# Patient Record
Sex: MALE | Race: WHITE | NOT HISPANIC OR LATINO | Employment: FULL TIME | URBAN - METROPOLITAN AREA
[De-identification: names, ages, dates, MRNs, and addresses within clinical notes are randomized per-mention and may not be internally consistent; named-entity substitution may affect disease eponyms.]

---

## 2017-06-02 ENCOUNTER — LAB VISIT (OUTPATIENT)
Dept: LAB | Facility: HOSPITAL | Age: 42
End: 2017-06-02
Attending: INTERNAL MEDICINE
Payer: COMMERCIAL

## 2017-06-02 DIAGNOSIS — N18.30 CHRONIC KIDNEY DISEASE, STAGE III (MODERATE): ICD-10-CM

## 2017-06-02 LAB
ANION GAP SERPL CALC-SCNC: 9 MMOL/L
BASOPHILS # BLD AUTO: 0.03 K/UL
BASOPHILS NFR BLD: 0.3 %
BUN SERPL-MCNC: 20 MG/DL
CALCIUM SERPL-MCNC: 9.2 MG/DL
CHLORIDE SERPL-SCNC: 105 MMOL/L
CO2 SERPL-SCNC: 24 MMOL/L
CREAT SERPL-MCNC: 1.5 MG/DL
DIFFERENTIAL METHOD: ABNORMAL
EOSINOPHIL # BLD AUTO: 0.4 K/UL
EOSINOPHIL NFR BLD: 3.4 %
ERYTHROCYTE [DISTWIDTH] IN BLOOD BY AUTOMATED COUNT: 13.5 %
EST. GFR  (AFRICAN AMERICAN): >60 ML/MIN/1.73 M^2
EST. GFR  (NON AFRICAN AMERICAN): 56.2 ML/MIN/1.73 M^2
GLUCOSE SERPL-MCNC: 108 MG/DL
HCT VFR BLD AUTO: 45.3 %
HGB BLD-MCNC: 15.1 G/DL
LYMPHOCYTES # BLD AUTO: 4.5 K/UL
LYMPHOCYTES NFR BLD: 41.2 %
MCH RBC QN AUTO: 32.1 PG
MCHC RBC AUTO-ENTMCNC: 33.3 %
MCV RBC AUTO: 96 FL
MONOCYTES # BLD AUTO: 0.7 K/UL
MONOCYTES NFR BLD: 6.1 %
NEUTROPHILS # BLD AUTO: 5.3 K/UL
NEUTROPHILS NFR BLD: 48.9 %
PLATELET # BLD AUTO: 321 K/UL
PMV BLD AUTO: 9.4 FL
POTASSIUM SERPL-SCNC: 4.1 MMOL/L
RBC # BLD AUTO: 4.7 M/UL
SODIUM SERPL-SCNC: 138 MMOL/L
WBC # BLD AUTO: 10.91 K/UL

## 2017-06-02 PROCEDURE — 36415 COLL VENOUS BLD VENIPUNCTURE: CPT | Mod: PO

## 2017-06-02 PROCEDURE — 85025 COMPLETE CBC W/AUTO DIFF WBC: CPT

## 2017-06-02 PROCEDURE — 80048 BASIC METABOLIC PNL TOTAL CA: CPT

## 2017-06-09 ENCOUNTER — OFFICE VISIT (OUTPATIENT)
Dept: NEPHROLOGY | Facility: CLINIC | Age: 42
End: 2017-06-09
Payer: COMMERCIAL

## 2017-06-09 VITALS
BODY MASS INDEX: 34.41 KG/M2 | HEIGHT: 71 IN | SYSTOLIC BLOOD PRESSURE: 140 MMHG | DIASTOLIC BLOOD PRESSURE: 70 MMHG | WEIGHT: 245.81 LBS | OXYGEN SATURATION: 98 % | HEART RATE: 88 BPM

## 2017-06-09 DIAGNOSIS — N02.B9 IGA NEPHROPATHY: ICD-10-CM

## 2017-06-09 DIAGNOSIS — N18.30 CKD (CHRONIC KIDNEY DISEASE) STAGE 3, GFR 30-59 ML/MIN: Primary | ICD-10-CM

## 2017-06-09 PROCEDURE — 99214 OFFICE O/P EST MOD 30 MIN: CPT | Mod: S$GLB,,, | Performed by: INTERNAL MEDICINE

## 2017-06-09 PROCEDURE — 99999 PR PBB SHADOW E&M-EST. PATIENT-LVL III: CPT | Mod: PBBFAC,,, | Performed by: INTERNAL MEDICINE

## 2017-06-09 RX ORDER — DUREZOL 0.5 MG/ML
EMULSION OPHTHALMIC
COMMUNITY
Start: 2017-06-05 | End: 2019-09-11 | Stop reason: ALTCHOICE

## 2017-06-09 NOTE — PROGRESS NOTES
New Consultation Report  Nephrology      Consult Requested By: Self-referred  Reason for Consult: CKD    History of Present Illness:  Patient is a 41 y.o. male presents with prior history of CKD stage 2/3, formerly followed by Dr. SONG Mancuso but has not been seen by a nephrologist in 4 years. Mr. Pena reports history of glomerulonephritis and gross hematuria at the age of 12. After that, he states that he has always had hematuria. He also reports history of iritis. He has family history of father with ESRD.     The patient denies taking NSAIDs or new antibiotics, recreational drugs, recent episode of dehydration, diarrhea, nausea or vomiting, acute illness, hospitalization or exposure to IV radiocontrast.     Past Medical History:   Diagnosis Date    Chronic kidney disease     Chronic kidney disease stage II    Colon polyps     Hyperlipidemia 12/13/2012    Hypertension        Current Outpatient Prescriptions:     allopurinol (ZYLOPRIM) 100 MG tablet, Take 1 tablet (100 mg total) by mouth 3 (three) times daily., Disp: 90 tablet, Rfl: 3    atorvastatin (LIPITOR) 10 MG tablet, Take 1 tablet (10 mg total) by mouth once daily., Disp: 90 tablet, Rfl: 3    DAILY GARLIC ONCE-A-DAY ORAL, Take by mouth., Disp: , Rfl:     DUREZOL 0.05 % Drop ophthalmic solution, , Disp: , Rfl:     lactobacillus acidophilus & bulgar (LACTINEX) 100 million cell packet, Take 1 tablet by mouth once daily., Disp: , Rfl:     multivitamin (ONE DAILY MULTIVITAMIN) per tablet, Take 1 tablet by mouth., Disp: , Rfl:     valsartan (DIOVAN) 160 MG tablet, Take 1 tablet (160 mg total) by mouth once daily., Disp: 90 tablet, Rfl: 3  No Known Allergies     Past Surgical History:   Procedure Laterality Date    COSMETIC SURGERY      pyloric stenosis       Family History   Problem Relation Age of Onset    COPD Mother     Heart disease Mother     Peripheral vascular disease Mother     Diabetes Father     Kidney disease Father     Stroke Father      Hypertension Father     Heart disease Father 70     CABG x 3    Heart disease Maternal Grandfather      Social History   Substance Use Topics    Smoking status: Current Every Day Smoker     Packs/day: 1.00     Years: 24.00    Smokeless tobacco: Not on file    Alcohol use Yes      Comment: Occasional       Review of Systems   Constitutional: Negative.    HENT: Negative.    Eyes: Positive for pain and redness. Negative for blurred vision, double vision, photophobia and discharge.   Respiratory: Positive for cough. Negative for sputum production, shortness of breath and wheezing.    Genitourinary: Negative for dysuria, hematuria and urgency.   Musculoskeletal: Negative for back pain, falls, joint pain, myalgias and neck pain.   Skin: Negative.    Neurological: Negative.    All other systems reviewed and are negative.      Vitals:    06/09/17 1517   BP: (!) 140/70   Pulse: 88       PHYSICAL EXAMINATION:  General: no distress, well nourished  Skin: color, texture, turgor normal. No rash or lesions  HEENT:  Eyes: reactive pupils, normal conjunctiva, mild redness. Oral mucosa moist, no ulcers. Throat: no erythema.  Neck: supple, symmetrical, trachea midline, no JVD, no carotid bruit  Lungs: clear to auscultation bilaterally and normal respiratory effort  Cardiovascular: Heart: regular rate and rhythm, S1, S2 normal, no murmur, rub or gallop. Pulses: 2+ and symmetric.  Abdomen: bowel sounds present, no abdominal bruit, soft, non-tender non-distented; no masses, organomegaly or ascites.   Musculoskeletal: no pitting edema in lower extremities, no clubbing or cyanosis  Lymph Nodes: No cervical or supraclavicular adenopathy  Neurologic: AAOx3, normal strength and tone. No focal deficit. No asterixis.       LABORATORY DATA:  Lab Results   Component Value Date    CREATININE 1.5 (H) 06/02/2017       Prot/Creat Ratio, Ur   Date Value Ref Range Status   11/08/2016 0.19 0.00 - 0.20 Final   08/12/2009 0.29 0.04 - 0.70 Final    06/18/2008 0.08 0.04 - 0.70 Final       Lab Results   Component Value Date     06/02/2017    K 4.1 06/02/2017    CO2 24 06/02/2017       last PTH   Lab Results   Component Value Date    PTH 85.0 (H) 11/08/2016    CALCIUM 9.2 06/02/2017    PHOS 3.9 11/08/2016       Lab Results   Component Value Date    HGB 15.1 06/02/2017        Lab Results   Component Value Date    HGBA1C 6.2 11/08/2016       Lab Results   Component Value Date    LDLCALC Invalid, Trig>400.0 11/08/2016         IMPRESSION / RECOMMENDATIONS:     1. CKD (chronic kidney disease) stage 3A, eGFR 56 ml/min  Unknown cause. HTN might be secondary to CKD instead of the cause. 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 History of iritis suggests systemic autoimmune link, uveitis can be an extrarenal manifestation in patients with IgA nephropathy. Not progressive, very low grade proteinuria and hematuria now. No compelling reason to pursue kidney biopsy. On an ARB. Asked to avoid NSAIDs. On atorvastatin. No compelling indication for immunosuppression at this time.     allopurinol (ZYLOPRIM) 100 MG tablet to slow CKD progression   2. Essential hypertension  As above. On ARB. Asked to reduce salt intake   3. Hyperuricemia  allopurinol (ZYLOPRIM) 100 MG tablet daily to keep serum Ur Ac < 7 mg/dL     SUMMARY OF PLAN:  1. Continue valsartan 160 mg daily; add low Na diet  2. Avoid NSAIDs  3. Continue atorvastatin 10 mg daily  4. Continue allopurinol 100 mg daily  5. RTC in 6 months

## 2017-06-28 DIAGNOSIS — I10 ESSENTIAL HYPERTENSION: ICD-10-CM

## 2017-06-28 DIAGNOSIS — N18.30 CKD (CHRONIC KIDNEY DISEASE) STAGE 3, GFR 30-59 ML/MIN: ICD-10-CM

## 2017-06-28 DIAGNOSIS — E79.0 HYPERURICEMIA: ICD-10-CM

## 2017-06-28 NOTE — TELEPHONE ENCOUNTER
----- Message from Soni Schmidt sent at 6/28/2017 12:50 PM CDT -----  Contact: self   [phone]  Pt is requesting a refill for all his medication:    valsartan (DIOVAN) 160 MG tablet   lactobacillus acidophilus & bulgar (LACTINEX) 100 million cell packet   DUREZOL 0.05 % Drop ophthalmic solution   atorvastatin (LIPITOR) 10 MG tablet   allopurinol (ZYLOPRIM) 100 MG tablet      Pt is requesting 90 supply on meds  Pt is requesting to speak with the nurse concerning changing his pressure meds please    [facility] phone [phone]    Thank you!          Called pt l/m

## 2017-06-30 DIAGNOSIS — E79.0 HYPERURICEMIA: ICD-10-CM

## 2017-06-30 DIAGNOSIS — N18.30 CKD (CHRONIC KIDNEY DISEASE) STAGE 3, GFR 30-59 ML/MIN: ICD-10-CM

## 2017-06-30 DIAGNOSIS — I10 ESSENTIAL HYPERTENSION: ICD-10-CM

## 2017-07-12 RX ORDER — L. ACIDOPHILUS/L.BULGARICUS 100MM CELL
1 GRANULES IN PACKET (EA) ORAL DAILY
Qty: 90 PACKET | Refills: 0 | Status: SHIPPED | OUTPATIENT
Start: 2017-07-12 | End: 2023-02-16

## 2017-07-12 RX ORDER — ALLOPURINOL 100 MG/1
100 TABLET ORAL 3 TIMES DAILY
Qty: 90 TABLET | Refills: 3 | Status: SHIPPED | OUTPATIENT
Start: 2017-07-12 | End: 2017-07-12 | Stop reason: SDUPTHER

## 2017-07-12 RX ORDER — ATORVASTATIN CALCIUM 10 MG/1
10 TABLET, FILM COATED ORAL DAILY
Qty: 90 TABLET | Refills: 3 | Status: SHIPPED | OUTPATIENT
Start: 2017-07-12 | End: 2019-09-11 | Stop reason: ALTCHOICE

## 2017-07-12 RX ORDER — VALSARTAN 160 MG/1
160 TABLET ORAL DAILY
Qty: 90 TABLET | Refills: 0 | Status: SHIPPED | OUTPATIENT
Start: 2017-07-12 | End: 2018-07-12

## 2017-07-12 RX ORDER — ALLOPURINOL 100 MG/1
100 TABLET ORAL 3 TIMES DAILY
Qty: 90 TABLET | Refills: 0 | Status: SHIPPED | OUTPATIENT
Start: 2017-07-12 | End: 2019-09-11 | Stop reason: SDUPTHER

## 2017-07-24 ENCOUNTER — OFFICE VISIT (OUTPATIENT)
Dept: RHEUMATOLOGY | Facility: CLINIC | Age: 42
End: 2017-07-24
Payer: COMMERCIAL

## 2017-07-24 ENCOUNTER — HOSPITAL ENCOUNTER (OUTPATIENT)
Dept: RADIOLOGY | Facility: HOSPITAL | Age: 42
Discharge: HOME | End: 2017-07-24
Attending: INTERNAL MEDICINE
Payer: COMMERCIAL

## 2017-07-24 VITALS
HEIGHT: 71 IN | WEIGHT: 242.69 LBS | BODY MASS INDEX: 33.98 KG/M2 | SYSTOLIC BLOOD PRESSURE: 130 MMHG | DIASTOLIC BLOOD PRESSURE: 86 MMHG | HEART RATE: 85 BPM

## 2017-07-24 DIAGNOSIS — H20.10 IRITIS, CHRONIC: Primary | ICD-10-CM

## 2017-07-24 DIAGNOSIS — H20.10 IRITIS, CHRONIC: ICD-10-CM

## 2017-07-24 PROCEDURE — 71020 XR CHEST PA AND LATERAL: CPT | Mod: TC

## 2017-07-24 PROCEDURE — 99244 OFF/OP CNSLTJ NEW/EST MOD 40: CPT | Mod: S$GLB,,, | Performed by: INTERNAL MEDICINE

## 2017-07-24 PROCEDURE — 99999 PR PBB SHADOW E&M-EST. PATIENT-LVL III: CPT | Mod: PBBFAC,,, | Performed by: INTERNAL MEDICINE

## 2017-07-24 PROCEDURE — 71020 XR CHEST PA AND LATERAL: CPT | Mod: 26,,, | Performed by: RADIOLOGY

## 2017-07-24 ASSESSMENT — ROUTINE ASSESSMENT OF PATIENT INDEX DATA (RAPID3)
TOTAL RAPID3 SCORE: .33
MDHAQ FUNCTION SCORE: 0
WHEN YOU AWAKENED IN THE MORNING OVER THE LAST WEEK, PLEASE INDICATE THE AMOUNT OF TIME IT TAKES UNTIL YOU ARE AS LIMBER AS YOU WILL BE FOR THE DAY: 1HR
AM STIFFNESS SCORE: 1, YES
PAIN SCORE: 0
PSYCHOLOGICAL DISTRESS SCORE: 0
PATIENT GLOBAL ASSESSMENT SCORE: 1
FATIGUE SCORE: 0

## 2017-07-24 NOTE — LETTER
July 24, 2017      CLARK Lopez, OD  [address] [place] [id number]           [facility] - Rheumatology  [address]  [facility] [address]  Phone: [phone]  Fax: [phone]          Patient: Edouard Pena   MR Number: [id number]   YOB: 1975   Date of Visit: 7/24/2017       Dear Dr. CLARK Lopez:    Thank you for referring Edouard Pena to me for evaluation. Attached you will find relevant portions of my assessment and plan of care.    If you have questions, please do not hesitate to call me. I look forward to following Edouard Pena along with you.    Sincerely,    Vijay Walker MD    Enclosure  CC:  No Recipients    If you would like to receive this communication electronically, please contact [email or web address] or [phone] to request more information on [employer] Link access.    For providers and/or their staff who would like to refer a patient to [facility], please contact us through our one-stop-shop provider referral line, [facility], at [phone].    If you feel you have received this communication in error or would no longer like to receive these types of communications, please e-mail [email or web address]
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
## 2017-07-31 ENCOUNTER — TELEPHONE (OUTPATIENT)
Dept: RHEUMATOLOGY | Facility: CLINIC | Age: 42
End: 2017-07-31

## 2017-08-02 ENCOUNTER — TELEPHONE (OUTPATIENT)
Dept: RHEUMATOLOGY | Facility: CLINIC | Age: 42
End: 2017-08-02

## 2017-11-06 DIAGNOSIS — I10 ESSENTIAL HYPERTENSION: ICD-10-CM

## 2017-11-06 DIAGNOSIS — E79.0 HYPERURICEMIA: ICD-10-CM

## 2017-11-06 DIAGNOSIS — N18.30 CKD (CHRONIC KIDNEY DISEASE) STAGE 3, GFR 30-59 ML/MIN: ICD-10-CM

## 2017-11-06 RX ORDER — VALSARTAN 160 MG/1
TABLET ORAL
Qty: 90 TABLET | Refills: 0 | OUTPATIENT
Start: 2017-11-06

## 2017-12-04 DIAGNOSIS — E79.0 HYPERURICEMIA: ICD-10-CM

## 2017-12-04 DIAGNOSIS — N18.30 CKD (CHRONIC KIDNEY DISEASE) STAGE 3, GFR 30-59 ML/MIN: ICD-10-CM

## 2017-12-04 DIAGNOSIS — I10 ESSENTIAL HYPERTENSION: ICD-10-CM

## 2017-12-04 RX ORDER — VALSARTAN 160 MG/1
TABLET ORAL
Qty: 90 TABLET | Refills: 3 | Status: SHIPPED | OUTPATIENT
Start: 2017-12-04 | End: 2018-07-19

## 2018-02-14 RX ORDER — ALLOPURINOL 100 MG/1
TABLET ORAL
Qty: 90 TABLET | Refills: 3 | Status: SHIPPED | OUTPATIENT
Start: 2018-02-14 | End: 2022-08-25

## 2018-07-16 ENCOUNTER — TELEPHONE (OUTPATIENT)
Dept: NEPHROLOGY | Facility: CLINIC | Age: 43
End: 2018-07-16

## 2018-07-19 DIAGNOSIS — I10 HYPERTENSION, UNSPECIFIED TYPE: ICD-10-CM

## 2018-07-19 DIAGNOSIS — N18.30 CKD (CHRONIC KIDNEY DISEASE) STAGE 3, GFR 30-59 ML/MIN: Primary | ICD-10-CM

## 2018-07-19 RX ORDER — IRBESARTAN 300 MG/1
300 TABLET ORAL DAILY
Qty: 90 TABLET | Refills: 3 | Status: SHIPPED | OUTPATIENT
Start: 2018-07-19 | End: 2019-09-11 | Stop reason: ALTCHOICE

## 2018-09-10 ENCOUNTER — TELEPHONE (OUTPATIENT)
Dept: NEPHROLOGY | Facility: CLINIC | Age: 43
End: 2018-09-10

## 2018-09-10 NOTE — TELEPHONE ENCOUNTER
New medication was prescribed on 7/19 pt changed pharmacies and never received prescription. Script was called in to the right pharmacy on 9/10----- Message from Radha Lowery sent at 9/10/2018  1:24 PM CDT -----  Contact: [phone]  .Needs Advice    Reason for call:      Communication Preference:  Additional Information:pt states he stop taking valsartan because of the recall stats he never got any other medication for replacement

## 2018-12-10 DIAGNOSIS — E79.0 HYPERURICEMIA: ICD-10-CM

## 2018-12-10 DIAGNOSIS — N18.30 CKD (CHRONIC KIDNEY DISEASE) STAGE 3, GFR 30-59 ML/MIN: ICD-10-CM

## 2018-12-10 DIAGNOSIS — I10 ESSENTIAL HYPERTENSION: ICD-10-CM

## 2018-12-10 RX ORDER — VALSARTAN 160 MG/1
TABLET ORAL
Qty: 90 TABLET | Refills: 3 | Status: SHIPPED | OUTPATIENT
Start: 2018-12-10 | End: 2019-12-13 | Stop reason: SDUPTHER

## 2019-09-11 ENCOUNTER — OFFICE VISIT (OUTPATIENT)
Dept: INTERNAL MEDICINE | Facility: CLINIC | Age: 44
End: 2019-09-11
Payer: COMMERCIAL

## 2019-09-11 VITALS
SYSTOLIC BLOOD PRESSURE: 120 MMHG | WEIGHT: 249.81 LBS | TEMPERATURE: 99 F | DIASTOLIC BLOOD PRESSURE: 70 MMHG | HEIGHT: 71 IN | BODY MASS INDEX: 34.97 KG/M2 | RESPIRATION RATE: 20 BRPM | HEART RATE: 80 BPM

## 2019-09-11 DIAGNOSIS — R36.1 BLOOD IN SEMEN: ICD-10-CM

## 2019-09-11 DIAGNOSIS — F17.200 TOBACCO DEPENDENCE: ICD-10-CM

## 2019-09-11 DIAGNOSIS — B96.89 ACUTE BACTERIAL BRONCHITIS: ICD-10-CM

## 2019-09-11 DIAGNOSIS — I10 ESSENTIAL HYPERTENSION: ICD-10-CM

## 2019-09-11 DIAGNOSIS — K92.1 BLOOD IN STOOL: ICD-10-CM

## 2019-09-11 DIAGNOSIS — J20.8 ACUTE BACTERIAL BRONCHITIS: ICD-10-CM

## 2019-09-11 DIAGNOSIS — Z00.00 ANNUAL PHYSICAL EXAM: Primary | ICD-10-CM

## 2019-09-11 DIAGNOSIS — R55 SYNCOPE, UNSPECIFIED SYNCOPE TYPE: ICD-10-CM

## 2019-09-11 DIAGNOSIS — N18.30 CKD (CHRONIC KIDNEY DISEASE) STAGE 3, GFR 30-59 ML/MIN: ICD-10-CM

## 2019-09-11 PROCEDURE — 93005 EKG 12-LEAD: ICD-10-PCS | Mod: S$GLB,,, | Performed by: HOSPITALIST

## 2019-09-11 PROCEDURE — 99386 PREV VISIT NEW AGE 40-64: CPT | Mod: S$GLB,,, | Performed by: HOSPITALIST

## 2019-09-11 PROCEDURE — 3078F PR MOST RECENT DIASTOLIC BLOOD PRESSURE < 80 MM HG: ICD-10-PCS | Mod: CPTII,S$GLB,, | Performed by: HOSPITALIST

## 2019-09-11 PROCEDURE — 99999 PR PBB SHADOW E&M-EST. PATIENT-LVL V: ICD-10-PCS | Mod: PBBFAC,,, | Performed by: HOSPITALIST

## 2019-09-11 PROCEDURE — 93010 EKG 12-LEAD: ICD-10-PCS | Mod: S$GLB,,, | Performed by: INTERNAL MEDICINE

## 2019-09-11 PROCEDURE — 3074F PR MOST RECENT SYSTOLIC BLOOD PRESSURE < 130 MM HG: ICD-10-PCS | Mod: CPTII,S$GLB,, | Performed by: HOSPITALIST

## 2019-09-11 PROCEDURE — 3078F DIAST BP <80 MM HG: CPT | Mod: CPTII,S$GLB,, | Performed by: HOSPITALIST

## 2019-09-11 PROCEDURE — 93005 ELECTROCARDIOGRAM TRACING: CPT | Mod: S$GLB,,, | Performed by: HOSPITALIST

## 2019-09-11 PROCEDURE — 99999 PR PBB SHADOW E&M-EST. PATIENT-LVL V: CPT | Mod: PBBFAC,,, | Performed by: HOSPITALIST

## 2019-09-11 PROCEDURE — 3074F SYST BP LT 130 MM HG: CPT | Mod: CPTII,S$GLB,, | Performed by: HOSPITALIST

## 2019-09-11 PROCEDURE — 99386 PR PREVENTIVE VISIT,NEW,40-64: ICD-10-PCS | Mod: S$GLB,,, | Performed by: HOSPITALIST

## 2019-09-11 PROCEDURE — 93010 ELECTROCARDIOGRAM REPORT: CPT | Mod: S$GLB,,, | Performed by: INTERNAL MEDICINE

## 2019-09-11 RX ORDER — AMOXICILLIN 500 MG
1 CAPSULE ORAL DAILY
COMMUNITY
End: 2023-02-16

## 2019-09-11 RX ORDER — AZITHROMYCIN 250 MG/1
TABLET, FILM COATED ORAL
Qty: 6 TABLET | Refills: 0 | Status: SHIPPED | OUTPATIENT
Start: 2019-09-11 | End: 2019-09-16

## 2019-09-11 NOTE — PROGRESS NOTES
"Subjective:     @Patient ID: Edouard Pena is a 44 y.o. male.    Chief Complaint: Establish Care and Annual Exam    HPI    44 y.o. male here for annual exam. Pt is new to me. 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 Is    1. Blood in semen 1-2 months. Intermittently. States brown appearance but not sure if blood  2. Blood in stool - started 2013. Last colonoscopy done at [facility]. Found polyps. Also has external hemorrhoids. Reports bright red blood. Last episode few months ago. Reports possible straining.   3. Syncope- started 2 years ago. heat exhaustion? Works outside. Has occurred 3x over the years. Reports possibly dehydration. One episode occurred when getting out of shower, hit head as he was going out. States only out a few seconds. Had also drank the night before this occurred Feb 2019. Last episode May 2019 and was outdoors, thought he had to have bowel movement and passed out.  Denies chest pain. "feels like black out"  4. Cough with green sputum x 4 days. Mild wheeze. No fever/chills. Not improving    Lipid disorders/ASCVD risk (ages >/= 45 or >/= 20 if increased risk ): ordered  DM (>45y yearly or if obese, HTN): A1c ordered  Eye exam:   Colorectal Cancer (normal risk 50-75yr): Colonoscopy  due   Prostate (per discussion with patient starting at 50y or 45y if high risk):     Vaccines:   Influenza (yearly) declines   Tetanus (every 10 yrs - 1st tdap) : reports done within 10 years    PPSV23(>66yo or <65 w/ lung dz, smoking, DM) :due. Defer at this time due to respiratory infection      Exercise:  none  Diet:  regular        Review of Systems   Constitutional: Negative for chills and fever.   HENT: Negative for congestion and sore throat.    Eyes: Negative for pain and visual disturbance.   Respiratory: Negative for cough and shortness of breath.    Cardiovascular: Negative for chest pain and leg swelling.   Gastrointestinal: Negative for abdominal pain, nausea and vomiting.   Endocrine: Negative for " "polydipsia and polyuria.   Genitourinary: Negative for difficulty urinating and dysuria.   Musculoskeletal: Negative for arthralgias and back pain.   Skin: Negative for rash and wound.   Neurological: Negative for weakness and headaches.   Psychiatric/Behavioral: Negative for agitation and confusion.     Past medical history, surgical history, and family medical history reviewed and updated as appropriate.    Medications and allergies reviewed.     Objective:     Vitals:    09/11/19 1624   BP: 120/70   Pulse: 80   Resp: 20   Temp: 99.3 °F (37.4 °C)   Weight: 113.3 kg (249 lb 12.5 oz)   Height: 5' 11" (1.803 m)     Body mass index is 34.84 kg/m².  Physical Exam   Constitutional: He is oriented to person, place, and time. He appears well-developed and well-nourished. No distress.   HENT:   Head: Normocephalic and atraumatic.   Right Ear: External ear normal.   Left Ear: External ear normal.   Mouth/Throat: Oropharynx is clear and moist. No oropharyngeal exudate.   Eyes: Pupils are equal, round, and reactive to light. Conjunctivae are normal. Right eye exhibits no discharge. Left eye exhibits no discharge.   Neck: Normal range of motion. Neck supple.   Cardiovascular: Normal rate, regular rhythm, normal heart sounds and intact distal pulses. Exam reveals no friction rub.   No murmur heard.  Pulmonary/Chest: Effort normal and breath sounds normal.   Abdominal: Soft. Bowel sounds are normal. He exhibits no distension. There is no tenderness.   Musculoskeletal: Normal range of motion. He exhibits no edema.   Lymphadenopathy:     He has no cervical adenopathy.   Neurological: He is alert and oriented to person, place, and time. No cranial nerve deficit or sensory deficit. He exhibits normal muscle tone. Coordination normal.   Skin: Skin is warm and dry.   Psychiatric: He has a normal mood and affect. His behavior is normal.   Vitals reviewed.      Lab Results   Component Value Date    WBC 10.91 06/02/2017    HGB 15.1 " 06/02/2017    HCT 45.3 06/02/2017     06/02/2017    CHOL 243 (H) 11/08/2016    TRIG 425 (H) 11/08/2016    HDL 31 (L) 11/08/2016    ALT 42 11/08/2016    AST 33 11/08/2016     06/02/2017    K 4.1 06/02/2017     06/02/2017    CREATININE 1.5 (H) 06/02/2017    BUN 20 06/02/2017    CO2 24 06/02/2017    TSH 1.200 12/12/2012    HGBA1C 6.2 11/08/2016       Assessment:     1. Annual physical exam    2. Syncope, unspecified syncope type    3. Acute bacterial bronchitis    4. Essential hypertension    5. CKD (chronic kidney disease) stage 3, GFR 30-59 ml/min    6. Blood in semen    7. Tobacco dependence      Plan:   Edouard was seen today for establish care and annual exam.    Diagnoses and all orders for this visit:    Annual physical exam  -     Comprehensive metabolic panel; Future  -     CBC auto differential; Future  -     TSH; Future  -     Vitamin D; Future  -     Lipid panel; Future  -     HIV 1/2 Ag/Ab (4th Gen); Future  -     Urinalysis; Future  -     HEMOGLOBIN A1C; Future  -     IN OFFICE EKG 12-LEAD (to Muse)  -     PSA, Screening; Future    Syncope, unspecified syncope type  - Possibly heat related/dehydration given clinical presentation. However will check ekg, carotid u/s and CT head.   -     IN OFFICE EKG 12-LEAD (to Muse)  -     US Carotid Bilateral; Future  -     CT Head Without Contrast; Future    Acute bacterial bronchitis  -     azithromycin (Z-MISHA) 250 MG tablet; Take 2 tablets by mouth on day 1; Take 1 tablet by mouth on days 2-5    Essential hypertension       - BP controlled. Not currently on any medication    CKD (chronic kidney disease) stage 3, GFR 30-59 ml/min  -     Ambulatory Referral to Nephrology    Blood in semen  -     Ambulatory Referral to Urology    Tobacco dependence         - Pt counseled on smoking cessation. At this time he is not ready to quit    Blood in stool          - Possibly due to hemorrhoids however likely due for repeat cscope. Reports last cscope in 2013 and  found to have polyps. Will refer to GI      Follow up in about 1 year (around 9/11/2020), or if symptoms worsen or fail to improve.    Jacki Mai MD   Internal Medicine    9/11/2019

## 2019-09-16 ENCOUNTER — HOSPITAL ENCOUNTER (OUTPATIENT)
Dept: RADIOLOGY | Facility: HOSPITAL | Age: 44
Discharge: HOME | End: 2019-09-16
Attending: HOSPITALIST
Payer: COMMERCIAL

## 2019-09-16 DIAGNOSIS — R55 SYNCOPE, UNSPECIFIED SYNCOPE TYPE: ICD-10-CM

## 2019-09-16 PROCEDURE — 93880 EXTRACRANIAL BILAT STUDY: CPT | Mod: TC

## 2019-09-16 PROCEDURE — 93880 EXTRACRANIAL BILAT STUDY: CPT | Mod: 26,,, | Performed by: RADIOLOGY

## 2019-09-16 PROCEDURE — 93880 US CAROTID BILATERAL: ICD-10-PCS | Mod: 26,,, | Performed by: RADIOLOGY

## 2019-09-20 ENCOUNTER — TELEPHONE (OUTPATIENT)
Dept: INTERNAL MEDICINE | Facility: CLINIC | Age: 44
End: 2019-09-20

## 2019-09-23 NOTE — TELEPHONE ENCOUNTER
Was able to speak to pt's wife about carotid ultrasound result showing no significant atherosclerosis. Also that CT head is now approved for scheduling as completed peer to peer last week w/ insurance company. Wife reports she will notify her  as he is at work and does not get off until evening

## 2019-09-30 ENCOUNTER — TELEPHONE (OUTPATIENT)
Dept: INTERNAL MEDICINE | Facility: CLINIC | Age: 44
End: 2019-09-30

## 2019-09-30 ENCOUNTER — LAB VISIT (OUTPATIENT)
Dept: LAB | Facility: HOSPITAL | Age: 44
End: 2019-09-30
Attending: HOSPITALIST
Payer: COMMERCIAL

## 2019-09-30 DIAGNOSIS — Z00.00 ANNUAL PHYSICAL EXAM: ICD-10-CM

## 2019-09-30 LAB
25(OH)D3+25(OH)D2 SERPL-MCNC: 37 NG/ML (ref 30–96)
ALBUMIN SERPL BCP-MCNC: 3.9 G/DL (ref 3.5–5.2)
ALP SERPL-CCNC: 87 U/L (ref 55–135)
ALT SERPL W/O P-5'-P-CCNC: 60 U/L (ref 10–44)
ANION GAP SERPL CALC-SCNC: 11 MMOL/L (ref 8–16)
AST SERPL-CCNC: 38 U/L (ref 10–40)
BASOPHILS # BLD AUTO: 0.07 K/UL (ref 0–0.2)
BASOPHILS NFR BLD: 0.6 % (ref 0–1.9)
BILIRUB SERPL-MCNC: 0.3 MG/DL (ref 0.1–1)
BUN SERPL-MCNC: 20 MG/DL (ref 6–20)
CALCIUM SERPL-MCNC: 9.8 MG/DL (ref 8.7–10.5)
CHLORIDE SERPL-SCNC: 105 MMOL/L (ref 95–110)
CHOLEST SERPL-MCNC: 292 MG/DL (ref 120–199)
CHOLEST/HDLC SERPL: 8.6 {RATIO} (ref 2–5)
CO2 SERPL-SCNC: 24 MMOL/L (ref 23–29)
COMPLEXED PSA SERPL-MCNC: 1.8 NG/ML (ref 0–4)
CREAT SERPL-MCNC: 1.5 MG/DL (ref 0.5–1.4)
DIFFERENTIAL METHOD: ABNORMAL
EOSINOPHIL # BLD AUTO: 0.3 K/UL (ref 0–0.5)
EOSINOPHIL NFR BLD: 2.5 % (ref 0–8)
ERYTHROCYTE [DISTWIDTH] IN BLOOD BY AUTOMATED COUNT: 13.3 % (ref 11.5–14.5)
EST. GFR  (AFRICAN AMERICAN): >60 ML/MIN/1.73 M^2
EST. GFR  (NON AFRICAN AMERICAN): 55 ML/MIN/1.73 M^2
ESTIMATED AVG GLUCOSE: 146 MG/DL (ref 68–131)
GLUCOSE SERPL-MCNC: 162 MG/DL (ref 70–110)
HBA1C MFR BLD HPLC: 6.7 % (ref 4–5.6)
HCT VFR BLD AUTO: 47.4 % (ref 40–54)
HDLC SERPL-MCNC: 34 MG/DL (ref 40–75)
HDLC SERPL: 11.6 % (ref 20–50)
HGB BLD-MCNC: 15.5 G/DL (ref 14–18)
HIV 1+2 AB+HIV1 P24 AG SERPL QL IA: NEGATIVE
IMM GRANULOCYTES # BLD AUTO: 0.05 K/UL (ref 0–0.04)
IMM GRANULOCYTES NFR BLD AUTO: 0.4 % (ref 0–0.5)
LDLC SERPL CALC-MCNC: ABNORMAL MG/DL (ref 63–159)
LYMPHOCYTES # BLD AUTO: 5.7 K/UL (ref 1–4.8)
LYMPHOCYTES NFR BLD: 46.3 % (ref 18–48)
MCH RBC QN AUTO: 32.6 PG (ref 27–31)
MCHC RBC AUTO-ENTMCNC: 32.7 G/DL (ref 32–36)
MCV RBC AUTO: 100 FL (ref 82–98)
MONOCYTES # BLD AUTO: 0.8 K/UL (ref 0.3–1)
MONOCYTES NFR BLD: 6.1 % (ref 4–15)
NEUTROPHILS # BLD AUTO: 5.5 K/UL (ref 1.8–7.7)
NEUTROPHILS NFR BLD: 44.1 % (ref 38–73)
NONHDLC SERPL-MCNC: 258 MG/DL
NRBC BLD-RTO: 0 /100 WBC
PLATELET # BLD AUTO: 412 K/UL (ref 150–350)
PMV BLD AUTO: 9.6 FL (ref 9.2–12.9)
POTASSIUM SERPL-SCNC: 4.5 MMOL/L (ref 3.5–5.1)
PROT SERPL-MCNC: 7.7 G/DL (ref 6–8.4)
RBC # BLD AUTO: 4.76 M/UL (ref 4.6–6.2)
SODIUM SERPL-SCNC: 140 MMOL/L (ref 136–145)
TRIGL SERPL-MCNC: 509 MG/DL (ref 30–150)
TSH SERPL DL<=0.005 MIU/L-ACNC: 0.91 UIU/ML (ref 0.4–4)
WBC # BLD AUTO: 12.41 K/UL (ref 3.9–12.7)

## 2019-09-30 PROCEDURE — 82306 VITAMIN D 25 HYDROXY: CPT

## 2019-09-30 PROCEDURE — 36415 COLL VENOUS BLD VENIPUNCTURE: CPT | Mod: PO

## 2019-09-30 PROCEDURE — 80061 LIPID PANEL: CPT

## 2019-09-30 PROCEDURE — 86703 HIV-1/HIV-2 1 RESULT ANTBDY: CPT

## 2019-09-30 PROCEDURE — 83036 HEMOGLOBIN GLYCOSYLATED A1C: CPT

## 2019-09-30 PROCEDURE — 80053 COMPREHEN METABOLIC PANEL: CPT

## 2019-09-30 PROCEDURE — 84443 ASSAY THYROID STIM HORMONE: CPT

## 2019-09-30 PROCEDURE — 85025 COMPLETE CBC W/AUTO DIFF WBC: CPT

## 2019-09-30 PROCEDURE — 84153 ASSAY OF PSA TOTAL: CPT

## 2019-09-30 NOTE — TELEPHONE ENCOUNTER
----- Message from Day Soliman sent at 9/30/2019 11:21 AM CDT -----  Contact: Pts wife Mrs. Gomez Cell# [phone]  Patient is returning a phone call.  Who left a message for the patient:   Does patient know what this is regarding:    Comments: Patient's wife Patricia said that she received a call on today about her 's test results and she would like a call back please.

## 2019-10-03 ENCOUNTER — TELEPHONE (OUTPATIENT)
Dept: UROLOGY | Facility: CLINIC | Age: 44
End: 2019-10-03

## 2019-10-03 ENCOUNTER — TELEPHONE (OUTPATIENT)
Dept: INTERNAL MEDICINE | Facility: CLINIC | Age: 44
End: 2019-10-03

## 2019-10-03 DIAGNOSIS — E11.22 TYPE 2 DIABETES MELLITUS WITH STAGE 3 CHRONIC KIDNEY DISEASE, WITHOUT LONG-TERM CURRENT USE OF INSULIN: Primary | ICD-10-CM

## 2019-10-03 DIAGNOSIS — N18.30 TYPE 2 DIABETES MELLITUS WITH STAGE 3 CHRONIC KIDNEY DISEASE, WITHOUT LONG-TERM CURRENT USE OF INSULIN: Primary | ICD-10-CM

## 2019-10-03 DIAGNOSIS — E78.5 HYPERLIPIDEMIA, UNSPECIFIED HYPERLIPIDEMIA TYPE: ICD-10-CM

## 2019-10-03 DIAGNOSIS — E11.9 NEWLY DIAGNOSED DIABETES: ICD-10-CM

## 2019-10-03 RX ORDER — ATORVASTATIN CALCIUM 20 MG/1
20 TABLET, FILM COATED ORAL DAILY
Qty: 90 TABLET | Refills: 3 | Status: SHIPPED | OUTPATIENT
Start: 2019-10-03 | End: 2023-02-16

## 2019-10-03 RX ORDER — INSULIN PUMP SYRINGE, 3 ML
EACH MISCELLANEOUS
Qty: 1 EACH | Refills: 0 | Status: SHIPPED | OUTPATIENT
Start: 2019-10-03

## 2019-10-03 RX ORDER — LANCETS
1 EACH MISCELLANEOUS 2 TIMES DAILY
Qty: 100 EACH | Refills: 6 | Status: SHIPPED | OUTPATIENT
Start: 2019-10-03 | End: 2023-03-08 | Stop reason: SDUPTHER

## 2019-10-03 NOTE — TELEPHONE ENCOUNTER
Spoke w/ pt's wife about 's lab results (pt difficult to get ahold of due to work schedule)    Mostly wnl. ckd3 stable.     New dm2 with a1c 6.7. Counseled will refer to diabetic education. Supplies sent to pharmacy. Pt to notify MD if would agree to start medication vs diet control    HLD- will start statin. Counseled on side effects of possible myalgia.     Needs lipid, a1c and f/u appt with pcp in 3 months.

## 2019-10-03 NOTE — TELEPHONE ENCOUNTER
A 3 month month recall placed,  Pt Needs lipid, a1c and f/u appt with PCP Dr Gilbert in 3 months around 1/4/2020.

## 2019-10-03 NOTE — TELEPHONE ENCOUNTER
I spoke with patient's wife who agreed to new appt date time,location, provider. Due to scheduled with wrong provider.

## 2019-11-01 ENCOUNTER — TELEPHONE (OUTPATIENT)
Dept: NEPHROLOGY | Facility: CLINIC | Age: 44
End: 2019-11-01

## 2019-11-01 DIAGNOSIS — N18.30 STAGE 3 CHRONIC KIDNEY DISEASE: Primary | ICD-10-CM

## 2019-12-13 DIAGNOSIS — N18.30 CKD (CHRONIC KIDNEY DISEASE) STAGE 3, GFR 30-59 ML/MIN: ICD-10-CM

## 2019-12-13 DIAGNOSIS — E79.0 HYPERURICEMIA: ICD-10-CM

## 2019-12-13 DIAGNOSIS — I10 ESSENTIAL HYPERTENSION: ICD-10-CM

## 2019-12-13 RX ORDER — VALSARTAN 160 MG/1
TABLET ORAL
Qty: 90 TABLET | Refills: 3 | Status: SHIPPED | OUTPATIENT
Start: 2019-12-13 | End: 2020-12-21

## 2020-12-21 DIAGNOSIS — E79.0 HYPERURICEMIA: ICD-10-CM

## 2020-12-21 DIAGNOSIS — N18.30 CKD (CHRONIC KIDNEY DISEASE) STAGE 3, GFR 30-59 ML/MIN: ICD-10-CM

## 2020-12-21 DIAGNOSIS — I10 ESSENTIAL HYPERTENSION: ICD-10-CM

## 2020-12-21 RX ORDER — VALSARTAN 160 MG/1
160 TABLET ORAL DAILY
Qty: 90 TABLET | Refills: 3 | Status: SHIPPED | OUTPATIENT
Start: 2020-12-21 | End: 2022-04-07

## 2020-12-21 NOTE — TELEPHONE ENCOUNTER
Spoke to pt wife, informed her I have sent refill request to . Verbally understood.       ----- Message from Amanda Blas sent at 12/21/2020  2:57 PM CST -----  Regarding: Out of Med  Contact: pt's wife  OUt of med     Pt's wife called previously    No Response       valsartan (DIOVAN) 160 MG tablet      Pt calling in regards to medication and needs refill on:      Preferred Pharmacy:   [facility] - [address]      Please call pt's wife   Mrs Patricia Pena  once script sent  ph   [phone]

## 2021-03-03 ENCOUNTER — TELEPHONE (OUTPATIENT)
Dept: UROLOGY | Facility: CLINIC | Age: 46
End: 2021-03-03

## 2022-04-01 ENCOUNTER — LAB VISIT (OUTPATIENT)
Dept: LAB | Facility: HOSPITAL | Age: 47
End: 2022-04-01
Attending: PEDIATRICS
Payer: COMMERCIAL

## 2022-04-01 ENCOUNTER — TELEPHONE (OUTPATIENT)
Dept: NEPHROLOGY | Facility: CLINIC | Age: 47
End: 2022-04-01
Payer: COMMERCIAL

## 2022-04-01 DIAGNOSIS — N18.30 STAGE 3 CHRONIC KIDNEY DISEASE, UNSPECIFIED WHETHER STAGE 3A OR 3B CKD: ICD-10-CM

## 2022-04-01 DIAGNOSIS — N18.30 STAGE 3 CHRONIC KIDNEY DISEASE, UNSPECIFIED WHETHER STAGE 3A OR 3B CKD: Primary | ICD-10-CM

## 2022-04-01 LAB
ANION GAP SERPL CALC-SCNC: 12 MMOL/L (ref 8–16)
BASOPHILS # BLD AUTO: 0.07 K/UL (ref 0–0.2)
BASOPHILS NFR BLD: 0.5 % (ref 0–1.9)
BUN SERPL-MCNC: 23 MG/DL (ref 6–20)
CALCIUM SERPL-MCNC: 9.3 MG/DL (ref 8.7–10.5)
CHLORIDE SERPL-SCNC: 111 MMOL/L (ref 95–110)
CO2 SERPL-SCNC: 16 MMOL/L (ref 23–29)
CREAT SERPL-MCNC: 1.9 MG/DL (ref 0.5–1.4)
DIFFERENTIAL METHOD: ABNORMAL
EOSINOPHIL # BLD AUTO: 0.4 K/UL (ref 0–0.5)
EOSINOPHIL NFR BLD: 2.7 % (ref 0–8)
ERYTHROCYTE [DISTWIDTH] IN BLOOD BY AUTOMATED COUNT: 13.5 % (ref 11.5–14.5)
EST. GFR  (AFRICAN AMERICAN): 47.1 ML/MIN/1.73 M^2
EST. GFR  (NON AFRICAN AMERICAN): 40.8 ML/MIN/1.73 M^2
GLUCOSE SERPL-MCNC: 116 MG/DL (ref 70–110)
HCT VFR BLD AUTO: 43.4 % (ref 40–54)
HGB BLD-MCNC: 14.3 G/DL (ref 14–18)
IMM GRANULOCYTES # BLD AUTO: 0.04 K/UL (ref 0–0.04)
IMM GRANULOCYTES NFR BLD AUTO: 0.3 % (ref 0–0.5)
LYMPHOCYTES # BLD AUTO: 4.7 K/UL (ref 1–4.8)
LYMPHOCYTES NFR BLD: 36.1 % (ref 18–48)
MCH RBC QN AUTO: 31.9 PG (ref 27–31)
MCHC RBC AUTO-ENTMCNC: 32.9 G/DL (ref 32–36)
MCV RBC AUTO: 97 FL (ref 82–98)
MONOCYTES # BLD AUTO: 0.8 K/UL (ref 0.3–1)
MONOCYTES NFR BLD: 6.5 % (ref 4–15)
NEUTROPHILS # BLD AUTO: 7 K/UL (ref 1.8–7.7)
NEUTROPHILS NFR BLD: 53.9 % (ref 38–73)
NRBC BLD-RTO: 0 /100 WBC
PLATELET # BLD AUTO: 441 K/UL (ref 150–450)
PLATELET BLD QL SMEAR: ABNORMAL
PMV BLD AUTO: 9.4 FL (ref 9.2–12.9)
POTASSIUM SERPL-SCNC: 4.8 MMOL/L (ref 3.5–5.1)
RBC # BLD AUTO: 4.48 M/UL (ref 4.6–6.2)
SODIUM SERPL-SCNC: 139 MMOL/L (ref 136–145)
WBC # BLD AUTO: 12.95 K/UL (ref 3.9–12.7)

## 2022-04-01 PROCEDURE — 36415 COLL VENOUS BLD VENIPUNCTURE: CPT | Mod: PO | Performed by: INTERNAL MEDICINE

## 2022-04-01 PROCEDURE — 85025 COMPLETE CBC W/AUTO DIFF WBC: CPT | Performed by: INTERNAL MEDICINE

## 2022-04-01 PROCEDURE — 80048 BASIC METABOLIC PNL TOTAL CA: CPT | Performed by: INTERNAL MEDICINE

## 2022-04-07 ENCOUNTER — OFFICE VISIT (OUTPATIENT)
Dept: NEPHROLOGY | Facility: CLINIC | Age: 47
End: 2022-04-07
Payer: COMMERCIAL

## 2022-04-07 VITALS
WEIGHT: 242.5 LBS | OXYGEN SATURATION: 98 % | HEART RATE: 87 BPM | SYSTOLIC BLOOD PRESSURE: 170 MMHG | DIASTOLIC BLOOD PRESSURE: 90 MMHG | BODY MASS INDEX: 33.82 KG/M2

## 2022-04-07 DIAGNOSIS — I10 HYPERTENSION, UNSPECIFIED TYPE: ICD-10-CM

## 2022-04-07 DIAGNOSIS — E79.0 HYPERURICEMIA: ICD-10-CM

## 2022-04-07 DIAGNOSIS — I10 ESSENTIAL HYPERTENSION: ICD-10-CM

## 2022-04-07 DIAGNOSIS — N18.32 STAGE 3B CHRONIC KIDNEY DISEASE: Primary | ICD-10-CM

## 2022-04-07 PROCEDURE — 99999 PR PBB SHADOW E&M-EST. PATIENT-LVL III: CPT | Mod: PBBFAC,,, | Performed by: INTERNAL MEDICINE

## 2022-04-07 PROCEDURE — 99999 PR PBB SHADOW E&M-EST. PATIENT-LVL III: ICD-10-PCS | Mod: PBBFAC,,, | Performed by: INTERNAL MEDICINE

## 2022-04-07 RX ORDER — ALLOPURINOL 100 MG/1
100 TABLET ORAL DAILY
Status: SHIPPED | OUTPATIENT
Start: 2022-04-08 | End: 2022-05-08

## 2022-04-07 RX ORDER — VALSARTAN 320 MG/1
320 TABLET ORAL DAILY
Qty: 90 TABLET | Refills: 3 | Status: SHIPPED | OUTPATIENT
Start: 2022-04-07 | End: 2022-11-10 | Stop reason: SDUPTHER

## 2022-04-07 RX ORDER — ALLOPURINOL 100 MG/1
100 TABLET ORAL
Status: DISCONTINUED | OUTPATIENT
Start: 2022-04-07 | End: 2022-04-07

## 2022-04-07 RX ORDER — COLCHICINE 0.6 MG/1
0.6 TABLET ORAL DAILY PRN
COMMUNITY
Start: 2022-03-15 | End: 2024-03-11 | Stop reason: SDUPTHER

## 2022-04-07 NOTE — PROGRESS NOTES
Nephrology Clinic Note   4/7/2022    Chief Complaint   Patient presents with    Chronic Kidney Disease      History of present illness:  Patient is a 46 y.o. male.   Presents to the clinic today for medical conditions listed below.  Problem Noted   Htn (Hypertension) 11/14/2012   Ckd (Chronic Kidney Disease) Stage 3, Gfr 30-59 Ml/Min 11/14/2012    45 y/o M with hx of HTN, Gout and prior history of glomerulonephritis who comes in for follow up evaluation. Pt was last evaluated by a kidney doctor 5y ago in 2017. At that time it was suspected he had stable disease, possibly IgA nephropathy. Biopsy was deferred and expectant observation was recommended. Pt now comes back to the Nephrology clinic with worsening renal dysfunction and proteinuria. Blood pressure also significantly elevated. He refers his father suffered from kidney disease and had a complication of bleeding after a biopsy and required dialysis however he cannot remember the diagnosis. He believes it was related to hypertension. Pt denies any recent nausea, vomits, diarrhea, radiocontrast administration, dehydration or NSAID use.        Review of Systems   Constitutional: Negative for chills, fever and weight loss.   Respiratory: Negative for cough, shortness of breath and wheezing.    Cardiovascular: Negative for chest pain, claudication and leg swelling.   Gastrointestinal: Negative for abdominal pain, diarrhea, nausea and vomiting.   Genitourinary: Negative for dysuria, flank pain, frequency, hematuria and urgency.   Skin: Negative for rash.   Neurological: Negative for weakness.       History:  Past Medical History:   Diagnosis Date    Chronic kidney disease     Chronic kidney disease stage II    Colon polyps     Hyperlipidemia 12/13/2012    Hypertension       Past Surgical History:   Procedure Laterality Date    COSMETIC SURGERY      pyloric stenosis          Current Outpatient Medications:     allopurinol (ZYLOPRIM) 100 MG tablet, TAKE ONE  TABLET BY MOUTH THREE TIMES A DAY (Patient not taking: Reported on 4/7/2022), Disp: 90 tablet, Rfl: 3    atorvastatin (LIPITOR) 20 MG tablet, Take 1 tablet (20 mg total) by mouth once daily., Disp: 90 tablet, Rfl: 3    blood sugar diagnostic Strp, 1 strip by Misc.(Non-Drug; Combo Route) route 2 (two) times daily. ICD 10: E11.22 (Patient not taking: Reported on 4/7/2022), Disp: 100 each, Rfl: 6    blood-glucose meter kit, Use as instructed to check blood sugar two times a day. ICD 10: E11.22 (Patient not taking: Reported on 4/7/2022), Disp: 1 each, Rfl: 0    colchicine (COLCRYS) 0.6 mg tablet, Take by mouth., Disp: , Rfl:     DAILY GARLIC ONCE-A-DAY ORAL, Take by mouth., Disp: , Rfl:     fish oil-omega-3 fatty acids 300-1,000 mg capsule, Take 1 capsule by mouth once daily., Disp: , Rfl:     lactobacillus acidophilus & bulgar (LACTINEX) 100 million cell packet, Take 1 packet (1 each total) by mouth once daily. (Patient not taking: Reported on 4/7/2022), Disp: 90 packet, Rfl: 0    lancets Misc, 1 lancet by Misc.(Non-Drug; Combo Route) route 2 (two) times daily. ICD 10: E11.22 (Patient not taking: Reported on 4/7/2022), Disp: 100 each, Rfl: 6    multivitamin (THERAGRAN) per tablet, Take 1 tablet by mouth., Disp: , Rfl:     valsartan (DIOVAN) 320 MG tablet, Take 1 tablet (320 mg total) by mouth once daily., Disp: 90 tablet, Rfl: 3    Current Facility-Administered Medications:     allopurinoL tablet 100 mg, 100 mg, Oral, 1 time in Clinic/HOD, All Benavides MD    [START ON 4/8/2022] allopurinoL tablet 100 mg, 100 mg, Oral, Daily, All Benavides MD  Review of patient's allergies indicates:  No Known Allergies   Social History     Tobacco Use    Smoking status: Current Every Day Smoker     Packs/day: 1.00     Years: 24.00     Pack years: 24.00    Smokeless tobacco: Not on file   Substance Use Topics    Alcohol use: Yes     Comment: Occasional      Family History   Problem Relation Age of Onset     COPD Mother     Heart disease Mother     Peripheral vascular disease Mother     Diabetes Father     Kidney disease Father     Stroke Father     Hypertension Father     Heart disease Father 70        CABG x 3    Kidney failure Father     Heart disease Maternal Grandfather         Physical Exam :  Vitals:    04/07/22 1430   BP: (!) 170/90   Pulse: 87     Physical Exam  Constitutional:       General: He is not in acute distress.     Appearance: He is not ill-appearing or toxic-appearing.   HENT:      Head: Atraumatic.      Nose: Nose normal.      Mouth/Throat:      Mouth: Mucous membranes are moist.   Cardiovascular:      Rate and Rhythm: Normal rate.   Pulmonary:      Effort: Pulmonary effort is normal.   Abdominal:      General: There is no distension.      Palpations: Abdomen is soft.      Tenderness: There is no abdominal tenderness.   Musculoskeletal:         General: No swelling. Normal range of motion.      Right lower leg: No edema.      Left lower leg: No edema.   Skin:     General: Skin is warm and dry.   Neurological:      Mental Status: He is alert and oriented to person, place, and time.         Labs reviewed   Images Reviewed    Assessment:    1. Stage 3b chronic kidney disease    2. CKD (chronic kidney disease) stage 3, GFR 30-59 ml/min    3. Essential hypertension    4. Hyperuricemia    5. Hypertension, unspecified type        Plan:    CKD (chronic kidney disease) stage 3, GFR 30-59 ml/min  Cr up to 1.9 from baseline around 1.5  Significant proteinuria which is new from before; now at nephrotic range with UPCR up to 4  Blood 2+ with 2 RBCs in UA   No edema on examination  Will send preteinuria workup along with repeat labs   Retroperitoneal US noted  Pt may benefit from kidney biopsy however he prefers to hold off from now and think about it.   Valsartan increased to 320mg for better management of HTN and proteinuria.   RTC in 6 weeks with repeat labs.       HTN (hypertension)  Valsartan  increased to 320mg PO daily     Follow up in about 6 weeks (around 5/19/2022).     Orders Placed This Encounter   Procedures    US Retroperitoneal Complete     Screen w/Reflex    ANTI-DNA ANTIBODY, DOUBLE-STRANDED    Protein electrophoresis, serum    Immunofixation electrophoresis    IGA    Immunoglobulin free LT chains blood    Glomerular Basement Membrane Antibodies    Phospholipase A2 Receptor AB, Serum    Uric Acid    Renal Function Panel    Urinalysis    Protein / creatinine ratio, urine     Medications Discontinued During This Encounter   Medication Reason    valsartan (DIOVAN) 160 MG tablet Change in Dosage Form    valsartan (DIOVAN) 160 MG tablet Change in Dosage Form      Future Appointments   Date Time Provider Department Center   5/5/2022 11:00 AM [facility] OI-US1 MASTER [facility] ULTR [facility]   5/5/2022  1:00 PM LAB, APPOINTMENT [facility] IM Dave Rodgers W   5/5/2022  1:10 PM LAB, SPECIMEN [facility] SPLABIM Dave Rodgers W   5/19/2022  2:00 PM All Benavides MD [facility] NEPHYA Benavides

## 2022-04-07 NOTE — ASSESSMENT & PLAN NOTE
Cr up to 1.9 from baseline around 1.5  Significant proteinuria which is new from before; now at nephrotic range with UPCR up to 4  Blood 2+ with 2 RBCs in UA   No edema on examination  Will send preteinuria workup along with repeat labs   Retroperitoneal US noted  Pt may benefit from kidney biopsy however he prefers to hold off from now and think about it.   Valsartan increased to 320mg for better management of HTN and proteinuria.   RTC in 6 weeks with repeat labs.

## 2022-05-05 ENCOUNTER — HOSPITAL ENCOUNTER (OUTPATIENT)
Dept: RADIOLOGY | Facility: HOSPITAL | Age: 47
Discharge: HOME | End: 2022-05-05
Attending: INTERNAL MEDICINE
Payer: COMMERCIAL

## 2022-05-05 DIAGNOSIS — N18.32 STAGE 3B CHRONIC KIDNEY DISEASE: ICD-10-CM

## 2022-05-05 PROCEDURE — 76770 US EXAM ABDO BACK WALL COMP: CPT | Mod: 26,,, | Performed by: RADIOLOGY

## 2022-05-05 PROCEDURE — 76770 US EXAM ABDO BACK WALL COMP: CPT | Mod: TC

## 2022-05-05 PROCEDURE — 76770 US RETROPERITONEAL COMPLETE: ICD-10-PCS | Mod: 26,,, | Performed by: RADIOLOGY

## 2022-05-19 ENCOUNTER — OFFICE VISIT (OUTPATIENT)
Dept: NEPHROLOGY | Facility: CLINIC | Age: 47
End: 2022-05-19
Payer: COMMERCIAL

## 2022-05-19 VITALS
HEART RATE: 89 BPM | BODY MASS INDEX: 34.13 KG/M2 | SYSTOLIC BLOOD PRESSURE: 172 MMHG | DIASTOLIC BLOOD PRESSURE: 90 MMHG | OXYGEN SATURATION: 98 % | WEIGHT: 244.69 LBS

## 2022-05-19 DIAGNOSIS — I10 HYPERTENSION, UNSPECIFIED TYPE: ICD-10-CM

## 2022-05-19 DIAGNOSIS — N18.32 STAGE 3B CHRONIC KIDNEY DISEASE: Primary | ICD-10-CM

## 2022-05-19 PROCEDURE — 3080F PR MOST RECENT DIASTOLIC BLOOD PRESSURE >= 90 MM HG: ICD-10-PCS | Mod: CPTII,S$GLB,, | Performed by: INTERNAL MEDICINE

## 2022-05-19 PROCEDURE — 3080F DIAST BP >= 90 MM HG: CPT | Mod: CPTII,S$GLB,, | Performed by: INTERNAL MEDICINE

## 2022-05-19 PROCEDURE — 3008F PR BODY MASS INDEX (BMI) DOCUMENTED: ICD-10-PCS | Mod: CPTII,S$GLB,, | Performed by: INTERNAL MEDICINE

## 2022-05-19 PROCEDURE — 99213 PR OFFICE/OUTPT VISIT, EST, LEVL III, 20-29 MIN: ICD-10-PCS | Mod: S$GLB,,, | Performed by: INTERNAL MEDICINE

## 2022-05-19 PROCEDURE — 99999 PR PBB SHADOW E&M-EST. PATIENT-LVL III: ICD-10-PCS | Mod: PBBFAC,,, | Performed by: INTERNAL MEDICINE

## 2022-05-19 PROCEDURE — 1159F PR MEDICATION LIST DOCUMENTED IN MEDICAL RECORD: ICD-10-PCS | Mod: CPTII,S$GLB,, | Performed by: INTERNAL MEDICINE

## 2022-05-19 PROCEDURE — 3077F SYST BP >= 140 MM HG: CPT | Mod: CPTII,S$GLB,, | Performed by: INTERNAL MEDICINE

## 2022-05-19 PROCEDURE — 4010F PR ACE/ARB THEARPY RXD/TAKEN: ICD-10-PCS | Mod: CPTII,S$GLB,, | Performed by: INTERNAL MEDICINE

## 2022-05-19 PROCEDURE — 99213 OFFICE O/P EST LOW 20 MIN: CPT | Mod: S$GLB,,, | Performed by: INTERNAL MEDICINE

## 2022-05-19 PROCEDURE — 3066F PR DOCUMENTATION OF TREATMENT FOR NEPHROPATHY: ICD-10-PCS | Mod: CPTII,S$GLB,, | Performed by: INTERNAL MEDICINE

## 2022-05-19 PROCEDURE — 4010F ACE/ARB THERAPY RXD/TAKEN: CPT | Mod: CPTII,S$GLB,, | Performed by: INTERNAL MEDICINE

## 2022-05-19 PROCEDURE — 3077F PR MOST RECENT SYSTOLIC BLOOD PRESSURE >= 140 MM HG: ICD-10-PCS | Mod: CPTII,S$GLB,, | Performed by: INTERNAL MEDICINE

## 2022-05-19 PROCEDURE — 3066F NEPHROPATHY DOC TX: CPT | Mod: CPTII,S$GLB,, | Performed by: INTERNAL MEDICINE

## 2022-05-19 PROCEDURE — 99999 PR PBB SHADOW E&M-EST. PATIENT-LVL III: CPT | Mod: PBBFAC,,, | Performed by: INTERNAL MEDICINE

## 2022-05-19 PROCEDURE — 3008F BODY MASS INDEX DOCD: CPT | Mod: CPTII,S$GLB,, | Performed by: INTERNAL MEDICINE

## 2022-05-19 PROCEDURE — 1159F MED LIST DOCD IN RCRD: CPT | Mod: CPTII,S$GLB,, | Performed by: INTERNAL MEDICINE

## 2022-05-19 RX ORDER — PREDNISOLONE ACETATE 10 MG/ML
1 SUSPENSION/ DROPS OPHTHALMIC DAILY PRN
COMMUNITY
Start: 2022-05-03

## 2022-05-19 RX ORDER — AMLODIPINE BESYLATE 10 MG/1
10 TABLET ORAL DAILY
Qty: 30 TABLET | Refills: 11 | Status: SHIPPED | OUTPATIENT
Start: 2022-05-19 | End: 2022-07-25 | Stop reason: SDUPTHER

## 2022-05-19 NOTE — PROGRESS NOTES
Nephrology Clinic Note   5/19/2022    Chief Complaint   Patient presents with    Chronic Kidney Disease      History of present illness:  Patient is a 46 y.o. male.   Presents to the clinic today for medical conditions listed below.  Problem Noted   Htn (Hypertension) 11/14/2012   Ckd (Chronic Kidney Disease) Stage 3, Gfr 30-59 Ml/Min 11/14/2012    45 y/o M with hx of HTN, Gout and prior history of glomerulonephritis who comes in for follow up evaluation. Pt was last evaluated by a kidney doctor 5y ago in 2017. At that time it was suspected he had stable disease, possibly IgA nephropathy. Biopsy was deferred and expectant observation was recommended. Pt now comes back to the Nephrology clinic with worsening renal dysfunction and proteinuria. Blood pressure also significantly elevated. He refers his father suffered from kidney disease and had a complication of bleeding after a biopsy and required dialysis however he cannot remember the diagnosis. He believes it was related to hypertension. Pt denies any recent nausea, vomits, diarrhea, radiocontrast administration, dehydration or NSAID use.        Review of Systems   Constitutional: Negative for chills, fever and weight loss.   Respiratory: Negative for cough, shortness of breath and wheezing.    Cardiovascular: Negative for chest pain, claudication and leg swelling.   Gastrointestinal: Negative for abdominal pain, diarrhea, nausea and vomiting.   Genitourinary: Negative for dysuria, flank pain, frequency, hematuria and urgency.   Skin: Negative for rash.   Neurological: Negative for weakness.       History:  Past Medical History:   Diagnosis Date    Chronic kidney disease     Chronic kidney disease stage II    Colon polyps     Hyperlipidemia 12/13/2012    Hypertension       Past Surgical History:   Procedure Laterality Date    COSMETIC SURGERY      pyloric stenosis          Current Outpatient Medications:     blood sugar diagnostic Strp, 1 strip by  Misc.(Non-Drug; Combo Route) route 2 (two) times daily. ICD 10: E11.22, Disp: 100 each, Rfl: 6    blood-glucose meter kit, Use as instructed to check blood sugar two times a day. ICD 10: E11.22, Disp: 1 each, Rfl: 0    lancets Misc, 1 lancet by Misc.(Non-Drug; Combo Route) route 2 (two) times daily. ICD 10: E11.22, Disp: 100 each, Rfl: 6    prednisoLONE acetate (PRED FORTE) 1 % DrpS, Instill 1 drop into right eye four times a day, Disp: , Rfl:     valsartan (DIOVAN) 320 MG tablet, Take 1 tablet (320 mg total) by mouth once daily., Disp: 90 tablet, Rfl: 3    allopurinol (ZYLOPRIM) 100 MG tablet, TAKE ONE TABLET BY MOUTH THREE TIMES A DAY (Patient not taking: No sig reported), Disp: 90 tablet, Rfl: 3    amLODIPine (NORVASC) 10 MG tablet, Take 1 tablet (10 mg total) by mouth once daily., Disp: 30 tablet, Rfl: 11    atorvastatin (LIPITOR) 20 MG tablet, Take 1 tablet (20 mg total) by mouth once daily., Disp: 90 tablet, Rfl: 3    colchicine (COLCRYS) 0.6 mg tablet, Take by mouth., Disp: , Rfl:     DAILY GARLIC ONCE-A-DAY ORAL, Take by mouth., Disp: , Rfl:     fish oil-omega-3 fatty acids 300-1,000 mg capsule, Take 1 capsule by mouth once daily., Disp: , Rfl:     lactobacillus acidophilus & bulgar (LACTINEX) 100 million cell packet, Take 1 packet (1 each total) by mouth once daily. (Patient not taking: No sig reported), Disp: 90 packet, Rfl: 0    multivitamin (THERAGRAN) per tablet, Take 1 tablet by mouth., Disp: , Rfl:   Review of patient's allergies indicates:  No Known Allergies   Social History     Tobacco Use    Smoking status: Current Every Day Smoker     Packs/day: 1.00     Years: 24.00     Pack years: 24.00    Smokeless tobacco: Not on file   Substance Use Topics    Alcohol use: Yes     Comment: Occasional      Family History   Problem Relation Age of Onset    COPD Mother     Heart disease Mother     Peripheral vascular disease Mother     Diabetes Father     Kidney disease Father     Stroke  Father     Hypertension Father     Heart disease Father 70        CABG x 3    Kidney failure Father     Heart disease Maternal Grandfather         Physical Exam :  Vitals:    05/19/22 1426   BP: (!) 172/90   Pulse: 89     Physical Exam    Labs reviewed   Images Reviewed    Assessment:    1. Stage 3b chronic kidney disease    2. Hypertension, unspecified type        Plan:    CKD (chronic kidney disease) stage 3, GFR 30-59 ml/min  Cr continues trending up, now up to 2.2  from baseline around 1.5  Proteinuria improved with Valsartan 320mg however remains at nephrotic range with UPCR at 3.8   Blood 1+ with 2 RBCs in UA   No edema on examination  Workup so far negative  Pt would  benefit from kidney biopsy; discussed with the pt who agreed; referred to Interventional Nephrologist Dr. Abad.   Will obtain repeat labs in around 2 weeks  RTC in 2-3 months; will schedule earlier if needed for worsening renal dysfunction or due to biopsy findings.     HTN (hypertension)  Valsartan 320mg PO daily   Remains uncontrolled; will start Amlodipine 10mg PO daily     Follow up in about 3 months (around 8/19/2022).     Orders Placed This Encounter   Procedures    RENAL FUNCTION PANEL    Protein / creatinine ratio, urine    Sedimentation rate    C-reactive protein    ANTI-SCLERODERMA ANTIBODY    C3 Complement    C4 Complement     There are no discontinued medications.   Future Appointments   Date Time Provider Department Center   6/2/2022  2:00 PM LAB, [facility] [place]   6/2/2022  2:15 PM SPECIMEN, [facility] Pasquale Benavides

## 2022-05-19 NOTE — ASSESSMENT & PLAN NOTE
Cr continues trending up, now up to 2.2  from baseline around 1.5  Proteinuria improved with Valsartan 320mg however remains at nephrotic range with UPCR at 3.8   Blood 1+ with 2 RBCs in UA   No edema on examination  Workup so far negative  Pt would  benefit from kidney biopsy; discussed with the pt who agreed; referred to Interventional Nephrologist Dr. Abad.   Will obtain repeat labs in around 2 weeks  RTC in 2-3 months; will schedule earlier if needed for worsening renal dysfunction or due to biopsy findings.

## 2022-06-07 ENCOUNTER — TELEPHONE (OUTPATIENT)
Dept: NEPHROLOGY | Facility: CLINIC | Age: 47
End: 2022-06-07
Payer: COMMERCIAL

## 2022-07-25 DIAGNOSIS — I10 HYPERTENSION, UNSPECIFIED TYPE: Primary | ICD-10-CM

## 2022-07-26 RX ORDER — AMLODIPINE BESYLATE 10 MG/1
10 TABLET ORAL DAILY
Qty: 30 TABLET | Refills: 11 | Status: SHIPPED | OUTPATIENT
Start: 2022-07-26 | End: 2022-09-22 | Stop reason: SDUPTHER

## 2022-08-18 DIAGNOSIS — N18.32 STAGE 3B CHRONIC KIDNEY DISEASE: Primary | ICD-10-CM

## 2022-08-24 ENCOUNTER — LAB VISIT (OUTPATIENT)
Dept: LAB | Facility: HOSPITAL | Age: 47
End: 2022-08-24
Payer: COMMERCIAL

## 2022-08-24 DIAGNOSIS — N18.32 STAGE 3B CHRONIC KIDNEY DISEASE: ICD-10-CM

## 2022-08-24 LAB
ALBUMIN SERPL BCP-MCNC: 3.5 G/DL (ref 3.5–5.2)
ANION GAP SERPL CALC-SCNC: 9 MMOL/L (ref 8–16)
ANISOCYTOSIS BLD QL SMEAR: SLIGHT
BASO STIPL BLD QL SMEAR: ABNORMAL
BASOPHILS # BLD AUTO: 0.09 K/UL (ref 0–0.2)
BASOPHILS NFR BLD: 0.8 % (ref 0–1.9)
BUN SERPL-MCNC: 30 MG/DL (ref 6–20)
CALCIUM SERPL-MCNC: 9.3 MG/DL (ref 8.7–10.5)
CHLORIDE SERPL-SCNC: 110 MMOL/L (ref 95–110)
CO2 SERPL-SCNC: 18 MMOL/L (ref 23–29)
CREAT SERPL-MCNC: 2.5 MG/DL (ref 0.5–1.4)
DIFFERENTIAL METHOD: ABNORMAL
EOSINOPHIL # BLD AUTO: 0.7 K/UL (ref 0–0.5)
EOSINOPHIL NFR BLD: 6.7 % (ref 0–8)
ERYTHROCYTE [DISTWIDTH] IN BLOOD BY AUTOMATED COUNT: 14.1 % (ref 11.5–14.5)
EST. GFR  (NO RACE VARIABLE): 30.7 ML/MIN/1.73 M^2
GLUCOSE SERPL-MCNC: 205 MG/DL (ref 70–110)
HCT VFR BLD AUTO: 38.8 % (ref 40–54)
HGB BLD-MCNC: 12.4 G/DL (ref 14–18)
IMM GRANULOCYTES # BLD AUTO: 0.04 K/UL (ref 0–0.04)
IMM GRANULOCYTES NFR BLD AUTO: 0.4 % (ref 0–0.5)
LYMPHOCYTES # BLD AUTO: 5.4 K/UL (ref 1–4.8)
LYMPHOCYTES NFR BLD: 50 % (ref 18–48)
MCH RBC QN AUTO: 32.1 PG (ref 27–31)
MCHC RBC AUTO-ENTMCNC: 32 G/DL (ref 32–36)
MCV RBC AUTO: 101 FL (ref 82–98)
MONOCYTES # BLD AUTO: 0.7 K/UL (ref 0.3–1)
MONOCYTES NFR BLD: 6.4 % (ref 4–15)
NEUTROPHILS # BLD AUTO: 3.9 K/UL (ref 1.8–7.7)
NEUTROPHILS NFR BLD: 35.7 % (ref 38–73)
NRBC BLD-RTO: 0 /100 WBC
PHOSPHATE SERPL-MCNC: 3.2 MG/DL (ref 2.7–4.5)
PLATELET # BLD AUTO: 478 K/UL (ref 150–450)
PLATELET BLD QL SMEAR: ABNORMAL
PMV BLD AUTO: 9.2 FL (ref 9.2–12.9)
POTASSIUM SERPL-SCNC: 5.1 MMOL/L (ref 3.5–5.1)
PTH-INTACT SERPL-MCNC: 157.9 PG/ML (ref 9–77)
RBC # BLD AUTO: 3.86 M/UL (ref 4.6–6.2)
SODIUM SERPL-SCNC: 137 MMOL/L (ref 136–145)
WBC # BLD AUTO: 10.81 K/UL (ref 3.9–12.7)

## 2022-08-24 PROCEDURE — 83970 ASSAY OF PARATHORMONE: CPT | Performed by: INTERNAL MEDICINE

## 2022-08-24 PROCEDURE — 85025 COMPLETE CBC W/AUTO DIFF WBC: CPT | Performed by: INTERNAL MEDICINE

## 2022-08-24 PROCEDURE — 36415 COLL VENOUS BLD VENIPUNCTURE: CPT | Mod: PO | Performed by: INTERNAL MEDICINE

## 2022-08-24 PROCEDURE — 80069 RENAL FUNCTION PANEL: CPT | Performed by: INTERNAL MEDICINE

## 2022-08-25 ENCOUNTER — OFFICE VISIT (OUTPATIENT)
Dept: NEPHROLOGY | Facility: CLINIC | Age: 47
End: 2022-08-25
Payer: COMMERCIAL

## 2022-08-25 VITALS
BODY MASS INDEX: 34.13 KG/M2 | SYSTOLIC BLOOD PRESSURE: 150 MMHG | WEIGHT: 244.69 LBS | HEART RATE: 96 BPM | DIASTOLIC BLOOD PRESSURE: 94 MMHG

## 2022-08-25 DIAGNOSIS — E79.0 HYPERURICEMIA: ICD-10-CM

## 2022-08-25 DIAGNOSIS — N18.32 STAGE 3B CHRONIC KIDNEY DISEASE: Primary | ICD-10-CM

## 2022-08-25 DIAGNOSIS — I10 ESSENTIAL HYPERTENSION: ICD-10-CM

## 2022-08-25 PROCEDURE — 4010F PR ACE/ARB THEARPY RXD/TAKEN: ICD-10-PCS | Mod: CPTII,S$GLB,, | Performed by: INTERNAL MEDICINE

## 2022-08-25 PROCEDURE — 4010F ACE/ARB THERAPY RXD/TAKEN: CPT | Mod: CPTII,S$GLB,, | Performed by: INTERNAL MEDICINE

## 2022-08-25 PROCEDURE — 99999 PR PBB SHADOW E&M-EST. PATIENT-LVL III: CPT | Mod: PBBFAC,,, | Performed by: INTERNAL MEDICINE

## 2022-08-25 PROCEDURE — 99999 PR PBB SHADOW E&M-EST. PATIENT-LVL III: ICD-10-PCS | Mod: PBBFAC,,, | Performed by: INTERNAL MEDICINE

## 2022-08-25 PROCEDURE — 99213 PR OFFICE/OUTPT VISIT, EST, LEVL III, 20-29 MIN: ICD-10-PCS | Mod: S$GLB,,, | Performed by: INTERNAL MEDICINE

## 2022-08-25 PROCEDURE — 3066F PR DOCUMENTATION OF TREATMENT FOR NEPHROPATHY: ICD-10-PCS | Mod: CPTII,S$GLB,, | Performed by: INTERNAL MEDICINE

## 2022-08-25 PROCEDURE — 3077F SYST BP >= 140 MM HG: CPT | Mod: CPTII,S$GLB,, | Performed by: INTERNAL MEDICINE

## 2022-08-25 PROCEDURE — 3080F PR MOST RECENT DIASTOLIC BLOOD PRESSURE >= 90 MM HG: ICD-10-PCS | Mod: CPTII,S$GLB,, | Performed by: INTERNAL MEDICINE

## 2022-08-25 PROCEDURE — 3008F BODY MASS INDEX DOCD: CPT | Mod: CPTII,S$GLB,, | Performed by: INTERNAL MEDICINE

## 2022-08-25 PROCEDURE — 3066F NEPHROPATHY DOC TX: CPT | Mod: CPTII,S$GLB,, | Performed by: INTERNAL MEDICINE

## 2022-08-25 PROCEDURE — 3077F PR MOST RECENT SYSTOLIC BLOOD PRESSURE >= 140 MM HG: ICD-10-PCS | Mod: CPTII,S$GLB,, | Performed by: INTERNAL MEDICINE

## 2022-08-25 PROCEDURE — 3008F PR BODY MASS INDEX (BMI) DOCUMENTED: ICD-10-PCS | Mod: CPTII,S$GLB,, | Performed by: INTERNAL MEDICINE

## 2022-08-25 PROCEDURE — 3080F DIAST BP >= 90 MM HG: CPT | Mod: CPTII,S$GLB,, | Performed by: INTERNAL MEDICINE

## 2022-08-25 PROCEDURE — 99213 OFFICE O/P EST LOW 20 MIN: CPT | Mod: S$GLB,,, | Performed by: INTERNAL MEDICINE

## 2022-08-25 NOTE — ASSESSMENT & PLAN NOTE
Cr continues trending up, now up to 2.5  from baseline around 1.5  Proteinuria improved with Valsartan 320mg however remains elevated  Blood 1+ with 2 RBCs in UA   No edema on examination  Workup so far negative  Urine microscopy (8/25) with many acanthocytes  Pt would  benefit from kidney biopsy; pt refers he was not able to get it done before due to not feeling well however is now willing to undergo the procedure; will need to schedule with Interventional Nephrology    RTC in 2-3 months; will schedule earlier if needed for worsening renal dysfunction or due to biopsy findings.

## 2022-08-25 NOTE — PROGRESS NOTES
Nephrology Clinic Note   8/25/2022    Chief Complaint   Patient presents with    Chronic Kidney Disease      History of present illness:  Patient is a 47 y.o. male.   Presents to the clinic today for medical conditions listed below.  Problem Noted   Htn (Hypertension) 11/14/2012   Ckd (Chronic Kidney Disease) Stage 3, Gfr 30-59 Ml/Min 11/14/2012    45 y/o M with hx of HTN, Gout and prior history of glomerulonephritis who comes in for follow up evaluation. Pt was last evaluated by a kidney doctor 5y ago in 2017. At that time it was suspected he had stable disease, possibly IgA nephropathy. Biopsy was deferred and expectant observation was recommended. Pt now comes back to the Nephrology clinic with worsening renal dysfunction and proteinuria. Blood pressure also significantly elevated. He refers his father suffered from kidney disease and had a complication of bleeding after a biopsy and required dialysis however he cannot remember the diagnosis. He believes it was related to hypertension. Pt denies any recent nausea, vomits, diarrhea, radiocontrast administration, dehydration or NSAID use.        Review of Systems   Constitutional: Negative for chills, fever and weight loss.   Respiratory: Negative for cough, shortness of breath and wheezing.    Cardiovascular: Negative for chest pain, claudication and leg swelling.   Gastrointestinal: Negative for abdominal pain, diarrhea, nausea and vomiting.   Genitourinary: Negative for dysuria, flank pain, frequency, hematuria and urgency.   Skin: Negative for rash.   Neurological: Negative for weakness.       History:  Past Medical History:   Diagnosis Date    Chronic kidney disease     Chronic kidney disease stage II    Colon polyps     Hyperlipidemia 12/13/2012    Hypertension       Past Surgical History:   Procedure Laterality Date    COSMETIC SURGERY      pyloric stenosis          Current Outpatient Medications:     amLODIPine (NORVASC) 10 MG tablet, Take 1  tablet (10 mg total) by mouth once daily., Disp: 30 tablet, Rfl: 11    blood-glucose meter kit, Use as instructed to check blood sugar two times a day. ICD 10: E11.22, Disp: 1 each, Rfl: 0    colchicine (COLCRYS) 0.6 mg tablet, Take by mouth., Disp: , Rfl:     prednisoLONE acetate (PRED FORTE) 1 % DrpS, Instill 1 drop into right eye four times a day, Disp: , Rfl:     atorvastatin (LIPITOR) 20 MG tablet, Take 1 tablet (20 mg total) by mouth once daily. (Patient not taking: Reported on 8/25/2022), Disp: 90 tablet, Rfl: 3    blood sugar diagnostic Strp, 1 strip by Misc.(Non-Drug; Combo Route) route 2 (two) times daily. ICD 10: E11.22 (Patient not taking: Reported on 8/25/2022), Disp: 100 each, Rfl: 6    DAILY GARLIC ONCE-A-DAY ORAL, Take by mouth., Disp: , Rfl:     fish oil-omega-3 fatty acids 300-1,000 mg capsule, Take 1 capsule by mouth once daily., Disp: , Rfl:     lactobacillus acidophilus & bulgar (LACTINEX) 100 million cell packet, Take 1 packet (1 each total) by mouth once daily. (Patient not taking: No sig reported), Disp: 90 packet, Rfl: 0    lancets Misc, 1 lancet by Misc.(Non-Drug; Combo Route) route 2 (two) times daily. ICD 10: E11.22 (Patient not taking: Reported on 8/25/2022), Disp: 100 each, Rfl: 6    multivitamin (THERAGRAN) per tablet, Take 1 tablet by mouth., Disp: , Rfl:     valsartan (DIOVAN) 320 MG tablet, Take 1 tablet (320 mg total) by mouth once daily. (Patient not taking: Reported on 8/25/2022), Disp: 90 tablet, Rfl: 3  Review of patient's allergies indicates:  No Known Allergies   Social History     Tobacco Use    Smoking status: Current Every Day Smoker     Packs/day: 1.00     Years: 24.00     Pack years: 24.00    Smokeless tobacco: Not on file   Substance Use Topics    Alcohol use: Yes     Comment: Occasional      Family History   Problem Relation Age of Onset    COPD Mother     Heart disease Mother     Peripheral vascular disease Mother     Diabetes Father     Kidney  disease Father     Stroke Father     Hypertension Father     Heart disease Father 70        CABG x 3    Kidney failure Father     Heart disease Maternal Grandfather         Physical Exam :  Vitals:    08/25/22 1510   BP: (!) 150/94   Pulse: 96     Physical Exam  Constitutional:       General: He is not in acute distress.     Appearance: He is not ill-appearing or toxic-appearing.   HENT:      Head: Atraumatic.      Nose: Nose normal.      Mouth/Throat:      Mouth: Mucous membranes are moist.   Cardiovascular:      Rate and Rhythm: Normal rate.   Pulmonary:      Effort: Pulmonary effort is normal.   Abdominal:      General: There is no distension.      Palpations: Abdomen is soft.      Tenderness: There is no abdominal tenderness.   Musculoskeletal:         General: No swelling. Normal range of motion.      Right lower leg: No edema.      Left lower leg: No edema.   Skin:     General: Skin is warm and dry.   Neurological:      Mental Status: He is alert and oriented to person, place, and time.         Labs reviewed   Images Reviewed    Assessment:    1. Stage 3b chronic kidney disease    2. CKD (chronic kidney disease) stage 3, GFR 30-59 ml/min    3. Essential hypertension    4. Hyperuricemia        Plan:    CKD (chronic kidney disease) stage 3, GFR 30-59 ml/min  Cr continues trending up, now up to 2.5  from baseline around 1.5  Proteinuria improved with Valsartan 320mg however remains elevated  Blood 1+ with 2 RBCs in UA   No edema on examination  Workup so far negative  Urine microscopy (8/25) with many acanthocytes  Pt would  benefit from kidney biopsy; pt refers he was not able to get it done before due to not feeling well however is now willing to undergo the procedure; will need to schedule with Interventional Nephrology    RTC in 2-3 months; will schedule earlier if needed for worsening renal dysfunction or due to biopsy findings.     Follow up in about 2 months (around 10/25/2022).     Orders Placed This  Encounter   Procedures    RENAL FUNCTION PANEL    Urinalysis    Protein / creatinine ratio, urine    Ambulatory referral/consult to Cardiology     Medications Discontinued During This Encounter   Medication Reason    allopurinol (ZYLOPRIM) 100 MG tablet       No future appointments.    All Benavides    Nephrology Clinic Note   8/25/2022    Chief Complaint   Patient presents with    Chronic Kidney Disease      History of present illness:  Patient is a 47 y.o. male.   Presents to the clinic today for medical conditions listed below.  Problem Noted   Htn (Hypertension) 11/14/2012   Ckd (Chronic Kidney Disease) Stage 3, Gfr 30-59 Ml/Min 11/14/2012    45 y/o M with hx of HTN, Gout and prior history of glomerulonephritis who comes in for follow up evaluation. Pt was last evaluated by a kidney doctor 5y ago in 2017. At that time it was suspected he had stable disease, possibly IgA nephropathy. Biopsy was deferred and expectant observation was recommended. Pt now comes back to the Nephrology clinic with worsening renal dysfunction and proteinuria. Blood pressure also significantly elevated. He refers his father suffered from kidney disease and had a complication of bleeding after a biopsy and required dialysis however he cannot remember the diagnosis. He believes it was related to hypertension. Pt denies any recent nausea, vomits, diarrhea, radiocontrast administration, dehydration or NSAID use.        Review of Systems   Constitutional: Negative for chills, fever and weight loss.   Respiratory: Negative for cough, shortness of breath and wheezing.    Cardiovascular: Negative for chest pain, claudication and leg swelling.   Gastrointestinal: Negative for abdominal pain, diarrhea, nausea and vomiting.   Genitourinary: Negative for dysuria, flank pain, frequency, hematuria and urgency.   Skin: Negative for rash.   Neurological: Negative for weakness.       History:  Past Medical History:   Diagnosis Date     Chronic kidney disease     Chronic kidney disease stage II    Colon polyps     Hyperlipidemia 12/13/2012    Hypertension       Past Surgical History:   Procedure Laterality Date    COSMETIC SURGERY      pyloric stenosis          Current Outpatient Medications:     amLODIPine (NORVASC) 10 MG tablet, Take 1 tablet (10 mg total) by mouth once daily., Disp: 30 tablet, Rfl: 11    blood-glucose meter kit, Use as instructed to check blood sugar two times a day. ICD 10: E11.22, Disp: 1 each, Rfl: 0    colchicine (COLCRYS) 0.6 mg tablet, Take by mouth., Disp: , Rfl:     prednisoLONE acetate (PRED FORTE) 1 % DrpS, Instill 1 drop into right eye four times a day, Disp: , Rfl:     atorvastatin (LIPITOR) 20 MG tablet, Take 1 tablet (20 mg total) by mouth once daily. (Patient not taking: Reported on 8/25/2022), Disp: 90 tablet, Rfl: 3    blood sugar diagnostic Strp, 1 strip by Misc.(Non-Drug; Combo Route) route 2 (two) times daily. ICD 10: E11.22 (Patient not taking: Reported on 8/25/2022), Disp: 100 each, Rfl: 6    DAILY GARLIC ONCE-A-DAY ORAL, Take by mouth., Disp: , Rfl:     fish oil-omega-3 fatty acids 300-1,000 mg capsule, Take 1 capsule by mouth once daily., Disp: , Rfl:     lactobacillus acidophilus & bulgar (LACTINEX) 100 million cell packet, Take 1 packet (1 each total) by mouth once daily. (Patient not taking: No sig reported), Disp: 90 packet, Rfl: 0    lancets Misc, 1 lancet by Misc.(Non-Drug; Combo Route) route 2 (two) times daily. ICD 10: E11.22 (Patient not taking: Reported on 8/25/2022), Disp: 100 each, Rfl: 6    multivitamin (THERAGRAN) per tablet, Take 1 tablet by mouth., Disp: , Rfl:     valsartan (DIOVAN) 320 MG tablet, Take 1 tablet (320 mg total) by mouth once daily. (Patient not taking: Reported on 8/25/2022), Disp: 90 tablet, Rfl: 3  Review of patient's allergies indicates:  No Known Allergies   Social History     Tobacco Use    Smoking status: Current Every Day Smoker     Packs/day: 1.00      Years: 24.00     Pack years: 24.00    Smokeless tobacco: Not on file   Substance Use Topics    Alcohol use: Yes     Comment: Occasional      Family History   Problem Relation Age of Onset    COPD Mother     Heart disease Mother     Peripheral vascular disease Mother     Diabetes Father     Kidney disease Father     Stroke Father     Hypertension Father     Heart disease Father 70        CABG x 3    Kidney failure Father     Heart disease Maternal Grandfather         Physical Exam :  Vitals:    08/25/22 1510   BP: (!) 150/94   Pulse: 96     Physical Exam    Labs reviewed   Images Reviewed    Assessment:    1. Stage 3b chronic kidney disease    2. CKD (chronic kidney disease) stage 3, GFR 30-59 ml/min    3. Essential hypertension    4. Hyperuricemia        Plan:    CKD (chronic kidney disease) stage 3, GFR 30-59 ml/min  Cr continues trending up, now up to 2.5  from baseline around 1.5  Proteinuria improved with Valsartan 320mg however remains elevated  Blood 1+ with 2 RBCs in UA   No edema on examination  Workup so far negative  Urine microscopy (8/25) with many acanthocytes  Pt would  benefit from kidney biopsy; pt refers he was not able to get it done before due to not feeling well however is now willing to undergo the procedure; will need to schedule with Interventional Nephrology    RTC in 2-3 months; will schedule earlier if needed for worsening renal dysfunction or due to biopsy findings.     Follow up in about 2 months (around 10/25/2022).     Orders Placed This Encounter   Procedures    RENAL FUNCTION PANEL    Urinalysis    Protein / creatinine ratio, urine    Ambulatory referral/consult to Cardiology     Medications Discontinued During This Encounter   Medication Reason    allopurinol (ZYLOPRIM) 100 MG tablet       No future appointments.    All Benavides

## 2022-08-26 ENCOUNTER — TELEPHONE (OUTPATIENT)
Dept: NEPHROLOGY | Facility: CLINIC | Age: 47
End: 2022-08-26
Payer: COMMERCIAL

## 2022-08-26 RX ORDER — ALLOPURINOL 100 MG/1
100 TABLET ORAL DAILY
Qty: 30 TABLET | Refills: 3 | Status: SHIPPED | OUTPATIENT
Start: 2022-08-26 | End: 2024-03-08 | Stop reason: SDUPTHER

## 2022-08-29 ENCOUNTER — OFFICE VISIT (OUTPATIENT)
Dept: NEPHROLOGY | Facility: CLINIC | Age: 47
End: 2022-08-29
Payer: COMMERCIAL

## 2022-08-29 ENCOUNTER — TELEPHONE (OUTPATIENT)
Dept: NEPHROLOGY | Facility: CLINIC | Age: 47
End: 2022-08-29
Payer: COMMERCIAL

## 2022-08-29 ENCOUNTER — LAB VISIT (OUTPATIENT)
Dept: LAB | Facility: HOSPITAL | Age: 47
End: 2022-08-29
Payer: COMMERCIAL

## 2022-08-29 VITALS
BODY MASS INDEX: 33.82 KG/M2 | HEART RATE: 96 BPM | WEIGHT: 242.5 LBS | SYSTOLIC BLOOD PRESSURE: 128 MMHG | DIASTOLIC BLOOD PRESSURE: 84 MMHG | OXYGEN SATURATION: 98 %

## 2022-08-29 DIAGNOSIS — Z01.812 PRE-PROCEDURE LAB EXAM: ICD-10-CM

## 2022-08-29 DIAGNOSIS — N17.9 AKI (ACUTE KIDNEY INJURY): ICD-10-CM

## 2022-08-29 DIAGNOSIS — Z01.812 PRE-PROCEDURE LAB EXAM: Primary | ICD-10-CM

## 2022-08-29 DIAGNOSIS — N17.9 ACUTE RENAL FAILURE, UNSPECIFIED ACUTE RENAL FAILURE TYPE: Primary | ICD-10-CM

## 2022-08-29 LAB
APTT BLDCRRT: 27.6 SEC (ref 21–32)
BASOPHILS # BLD AUTO: 0.11 K/UL (ref 0–0.2)
BASOPHILS NFR BLD: 0.8 % (ref 0–1.9)
DIFFERENTIAL METHOD: ABNORMAL
EOSINOPHIL # BLD AUTO: 0.7 K/UL (ref 0–0.5)
EOSINOPHIL NFR BLD: 4.7 % (ref 0–8)
ERYTHROCYTE [DISTWIDTH] IN BLOOD BY AUTOMATED COUNT: 14.3 % (ref 11.5–14.5)
HCT VFR BLD AUTO: 39.9 % (ref 40–54)
HGB BLD-MCNC: 12.9 G/DL (ref 14–18)
IMM GRANULOCYTES # BLD AUTO: 0.12 K/UL (ref 0–0.04)
IMM GRANULOCYTES NFR BLD AUTO: 0.9 % (ref 0–0.5)
INR PPP: 1 (ref 0.8–1.2)
LYMPHOCYTES # BLD AUTO: 6.5 K/UL (ref 1–4.8)
LYMPHOCYTES NFR BLD: 46.6 % (ref 18–48)
MCH RBC QN AUTO: 32.3 PG (ref 27–31)
MCHC RBC AUTO-ENTMCNC: 32.3 G/DL (ref 32–36)
MCV RBC AUTO: 100 FL (ref 82–98)
MONOCYTES # BLD AUTO: 1.2 K/UL (ref 0.3–1)
MONOCYTES NFR BLD: 8.5 % (ref 4–15)
NEUTROPHILS # BLD AUTO: 5.4 K/UL (ref 1.8–7.7)
NEUTROPHILS NFR BLD: 38.5 % (ref 38–73)
NRBC BLD-RTO: 0 /100 WBC
PLATELET # BLD AUTO: 472 K/UL (ref 150–450)
PMV BLD AUTO: 8.9 FL (ref 9.2–12.9)
PROTHROMBIN TIME: 10 SEC (ref 9–12.5)
RBC # BLD AUTO: 4 M/UL (ref 4.6–6.2)
WBC # BLD AUTO: 13.87 K/UL (ref 3.9–12.7)

## 2022-08-29 PROCEDURE — 3079F PR MOST RECENT DIASTOLIC BLOOD PRESSURE 80-89 MM HG: ICD-10-PCS | Mod: CPTII,S$GLB,, | Performed by: INTERNAL MEDICINE

## 2022-08-29 PROCEDURE — 3079F DIAST BP 80-89 MM HG: CPT | Mod: CPTII,S$GLB,, | Performed by: INTERNAL MEDICINE

## 2022-08-29 PROCEDURE — 3074F PR MOST RECENT SYSTOLIC BLOOD PRESSURE < 130 MM HG: ICD-10-PCS | Mod: CPTII,S$GLB,, | Performed by: INTERNAL MEDICINE

## 2022-08-29 PROCEDURE — 3074F SYST BP LT 130 MM HG: CPT | Mod: CPTII,S$GLB,, | Performed by: INTERNAL MEDICINE

## 2022-08-29 PROCEDURE — 3008F PR BODY MASS INDEX (BMI) DOCUMENTED: ICD-10-PCS | Mod: CPTII,S$GLB,, | Performed by: INTERNAL MEDICINE

## 2022-08-29 PROCEDURE — 4010F ACE/ARB THERAPY RXD/TAKEN: CPT | Mod: CPTII,S$GLB,, | Performed by: INTERNAL MEDICINE

## 2022-08-29 PROCEDURE — 99999 PR PBB SHADOW E&M-EST. PATIENT-LVL III: ICD-10-PCS | Mod: PBBFAC,,, | Performed by: INTERNAL MEDICINE

## 2022-08-29 PROCEDURE — 85730 THROMBOPLASTIN TIME PARTIAL: CPT | Performed by: INTERNAL MEDICINE

## 2022-08-29 PROCEDURE — 85610 PROTHROMBIN TIME: CPT | Performed by: INTERNAL MEDICINE

## 2022-08-29 PROCEDURE — 3008F BODY MASS INDEX DOCD: CPT | Mod: CPTII,S$GLB,, | Performed by: INTERNAL MEDICINE

## 2022-08-29 PROCEDURE — 99214 PR OFFICE/OUTPT VISIT, EST, LEVL IV, 30-39 MIN: ICD-10-PCS | Mod: S$GLB,,, | Performed by: INTERNAL MEDICINE

## 2022-08-29 PROCEDURE — 36415 COLL VENOUS BLD VENIPUNCTURE: CPT | Performed by: INTERNAL MEDICINE

## 2022-08-29 PROCEDURE — 99999 PR PBB SHADOW E&M-EST. PATIENT-LVL III: CPT | Mod: PBBFAC,,, | Performed by: INTERNAL MEDICINE

## 2022-08-29 PROCEDURE — 4010F PR ACE/ARB THEARPY RXD/TAKEN: ICD-10-PCS | Mod: CPTII,S$GLB,, | Performed by: INTERNAL MEDICINE

## 2022-08-29 PROCEDURE — 85025 COMPLETE CBC W/AUTO DIFF WBC: CPT | Performed by: INTERNAL MEDICINE

## 2022-08-29 PROCEDURE — 99214 OFFICE O/P EST MOD 30 MIN: CPT | Mod: S$GLB,,, | Performed by: INTERNAL MEDICINE

## 2022-08-29 PROCEDURE — 3066F PR DOCUMENTATION OF TREATMENT FOR NEPHROPATHY: ICD-10-PCS | Mod: CPTII,S$GLB,, | Performed by: INTERNAL MEDICINE

## 2022-08-29 PROCEDURE — 3066F NEPHROPATHY DOC TX: CPT | Mod: CPTII,S$GLB,, | Performed by: INTERNAL MEDICINE

## 2022-08-29 RX ORDER — SODIUM CHLORIDE 9 MG/ML
INJECTION, SOLUTION INTRAVENOUS CONTINUOUS
Status: CANCELLED | OUTPATIENT
Start: 2022-08-29 | End: 2022-08-29

## 2022-08-29 NOTE — PROGRESS NOTES
CHIEF COMPLAINT/HPI: Edouard is a 47 y.o. male for evaluation of Consult (For kidney bx)    HPI:    I have seen Edouard Turcios for assessment prekidncye biopsy. He had history of HTN. Diabetes, Heart failure, hyperlipidemia and Hyperuricemia. His kidney function had been declining  4 months. He was followed in the fellows' Nephrology clinic and was referred for a kidney biopsy. With his multiple comorbid medical history with DM and HTN, we will need to confirm that his Bp is well controlled before we proceed. I have asked him to measure his BP three times daily and send the paper in 2 days. I have explained all the details of the procedure, Kidney biopsy, with all the complications including Hematuria, requirement of transfusion, and the possible need for arterial embolization if the bleeding did not stop. I have answered all the questions asked. We will confirm the date after checking home BP. The patient agree to go ahead with the biopsy and signed the consent.     Past Medical History:   Diagnosis Date    Chronic kidney disease     Chronic kidney disease stage II    Colon polyps     Hyperlipidemia 12/13/2012    Hypertension        Edouard reports that he has been smoking. He has a 24.00 pack-year smoking history. He does not have any smokeless tobacco history on file. He reports current alcohol use. He reports that he does not use drugs.    Family History   Problem Relation Age of Onset    COPD Mother     Heart disease Mother     Peripheral vascular disease Mother     Diabetes Father     Kidney disease Father     Stroke Father     Hypertension Father     Heart disease Father 70        CABG x 3    Kidney failure Father     Heart disease Maternal Grandfather        ROS:    General: No fever, chills, change in appetite or weight loss  Skin: No rashes or pruritus  Head: No headaches or recent head trauma  Eyes: No changes in vision or eye pain  Nodes: No swollen glands  Pulmonary: No dyspnea, wheezes, cough or sputum  production  Cardiovascular: No chest pain, edema, PND, orthopnea or reduced exercise tolerance  Abdomen: No abdominal pain, nausea, vomiting, constipation or diarrhea  Urinary: No flank pain, dysuria or hematuria  Peripheral Vascular: No claudication or cyanosis  Musculoskeletal: No joint stiffness, swelling or back pain  Neurologic: No history of seizures, tremors, forcal weakness or numbness    PE:    Vitals:    08/29/22 1407   BP: 128/84   Pulse: 96   SpO2: 98%   Weight: 110 kg (242 lb 8.1 oz)       HEENT: Normocephalic, atraumatic, EOMI, PERRLA, normal conjunctive and lids, supple neck with no thyromeglay or masses, normal oropharynx, hearing intact  Cardiovascular: Regular without murmur, gallop or rub, no edema  Respiratory: Clear bilaterally without rales, rhonchi, wheezes with normal effort  Abdomen: Soft, nontender/nondistended, positive bowel sounds, no hepatospenomegaly  Extremities: No cyanosis, clubbing, normal gait  Skin: No rashes, ulcers, induration  Psych: Oriented x 3 with normal mood and effect      Labs:  CBC  PT  PTT      Impression and plan:  CKD III with rapid decline in his renal function for the last  4 months. He is using NSAID as pain control. Will schedule the biopsy of his BP was well controlled at [facility] and on the condition that all his labs and coagulation parameters are normal.     Primary HTN will check at home.    Ischemic Cardiomyopathy with CHF.    Bifascicular block.     Aortic valve disease.     JEWEL IRWIN.Marco Antonio. MD. ZENA. FACP.  , [employer] / [employer] ([place]  Nephrology Consultant. [employer] [facility].   [facility]. 5th [place] [address].    email: [email or web address]  Tel: Office: [phone]

## 2022-08-29 NOTE — H&P (VIEW-ONLY)
CHIEF COMPLAINT/HPI: Edouard is a 47 y.o. male for evaluation of Consult (For kidney bx)    HPI:    I have seen Edouard Turcios for assessment prekidncye biopsy. He had history of HTN. Diabetes, Heart failure, hyperlipidemia and Hyperuricemia. His kidney function had been declining  4 months. He was followed in the fellows' Nephrology clinic and was referred for a kidney biopsy. With his multiple comorbid medical history with DM and HTN, we will need to confirm that his Bp is well controlled before we proceed. I have asked him to measure his BP three times daily and send the paper in 2 days. I have explained all the details of the procedure, Kidney biopsy, with all the complications including Hematuria, requirement of transfusion, and the possible need for arterial embolization if the bleeding did not stop. I have answered all the questions asked. We will confirm the date after checking home BP. The patient agree to go ahead with the biopsy and signed the consent.     Past Medical History:   Diagnosis Date    Chronic kidney disease     Chronic kidney disease stage II    Colon polyps     Hyperlipidemia 12/13/2012    Hypertension        Edouard reports that he has been smoking. He has a 24.00 pack-year smoking history. He does not have any smokeless tobacco history on file. He reports current alcohol use. He reports that he does not use drugs.    Family History   Problem Relation Age of Onset    COPD Mother     Heart disease Mother     Peripheral vascular disease Mother     Diabetes Father     Kidney disease Father     Stroke Father     Hypertension Father     Heart disease Father 70        CABG x 3    Kidney failure Father     Heart disease Maternal Grandfather        ROS:    General: No fever, chills, change in appetite or weight loss  Skin: No rashes or pruritus  Head: No headaches or recent head trauma  Eyes: No changes in vision or eye pain  Nodes: No swollen glands  Pulmonary: No dyspnea, wheezes, cough or sputum  production  Cardiovascular: No chest pain, edema, PND, orthopnea or reduced exercise tolerance  Abdomen: No abdominal pain, nausea, vomiting, constipation or diarrhea  Urinary: No flank pain, dysuria or hematuria  Peripheral Vascular: No claudication or cyanosis  Musculoskeletal: No joint stiffness, swelling or back pain  Neurologic: No history of seizures, tremors, forcal weakness or numbness    PE:    Vitals:    08/29/22 1407   BP: 128/84   Pulse: 96   SpO2: 98%   Weight: 110 kg (242 lb 8.1 oz)       HEENT: Normocephalic, atraumatic, EOMI, PERRLA, normal conjunctive and lids, supple neck with no thyromeglay or masses, normal oropharynx, hearing intact  Cardiovascular: Regular without murmur, gallop or rub, no edema  Respiratory: Clear bilaterally without rales, rhonchi, wheezes with normal effort  Abdomen: Soft, nontender/nondistended, positive bowel sounds, no hepatospenomegaly  Extremities: No cyanosis, clubbing, normal gait  Skin: No rashes, ulcers, induration  Psych: Oriented x 3 with normal mood and effect      Labs:  CBC  PT  PTT      Impression and plan:  CKD III with rapid decline in his renal function for the last  4 months. He is using NSAID as pain control. Will schedule the biopsy of his BP was well controlled at [facility] and on the condition that all his labs and coagulation parameters are normal.     Primary HTN will check at home.    Ischemic Cardiomyopathy with CHF.    Bifascicular block.     Aortic valve disease.     JEWEL IRWIN.Marco Antonio. MD. ZENA. FACP.  , [employer] / [employer] ([place]  Nephrology Consultant. [facility].   [facility]. 5th [place] [address].    email: [email or web address]  Tel: Office: [phone]

## 2022-08-29 NOTE — TELEPHONE ENCOUNTER
Informed NPO after MN the night before his kidney biopsy. Informed with a sip of water to take the amlodipine and valsartan the morning of the biopsy at home. Informed cannot drive himself home after the procedure. Informed to keep the dressing on for 24 hrs. and can shower afterwards but do not sit in a tub or pool of water for 3 weeks. Informed no heavy lifting for 3 weeks after the biopsy.Informed to report to Short stay cardiac unit on the 3rd floor 2 hrs. before the procedure begins. Informed no aspirin or blood thinners between now and the procedure.

## 2022-08-30 ENCOUNTER — TELEPHONE (OUTPATIENT)
Dept: NEPHROLOGY | Facility: CLINIC | Age: 47
End: 2022-08-30
Payer: COMMERCIAL

## 2022-08-30 RX ORDER — SODIUM CHLORIDE 9 MG/ML
INJECTION, SOLUTION INTRAVENOUS CONTINUOUS
Status: CANCELLED | OUTPATIENT
Start: 2022-08-30 | End: 2022-08-30

## 2022-08-31 ENCOUNTER — TELEPHONE (OUTPATIENT)
Dept: NEPHROLOGY | Facility: CLINIC | Age: 47
End: 2022-08-31
Payer: COMMERCIAL

## 2022-09-01 ENCOUNTER — OFFICE VISIT (OUTPATIENT)
Dept: MEDSURG UNIT | Facility: HOSPITAL | Age: 47
End: 2022-09-01
Attending: INTERNAL MEDICINE
Payer: COMMERCIAL

## 2022-09-01 ENCOUNTER — HOSPITAL ENCOUNTER (OUTPATIENT)
Facility: HOSPITAL | Age: 47
Discharge: HOME | End: 2022-09-01
Attending: INTERNAL MEDICINE | Admitting: INTERNAL MEDICINE
Payer: COMMERCIAL

## 2022-09-01 VITALS
OXYGEN SATURATION: 95 % | DIASTOLIC BLOOD PRESSURE: 87 MMHG | BODY MASS INDEX: 34.37 KG/M2 | RESPIRATION RATE: 16 BRPM | SYSTOLIC BLOOD PRESSURE: 140 MMHG | TEMPERATURE: 98 F | HEART RATE: 74 BPM | HEIGHT: 71 IN | WEIGHT: 245.5 LBS

## 2022-09-01 DIAGNOSIS — N17.9 AKI (ACUTE KIDNEY INJURY): ICD-10-CM

## 2022-09-01 DIAGNOSIS — N17.9 ACUTE RENAL FAILURE, UNSPECIFIED ACUTE RENAL FAILURE TYPE: ICD-10-CM

## 2022-09-01 DIAGNOSIS — N18.30 STAGE 3 CHRONIC KIDNEY DISEASE, UNSPECIFIED WHETHER STAGE 3A OR 3B CKD: Primary | ICD-10-CM

## 2022-09-01 LAB
BASOPHILS # BLD AUTO: 0.05 K/UL (ref 0–0.2)
BASOPHILS NFR BLD: 0.5 % (ref 0–1.9)
CTP QC/QA: YES
DIFFERENTIAL METHOD: ABNORMAL
EOSINOPHIL # BLD AUTO: 0.4 K/UL (ref 0–0.5)
EOSINOPHIL NFR BLD: 3.5 % (ref 0–8)
ERYTHROCYTE [DISTWIDTH] IN BLOOD BY AUTOMATED COUNT: 14.4 % (ref 11.5–14.5)
HCT VFR BLD AUTO: 34 % (ref 40–54)
HGB BLD-MCNC: 11.1 G/DL (ref 14–18)
IMM GRANULOCYTES # BLD AUTO: 0.05 K/UL (ref 0–0.04)
IMM GRANULOCYTES NFR BLD AUTO: 0.5 % (ref 0–0.5)
LYMPHOCYTES # BLD AUTO: 4.8 K/UL (ref 1–4.8)
LYMPHOCYTES NFR BLD: 46.1 % (ref 18–48)
MCH RBC QN AUTO: 32.1 PG (ref 27–31)
MCHC RBC AUTO-ENTMCNC: 32.6 G/DL (ref 32–36)
MCV RBC AUTO: 98 FL (ref 82–98)
MONOCYTES # BLD AUTO: 0.7 K/UL (ref 0.3–1)
MONOCYTES NFR BLD: 6.3 % (ref 4–15)
NEUTROPHILS # BLD AUTO: 4.5 K/UL (ref 1.8–7.7)
NEUTROPHILS NFR BLD: 43.1 % (ref 38–73)
NRBC BLD-RTO: 0 /100 WBC
PLATELET # BLD AUTO: 391 K/UL (ref 150–450)
PMV BLD AUTO: 8.8 FL (ref 9.2–12.9)
POCT GLUCOSE: 154 MG/DL (ref 70–110)
RBC # BLD AUTO: 3.46 M/UL (ref 4.6–6.2)
SARS-COV-2 AG RESP QL IA.RAPID: NEGATIVE
WBC # BLD AUTO: 10.47 K/UL (ref 3.9–12.7)

## 2022-09-01 PROCEDURE — 25000003 PHARM REV CODE 250: Performed by: INTERNAL MEDICINE

## 2022-09-01 PROCEDURE — 50200 RENAL BIOPSY PERQ: CPT

## 2022-09-01 PROCEDURE — 85025 COMPLETE CBC W/AUTO DIFF WBC: CPT | Performed by: INTERNAL MEDICINE

## 2022-09-01 PROCEDURE — 63600175 PHARM REV CODE 636 W HCPCS: Performed by: INTERNAL MEDICINE

## 2022-09-01 PROCEDURE — 36415 COLL VENOUS BLD VENIPUNCTURE: CPT | Performed by: INTERNAL MEDICINE

## 2022-09-01 RX ORDER — ACETAMINOPHEN 10 MG/ML
INJECTION, SOLUTION INTRAVENOUS
Status: DISCONTINUED | OUTPATIENT
Start: 2022-09-01 | End: 2022-09-01 | Stop reason: HOSPADM

## 2022-09-01 RX ORDER — LIDOCAINE HYDROCHLORIDE AND EPINEPHRINE 20; 10 MG/ML; UG/ML
INJECTION, SOLUTION INFILTRATION; PERINEURAL
Status: DISCONTINUED | OUTPATIENT
Start: 2022-09-01 | End: 2022-09-01 | Stop reason: HOSPADM

## 2022-09-01 RX ORDER — SODIUM CHLORIDE 9 MG/ML
INJECTION, SOLUTION INTRAVENOUS CONTINUOUS
Status: DISCONTINUED | OUTPATIENT
Start: 2022-09-01 | End: 2022-09-01 | Stop reason: HOSPADM

## 2022-09-01 RX ORDER — SODIUM CHLORIDE 9 MG/ML
INJECTION, SOLUTION INTRAVENOUS CONTINUOUS
Status: ACTIVE | OUTPATIENT
Start: 2022-09-01 | End: 2022-09-01

## 2022-09-01 RX ADMIN — SODIUM CHLORIDE: 0.9 INJECTION, SOLUTION INTRAVENOUS at 07:09

## 2022-09-01 NOTE — NURSING
Pt transferred from procedure area via stretcher.  Vss.  Post op orders and assessment initiated.  Pt aao, tolerating po.  Family called to bs.  Pt in no acute distress and verbalizes no complaints. Call bell within reach.  Will continue to monitor.

## 2022-09-01 NOTE — CARE UPDATE
Interventional Nephrology.    Post Biopsy care:    Post Kidney Biopsy Orders:    The patient was shifted to the observation one day surgery area for 8 hours of monitoring.  Complete bed rest in supine for 6 hours with a pad below the Lt flank.  Head can be elevated at 40 degrees if she wants to eat.   Bed pan for urine is recommended. So that she won't get out of the bed for the first 6 hours.   CBC will be done after 4 hours. At 18:00.  The urine color and output will be monitored.   For the first three times she passes urine, a sample will be held in a cup to observe the color change   The BP will be monitored closely. Every 15 min for one hour, then hourly.   No bleeding was noticed during the procedure.        JEWEL IRWIN.Marco Antonio. MD. ZENA. KIKAP.  , [employer] / [employer] ([place]  Nephrology Consultant. [facility].   [address] [place] [address]. 5th [place] [address].    email: [email or web address]  Tel: Office: [phone]

## 2022-09-01 NOTE — OP NOTE
[facility] - Cath Lab  Operative Note    Date of Procedure: 9/1/2022     Procedure: Procedure(s) (LRB):  BIOPSY, KIDNEY (Left)     Kidney biopsy note: 9/1/2022     Edouard Cali Jean  1975  [id number]    Pre-op Diagnosis: Acute renal failure, unspecified acute renal failure type [N17.9]   Post-op Diagnosis: Acute renal failure, unspecified acute renal failure type [N17.9]    Procedure note: Lt. Kidney Biopsy.   The patient consent was obtained earlier after explaining all the possible complications.  The patient was placed prone with support below the abdomen.   Vitals were stable.   The Lt. lower renal pole was localized with ultrasound.   The patient was given 2 % Xylocaine to anesthetize the skin and subcutaneous tissues.  A spinal needle was introduced with intermittent xylocaine injections form the skin down to the kidneys at 8 depth.  We used he biopsy gun and 2 pieces was obtained via 2 passes.   The pieces were inspected under the Microscope.    The patient was stable.   Acetaminophen 1000mg IV was given.   There was no complication.  The procedure was smooth.   The patient was shifted to the observation one day surgery area for 8 hours of monitoring.  CBC will be done after 4 hours.  The urine color and output will be monitored.   The BP will be monitored closely.   He will be in complete bed rest for 6 hours post biopsy.   Complications: No  Condition: Good  FOLLOWUP: In clinic      Post Kidney Biopsy Orders:    The patient was shifted to the observation one day surgery area for 8 hours of monitoring.  Complete bed rest in supine for 6 hours with a pad below the Lt flank.  Head can be elevated at 40 degrees if she wants to eat.   Bed pan for urine is recommended. So that she won't get out of the bed for the first 6 hours.   CBC will be done after 4 hours. At 18:00.  The urine color and output will be monitored.   For the first three times she passes urine, a sample will be held in a cup to observe the  color change   The BP will be monitored closely. Every 15 min for one hour, then hourly.   No bleeding was noticed during the procedure.        Angelia IRWIN. MD. ALISSA HERRERA.  , [employer] [facility] ([place]).  Nephrology Consultant. [facility].   [address] [place] [address]. 5th [place] [address].    email: [email or web address].  Tel: Office: [phone]           15:04: The patient is comfortable. He had no pain or discomfort. A repeated ultrasound was done for the Lt kidney. There was no hematoma noticed. The patient is comfortable. Will monitor for 2 more hours.           Angelia IRWIN. MD. ALISSA HERRERA.  , [employer] [facility] ([place]).  Nephrology Consultant. [facility].   [address] [place] [address]. 5th [place] [address].    email: [email or web address].  Tel: Office: [phone]

## 2022-09-01 NOTE — INTERVAL H&P NOTE
The patient has been examined and the H&P has been reviewed:    I concur with the findings and no changes have occurred since H&P was written.    Procedure risks, benefits and alternative options discussed and understood by patient/family.    Kidney Biopsy.       There are no hospital problems to display for this patient.

## 2022-09-01 NOTE — DISCHARGE INSTRUCTIONS
You may take tylenol 650 m.g. as needed for pain every six hours. You may remove the bandage and shower after 48 hours. Monitor for s/s of infection (bleeding, oozing, redness, swelling, fever). Call the doctor's office with any questions.

## 2022-09-01 NOTE — DISCHARGE SUMMARY
Clinical Course:     I have seen Edouard Turcios for assessment prekidncye biopsy. He had history of HTN. Diabetes, Heart failure, hyperlipidemia and Hyperuricemia. His kidney function had been declining  4 months. He was followed in the fellows' Nephrology clinic and was referred for a kidney biopsy. With his multiple comorbid medical history with DM and HTN, we will need to confirm that his Bp is well controlled before we proceed. I have asked him to measure his BP three times daily and send the paper in 2 days. I have explained all the details of the procedure, Kidney biopsy, with all the complications including Hematuria, requirement of transfusion, and the possible need for arterial embolization if the bleeding did not stop. I have answered all the questions asked. We will confirm the date after checking home BP. The patient agree to go ahead with the biopsy and signed the consent.           Past Medical History:   Diagnosis Date    Chronic kidney disease       Chronic kidney disease stage II    Colon polyps      Hyperlipidemia 12/13/2012    Hypertension           Edouard reports that he has been smoking. He has a 24.00 pack-year smoking history. He does not have any smokeless tobacco history on file. He reports current alcohol use. He reports that he does not use drugs.           Family History   Problem Relation Age of Onset    COPD Mother      Heart disease Mother      Peripheral vascular disease Mother      Diabetes Father      Kidney disease Father      Stroke Father      Hypertension Father      Heart disease Father 70         CABG x 3    Kidney failure Father      Heart disease Maternal Grandfather           ROS:     General: No fever, chills, change in appetite or weight loss  Skin: No rashes or pruritus  Head: No headaches or recent head trauma  Eyes: No changes in vision or eye pain  Nodes: No swollen glands  Pulmonary: No dyspnea, wheezes, cough or sputum production  Cardiovascular: No chest pain,  edema, PND, orthopnea or reduced exercise tolerance  Abdomen: No abdominal pain, nausea, vomiting, constipation or diarrhea  Urinary: No flank pain, dysuria or hematuria  Peripheral Vascular: No claudication or cyanosis  Musculoskeletal: No joint stiffness, swelling or back pain  Neurologic: No history of seizures, tremors, forcal weakness or numbness     PE:     Vitals       Vitals:     08/29/22 1407   BP: 128/84   Pulse: 96   SpO2: 98%   Weight: 110 kg (242 lb 8.1 oz)            HEENT: Normocephalic, atraumatic, EOMI, PERRLA, normal conjunctive and lids, supple neck with no thyromeglay or masses, normal oropharynx, hearing intact  Cardiovascular: Regular without murmur, gallop or rub, no edema  Respiratory: Clear bilaterally without rales, rhonchi, wheezes with normal effort  Abdomen: Soft, nontender/nondistended, positive bowel sounds, no hepatosplenomegaly  Extremities: No cyanosis, clubbing, normal gait  Skin: No rashes, ulcers, induration  Psych: Oriented x 3 with normal mood and effect        Labs:  CBC  PT  PTT        Impression and plan:  CKD III with rapid decline in his renal function for the last  4 months. He is using NSAID as pain control. Will schedule the biopsy of his BP was well controlled at [facility] and on the condition that all his labs and coagulation parameters are normal.      Primary HTN will check at home.     Ischemic Cardiomyopathy with CHF.     Bifascicular block.     Aortic valve disease.     Date of Procedure: 9/1/2022      Procedure: Procedure(s) (LRB):  BIOPSY, KIDNEY (Left)      Kidney biopsy note: 9/1/2022      Edouard Cali [facility]  1975  [id number]     Pre-op Diagnosis: Acute renal failure, unspecified acute renal failure type [N17.9]   Post-op Diagnosis: Acute renal failure, unspecified acute renal failure type [N17.9]     Procedure note: Lt. Kidney Biopsy.   The patient consent was obtained earlier after explaining all the possible complications.  The patient was placed prone with  support below the abdomen.   Vitals were stable.   The Lt. lower renal pole was localized with ultrasound.   The patient was given 2 % Xylocaine to anesthetize the skin and subcutaneous tissues.  A spinal needle was introduced with intermittent xylocaine injections form the skin down to the kidneys at 8 depth.  We used he biopsy gun and 2 pieces was obtained via 2 passes.   The pieces were inspected under the Microscope.    The patient was stable.   Acetaminophen 1000mg IV was given.   There was no complication.  The procedure was smooth.   The patient was shifted to the observation one day surgery area for 8 hours of monitoring.  CBC will be done after 4 hours.  The urine color and output will be monitored.   The BP will be monitored closely.   He will be in complete bed rest for 6 hours post biopsy.   Complications: No  Condition: Good  FOLLOWUP: In clinic        Post Kidney Biopsy Orders:     The patient was shifted to the observation one day surgery area for 8 hours of monitoring.  Complete bed rest in supine for 6 hours with a pad below the Lt flank.  Head can be elevated at 40 degrees if she wants to eat.   Bed pan for urine is recommended. So that she won't get out of the bed for the first 6 hours.   CBC will be done after 4 hours. At 18:00.  The urine color and output will be monitored.   For the first three times she passes urine, a sample will be held in a cup to observe the color change   The BP will be monitored closely. Every 15 min for one hour, then hourly.   No bleeding was noticed during the procedure.                 15:04: The patient is comfortable. He had no pain or discomfort. A repeated ultrasound was done for the Lt kidney. There was no hematoma noticed. The patient is comfortable. Will monitor for 2 more hours.       He will be discharged home and will need 48 hours of leave.          JEWEL IRWIN.Marco Antonio. MD. ZENA. KIKAP.  , [employer] [facility]  [place] ([place]).  Nephrology Consultant. [facility].   [address],  [facility]. 5th [place] [address].

## 2022-09-01 NOTE — Clinical Note
The left back and lower back was prepped. The site was prepped with ChloraPrep. The patient was draped. The patient was positioned prone.

## 2022-09-01 NOTE — PROGRESS NOTES
Pt walking off unit for d/c at this time with spouse. Pt has personal belongings and d/c documentation.

## 2022-09-01 NOTE — PROGRESS NOTES
Dr. Madisonaid at bedside with pt, clearing him for d/c at this time. Pt VS are stable and he has no complaints of pain. D/C instructions given per MD orders. Pt verbalizes understanding.

## 2022-09-01 NOTE — Clinical Note
NDL MONOPTY BIOPSY 18g 16cm- The biopsy device collected a biopsy of the Lt. Kidney. The sample count is 2.

## 2022-09-01 NOTE — PROGRESS NOTES
Pt out of bed and ambulating at this time. No s/s of distress or discomfort present, VS stable. Call placed to Dr. Abad to inform him pt is up and walking and that he has voided his last urine sample. Awaiting returned call.

## 2022-09-02 ENCOUNTER — TELEPHONE (OUTPATIENT)
Dept: NEPHROLOGY | Facility: CLINIC | Age: 47
End: 2022-09-02
Payer: COMMERCIAL

## 2022-09-08 ENCOUNTER — TELEPHONE (OUTPATIENT)
Dept: NEPHROLOGY | Facility: CLINIC | Age: 47
End: 2022-09-08
Payer: COMMERCIAL

## 2022-09-09 ENCOUNTER — TELEPHONE (OUTPATIENT)
Dept: NEPHROLOGY | Facility: CLINIC | Age: 47
End: 2022-09-09
Payer: COMMERCIAL

## 2022-09-09 ENCOUNTER — PATIENT MESSAGE (OUTPATIENT)
Dept: NEPHROLOGY | Facility: CLINIC | Age: 47
End: 2022-09-09
Payer: COMMERCIAL

## 2022-09-13 ENCOUNTER — TELEPHONE (OUTPATIENT)
Dept: NEPHROLOGY | Facility: CLINIC | Age: 47
End: 2022-09-13
Payer: COMMERCIAL

## 2022-09-13 ENCOUNTER — LAB VISIT (OUTPATIENT)
Dept: LAB | Facility: HOSPITAL | Age: 47
End: 2022-09-13
Attending: INTERNAL MEDICINE
Payer: COMMERCIAL

## 2022-09-13 DIAGNOSIS — N18.32 STAGE 3B CHRONIC KIDNEY DISEASE: Primary | ICD-10-CM

## 2022-09-13 DIAGNOSIS — N18.32 STAGE 3B CHRONIC KIDNEY DISEASE: ICD-10-CM

## 2022-09-13 LAB
ALBUMIN SERPL BCP-MCNC: 3.9 G/DL (ref 3.5–5.2)
ANION GAP SERPL CALC-SCNC: 6 MMOL/L (ref 8–16)
ANISOCYTOSIS BLD QL SMEAR: SLIGHT
BASOPHILS # BLD AUTO: 0.09 K/UL (ref 0–0.2)
BASOPHILS NFR BLD: 0.7 % (ref 0–1.9)
BUN SERPL-MCNC: 38 MG/DL (ref 6–20)
CALCIUM SERPL-MCNC: 9.3 MG/DL (ref 8.7–10.5)
CHLORIDE SERPL-SCNC: 109 MMOL/L (ref 95–110)
CO2 SERPL-SCNC: 20 MMOL/L (ref 23–29)
CREAT SERPL-MCNC: 2.9 MG/DL (ref 0.5–1.4)
DIFFERENTIAL METHOD: ABNORMAL
EOSINOPHIL # BLD AUTO: 0.3 K/UL (ref 0–0.5)
EOSINOPHIL NFR BLD: 2.7 % (ref 0–8)
ERYTHROCYTE [DISTWIDTH] IN BLOOD BY AUTOMATED COUNT: 14.8 % (ref 11.5–14.5)
EST. GFR  (NO RACE VARIABLE): 25.7 ML/MIN/1.73 M^2
GIANT PLATELETS BLD QL SMEAR: PRESENT
GLUCOSE SERPL-MCNC: 211 MG/DL (ref 70–110)
HCT VFR BLD AUTO: 39.8 % (ref 40–54)
HGB BLD-MCNC: 13.1 G/DL (ref 14–18)
HYPOCHROMIA BLD QL SMEAR: ABNORMAL
IMM GRANULOCYTES # BLD AUTO: 0.05 K/UL (ref 0–0.04)
IMM GRANULOCYTES NFR BLD AUTO: 0.4 % (ref 0–0.5)
LYMPHOCYTES # BLD AUTO: 5 K/UL (ref 1–4.8)
LYMPHOCYTES NFR BLD: 40.1 % (ref 18–48)
MCH RBC QN AUTO: 32.9 PG (ref 27–31)
MCHC RBC AUTO-ENTMCNC: 32.9 G/DL (ref 32–36)
MCV RBC AUTO: 100 FL (ref 82–98)
MONOCYTES # BLD AUTO: 0.7 K/UL (ref 0.3–1)
MONOCYTES NFR BLD: 5.4 % (ref 4–15)
NEUTROPHILS # BLD AUTO: 6.3 K/UL (ref 1.8–7.7)
NEUTROPHILS NFR BLD: 50.7 % (ref 38–73)
NRBC BLD-RTO: 0 /100 WBC
PHOSPHATE SERPL-MCNC: 3.4 MG/DL (ref 2.7–4.5)
PLATELET # BLD AUTO: 530 K/UL (ref 150–450)
PLATELET BLD QL SMEAR: ABNORMAL
PMV BLD AUTO: 9 FL (ref 9.2–12.9)
POIKILOCYTOSIS BLD QL SMEAR: SLIGHT
POLYCHROMASIA BLD QL SMEAR: ABNORMAL
POTASSIUM SERPL-SCNC: 5.7 MMOL/L (ref 3.5–5.1)
PTH-INTACT SERPL-MCNC: 198.4 PG/ML (ref 9–77)
RBC # BLD AUTO: 3.98 M/UL (ref 4.6–6.2)
SODIUM SERPL-SCNC: 135 MMOL/L (ref 136–145)
SPHEROCYTES BLD QL SMEAR: ABNORMAL
WBC # BLD AUTO: 12.44 K/UL (ref 3.9–12.7)

## 2022-09-13 PROCEDURE — 83970 ASSAY OF PARATHORMONE: CPT | Performed by: INTERNAL MEDICINE

## 2022-09-13 PROCEDURE — 36415 COLL VENOUS BLD VENIPUNCTURE: CPT | Mod: PO | Performed by: INTERNAL MEDICINE

## 2022-09-13 PROCEDURE — 85025 COMPLETE CBC W/AUTO DIFF WBC: CPT | Performed by: INTERNAL MEDICINE

## 2022-09-13 PROCEDURE — 80069 RENAL FUNCTION PANEL: CPT | Performed by: INTERNAL MEDICINE

## 2022-09-13 RX ORDER — DAPAGLIFLOZIN 5 MG/1
10 TABLET, FILM COATED ORAL DAILY
Qty: 90 TABLET | Refills: 1 | Status: SHIPPED | OUTPATIENT
Start: 2022-09-13 | End: 2022-09-15 | Stop reason: ALTCHOICE

## 2022-09-13 NOTE — PROGRESS NOTES
Biopsy results reviewed. Consistent with IgA Nephropathy. Results discussed with the patient and will start therapy with SGLT2 inhibitors at this time for additional proteinuria control. Pt oriented on the importance of blood pressure control, maintaining a low salt diet, quitting tobacco, and losing weight. Pt understands and agreed to plan. Will see on clinic on Thursday 15, 2022.     Discussed with attending Dr. Ozuna.

## 2022-09-15 ENCOUNTER — SPECIALTY PHARMACY (OUTPATIENT)
Dept: PHARMACY | Facility: CLINIC | Age: 47
End: 2022-09-15
Payer: COMMERCIAL

## 2022-09-15 ENCOUNTER — OFFICE VISIT (OUTPATIENT)
Dept: NEPHROLOGY | Facility: CLINIC | Age: 47
End: 2022-09-15
Payer: COMMERCIAL

## 2022-09-15 VITALS
WEIGHT: 244.69 LBS | SYSTOLIC BLOOD PRESSURE: 122 MMHG | OXYGEN SATURATION: 98 % | BODY MASS INDEX: 34.13 KG/M2 | HEART RATE: 98 BPM | DIASTOLIC BLOOD PRESSURE: 80 MMHG

## 2022-09-15 DIAGNOSIS — N18.4 CHRONIC KIDNEY DISEASE, STAGE 4 (SEVERE): ICD-10-CM

## 2022-09-15 LAB
FINAL PATHOLOGIC DIAGNOSIS: NORMAL
GROSS: NORMAL
Lab: NORMAL

## 2022-09-15 PROCEDURE — 3079F PR MOST RECENT DIASTOLIC BLOOD PRESSURE 80-89 MM HG: ICD-10-PCS | Mod: CPTII,S$GLB,, | Performed by: INTERNAL MEDICINE

## 2022-09-15 PROCEDURE — 3066F NEPHROPATHY DOC TX: CPT | Mod: CPTII,S$GLB,, | Performed by: INTERNAL MEDICINE

## 2022-09-15 PROCEDURE — 3066F PR DOCUMENTATION OF TREATMENT FOR NEPHROPATHY: ICD-10-PCS | Mod: CPTII,S$GLB,, | Performed by: INTERNAL MEDICINE

## 2022-09-15 PROCEDURE — 3074F PR MOST RECENT SYSTOLIC BLOOD PRESSURE < 130 MM HG: ICD-10-PCS | Mod: CPTII,S$GLB,, | Performed by: INTERNAL MEDICINE

## 2022-09-15 PROCEDURE — 99999 PR PBB SHADOW E&M-EST. PATIENT-LVL IV: CPT | Mod: PBBFAC,,, | Performed by: INTERNAL MEDICINE

## 2022-09-15 PROCEDURE — 99214 OFFICE O/P EST MOD 30 MIN: CPT | Mod: S$GLB,,, | Performed by: INTERNAL MEDICINE

## 2022-09-15 PROCEDURE — 99999 PR PBB SHADOW E&M-EST. PATIENT-LVL IV: ICD-10-PCS | Mod: PBBFAC,,, | Performed by: INTERNAL MEDICINE

## 2022-09-15 PROCEDURE — 3079F DIAST BP 80-89 MM HG: CPT | Mod: CPTII,S$GLB,, | Performed by: INTERNAL MEDICINE

## 2022-09-15 PROCEDURE — 99214 PR OFFICE/OUTPT VISIT, EST, LEVL IV, 30-39 MIN: ICD-10-PCS | Mod: S$GLB,,, | Performed by: INTERNAL MEDICINE

## 2022-09-15 PROCEDURE — 4010F ACE/ARB THERAPY RXD/TAKEN: CPT | Mod: CPTII,S$GLB,, | Performed by: INTERNAL MEDICINE

## 2022-09-15 PROCEDURE — 3074F SYST BP LT 130 MM HG: CPT | Mod: CPTII,S$GLB,, | Performed by: INTERNAL MEDICINE

## 2022-09-15 PROCEDURE — 4010F PR ACE/ARB THEARPY RXD/TAKEN: ICD-10-PCS | Mod: CPTII,S$GLB,, | Performed by: INTERNAL MEDICINE

## 2022-09-15 RX ORDER — SODIUM BICARBONATE 650 MG/1
650 TABLET ORAL 3 TIMES DAILY
Qty: 270 TABLET | Refills: 3 | Status: SHIPPED | OUTPATIENT
Start: 2022-09-15 | End: 2023-01-19 | Stop reason: SDUPTHER

## 2022-09-15 RX ORDER — BUDESONIDE 4 MG/1
16 CAPSULE, DELAYED RELEASE ORAL DAILY
Qty: 120 CAPSULE | Refills: 3 | Status: SHIPPED | OUTPATIENT
Start: 2022-09-15 | End: 2023-08-28

## 2022-09-15 RX ORDER — CHLORTHALIDONE 25 MG/1
12.5 TABLET ORAL DAILY
Qty: 45 TABLET | Refills: 3 | Status: SHIPPED | OUTPATIENT
Start: 2022-09-15 | End: 2022-11-10

## 2022-09-15 NOTE — PROGRESS NOTES
Nephrology Clinic Note   9/15/2022    Chief Complaint   Patient presents with    Chronic Kidney Disease      History of present illness:  Patient is a 47 y.o. male.   Presents to the clinic today for medical conditions listed below.  Problem Noted   Htn (Hypertension) 11/14/2012   Chronic Kidney Disease, Stage 4 (Severe) 11/14/2012    47 y/o M with hx of HTN, Gout and prior history of glomerulonephritis who comes in for follow up evaluation. Pt was last evaluated by a kidney doctor 5y ago in 2017. At that time it was suspected he had stable disease, possibly IgA nephropathy. Biopsy was deferred and expectant observation was recommended. Pt now comes back to the Nephrology clinic with worsening renal dysfunction and proteinuria. Blood pressure also significantly elevated. He refers his father suffered from kidney disease and had a complication of bleeding after a biopsy and required dialysis however he cannot remember the diagnosis. He believes it was related to hypertension. Pt denies any recent nausea, vomits, diarrhea, radiocontrast administration, dehydration or NSAID use.     Interval Hx:  9/15/22: Kidney biopsy results noted and consistent with IgA nephropathy. Worsening kidney function with creatinine up to 2.9.        Review of Systems   Constitutional:  Negative for chills, fever and weight loss.   Respiratory:  Negative for cough, shortness of breath and wheezing.    Cardiovascular:  Negative for chest pain, claudication and leg swelling.   Gastrointestinal:  Negative for abdominal pain, diarrhea, nausea and vomiting.   Genitourinary:  Negative for dysuria, flank pain, frequency, hematuria and urgency.   Skin:  Negative for rash.   Neurological:  Negative for weakness.     History:  Past Medical History:   Diagnosis Date    Chronic kidney disease     Chronic kidney disease stage II    Colon polyps     Hyperlipidemia 12/13/2012    Hypertension       Past Surgical History:   Procedure Laterality Date     COSMETIC SURGERY      pyloric stenosis      RENAL BIOPSY Left 9/1/2022    Procedure: BIOPSY, KIDNEY;  Surgeon: Sinan Abad MD;  Location: [facility] CATH LAB;  Service: Interventional Nephrology;  Laterality: Left;        Current Outpatient Medications:     allopurinoL (ZYLOPRIM) 100 MG tablet, Take 1 tablet (100 mg total) by mouth once daily., Disp: 30 tablet, Rfl: 3    amLODIPine (NORVASC) 10 MG tablet, Take 1 tablet (10 mg total) by mouth once daily., Disp: 30 tablet, Rfl: 11    blood sugar diagnostic Strp, 1 strip by Misc.(Non-Drug; Combo Route) route 2 (two) times daily. ICD 10: E11.22, Disp: 100 each, Rfl: 6    blood-glucose meter kit, Use as instructed to check blood sugar two times a day. ICD 10: E11.22, Disp: 1 each, Rfl: 0    colchicine (COLCRYS) 0.6 mg tablet, Take by mouth., Disp: , Rfl:     DAILY GARLIC ONCE-A-DAY ORAL, Take by mouth., Disp: , Rfl:     lancets Misc, 1 lancet by Misc.(Non-Drug; Combo Route) route 2 (two) times daily. ICD 10: E11.22, Disp: 100 each, Rfl: 6    prednisoLONE acetate (PRED FORTE) 1 % DrpS, Instill 1 drop into right eye four times a day, Disp: , Rfl:     valsartan (DIOVAN) 320 MG tablet, Take 1 tablet (320 mg total) by mouth once daily., Disp: 90 tablet, Rfl: 3    atorvastatin (LIPITOR) 20 MG tablet, Take 1 tablet (20 mg total) by mouth once daily. (Patient not taking: Reported on 9/15/2022), Disp: 90 tablet, Rfl: 3    budesonide (TARPEYO) 4 mg CpDR, Take 16 mg by mouth once daily., Disp: 120 capsule, Rfl: 3    chlorthalidone (HYGROTEN) 25 MG Tab, Take 0.5 tablets (12.5 mg total) by mouth once daily., Disp: 45 tablet, Rfl: 3    dapagliflozin (FARXIGA) 5 mg Tab tablet, Take 2 tablets (10 mg total) by mouth once daily. (Patient not taking: Reported on 9/15/2022), Disp: 90 tablet, Rfl: 1    fish oil-omega-3 fatty acids 300-1,000 mg capsule, Take 1 capsule by mouth once daily., Disp: , Rfl:     lactobacillus acidophilus & bulgar (LACTINEX) 100 million cell packet, Take 1 packet (1  each total) by mouth once daily. (Patient not taking: No sig reported), Disp: 90 packet, Rfl: 0    multivitamin (THERAGRAN) per tablet, Take 1 tablet by mouth., Disp: , Rfl:     sodium bicarbonate 650 MG tablet, Take 1 tablet (650 mg total) by mouth 3 (three) times daily., Disp: 270 tablet, Rfl: 3    sodium zirconium cyclosilicate (LOKELMA) 5 gram packet, Take 1 packet (5 g total) by mouth once daily. Mix entire contents of packet(s) into drinking glass containing 3 tablespoons of water; stir well and drink immediately. Add water and repeat until no powder remains to receive entire dose., Disp: 30 packet, Rfl: 2  Review of patient's allergies indicates:  No Known Allergies   Social History     Tobacco Use    Smoking status: Every Day     Packs/day: 1.00     Years: 24.00     Pack years: 24.00     Types: Cigarettes    Smokeless tobacco: Current   Substance Use Topics    Alcohol use: Yes     Comment: Occasional      Family History   Problem Relation Age of Onset    COPD Mother     Heart disease Mother     Peripheral vascular disease Mother     Diabetes Father     Kidney disease Father     Stroke Father     Hypertension Father     Heart disease Father 70        CABG x 3    Kidney failure Father     Heart disease Maternal Grandfather         Physical Exam :  Vitals:    09/15/22 1319   BP: 122/80   Pulse: 98     Physical Exam  Constitutional:       General: He is not in acute distress.     Appearance: He is not ill-appearing or toxic-appearing.   HENT:      Head: Atraumatic.      Nose: Nose normal.      Mouth/Throat:      Mouth: Mucous membranes are moist.   Cardiovascular:      Rate and Rhythm: Normal rate.   Pulmonary:      Effort: Pulmonary effort is normal.   Abdominal:      General: There is no distension.      Palpations: Abdomen is soft.      Tenderness: There is no abdominal tenderness.   Musculoskeletal:         General: No swelling. Normal range of motion.      Right lower leg: No edema.      Left lower leg: No  edema.   Skin:     General: Skin is warm and dry.   Neurological:      Mental Status: He is alert and oriented to person, place, and time.       Labs reviewed   Images Reviewed    Assessment:    1. Chronic kidney disease, stage 4 (severe)        Plan:    Chronic kidney disease, stage 4 (severe)  Cr continues trending up, now up to 2.9  from baseline around 1.5  Proteinuria improved with Valsartan 320mg; down to 1.6g/g  Blood 1+ with 2 RBCs in UA   No edema on examination  Workup so far negative  Urine microscopy (8/25) with many acanthocytes  Kidney biopsy results noted; consistent with chronic IgA nephropathy; no significant disease activity noted however pts renal function continues to deteriorate   Serum creatinine now up to 2.9 with metabolic acidosis (bicarb 20) and hyperkalemia of 5.7  Kidney US performed in clinic (9/15) with no hydronephrosis  Pt refers SGLT2 inhibitor prescription was not filled at his pharmacy due to insurance issues  Will start oral budesonide 16mg PO daily  Will also start Chlorthalidone 12.5mg PO daily, Sodium bicarb 650mg PO TID and Lokelma 10mg PO every other day   Repeat labs on Monday   RTC in 1 month with repeat labs as well   Follow up in about 1 month (around 10/15/2022).     No orders of the defined types were placed in this encounter.    There are no discontinued medications.   No future appointments.    All Benavides

## 2022-09-15 NOTE — ASSESSMENT & PLAN NOTE
Cr continues trending up, now up to 2.9  from baseline around 1.5  Proteinuria improved with Valsartan 320mg; down to 1.6g/g  Blood 1+ with 2 RBCs in UA   No edema on examination  Workup so far negative  Urine microscopy (8/25) with many acanthocytes  Kidney biopsy results noted; consistent with chronic IgA nephropathy; no significant disease activity noted however pts renal function continues to deteriorate   Serum creatinine now up to 2.9 with metabolic acidosis (bicarb 20) and hyperkalemia of 5.7  Kidney US performed in clinic (9/15) with no hydronephrosis  Pt refers SGLT2 inhibitor prescription was not filled at his pharmacy due to insurance issues  Will start oral budesonide 16mg PO daily  Will also start Chlorthalidone 12.5mg PO daily, Sodium bicarb 650mg PO TID and Lokelma 10mg PO every other day   Repeat labs on Monday   RTC in 1 month with repeat labs as well

## 2022-09-16 ENCOUNTER — SPECIALTY PHARMACY (OUTPATIENT)
Dept: PHARMACY | Facility: CLINIC | Age: 47
End: 2022-09-16

## 2022-09-16 NOTE — TELEPHONE ENCOUNTER
New Rx received for Tarpeyo for treatment of IgA nephropathy. PA required. Tarpeyo PA submitted via [facility]. Key: [id number] - PA Case ID: [id number].

## 2022-09-16 NOTE — TELEPHONE ENCOUNTER
New Rx received for [facility]. No PA required. Test claim: $95 copay. Pt has commercial insurance. Forwarding to BI/FA.

## 2022-09-19 ENCOUNTER — LAB VISIT (OUTPATIENT)
Dept: LAB | Facility: HOSPITAL | Age: 47
End: 2022-09-19
Attending: INTERNAL MEDICINE
Payer: COMMERCIAL

## 2022-09-19 DIAGNOSIS — N18.4 CHRONIC KIDNEY DISEASE, STAGE 4 (SEVERE): ICD-10-CM

## 2022-09-19 LAB
CREAT UR-MCNC: 250 MG/DL (ref 23–375)
PROT UR-MCNC: 266 MG/DL (ref 0–15)
PROT/CREAT UR: 1.06 MG/G{CREAT} (ref 0–0.2)

## 2022-09-19 PROCEDURE — 84156 ASSAY OF PROTEIN URINE: CPT | Performed by: INTERNAL MEDICINE

## 2022-09-19 PROCEDURE — 81001 URINALYSIS AUTO W/SCOPE: CPT | Performed by: INTERNAL MEDICINE

## 2022-09-19 NOTE — TELEPHONE ENCOUNTER
Patient's caregiver called for updates on Tarpeyo and Tjma. Informed her of OSP services and the PA process. It may take up to 3 business days for a PA determination to be made. She expressed understanding.

## 2022-09-20 ENCOUNTER — PATIENT MESSAGE (OUTPATIENT)
Dept: NEPHROLOGY | Facility: CLINIC | Age: 47
End: 2022-09-20
Payer: COMMERCIAL

## 2022-09-20 LAB
BACTERIA #/AREA URNS AUTO: ABNORMAL /HPF
BILIRUB UR QL STRIP: NEGATIVE
CLARITY UR REFRACT.AUTO: CLEAR
COLOR UR AUTO: YELLOW
GLUCOSE UR QL STRIP: NEGATIVE
HGB UR QL STRIP: ABNORMAL
HYALINE CASTS UR QL AUTO: 0 /LPF
KETONES UR QL STRIP: NEGATIVE
LEUKOCYTE ESTERASE UR QL STRIP: NEGATIVE
MICROSCOPIC COMMENT: ABNORMAL
NITRITE UR QL STRIP: NEGATIVE
PH UR STRIP: 6 [PH] (ref 5–8)
PROT UR QL STRIP: ABNORMAL
RBC #/AREA URNS AUTO: 14 /HPF (ref 0–4)
SP GR UR STRIP: 1.01 (ref 1–1.03)
SQUAMOUS #/AREA URNS AUTO: 0 /HPF
URN SPEC COLLECT METH UR: ABNORMAL
WBC #/AREA URNS AUTO: 4 /HPF (ref 0–5)

## 2022-09-21 ENCOUNTER — SPECIALTY PHARMACY (OUTPATIENT)
Dept: PHARMACY | Facility: CLINIC | Age: 47
End: 2022-09-21
Payer: COMMERCIAL

## 2022-09-21 DIAGNOSIS — E87.5 HYPERKALEMIA: Primary | ICD-10-CM

## 2022-09-21 NOTE — TELEPHONE ENCOUNTER
Specialty Pharmacy - Initial Clinical Assessment    Specialty Medication Orders Linked to Encounter      Flowsheet Row Most Recent Value   Medication #1 sodium zirconium cyclosilicate (LOKELMA) 5 gram packet (Order#[id number], Rx#[id number])          Patient Diagnosis   E87.5 - Hyperkalemia    Subjective    Edouard Pena is a 47 y.o. male, who is followed by the specialty pharmacy service for management and education.    Recent Encounters       Date Type Provider Description    09/21/2022 Specialty Pharmacy Betsy Adkins PharmD Initial Clinical Assessment    09/16/2022 Specialty Pharmacy Betsy Adkins, Liza Referral Authorization    09/15/2022 Specialty Pharmacy Betsy Adkins, Liza Referral Authorization          Clinical call attempts since last clinical assessment   No call attempts found.     Current Outpatient Medications   Medication Sig    allopurinoL (ZYLOPRIM) 100 MG tablet Take 1 tablet (100 mg total) by mouth once daily.    amLODIPine (NORVASC) 10 MG tablet Take 1 tablet (10 mg total) by mouth once daily.    atorvastatin (LIPITOR) 20 MG tablet Take 1 tablet (20 mg total) by mouth once daily. (Patient not taking: Reported on 9/15/2022)    blood sugar diagnostic Strp 1 strip by Misc.(Non-Drug; Combo Route) route 2 (two) times daily. ICD 10: E11.22    blood-glucose meter kit Use as instructed to check blood sugar two times a day. ICD 10: E11.22    budesonide (TARPEYO) 4 mg CpDR Take 16 mg by mouth once daily.    chlorthalidone (HYGROTEN) 25 MG Tab Take 0.5 tablets (12.5 mg total) by mouth once daily.    colchicine (COLCRYS) 0.6 mg tablet Take by mouth.    DAILY GARLIC ONCE-A-DAY ORAL Take by mouth.    fish oil-omega-3 fatty acids 300-1,000 mg capsule Take 1 capsule by mouth once daily.    lactobacillus acidophilus & bulgar (LACTINEX) 100 million cell packet Take 1 packet (1 each total) by mouth once daily. (Patient not taking: No sig reported)    lancets Misc 1 lancet by Misc.(Non-Drug; Combo  Route) route 2 (two) times daily. ICD 10: E11.22    multivitamin (THERAGRAN) per tablet Take 1 tablet by mouth.    prednisoLONE acetate (PRED FORTE) 1 % DrpS Instill 1 drop into right eye four times a day    sodium bicarbonate 650 MG tablet Take 1 tablet (650 mg total) by mouth 3 (three) times daily.    sodium zirconium cyclosilicate (LOKELMA) 5 gram packet Take 1 packet (5 g total) by mouth once daily. Mix entire contents of packet(s) into drinking glass containing 3 tablespoons of water; stir well and drink immediately. Add water and repeat until no powder remains to receive entire dose.    valsartan (DIOVAN) 320 MG tablet Take 1 tablet (320 mg total) by mouth once daily.   Last reviewed on 9/15/2022  1:49 PM by All Benavides MD    Review of patient's allergies indicates:  No Known AllergiesLast reviewed on  9/15/2022 1:49 PM by All Benavides    Drug Interactions    Drug interactions evaluated: yes  Clinically relevant drug interactions identified: yes   Interactions list: Separate other oral medications by 2 hours.                Assessment Questions - Documented Responses      Flowsheet Row Most Recent Value   Assessment    Medication Reconciliation completed for patient Yes   During the past 4 weeks, has patient missed any activities due to condition or medication? No   During the past 4 weeks, did patient have any of the following urgent care visits? None   Goals of Therapy Status Discussed (new start)   Status of the patients ability to self-administer: Is Able   All education points have been covered with patient? Yes, supplemental printed education provided   Welcome packet contents reviewed and discussed with patient? Yes   Assesment completed? Yes   Plan Therapy being initiated   Do you need to open a clinical intervention (i-vent)? No   Do you want to schedule first shipment? Yes   Medication #1 Assessment Info    Patient status New medication, New to [facility]   Is this medication appropriate  "for the patient? Yes   Is this medication effective? Not yet started          Refill Questions - Documented Responses      Flowsheet Row Most Recent Value   Patient Availability and HIPAA Verification    Does patient want to proceed with activity? Yes   HIPAA/medical authority confirmed? Yes   Relationship to patient of person spoken to? Self   Refill Screening Questions    When does the patient need to receive the medication? 09/22/22   Refill Delivery Questions    How will the patient receive the medication? Delivery [facility]   When does the patient need to receive the medication? 09/22/22   Shipping Address Home   Address in [facility] confirmed and updated if neccessary? Yes   Expected Copay ($) 0   Is the patient able to afford the medication copay? Yes   Payment Method zero copay   Days supply of Refill 30   Supplies needed? No supplies needed   Refill activity completed? Yes   Refill activity plan Refill scheduled   Shipment/Pickup Date: 09/21/22            Objective    He has a past medical history of Chronic kidney disease, Colon polyps, Hyperlipidemia (12/13/2012), and Hypertension.    Tried/failed medications: None    BP Readings from Last 4 Encounters:   09/15/22 122/80   09/01/22 (!) 140/87   08/29/22 128/84   08/25/22 (!) 150/94     Ht Readings from Last 4 Encounters:   09/01/22 5' 11" (1.803 m)   09/11/19 5' 11" (1.803 m)   07/24/17 5' 11" (1.803 m)   06/09/17 5' 11" (1.803 m)     Wt Readings from Last 4 Encounters:   09/15/22 111 kg (244 lb 11.4 oz)   09/01/22 111.4 kg (245 lb 8.1 oz)   08/29/22 110 kg (242 lb 8.1 oz)   08/25/22 111 kg (244 lb 11.4 oz)       The goals of prescribed drug therapy management include:  Supporting patient to meet the prescriber's medical treatment objectives  Improving or maintaining quality of life  Maintaining optimal therapy adherence  Minimizing and managing side effects      Goals of Therapy Status: Discussed (new start)    Assessment/Plan  Patient plans to start " therapy on 09/22/22      Indication, dosage, appropriateness, effectiveness, safety and convenience of his specialty medication(s) were reviewed today.     Patient Education   Patient received education on the following:   Expectations and possible outcomes of therapy  Proper use, timely administration, and missed dose management  Duration of therapy  Side effects, including prevention, minimization, and management  Contraindications and safety precautions  New or changed medications, including prescribe and over the counter medications and supplements  Reviews recommended vaccinations, as appropriate  Storage, safe handling, and disposal      Tasks added this encounter   10/15/2022 - Refill Call (Auto Added)   Tasks due within next 3 months   No tasks due.     Betsy Adkins, PharmD  [facility]  [address] [facility] [address]  Phone: [phone]  Fax: [phone]

## 2022-09-21 NOTE — TELEPHONE ENCOUNTER
BENEFITS INVESTIGATION:   [employer]; [facility] website  [facility] in network.   Deductible: None   OOP Max: $97688 ($2908.12 met)  Estimated copay: $95 copay.

## 2022-09-21 NOTE — TELEPHONE ENCOUNTER
Called [employer] dept at [phone] for status check on Tarpe[place] PA. Spoke with representative Dionisio who states PA still in process at this time. However, he will expedite request and a determination should be received within 1-2 days.

## 2022-09-27 ENCOUNTER — TELEPHONE (OUTPATIENT)
Dept: NEPHROLOGY | Facility: CLINIC | Age: 47
End: 2022-09-27
Payer: COMMERCIAL

## 2022-09-27 ENCOUNTER — DOCUMENTATION ONLY (OUTPATIENT)
Dept: TRANSPLANT | Facility: HOSPITAL | Age: 47
End: 2022-09-27
Payer: COMMERCIAL

## 2022-09-27 NOTE — PROGRESS NOTES
Called patient to discuss most recent labs. Pts wife referred he has not been feeling well due to a tooth infection for which he is taking antibiotics however they report no improvement and is now having severe headaches and subjective fevers. Strongly recommended he come in to the ED for further evaluation as his infection maybe more severe or not responding to therapy. Pts wife understood and agreed to bring him in today.

## 2022-09-27 NOTE — TELEPHONE ENCOUNTER
Ferdinand [employer] denied on the basis that patient's plan requires his estimated eGFR to be >30 mL/min. Will proceed with appeal. Attempted to reach patient to inform him of this information. No answer, unable to LVM.

## 2022-09-27 NOTE — TELEPHONE ENCOUNTER
Incoming call from provider to check status of Tarpe[place]. Informed him that the PA was denied. Betsy MAGAÑA PharmD is currently working on an appeal. MD requested to speak with Betsy, but she was unavailable at the time of the call. MD is okay with [facility] proceeding with the Appeal process.

## 2022-09-28 ENCOUNTER — HOSPITAL ENCOUNTER (EMERGENCY)
Facility: HOSPITAL | Age: 47
Discharge: HOME | End: 2022-09-28
Attending: EMERGENCY MEDICINE
Payer: COMMERCIAL

## 2022-09-28 VITALS
SYSTOLIC BLOOD PRESSURE: 163 MMHG | OXYGEN SATURATION: 97 % | RESPIRATION RATE: 18 BRPM | DIASTOLIC BLOOD PRESSURE: 87 MMHG | HEIGHT: 71 IN | TEMPERATURE: 98 F | BODY MASS INDEX: 34.44 KG/M2 | HEART RATE: 80 BPM | WEIGHT: 246 LBS

## 2022-09-28 DIAGNOSIS — R51.9 HEADACHE, TEMPORAL: ICD-10-CM

## 2022-09-28 DIAGNOSIS — R03.0 ELEVATED BLOOD PRESSURE READING: ICD-10-CM

## 2022-09-28 DIAGNOSIS — K08.89 PAIN, DENTAL: Primary | ICD-10-CM

## 2022-09-28 PROCEDURE — 99284 EMERGENCY DEPT VISIT MOD MDM: CPT | Mod: ,,, | Performed by: EMERGENCY MEDICINE

## 2022-09-28 PROCEDURE — 99284 PR EMERGENCY DEPT VISIT,LEVEL IV: ICD-10-PCS | Mod: ,,, | Performed by: EMERGENCY MEDICINE

## 2022-09-28 PROCEDURE — 99284 EMERGENCY DEPT VISIT MOD MDM: CPT

## 2022-09-28 PROCEDURE — 25000003 PHARM REV CODE 250: Performed by: PHYSICIAN ASSISTANT

## 2022-09-28 RX ORDER — CLINDAMYCIN HYDROCHLORIDE 150 MG/1
300 CAPSULE ORAL
Status: COMPLETED | OUTPATIENT
Start: 2022-09-28 | End: 2022-09-28

## 2022-09-28 RX ORDER — HYDROCODONE BITARTRATE AND ACETAMINOPHEN 5; 325 MG/1; MG/1
1 TABLET ORAL EVERY 4 HOURS PRN
Qty: 9 TABLET | Refills: 0 | OUTPATIENT
Start: 2022-09-28 | End: 2023-02-16

## 2022-09-28 RX ORDER — HYDROCODONE BITARTRATE AND ACETAMINOPHEN 5; 325 MG/1; MG/1
1 TABLET ORAL
Status: COMPLETED | OUTPATIENT
Start: 2022-09-28 | End: 2022-09-28

## 2022-09-28 RX ORDER — CLINDAMYCIN HYDROCHLORIDE 150 MG/1
450 CAPSULE ORAL EVERY 8 HOURS
Qty: 63 CAPSULE | Refills: 0 | Status: SHIPPED | OUTPATIENT
Start: 2022-09-28 | End: 2022-10-05

## 2022-09-28 RX ADMIN — HYDROCODONE BITARTRATE AND ACETAMINOPHEN 1 TABLET: 5; 325 TABLET ORAL at 08:09

## 2022-09-28 RX ADMIN — CLINDAMYCIN HYDROCHLORIDE 300 MG: 150 CAPSULE ORAL at 08:09

## 2022-09-28 NOTE — Clinical Note
"Edouard Olivares" Jean was seen and treated in our emergency department on 9/28/2022.  He may return to work on 09/30/2022.       If you have any questions or concerns, please don't hesitate to call.      TRISTAN Aiken"

## 2022-09-28 NOTE — ED NOTES
Patient at sink washing warm fluid over face, applied warm pack, has dental appointment tomorrow, taking wifes antibiotics

## 2022-09-28 NOTE — ED PROVIDER NOTES
Encounter Date: 9/28/2022       History     Chief Complaint   Patient presents with    Headache     48 y/o male with history of CKD stage II, HTN, presents to the ER with complaint left upper dental pain worsening over the last week.  He is taking wife's antibiotics - he has taken 6 tablets of Penicillin over the last 2 days .  He is taking tylenol and ibuprofen without relief of pain.  He reports throbbing bitemporal headache and left upper dental pain with severe pain radiating into left upper jaw, which radiates up to left temple.  He has taken blood pressure medications today.  He denies chest pain or SOB, nausea, vomiting or visual changes, neck stiffness or fever. Patient drove himself to the ER.  He has no other complaints at this time.        Review of patient's allergies indicates:  No Known Allergies  Past Medical History:   Diagnosis Date    Chronic kidney disease     Chronic kidney disease stage II    Colon polyps     Hyperlipidemia 12/13/2012    Hypertension      Past Surgical History:   Procedure Laterality Date    COSMETIC SURGERY      pyloric stenosis      RENAL BIOPSY Left 9/1/2022    Procedure: BIOPSY, KIDNEY;  Surgeon: Sinan Abad MD;  [facility] C[facility] LAB;  Service: Interventional Nephrology;  Laterality: Left;     Family History   Problem Relation Age of Onset    COPD Mother     Heart disease Mother     Peripheral vascular disease Mother     Diabetes Father     Kidney disease Father     Stroke Father     Hypertension Father     Heart disease Father 70        CABG x 3    Kidney failure Father     Heart disease Maternal Grandfather      Social History     Tobacco Use    Smoking status: Every Day     Packs/day: 1.00     Years: 24.00     Pack years: 24.00     Types: Cigarettes    Smokeless tobacco: Current   Substance Use Topics    Alcohol use: Yes     Comment: Occasional    Drug use: No     Review of Systems   Constitutional:  Negative for chills and fever.   HENT:   Positive for dental problem. Negative for sore throat, trouble swallowing and voice change.    Respiratory:  Negative for shortness of breath.    Cardiovascular:  Negative for chest pain.   Gastrointestinal:  Negative for nausea and vomiting.   Genitourinary:  Negative for dysuria.   Musculoskeletal:  Negative for back pain.   Skin:  Negative for color change and rash.   Neurological:  Positive for headaches. Negative for dizziness, seizures, syncope, weakness, light-headedness and numbness.   Hematological:  Does not bruise/bleed easily.   Psychiatric/Behavioral:  Negative for confusion.      Physical Exam     Initial Vitals [09/28/22 0752]   BP Pulse Resp Temp SpO2   (!) 209/98 94 16 98.4 °F (36.9 °C) 100 %      MAP       --         Physical Exam    Nursing note and vitals reviewed.  Constitutional: He appears well-developed and well-nourished. No distress.   HENT:   Head: Atraumatic.   Mouth/Throat: Dental caries (significant dental decay left upper tooth #16. no gumline tenderness, swelling or fluctuance) present.   Very mild swelling left maxillary area, no overlying tenderness, no cellulitis.     Eyes: Conjunctivae and EOM are normal. Pupils are equal, round, and reactive to light.   Neck: Neck supple.   Normal range of motion.  Cardiovascular:  Normal rate and regular rhythm.           Pulmonary/Chest: Breath sounds normal. No respiratory distress. He has no wheezes. He has no rales.   Musculoskeletal:      Cervical back: Normal range of motion and neck supple.     Neurological: He is alert and oriented to person, place, and time. He has normal strength. No cranial nerve deficit or sensory deficit. GCS score is 15. GCS eye subscore is 4. GCS verbal subscore is 5. GCS motor subscore is 6.   Skin: No rash noted.   Psychiatric: He has a normal mood and affect.       ED Course   Procedures  Labs Reviewed - No data to display         Imaging Results    None          Medications   HYDROcodone-acetaminophen 5-325 mg  per tablet 1 tablet (1 tablet Oral Given 9/28/22 0847)   clindamycin capsule 300 mg (300 mg Oral Given 9/28/22 0847)           APC / Resident Notes:   The patient appears to have left upper dental infection.    Based upon the history and physical I see no signs of Darrick's angina, sublingual swelling, significant facial swelling, airway compromise, facial cellulitis, sepsis, dehydration, or a fluctuant abscess to drain.  Patient has associated headache in bitemporal area.  NO visual changes or dizziness. Headache is ongoing for 1 week, he his neuro intact on exam.  I discussed the care of this patient with my supervising MD and the patient was also seen by her.  We did not see an indication for emergent labs or imaging at this time.  I believe the patient can be discharged home with antibiotics and pain medications and plan to follow up with dentist tomorrow as planned.  Patient given norco and first dose of clindamycin in the ED, BP improving to 163/87 with pain control..  The patient has been given specific return precautions and instructed to follow up with primary care physician within 1 week for ER follow up exam.         Attending Attestation:     Physician Attestation Statement for NP/PA:   I have conducted a face to face encounter with this patient in addition to the NP/PA, due to Medical Complexity    Other NP/PA Attestation Additions:    History of Present Illness: This is a 47-year-old male who presents to the emergency department today with a known history of chronic renal insufficiency.  His presentation today is related to some pain and he has been having in his left most posterior molar in his maxillary region.  He states that is been hurting for quite some time but the pain is now escalating.  It is to the point that it is radiating up primarily to the left side of the head but across the forehead as well.  He has an appointment with a dentist tomorrow afternoon.  He is not able to take ibuprofen  given his renal insufficiency and this is been trying to use Tylenol for the pain but to minimal avail.  He has also started to take his wife's old antibiotic prescription and has had 4-5 doses of this.  He believes it is a penicillin class medication.  He has not had any systemic symptoms.  No fevers or chills.  No chest pain or shortness of breath.  No abdominal pain, vomiting, or diarrhea.  He has had no visual changes either.  He has no difficulty swallowing and no airway difficulty either.  He states that he has had problems in the past with that tooth to the point that he has had abscesses around the tooth that is actively draining to his mouth.  None of that drainage has occurred recently.  He has noticed some mild swelling to his left maxillary area on external examination of his face.   Physical Exam: VS upon arrival:  163/87; 80; 18; 97% room air; afebrile.  Consititutional: Pt is awake, alert, and oriented x 4. Does not appear to be in any em distress.  HEENT: PERRL; EOMI; the patient has some very subtle overlying swelling to the left side of the face around the left zygoma area.  Once again it is very subtle and there is no overlying redness or induration;  nares patent; op clear posteriorly and widely patent; mmm without lesions; the tooth of concern is the left posterior maxillary molar which does appear carried as does the tooth adjacent to it.  There does not appear to be any gingival change around the tooth however and no gingival abscess.  Both of these teeth are tender to tap.  There is normal tongue protrusion and saw floor the mouth.  There are no brawny changes to the submandibular area.  Neck: Supple with good ROM.  CV: Normal rate; regular rhythm; no mrg. Heart sounds normal. No peripheral edema. 2+ radials bilateral and symmetric.  Respiratory: CTA bilaterally with no focal rales, ronchi, or wheezes.  GI: Abdomen soft, NTND. No rebound. No guarding. BS normal.  MSK: No long bone  deformities; no focal joint swellings.  Neuro: DESTINY.   Skin:  Face findings as above.  Otherwise no skin eruptions noted.      Medical Decision Making: The patient appears to have a odontogenic infection involving the left posterior maxillary molar.  Likely this is a periapical abscess.  However, he has no systemic signs of infection.  He has no visible abscess that could be easily drained here in the emergency department.  He has very mild swelling in the left zygoma area that is hardly discernible on exam and thus no overt significant facial swelling.  He also has no difficulty tolerating p.o. or difficulty with his airway.  He also has excellent dental follow-up tomorrow afternoon.  Thus, we have discussed placing the patient on antibiotics now.  We are going to use clindamycin.  He had already had multiple doses of a penicillin that did not seem to be helping much.  I honestly do not know if he had a steady state a penicillin or if this was a true failure of the penicillin given how many tablets he has had, but given this was difficult to tell, we opted for switching to clindamycin.  I did discuss with him the side effects of clindamycin including possible allergic reaction or Clostridium difficile.  He has voiced understanding of this.  I do think the benefits of treatment of this periapical abscess outweigh the risks.  We will place him on a course of this as well as a short course of oral narcotics to bridge him to his visit tomorrow with the dentist.  I do not feel that any radiographic imaging is needed at this time.  He is in stable condition for discharge.  There are no concerns for Vincent's angina, Darrick's angina, abscess to the neck, or airway difficulty.  ED diagnosis:  1. Acute periapical abscess, left posterior maxillary molar.                        Clinical Impression:   Final diagnoses:  [K08.89] Pain, dental (Primary)  [R51.9] Headache, temporal  [R03.0] Elevated blood pressure reading      ED  Disposition Condition    Discharge Stable          ED Prescriptions       Medication Sig Dispense Start Date End Date Auth. Provider    HYDROcodone-acetaminophen (NORCO) 5-325 mg per tablet Take 1 tablet by mouth every 4 (four) hours as needed for Pain. 9 tablet 9/28/2022 -- TRISTAN Aiken    clindamycin (CLEOCIN) 150 MG capsule Take 3 capsules (450 mg total) by mouth every 8 (eight) hours. for 7 days 63 capsule 9/28/2022 10/5/2022 TRISTAN Aiken          Follow-up Information       Follow up With Specialties Details Why Contact Info    Castro Gilbert MD Family Medicine Schedule an appointment as soon as possible for a visit in 1 week To discuss ER visit and schedule follow up appointment within 1 week [address] [facility] [address]  [phone]               TRISTAN Aiken  09/28/22 2883       Patricia Paredes MD  09/29/22 0491

## 2022-09-28 NOTE — ED NOTES
Patient states left jaw pain that radiates up to eye, kylah temple pain Tylenol 0500, reports left tooth pain x 1 year

## 2022-09-28 NOTE — ED NOTES
Patient identifiers verified and correct for Mr Senac  C/C: Dental PAIn SEE NN  APPEARANCE: awake and alert in NAD.  SKIN: warm, dry and intact. No breakdown or bruising.  MUSCULOSKELETAL: Patient moving all extremities spontaneously, no obvious swelling or deformities noted. Ambulates independently.  RESPIRATORY: Denies shortness of breath.Respirations unlabored.   CARDIAC: Denies CP, 2+ distal pulses; no peripheral edema  ABDOMEN: S/ND/NT, Denies nausea  : voids spontaneously, denies difficulty  Neurologic: AAO x 4; follows commands equal strength in all extremities; denies numbness/tingling. Denies dizziness Denies weakness, reports left dental pain

## 2022-10-07 DIAGNOSIS — N18.4 CHRONIC KIDNEY DISEASE, STAGE 4 (SEVERE): Primary | ICD-10-CM

## 2022-10-07 NOTE — TELEPHONE ENCOUNTER
Called appeals dept at [phone] for status check on [facility] [employer] appeal. Spoke with representative, Beatris, who confirms receipt of appeal on 10/4. However, she states that the appeal did not meet criteria to be expedited and is being processed as a standard appeal. Expected turnaround time is within 30 days. Appeal request ID: [id number].

## 2022-10-13 NOTE — TELEPHONE ENCOUNTER
Called appeals dept again at [phone] for status check on [place]pe[place] appeal. Spoke with representative Dakotah who states appeal determination still in process at this time and may take up to 30 calendar days.

## 2022-10-17 ENCOUNTER — SPECIALTY PHARMACY (OUTPATIENT)
Dept: PHARMACY | Facility: CLINIC | Age: 47
End: 2022-10-17
Payer: COMMERCIAL

## 2022-10-17 ENCOUNTER — LAB VISIT (OUTPATIENT)
Dept: LAB | Facility: HOSPITAL | Age: 47
End: 2022-10-17
Payer: COMMERCIAL

## 2022-10-17 DIAGNOSIS — N18.4 CHRONIC KIDNEY DISEASE, STAGE 4 (SEVERE): ICD-10-CM

## 2022-10-17 LAB
ALBUMIN SERPL BCP-MCNC: 3.7 G/DL (ref 3.5–5.2)
ANION GAP SERPL CALC-SCNC: 8 MMOL/L (ref 8–16)
BASOPHILS # BLD AUTO: 0.07 K/UL (ref 0–0.2)
BASOPHILS NFR BLD: 0.7 % (ref 0–1.9)
BUN SERPL-MCNC: 32 MG/DL (ref 6–20)
CALCIUM SERPL-MCNC: 9.5 MG/DL (ref 8.7–10.5)
CHLORIDE SERPL-SCNC: 106 MMOL/L (ref 95–110)
CO2 SERPL-SCNC: 24 MMOL/L (ref 23–29)
CREAT SERPL-MCNC: 3.2 MG/DL (ref 0.5–1.4)
DIFFERENTIAL METHOD: ABNORMAL
EOSINOPHIL # BLD AUTO: 0.3 K/UL (ref 0–0.5)
EOSINOPHIL NFR BLD: 3.4 % (ref 0–8)
ERYTHROCYTE [DISTWIDTH] IN BLOOD BY AUTOMATED COUNT: 14.2 % (ref 11.5–14.5)
EST. GFR  (NO RACE VARIABLE): 22.8 ML/MIN/1.73 M^2
GLUCOSE SERPL-MCNC: 164 MG/DL (ref 70–110)
HCT VFR BLD AUTO: 37.8 % (ref 40–54)
HGB BLD-MCNC: 12.2 G/DL (ref 14–18)
IMM GRANULOCYTES # BLD AUTO: 0.03 K/UL (ref 0–0.04)
IMM GRANULOCYTES NFR BLD AUTO: 0.3 % (ref 0–0.5)
LYMPHOCYTES # BLD AUTO: 5.1 K/UL (ref 1–4.8)
LYMPHOCYTES NFR BLD: 52.4 % (ref 18–48)
MCH RBC QN AUTO: 32.1 PG (ref 27–31)
MCHC RBC AUTO-ENTMCNC: 32.3 G/DL (ref 32–36)
MCV RBC AUTO: 100 FL (ref 82–98)
MONOCYTES # BLD AUTO: 0.7 K/UL (ref 0.3–1)
MONOCYTES NFR BLD: 7.4 % (ref 4–15)
NEUTROPHILS # BLD AUTO: 3.5 K/UL (ref 1.8–7.7)
NEUTROPHILS NFR BLD: 35.8 % (ref 38–73)
NRBC BLD-RTO: 0 /100 WBC
PHOSPHATE SERPL-MCNC: 3.8 MG/DL (ref 2.7–4.5)
PLATELET # BLD AUTO: 561 K/UL (ref 150–450)
PMV BLD AUTO: 8.9 FL (ref 9.2–12.9)
POTASSIUM SERPL-SCNC: 4.5 MMOL/L (ref 3.5–5.1)
PTH-INTACT SERPL-MCNC: 212.4 PG/ML (ref 9–77)
RBC # BLD AUTO: 3.8 M/UL (ref 4.6–6.2)
SODIUM SERPL-SCNC: 138 MMOL/L (ref 136–145)
WBC # BLD AUTO: 9.81 K/UL (ref 3.9–12.7)

## 2022-10-17 PROCEDURE — 36415 COLL VENOUS BLD VENIPUNCTURE: CPT | Mod: PO | Performed by: INTERNAL MEDICINE

## 2022-10-17 PROCEDURE — 85025 COMPLETE CBC W/AUTO DIFF WBC: CPT | Performed by: INTERNAL MEDICINE

## 2022-10-17 PROCEDURE — 83970 ASSAY OF PARATHORMONE: CPT | Performed by: INTERNAL MEDICINE

## 2022-10-17 PROCEDURE — 80069 RENAL FUNCTION PANEL: CPT | Performed by: INTERNAL MEDICINE

## 2022-10-17 NOTE — TELEPHONE ENCOUNTER
Specialty Pharmacy - Refill Coordination    Specialty Medication Orders Linked to Encounter      Flowsheet Row Most Recent Value   Medication #1 sodium zirconium cyclosilicate (LOKELMA) 5 gram packet (Order#[id number], Rx#[id number])            Refill Questions - Documented Responses      Flowsheet Row Most Recent Value   Patient Availability and HIPAA Verification    Does patient want to proceed with activity? Yes   HIPAA/medical authority confirmed? Yes   Relationship to patient of person spoken to? Self   Refill Screening Questions    Changes to allergies? No   Changes to medications? No   New conditions since last clinic visit? No   Unplanned office visit, urgent care, ED, or hospital admission in the last 4 weeks? Yes  [dental pain]   How does patient/caregiver feel medication is working? Good   Financial problems or insurance changes? No   How many doses of your specialty medications were missed in the last 4 weeks? 0   Would patient like to speak to a pharmacist? No   When does the patient need to receive the medication? 10/24/22   Refill Delivery Questions    How will the patient receive the medication? [facility]   When does the patient need to receive the medication? 10/24/22   Shipping Address Home   Address in [facility] confirmed and updated if neccessary? Yes   Expected Copay ($) 0   Is the patient able to afford the medication copay? Yes   Payment Method zero copay   Days supply of Refill 30   Supplies needed? No supplies needed   Refill activity completed? Yes   Refill activity plan Refill scheduled   Shipment/Pickup Date: 10/19/22            Current Outpatient Medications   Medication Sig    allopurinoL (ZYLOPRIM) 100 MG tablet Take 1 tablet (100 mg total) by mouth once daily.    amLODIPine (NORVASC) 10 MG tablet Take 1 tablet (10 mg total) by mouth once daily.    atorvastatin (LIPITOR) 20 MG tablet Take 1 tablet (20 mg total) by mouth once daily. (Patient not taking: Reported on 9/15/2022)     blood sugar diagnostic Strp 1 strip by Misc.(Non-Drug; Combo Route) route 2 (two) times daily. ICD 10: E11.22    blood-glucose meter kit Use as instructed to check blood sugar two times a day. ICD 10: E11.22    chlorthalidone (HYGROTEN) 25 MG Tab Take 0.5 tablets (12.5 mg total) by mouth once daily.    colchicine (COLCRYS) 0.6 mg tablet Take by mouth.    DAILY GARLIC ONCE-A-DAY ORAL Take by mouth.    fish oil-omega-3 fatty acids 300-1,000 mg capsule Take 1 capsule by mouth once daily.    HYDROcodone-acetaminophen (NORCO) 5-325 mg per tablet Take 1 tablet by mouth every 4 (four) hours as needed for Pain.    lactobacillus acidophilus & bulgar (LACTINEX) 100 million cell packet Take 1 packet (1 each total) by mouth once daily. (Patient not taking: No sig reported)    lancets Misc 1 lancet by Misc.(Non-Drug; Combo Route) route 2 (two) times daily. ICD 10: E11.22    multivitamin (THERAGRAN) per tablet Take 1 tablet by mouth.    prednisoLONE acetate (PRED FORTE) 1 % DrpS Instill 1 drop into right eye four times a day    sodium bicarbonate 650 MG tablet Take 1 tablet (650 mg total) by mouth 3 (three) times daily.    sodium zirconium cyclosilicate (LOKELMA) 5 gram packet Take 1 packet (5 g total) by mouth once daily. Mix entire contents of packet(s) into drinking glass containing 3 tablespoons of water; stir well and drink immediately. Add water and repeat until no powder remains to receive entire dose.    valsartan (DIOVAN) 320 MG tablet Take 1 tablet (320 mg total) by mouth once daily.   Last reviewed on 9/28/2022  8:43 AM by Rebeca Leal, RN    Review of patient's allergies indicates:  No Known Allergies Last reviewed on  9/28/2022 8:42 AM by Rebeca Leal      Tasks added this encounter   No tasks added.   Tasks due within next 3 months   11/16/2022 - Refill Call (Auto Added)     Pierce Huber, PharmD  [facility]  [address] [place] [id number]  Phone: [phone]  Fax:  [phone]

## 2022-10-20 ENCOUNTER — OFFICE VISIT (OUTPATIENT)
Dept: NEPHROLOGY | Facility: CLINIC | Age: 47
End: 2022-10-20
Payer: COMMERCIAL

## 2022-10-20 VITALS
OXYGEN SATURATION: 99 % | WEIGHT: 246.94 LBS | HEART RATE: 103 BPM | SYSTOLIC BLOOD PRESSURE: 162 MMHG | DIASTOLIC BLOOD PRESSURE: 80 MMHG | BODY MASS INDEX: 34.44 KG/M2

## 2022-10-20 DIAGNOSIS — N02.B9 IGA NEPHROPATHY: Primary | ICD-10-CM

## 2022-10-20 DIAGNOSIS — N18.4 CHRONIC KIDNEY DISEASE, STAGE 4 (SEVERE): ICD-10-CM

## 2022-10-20 PROCEDURE — 3077F PR MOST RECENT SYSTOLIC BLOOD PRESSURE >= 140 MM HG: ICD-10-PCS | Mod: CPTII,S$GLB,, | Performed by: INTERNAL MEDICINE

## 2022-10-20 PROCEDURE — 99999 PR PBB SHADOW E&M-EST. PATIENT-LVL IV: ICD-10-PCS | Mod: PBBFAC,,, | Performed by: INTERNAL MEDICINE

## 2022-10-20 PROCEDURE — 4010F ACE/ARB THERAPY RXD/TAKEN: CPT | Mod: CPTII,S$GLB,, | Performed by: INTERNAL MEDICINE

## 2022-10-20 PROCEDURE — 1159F PR MEDICATION LIST DOCUMENTED IN MEDICAL RECORD: ICD-10-PCS | Mod: CPTII,S$GLB,, | Performed by: INTERNAL MEDICINE

## 2022-10-20 PROCEDURE — 4010F PR ACE/ARB THEARPY RXD/TAKEN: ICD-10-PCS | Mod: CPTII,S$GLB,, | Performed by: INTERNAL MEDICINE

## 2022-10-20 PROCEDURE — 1159F MED LIST DOCD IN RCRD: CPT | Mod: CPTII,S$GLB,, | Performed by: INTERNAL MEDICINE

## 2022-10-20 PROCEDURE — 99215 PR OFFICE/OUTPT VISIT, EST, LEVL V, 40-54 MIN: ICD-10-PCS | Mod: S$GLB,,, | Performed by: INTERNAL MEDICINE

## 2022-10-20 PROCEDURE — 99215 OFFICE O/P EST HI 40 MIN: CPT | Mod: S$GLB,,, | Performed by: INTERNAL MEDICINE

## 2022-10-20 PROCEDURE — 3077F SYST BP >= 140 MM HG: CPT | Mod: CPTII,S$GLB,, | Performed by: INTERNAL MEDICINE

## 2022-10-20 PROCEDURE — 99999 PR PBB SHADOW E&M-EST. PATIENT-LVL IV: CPT | Mod: PBBFAC,,, | Performed by: INTERNAL MEDICINE

## 2022-10-20 PROCEDURE — 3066F PR DOCUMENTATION OF TREATMENT FOR NEPHROPATHY: ICD-10-PCS | Mod: CPTII,S$GLB,, | Performed by: INTERNAL MEDICINE

## 2022-10-20 PROCEDURE — 3079F PR MOST RECENT DIASTOLIC BLOOD PRESSURE 80-89 MM HG: ICD-10-PCS | Mod: CPTII,S$GLB,, | Performed by: INTERNAL MEDICINE

## 2022-10-20 PROCEDURE — 3066F NEPHROPATHY DOC TX: CPT | Mod: CPTII,S$GLB,, | Performed by: INTERNAL MEDICINE

## 2022-10-20 PROCEDURE — 3079F DIAST BP 80-89 MM HG: CPT | Mod: CPTII,S$GLB,, | Performed by: INTERNAL MEDICINE

## 2022-10-20 RX ORDER — PREDNISONE 20 MG/1
TABLET ORAL
Qty: 42 TABLET | Refills: 0 | Status: SHIPPED | OUTPATIENT
Start: 2022-10-20 | End: 2022-10-21 | Stop reason: ALTCHOICE

## 2022-10-20 RX ORDER — SULFAMETHOXAZOLE AND TRIMETHOPRIM 400; 80 MG/1; MG/1
1 TABLET ORAL
Qty: 15 TABLET | Refills: 0 | Status: SHIPPED | OUTPATIENT
Start: 2022-10-21 | End: 2022-11-10 | Stop reason: ALTCHOICE

## 2022-10-20 RX ORDER — PANTOPRAZOLE SODIUM 40 MG/1
40 TABLET, DELAYED RELEASE ORAL DAILY
Qty: 90 TABLET | Refills: 0 | Status: SHIPPED | OUTPATIENT
Start: 2022-10-20 | End: 2022-11-10 | Stop reason: ALTCHOICE

## 2022-10-20 NOTE — ASSESSMENT & PLAN NOTE
Cr appears to have peaked at 3.6 and is currently down to 3.2  Proteinuria improved with Valsartan 320mg; down to 1.4g/g  Blood 1+ with 2 RBCs in UA   No edema on examination   Urine microscopy (8/25) with many acanthocytes  Kidney biopsy results noted; consistent with chronic IgA nephropathy; no significant disease activity noted however pts renal function continues to deteriorate   Serum creatinine stabilizing at 3.2  Hyperkalemia well controlled on lokelma   Kidney US performed in clinic (9/15) with no hydronephrosis  Budesonide 16mg PO daily currently awaiting appeal to insurance company   Will start prednisone course at this time; Bactrim prescribed for OI ppx and Pantoprazole for ulcer ppx  Continue Chlorthalidone 12.5mg PO daily, Sodium bicarb 650mg PO TID and Lokelma 10mg PO every other day   Oriented on the importance of weight loss, BP control and smoking cessation  Also recommend to follow up or establish care with his primary care physician   RTC in 4-6 weekswith repeat labs

## 2022-10-20 NOTE — PROGRESS NOTES
Nephrology Clinic Note   10/20/2022    Chief Complaint   Patient presents with    Chronic Kidney Disease      History of present illness:  Patient is a 47 y.o. male.   Presents to the clinic today for medical conditions listed below.  Problem Noted   Iga Nephropathy 10/20/2022   Htn (Hypertension) 11/14/2012   Chronic Kidney Disease, Stage 4 (Severe) 11/14/2012    45 y/o M with hx of HTN, Gout and prior history of glomerulonephritis who comes in for follow up evaluation. Pt was last evaluated by a kidney doctor 5y ago in 2017. At that time it was suspected he had stable disease, possibly IgA nephropathy. Biopsy was deferred and expectant observation was recommended. Pt now comes back to the Nephrology clinic with worsening renal dysfunction and proteinuria. Blood pressure also significantly elevated. He refers his father suffered from kidney disease and had a complication of bleeding after a biopsy and required dialysis however he cannot remember the diagnosis. He believes it was related to hypertension. Pt denies any recent nausea, vomits, diarrhea, radiocontrast administration, dehydration or NSAID use.     Interval Hx:  9/15/22: Kidney biopsy results noted and consistent with IgA nephropathy. Worsening kidney function with creatinine up to 2.9.   10/20/22: Serum creatinine appears to have peaked at 3.6 and currently down to 3.2 as pt refers he has been working on lifestyle modifications. Tooth infection/abscess resolved after extraction and pt has completed his antibiotics.        Review of Systems   Constitutional:  Negative for chills, fever and weight loss.   Respiratory:  Negative for cough, shortness of breath and wheezing.    Cardiovascular:  Negative for chest pain, claudication and leg swelling.   Gastrointestinal:  Negative for abdominal pain, diarrhea, nausea and vomiting.   Genitourinary:  Negative for dysuria, flank pain, frequency, hematuria and urgency.   Skin:  Negative for rash.   Neurological:   Negative for weakness.     History:  Past Medical History:   Diagnosis Date    Chronic kidney disease     Chronic kidney disease stage II    Colon polyps     Hyperlipidemia 12/13/2012    Hypertension       Past Surgical History:   Procedure Laterality Date    COSMETIC SURGERY      pyloric stenosis      RENAL BIOPSY Left 9/1/2022    Procedure: BIOPSY, KIDNEY;  Surgeon: Sinan Abad MD;  Location: [facility] CATH LAB;  Service: Interventional Nephrology;  Laterality: Left;        Current Outpatient Medications:     allopurinoL (ZYLOPRIM) 100 MG tablet, Take 1 tablet (100 mg total) by mouth once daily., Disp: 30 tablet, Rfl: 3    amLODIPine (NORVASC) 10 MG tablet, Take 1 tablet (10 mg total) by mouth once daily., Disp: 90 tablet, Rfl: 3    blood sugar diagnostic Strp, 1 strip by Misc.(Non-Drug; Combo Route) route 2 (two) times daily. ICD 10: E11.22, Disp: 100 each, Rfl: 6    blood-glucose meter kit, Use as instructed to check blood sugar two times a day. ICD 10: E11.22, Disp: 1 each, Rfl: 0    chlorthalidone (HYGROTEN) 25 MG Tab, Take 0.5 tablets (12.5 mg total) by mouth once daily., Disp: 45 tablet, Rfl: 3    colchicine (COLCRYS) 0.6 mg tablet, Take by mouth., Disp: , Rfl:     HYDROcodone-acetaminophen (NORCO) 5-325 mg per tablet, Take 1 tablet by mouth every 4 (four) hours as needed for Pain., Disp: 9 tablet, Rfl: 0    lancets Misc, 1 lancet by Misc.(Non-Drug; Combo Route) route 2 (two) times daily. ICD 10: E11.22, Disp: 100 each, Rfl: 6    prednisoLONE acetate (PRED FORTE) 1 % DrpS, Instill 1 drop into right eye four times a day, Disp: , Rfl:     sodium bicarbonate 650 MG tablet, Take 1 tablet (650 mg total) by mouth 3 (three) times daily., Disp: 270 tablet, Rfl: 3    sodium zirconium cyclosilicate (LOKELMA) 5 gram packet, Take 1 packet (5 g total) by mouth once daily. Mix entire contents of packet(s) into drinking glass containing 3 tablespoons of water; stir well and drink immediately. Add water and repeat until no  powder remains to receive entire dose., Disp: 30 packet, Rfl: 2    valsartan (DIOVAN) 320 MG tablet, Take 1 tablet (320 mg total) by mouth once daily., Disp: 90 tablet, Rfl: 3    atorvastatin (LIPITOR) 20 MG tablet, Take 1 tablet (20 mg total) by mouth once daily. (Patient not taking: No sig reported), Disp: 90 tablet, Rfl: 3    DAILY GARLIC ONCE-A-DAY ORAL, Take by mouth., Disp: , Rfl:     fish oil-omega-3 fatty acids 300-1,000 mg capsule, Take 1 capsule by mouth once daily., Disp: , Rfl:     lactobacillus acidophilus & bulgar (LACTINEX) 100 million cell packet, Take 1 packet (1 each total) by mouth once daily. (Patient not taking: No sig reported), Disp: 90 packet, Rfl: 0    multivitamin (THERAGRAN) per tablet, Take 1 tablet by mouth., Disp: , Rfl:     pantoprazole (PROTONIX) 40 MG tablet, Take 1 tablet (40 mg total) by mouth once daily., Disp: 90 tablet, Rfl: 0    predniSONE (DELTASONE) 20 MG tablet, Take 2 tablets (40 mg total) by mouth once daily AND 1 tablet (20 mg total) once daily. Do all this for 14 days., Disp: 42 tablet, Rfl: 0    [START ON 10/21/2022] sulfamethoxazole-trimethoprim 400-80mg (BACTRIM,SEPTRA) 400-80 mg per tablet, Take 1 tablet by mouth every Mon, Wed, Fri., Disp: 15 tablet, Rfl: 0  Review of patient's allergies indicates:  No Known Allergies   Social History     Tobacco Use    Smoking status: Every Day     Packs/day: 1.00     Years: 24.00     Pack years: 24.00     Types: Cigarettes    Smokeless tobacco: Current   Substance Use Topics    Alcohol use: Yes     Comment: Occasional      Family History   Problem Relation Age of Onset    COPD Mother     Heart disease Mother     Peripheral vascular disease Mother     Diabetes Father     Kidney disease Father     Stroke Father     Hypertension Father     Heart disease Father 70        CABG x 3    Kidney failure Father     Heart disease Maternal Grandfather         Physical Exam :  Vitals:    10/20/22 1340   BP: (!) 162/80   Pulse: 103     Physical  Exam  Constitutional:       General: He is not in acute distress.     Appearance: He is not ill-appearing or toxic-appearing.   HENT:      Head: Atraumatic.      Nose: Nose normal.      Mouth/Throat:      Mouth: Mucous membranes are moist.   Cardiovascular:      Rate and Rhythm: Normal rate.   Pulmonary:      Effort: Pulmonary effort is normal.   Abdominal:      General: There is no distension.      Palpations: Abdomen is soft.      Tenderness: There is no abdominal tenderness.   Musculoskeletal:         General: No swelling. Normal range of motion.      Right lower leg: No edema.      Left lower leg: No edema.   Skin:     General: Skin is warm and dry.   Neurological:      Mental Status: He is alert and oriented to person, place, and time.   Psychiatric:         Mood and Affect: Mood normal.         Behavior: Behavior normal.       Labs reviewed   Images Reviewed    Assessment:    1. IgA nephropathy    2. Chronic kidney disease, stage 4 (severe)        Plan:    Chronic kidney disease, stage 4 (severe)  Cr appears to have peaked at 3.6 and is currently down to 3.2  Proteinuria improved with Valsartan 320mg; down to 1.4g/g  Blood 1+ with 2 RBCs in UA   No edema on examination   Urine microscopy (8/25) with many acanthocytes  Kidney biopsy results noted; consistent with chronic IgA nephropathy; no significant disease activity noted however pts renal function continues to deteriorate   Serum creatinine stabilizing at 3.2  Hyperkalemia well controlled on lokelma   Kidney US performed in clinic (9/15) with no hydronephrosis  Budesonide 16mg PO daily currently awaiting appeal to insurance company   Will start prednisone course at this time; Bactrim prescribed for OI ppx and Pantoprazole for ulcer ppx  Continue Chlorthalidone 12.5mg PO daily, Sodium bicarb 650mg PO TID and Lokelma 10mg PO every other day   Oriented on the importance of weight loss, BP control and smoking cessation  Also recommend to follow up or establish  care with his primary care physician   RTC in 4-6 weekswith repeat labs     IgA nephropathy  See CKD stage 4   Follow up in about 4 weeks (around 11/17/2022).     Orders Placed This Encounter   Procedures    RENAL FUNCTION PANEL    Urinalysis    Protein / creatinine ratio, urine     There are no discontinued medications.   Future Appointments   Date Time Provider Department Center   11/10/2022  3:00 PM All Benavides MD [facility] NEPHRO [facility]       All Benavides

## 2022-10-21 ENCOUNTER — TELEPHONE (OUTPATIENT)
Dept: NEPHROLOGY | Facility: CLINIC | Age: 47
End: 2022-10-21
Payer: COMMERCIAL

## 2022-10-21 RX ORDER — PREDNISONE 20 MG/1
TABLET ORAL
Qty: 67 TABLET | Refills: 0 | Status: SHIPPED | OUTPATIENT
Start: 2022-10-21 | End: 2022-11-10 | Stop reason: ALTCHOICE

## 2022-10-21 NOTE — TELEPHONE ENCOUNTER
Adin Carrizales Staff  Caller: Herminia (Today,  9:04 AM)  Herminia from [facility] requesting call back RE: has questions regarding RX below. Would like to know if pt should take two in the morning and 1 at night     predniSONE (DELTASONE) 20 MG tablet       [facility] - [place]LENE Giordano - [address]   [address]   [place] [address]   Phone: [phone] Fax: [phone]

## 2022-10-24 NOTE — PROGRESS NOTES
I have reviewed the notes, assessments, and/or procedures performed by Dr Blue and , I concur with her/his documentation of Edouard Pena.     Biopsy proven IgA nephropathy with no crescents/ endocapillary proliferation  Cr appears to have peaked at 3.6 and is currently down to 3.2  Proteinuria improved with Valsartan 320mg; down to 1.4g/g  Blood 1+ with 2 RBCs in UA   Urine microscopy (10/20) with acanthocytes  Patient  is awaiting to be started on Tarpeyo, however couldn't get given insurance issues.  Mean while would start patient on Prednisone 40 mg po daily for one month, followed by 20 mg   Will start Bactrim prophylaxis while on steroids along with calcium / PPI    Guadalupe Colón MD  Nephrology  [facility].

## 2022-10-25 NOTE — TELEPHONE ENCOUNTER
Called appeals dept again at [phone] for status check on Tarpeyo appeal. Was advised system is currently down and they are unable to provide any updates at this time.

## 2022-10-27 NOTE — TELEPHONE ENCOUNTER
Called [place] dept for status check on Tarpeyo appeal. Was advised that appeal has been approved. REQ ID: [id number]. Approval letter will be faxed to [employer]. Tarpeyo is only available through [facility] ([phone]). Rx forwarded.     Attempted to reach patient to inform him of this information. No answer, LVM requesting a call back.

## 2022-11-07 ENCOUNTER — LAB VISIT (OUTPATIENT)
Dept: LAB | Facility: HOSPITAL | Age: 47
End: 2022-11-07
Payer: COMMERCIAL

## 2022-11-07 DIAGNOSIS — N02.B9 IGA NEPHROPATHY: ICD-10-CM

## 2022-11-07 PROCEDURE — 36415 COLL VENOUS BLD VENIPUNCTURE: CPT | Mod: PO | Performed by: INTERNAL MEDICINE

## 2022-11-07 PROCEDURE — 80069 RENAL FUNCTION PANEL: CPT | Performed by: INTERNAL MEDICINE

## 2022-11-08 LAB
ALBUMIN SERPL BCP-MCNC: 3.6 G/DL (ref 3.5–5.2)
ANION GAP SERPL CALC-SCNC: 10 MMOL/L (ref 8–16)
BUN SERPL-MCNC: 22 MG/DL (ref 6–20)
CALCIUM SERPL-MCNC: 9.1 MG/DL (ref 8.7–10.5)
CHLORIDE SERPL-SCNC: 107 MMOL/L (ref 95–110)
CO2 SERPL-SCNC: 23 MMOL/L (ref 23–29)
CREAT SERPL-MCNC: 2.6 MG/DL (ref 0.5–1.4)
EST. GFR  (NO RACE VARIABLE): 29.3 ML/MIN/1.73 M^2
GLUCOSE SERPL-MCNC: 151 MG/DL (ref 70–110)
PHOSPHATE SERPL-MCNC: 3.1 MG/DL (ref 2.7–4.5)
POTASSIUM SERPL-SCNC: 4.6 MMOL/L (ref 3.5–5.1)
SODIUM SERPL-SCNC: 140 MMOL/L (ref 136–145)

## 2022-11-10 ENCOUNTER — OFFICE VISIT (OUTPATIENT)
Dept: NEPHROLOGY | Facility: CLINIC | Age: 47
End: 2022-11-10
Payer: COMMERCIAL

## 2022-11-10 VITALS
HEART RATE: 92 BPM | WEIGHT: 249.13 LBS | BODY MASS INDEX: 34.75 KG/M2 | DIASTOLIC BLOOD PRESSURE: 84 MMHG | SYSTOLIC BLOOD PRESSURE: 168 MMHG | OXYGEN SATURATION: 98 %

## 2022-11-10 DIAGNOSIS — N02.B9 IGA NEPHROPATHY: ICD-10-CM

## 2022-11-10 DIAGNOSIS — I10 HYPERTENSION, UNSPECIFIED TYPE: ICD-10-CM

## 2022-11-10 DIAGNOSIS — N18.4 CHRONIC KIDNEY DISEASE, STAGE 4 (SEVERE): ICD-10-CM

## 2022-11-10 PROCEDURE — 3077F SYST BP >= 140 MM HG: CPT | Mod: CPTII,S$GLB,, | Performed by: INTERNAL MEDICINE

## 2022-11-10 PROCEDURE — 3066F NEPHROPATHY DOC TX: CPT | Mod: CPTII,S$GLB,, | Performed by: INTERNAL MEDICINE

## 2022-11-10 PROCEDURE — 99999 PR PBB SHADOW E&M-EST. PATIENT-LVL IV: CPT | Mod: PBBFAC,,, | Performed by: INTERNAL MEDICINE

## 2022-11-10 PROCEDURE — 4010F PR ACE/ARB THEARPY RXD/TAKEN: ICD-10-PCS | Mod: CPTII,S$GLB,, | Performed by: INTERNAL MEDICINE

## 2022-11-10 PROCEDURE — 4010F ACE/ARB THERAPY RXD/TAKEN: CPT | Mod: CPTII,S$GLB,, | Performed by: INTERNAL MEDICINE

## 2022-11-10 PROCEDURE — 99999 PR PBB SHADOW E&M-EST. PATIENT-LVL IV: ICD-10-PCS | Mod: PBBFAC,,, | Performed by: INTERNAL MEDICINE

## 2022-11-10 PROCEDURE — 3066F PR DOCUMENTATION OF TREATMENT FOR NEPHROPATHY: ICD-10-PCS | Mod: CPTII,S$GLB,, | Performed by: INTERNAL MEDICINE

## 2022-11-10 PROCEDURE — 1159F PR MEDICATION LIST DOCUMENTED IN MEDICAL RECORD: ICD-10-PCS | Mod: CPTII,S$GLB,, | Performed by: INTERNAL MEDICINE

## 2022-11-10 PROCEDURE — 3077F PR MOST RECENT SYSTOLIC BLOOD PRESSURE >= 140 MM HG: ICD-10-PCS | Mod: CPTII,S$GLB,, | Performed by: INTERNAL MEDICINE

## 2022-11-10 PROCEDURE — 3079F DIAST BP 80-89 MM HG: CPT | Mod: CPTII,S$GLB,, | Performed by: INTERNAL MEDICINE

## 2022-11-10 PROCEDURE — 3008F BODY MASS INDEX DOCD: CPT | Mod: CPTII,S$GLB,, | Performed by: INTERNAL MEDICINE

## 2022-11-10 PROCEDURE — 99213 OFFICE O/P EST LOW 20 MIN: CPT | Mod: S$GLB,,, | Performed by: INTERNAL MEDICINE

## 2022-11-10 PROCEDURE — 3008F PR BODY MASS INDEX (BMI) DOCUMENTED: ICD-10-PCS | Mod: CPTII,S$GLB,, | Performed by: INTERNAL MEDICINE

## 2022-11-10 PROCEDURE — 3079F PR MOST RECENT DIASTOLIC BLOOD PRESSURE 80-89 MM HG: ICD-10-PCS | Mod: CPTII,S$GLB,, | Performed by: INTERNAL MEDICINE

## 2022-11-10 PROCEDURE — 1159F MED LIST DOCD IN RCRD: CPT | Mod: CPTII,S$GLB,, | Performed by: INTERNAL MEDICINE

## 2022-11-10 PROCEDURE — 99213 PR OFFICE/OUTPT VISIT, EST, LEVL III, 20-29 MIN: ICD-10-PCS | Mod: S$GLB,,, | Performed by: INTERNAL MEDICINE

## 2022-11-10 RX ORDER — AMLODIPINE BESYLATE 10 MG/1
10 TABLET ORAL DAILY
Qty: 90 TABLET | Refills: 3 | Status: SHIPPED | OUTPATIENT
Start: 2022-11-10 | End: 2023-01-19 | Stop reason: SDUPTHER

## 2022-11-10 RX ORDER — CHLORTHALIDONE 25 MG/1
25 TABLET ORAL DAILY
Qty: 90 TABLET | Refills: 3 | Status: SHIPPED | OUTPATIENT
Start: 2022-11-10 | End: 2023-01-19 | Stop reason: SDUPTHER

## 2022-11-10 RX ORDER — VALSARTAN 320 MG/1
320 TABLET ORAL DAILY
Qty: 90 TABLET | Refills: 3 | Status: SHIPPED | OUTPATIENT
Start: 2022-11-10 | End: 2023-01-19 | Stop reason: SDUPTHER

## 2022-11-10 RX ORDER — CALCITRIOL 0.25 UG/1
0.25 CAPSULE ORAL DAILY
Qty: 90 CAPSULE | Refills: 3 | Status: SHIPPED | OUTPATIENT
Start: 2022-11-10 | End: 2023-01-19 | Stop reason: SDUPTHER

## 2022-11-10 NOTE — ASSESSMENT & PLAN NOTE
Cr appears to have peaked at 3.6 and is currently down to 2.6 with a GFR of 29   Proteinuria up to 4g/g; continue Valsartan 320mg  Blood 1+ with 2 RBCs in UA   No edema on examination   Urine microscopy (8/25) with many acanthocytes  Kidney biopsy results noted; consistent with chronic IgA nephropathy; no significant disease activity noted however pts renal function continues to deteriorate   Serum creatinine stabilizing at 3.2  Hyperkalemia well controlled on lokelma will decrease lokelma to TIW  Kidney US performed in clinic (9/15) with no hydronephrosis  Budesonide 16mg PO daily currently approved after appeal; discussed with rep from company who referred it should be mailed to him in the next 2-3 days  Pt refers he did not start prednisone course; will hold off at this time.   Increase chlorthalidone to 25mg PO daily  Sodium bicarb 650mg PO TID and Lokelma 10mg PO TIW  Oriented on the importance of weight loss, BP control and smoking cessation  Referred to dietitian  Also recommend to follow up or establish care with his primary care physician   RTC in 4-6 weekswith repeat labs

## 2022-11-10 NOTE — PATIENT INSTRUCTIONS
Blood pressure medications: Amlodipine 10mg pill every day, Valsartan 320mg pill every day and Chlorthalidone 25mg pill every day   Start Tarpeyo as prescribed soons as it arrives from the company  Start calcitriol 0.25mcg every day   Remain hydrated  Avoid salt, goal < 2g of salt per day  Follow up with nutritionist   Strongly recommend to consider tobacco cessation

## 2022-11-10 NOTE — ASSESSMENT & PLAN NOTE
Kidney biopsy (9/8/22): IgA dominant diffuse global granular mesangial with segmental capillary loop staining by IF, diffuse mild increase in mesangial cellularity and 30-40% interstitial fibrosis. There is no activity in that isaiah is no endocapillary proliferation, crescents, or fibrinoid necrosis. Three worse prognostic indicators present; significant mesangial proliferation, segmental sclerosis and interstitial fibrosis.     Tarpeyo approved

## 2022-11-16 ENCOUNTER — SPECIALTY PHARMACY (OUTPATIENT)
Dept: PHARMACY | Facility: CLINIC | Age: 47
End: 2022-11-16
Payer: COMMERCIAL

## 2022-11-16 NOTE — TELEPHONE ENCOUNTER
Outgoing call regarding lokelma refill; per pt, he's unsure of how many he has on hand; informed him that [employer] will follow up on 11/17 to schedule delivery

## 2022-11-17 NOTE — TELEPHONE ENCOUNTER
Outgoing call regarding lokelma refill; per pt, he takes it every other day, and has 9 packets on hand; informed him that [facility] will follow up on 11/28 to schedule delivery

## 2022-11-25 NOTE — TELEPHONE ENCOUNTER
Incoming call from pt's wife to see why [employer] had called.  Informed wife [employer] was calling regarding Michellekelma.  Per last note, pt dose has been changed.  However, wife was unaware.  Per OV on 11/10, dosing was changed from daily to three times a week.  Informed wife of dosage change and will have MD send new order.  Wife verbalized understanding.  Will route assigned [facility] to have new order sent.

## 2022-12-06 NOTE — PROGRESS NOTES
Nephrology Clinic Note   11/10/2022    Chief Complaint   Patient presents with    Chronic Kidney Disease      History of present illness:  Patient is a 47 y.o. male.   Presents to the clinic today for medical conditions listed below.  Problem Noted   Iga Nephropathy 10/20/2022   Htn (Hypertension) 11/14/2012   Chronic Kidney Disease, Stage 4 (Severe) 11/14/2012    47 y/o M with hx of HTN, Gout and prior history of glomerulonephritis who comes in for follow up evaluation. Pt was last evaluated by a kidney doctor 5y ago in 2017. At that time it was suspected he had stable disease, possibly IgA nephropathy. Biopsy was deferred and expectant observation was recommended. Pt now comes back to the Nephrology clinic with worsening renal dysfunction and proteinuria. Blood pressure also significantly elevated. He refers his father suffered from kidney disease and had a complication of bleeding after a biopsy and required dialysis however he cannot remember the diagnosis. He believes it was related to hypertension. Pt denies any recent nausea, vomits, diarrhea, radiocontrast administration, dehydration or NSAID use.     Interval Hx:  9/15/22: Kidney biopsy results noted and consistent with IgA nephropathy. Worsening kidney function with creatinine up to 2.9.   10/20/22: Serum creatinine appears to have peaked at 3.6 and currently down to 3.2 as pt refers he has been working on lifestyle modifications. Tooth infection/abscess resolved after extraction and pt has completed his antibiotics.   11/10/22: sCr down to 2.6, UPCR up to 4. Pt refers he just realized he had not been taking the prednisone. [place] approved on appeal.        Review of Systems   Constitutional:  Negative for chills, fever and weight loss.   Respiratory:  Negative for cough, shortness of breath and wheezing.    Cardiovascular:  Negative for chest pain, claudication and leg swelling.   Gastrointestinal:  Negative for abdominal pain, diarrhea, nausea and  vomiting.   Genitourinary:  Negative for dysuria, flank pain, frequency, hematuria and urgency.   Skin:  Negative for rash.   Neurological:  Negative for weakness.     History:  Past Medical History:   Diagnosis Date    Chronic kidney disease     Chronic kidney disease stage II    Colon polyps     Hyperlipidemia 12/13/2012    Hypertension       Past Surgical History:   Procedure Laterality Date    COSMETIC SURGERY      pyloric stenosis      RENAL BIOPSY Left 9/1/2022    Procedure: BIOPSY, KIDNEY;  Surgeon: Sinan Abad MD;  Location: [facility] CATH LAB;  Service: Interventional Nephrology;  Laterality: Left;        Current Outpatient Medications:     allopurinoL (ZYLOPRIM) 100 MG tablet, Take 1 tablet (100 mg total) by mouth once daily., Disp: 30 tablet, Rfl: 3    blood sugar diagnostic Strp, 1 strip by Misc.(Non-Drug; Combo Route) route 2 (two) times daily. ICD 10: E11.22, Disp: 100 each, Rfl: 6    blood-glucose meter kit, Use as instructed to check blood sugar two times a day. ICD 10: E11.22, Disp: 1 each, Rfl: 0    colchicine (COLCRYS) 0.6 mg tablet, Take by mouth., Disp: , Rfl:     DAILY GARLIC ONCE-A-DAY ORAL, Take by mouth., Disp: , Rfl:     fish oil-omega-3 fatty acids 300-1,000 mg capsule, Take 1 capsule by mouth once daily., Disp: , Rfl:     lactobacillus acidophilus & bulgar (LACTINEX) 100 million cell packet, Take 1 packet (1 each total) by mouth once daily., Disp: 90 packet, Rfl: 0    lancets Misc, 1 lancet by Misc.(Non-Drug; Combo Route) route 2 (two) times daily. ICD 10: E11.22, Disp: 100 each, Rfl: 6    multivitamin (THERAGRAN) per tablet, Take 1 tablet by mouth., Disp: , Rfl:     prednisoLONE acetate (PRED FORTE) 1 % DrpS, Instill 1 drop into right eye four times a day, Disp: , Rfl:     sodium bicarbonate 650 MG tablet, Take 1 tablet (650 mg total) by mouth 3 (three) times daily., Disp: 270 tablet, Rfl: 3    sodium zirconium cyclosilicate (LOKELMA) 5 gram packet, Take 1 packet (5 g total) by mouth once  daily. Mix entire contents of packet(s) into drinking glass containing 3 tablespoons of water; stir well and drink immediately. Add water and repeat until no powder remains to receive entire dose., Disp: 30 packet, Rfl: 2    amLODIPine (NORVASC) 10 MG tablet, Take 1 tablet (10 mg total) by mouth once daily., Disp: 90 tablet, Rfl: 3    atorvastatin (LIPITOR) 20 MG tablet, Take 1 tablet (20 mg total) by mouth once daily. (Patient not taking: Reported on 9/15/2022), Disp: 90 tablet, Rfl: 3    budesonide (TARPEYO) 4 mg CpDR, Take 16 mg by mouth once daily. (Patient not taking: Reported on 11/10/2022), Disp: 120 capsule, Rfl: 3    calcitRIOL (ROCALTROL) 0.25 MCG Cap, Take 1 capsule (0.25 mcg total) by mouth once daily., Disp: 90 capsule, Rfl: 3    chlorthalidone (HYGROTEN) 25 MG Tab, Take 1 tablet (25 mg total) by mouth once daily., Disp: 90 tablet, Rfl: 3    HYDROcodone-acetaminophen (NORCO) 5-325 mg per tablet, Take 1 tablet by mouth every 4 (four) hours as needed for Pain. (Patient not taking: Reported on 11/10/2022), Disp: 9 tablet, Rfl: 0    valsartan (DIOVAN) 320 MG tablet, Take 1 tablet (320 mg total) by mouth once daily., Disp: 90 tablet, Rfl: 3  Review of patient's allergies indicates:  No Known Allergies   Social History     Tobacco Use    Smoking status: Every Day     Packs/day: 1.00     Years: 24.00     Pack years: 24.00     Types: Cigarettes    Smokeless tobacco: Current   Substance Use Topics    Alcohol use: Yes     Comment: Occasional      Family History   Problem Relation Age of Onset    COPD Mother     Heart disease Mother     Peripheral vascular disease Mother     Diabetes Father     Kidney disease Father     Stroke Father     Hypertension Father     Heart disease Father 70        CABG x 3    Kidney failure Father     Heart disease Maternal Grandfather         Physical Exam :  Vitals:    11/10/22 1500   BP: (!) 168/84   Pulse: 92     Physical Exam  Constitutional:       General: He is not in acute  distress.     Appearance: He is not ill-appearing or toxic-appearing.   HENT:      Head: Atraumatic.      Nose: Nose normal.      Mouth/Throat:      Mouth: Mucous membranes are moist.   Cardiovascular:      Rate and Rhythm: Normal rate.   Pulmonary:      Effort: Pulmonary effort is normal.   Abdominal:      General: There is no distension.      Palpations: Abdomen is soft.      Tenderness: There is no abdominal tenderness.   Musculoskeletal:         General: No swelling. Normal range of motion.      Right lower leg: No edema.      Left lower leg: No edema.   Skin:     General: Skin is warm and dry.   Neurological:      Mental Status: He is alert and oriented to person, place, and time.   Psychiatric:         Mood and Affect: Mood normal.         Behavior: Behavior normal.       Labs reviewed   Images Reviewed    Assessment:    1. Chronic kidney disease, stage 4 (severe)    2. Hypertension, unspecified type    3. IgA nephropathy        Plan:    Chronic kidney disease, stage 4 (severe)  Cr appears to have peaked at 3.6 and is currently down to 2.6 with a GFR of 29   Proteinuria up to 4g/g; continue Valsartan 320mg  Blood 1+ with 2 RBCs in UA   No edema on examination   Urine microscopy (8/25) with many acanthocytes  Kidney biopsy results noted; consistent with chronic IgA nephropathy; no significant disease activity noted however pts renal function continues to deteriorate   Serum creatinine stabilizing at 3.2  Hyperkalemia well controlled on lokelma will decrease lokelma to TIW  Kidney US performed in clinic (9/15) with no hydronephrosis  Budesonide 16mg PO daily currently approved after appeal; discussed with rep from company who referred it should be mailed to him in the next 2-3 days  Pt refers he did not start prednisone course; will hold off at this time.   Increase chlorthalidone to 25mg PO daily  Sodium bicarb 650mg PO TID and Lokelma 10mg PO TIW  Oriented on the importance of weight loss, BP control and  smoking cessation  Referred to dietitian  Also recommend to follow up or establish care with his primary care physician   RTC in 4-6 weekswith repeat labs     IgA nephropathy  Kidney biopsy (9/8/22): IgA dominant diffuse global granular mesangial with segmental capillary loop staining by IF, diffuse mild increase in mesangial cellularity and 30-40% interstitial fibrosis. There is no activity in that isaiah is no endocapillary proliferation, crescents, or fibrinoid necrosis. Three worse prognostic indicators present; significant mesangial proliferation, segmental sclerosis and interstitial fibrosis.     Tarpeyo approved     HTN (hypertension)  Valsartan 320mg PO daily  Amlodipine 10mg PO daily   Chlorthalidone 25mg PO daily   Follow up in about 4 weeks (around 12/8/2022).     Orders Placed This Encounter   Procedures    Ambulatory referral/consult to Nutrition Services     Medications Discontinued During This Encounter   Medication Reason    predniSONE (DELTASONE) 20 MG tablet Alternate therapy    valsartan (DIOVAN) 320 MG tablet Reorder    chlorthalidone (HYGROTEN) 25 MG Tab     amLODIPine (NORVASC) 10 MG tablet Reorder    sulfamethoxazole-trimethoprim 400-80mg (BACTRIM,SEPTRA) 400-80 mg per tablet Alternate therapy    pantoprazole (PROTONIX) 40 MG tablet Alternate therapy      No future appointments.    All Benavides        Orthopedic

## 2022-12-08 NOTE — TELEPHONE ENCOUNTER
Specialty Pharmacy - Refill Coordination    Specialty Medication Orders Linked to Encounter      Flowsheet Row Most Recent Value   Medication #1 sodium zirconium cyclosilicate (LOKELMA) 5 gram packet (Order#[id number], Rx#[id number])            Refill Questions - Documented Responses      Flowsheet Row Most Recent Value   Patient Availability and HIPAA Verification    Does patient want to proceed with activity? Yes   HIPAA/medical authority confirmed? Yes   Relationship to patient of person spoken to? Self   Refill Screening Questions    Changes to allergies? No   Changes to medications? No   New conditions since last clinic visit? No   Unplanned office visit, urgent care, ED, or hospital admission in the last 4 weeks? No   How does patient/caregiver feel medication is working? Good   Financial problems or insurance changes? No   How many doses of your specialty medications were missed in the last 4 weeks? 0   Would patient like to speak to a pharmacist? No   When does the patient need to receive the medication? 12/09/22   Refill Delivery Questions    How will the patient receive the medication? [facility]   When does the patient need to receive the medication? 12/09/22   Shipping Address Home   Address in [facility] confirmed and updated if neccessary? Yes   Expected Copay ($) 0   Is the patient able to afford the medication copay? Yes   Payment Method zero copay   Days supply of Refill 70   Supplies needed? No supplies needed   Refill activity completed? Yes   Refill activity plan Refill scheduled   Shipment/Pickup Date: 12/08/22            Current Outpatient Medications   Medication Sig    allopurinoL (ZYLOPRIM) 100 MG tablet Take 1 tablet (100 mg total) by mouth once daily.    amLODIPine (NORVASC) 10 MG tablet Take 1 tablet (10 mg total) by mouth once daily.    atorvastatin (LIPITOR) 20 MG tablet Take 1 tablet (20 mg total) by mouth once daily. (Patient not taking: Reported on 9/15/2022)    blood sugar  diagnostic Strp 1 strip by Misc.(Non-Drug; Combo Route) route 2 (two) times daily. ICD 10: E11.22    blood-glucose meter kit Use as instructed to check blood sugar two times a day. ICD 10: E11.22    budesonide (TARPEYO) 4 mg CpDR Take 16 mg by mouth once daily. (Patient not taking: Reported on 11/10/2022)    calcitRIOL (ROCALTROL) 0.25 MCG Cap Take 1 capsule (0.25 mcg total) by mouth once daily.    chlorthalidone (HYGROTEN) 25 MG Tab Take 1 tablet (25 mg total) by mouth once daily.    colchicine (COLCRYS) 0.6 mg tablet Take by mouth.    DAILY GARLIC ONCE-A-DAY ORAL Take by mouth.    fish oil-omega-3 fatty acids 300-1,000 mg capsule Take 1 capsule by mouth once daily.    HYDROcodone-acetaminophen (NORCO) 5-325 mg per tablet Take 1 tablet by mouth every 4 (four) hours as needed for Pain. (Patient not taking: Reported on 11/10/2022)    lactobacillus acidophilus & bulgar (LACTINEX) 100 million cell packet Take 1 packet (1 each total) by mouth once daily.    lancets Misc 1 lancet by Misc.(Non-Drug; Combo Route) route 2 (two) times daily. ICD 10: E11.22    multivitamin (THERAGRAN) per tablet Take 1 tablet by mouth.    prednisoLONE acetate (PRED FORTE) 1 % DrpS Instill 1 drop into right eye four times a day    sodium bicarbonate 650 MG tablet Take 1 tablet (650 mg total) by mouth 3 (three) times daily.    sodium zirconium cyclosilicate (LOKELMA) 5 gram packet Take 1 packet (5 g total) by mouth once daily. Mix entire contents of packet(s) into drinking glass containing 3 tablespoons of water; stir well and drink immediately. Add water and repeat until no powder remains to receive entire dose.    valsartan (DIOVAN) 320 MG tablet Take 1 tablet (320 mg total) by mouth once daily.   Last reviewed on 11/10/2022  3:10 PM by Marbella Finch RN    Review of patient's allergies indicates:  No Known Allergies Last reviewed on  11/10/2022 3:09 PM by Marbella Finch      Tasks added this encounter   2/10/2023 - Refill Call (Auto Added)    Tasks due within next 3 months   No tasks due.     Betsy Adkins, PharmD  [facility]  [address]  [facility] [address]  Phone: [phone]  Fax: [phone]

## 2022-12-16 DIAGNOSIS — N18.4 CHRONIC KIDNEY DISEASE, STAGE 4 (SEVERE): Primary | ICD-10-CM

## 2023-01-03 ENCOUNTER — LAB VISIT (OUTPATIENT)
Dept: LAB | Facility: HOSPITAL | Age: 48
End: 2023-01-03
Payer: COMMERCIAL

## 2023-01-03 DIAGNOSIS — N18.4 CHRONIC KIDNEY DISEASE, STAGE 4 (SEVERE): ICD-10-CM

## 2023-01-03 PROCEDURE — 36415 COLL VENOUS BLD VENIPUNCTURE: CPT | Mod: PO | Performed by: INTERNAL MEDICINE

## 2023-01-03 PROCEDURE — 83970 ASSAY OF PARATHORMONE: CPT | Performed by: INTERNAL MEDICINE

## 2023-01-03 PROCEDURE — 85025 COMPLETE CBC W/AUTO DIFF WBC: CPT | Performed by: INTERNAL MEDICINE

## 2023-01-03 PROCEDURE — 80069 RENAL FUNCTION PANEL: CPT | Performed by: INTERNAL MEDICINE

## 2023-01-04 LAB
ALBUMIN SERPL BCP-MCNC: 3.6 G/DL (ref 3.5–5.2)
ANION GAP SERPL CALC-SCNC: 15 MMOL/L (ref 8–16)
BASOPHILS # BLD AUTO: 0.04 K/UL (ref 0–0.2)
BASOPHILS NFR BLD: 0.3 % (ref 0–1.9)
BUN SERPL-MCNC: 62 MG/DL (ref 6–20)
CALCIUM SERPL-MCNC: 9.4 MG/DL (ref 8.7–10.5)
CHLORIDE SERPL-SCNC: 99 MMOL/L (ref 95–110)
CO2 SERPL-SCNC: 20 MMOL/L (ref 23–29)
CREAT SERPL-MCNC: 3 MG/DL (ref 0.5–1.4)
DIFFERENTIAL METHOD: ABNORMAL
EOSINOPHIL # BLD AUTO: 0 K/UL (ref 0–0.5)
EOSINOPHIL NFR BLD: 0.1 % (ref 0–8)
ERYTHROCYTE [DISTWIDTH] IN BLOOD BY AUTOMATED COUNT: 13.5 % (ref 11.5–14.5)
EST. GFR  (NO RACE VARIABLE): 24.7 ML/MIN/1.73 M^2
GLUCOSE SERPL-MCNC: 402 MG/DL (ref 70–110)
HCT VFR BLD AUTO: 43.3 % (ref 40–54)
HGB BLD-MCNC: 13.8 G/DL (ref 14–18)
IMM GRANULOCYTES # BLD AUTO: 0.1 K/UL (ref 0–0.04)
IMM GRANULOCYTES NFR BLD AUTO: 0.6 % (ref 0–0.5)
LYMPHOCYTES # BLD AUTO: 3.2 K/UL (ref 1–4.8)
LYMPHOCYTES NFR BLD: 20 % (ref 18–48)
MCH RBC QN AUTO: 31.9 PG (ref 27–31)
MCHC RBC AUTO-ENTMCNC: 31.9 G/DL (ref 32–36)
MCV RBC AUTO: 100 FL (ref 82–98)
MONOCYTES # BLD AUTO: 0.8 K/UL (ref 0.3–1)
MONOCYTES NFR BLD: 4.7 % (ref 4–15)
NEUTROPHILS # BLD AUTO: 11.8 K/UL (ref 1.8–7.7)
NEUTROPHILS NFR BLD: 74.3 % (ref 38–73)
NRBC BLD-RTO: 0 /100 WBC
PHOSPHATE SERPL-MCNC: 4.4 MG/DL (ref 2.7–4.5)
PLATELET # BLD AUTO: 488 K/UL (ref 150–450)
PMV BLD AUTO: 9.7 FL (ref 9.2–12.9)
POTASSIUM SERPL-SCNC: 4.4 MMOL/L (ref 3.5–5.1)
PTH-INTACT SERPL-MCNC: 167.5 PG/ML (ref 9–77)
RBC # BLD AUTO: 4.33 M/UL (ref 4.6–6.2)
SODIUM SERPL-SCNC: 134 MMOL/L (ref 136–145)
WBC # BLD AUTO: 15.9 K/UL (ref 3.9–12.7)

## 2023-01-12 ENCOUNTER — TELEPHONE (OUTPATIENT)
Dept: NEPHROLOGY | Facility: CLINIC | Age: 48
End: 2023-01-12
Payer: COMMERCIAL

## 2023-01-19 ENCOUNTER — OFFICE VISIT (OUTPATIENT)
Dept: NEPHROLOGY | Facility: CLINIC | Age: 48
End: 2023-01-19
Payer: COMMERCIAL

## 2023-01-19 ENCOUNTER — LAB VISIT (OUTPATIENT)
Dept: LAB | Facility: HOSPITAL | Age: 48
End: 2023-01-19
Payer: COMMERCIAL

## 2023-01-19 VITALS
WEIGHT: 244.69 LBS | BODY MASS INDEX: 34.13 KG/M2 | DIASTOLIC BLOOD PRESSURE: 86 MMHG | OXYGEN SATURATION: 96 % | HEART RATE: 105 BPM | SYSTOLIC BLOOD PRESSURE: 134 MMHG

## 2023-01-19 DIAGNOSIS — I10 HYPERTENSION, UNSPECIFIED TYPE: ICD-10-CM

## 2023-01-19 DIAGNOSIS — N02.B9 IGA NEPHROPATHY: ICD-10-CM

## 2023-01-19 DIAGNOSIS — N18.4 CHRONIC KIDNEY DISEASE, STAGE 4 (SEVERE): ICD-10-CM

## 2023-01-19 LAB
ALBUMIN SERPL BCP-MCNC: 3.7 G/DL (ref 3.5–5.2)
ALBUMIN SERPL BCP-MCNC: 3.7 G/DL (ref 3.5–5.2)
ANION GAP SERPL CALC-SCNC: 11 MMOL/L (ref 8–16)
ANION GAP SERPL CALC-SCNC: 11 MMOL/L (ref 8–16)
BUN SERPL-MCNC: 46 MG/DL (ref 6–20)
BUN SERPL-MCNC: 46 MG/DL (ref 6–20)
CALCIUM SERPL-MCNC: 10.3 MG/DL (ref 8.7–10.5)
CALCIUM SERPL-MCNC: 10.3 MG/DL (ref 8.7–10.5)
CHLORIDE SERPL-SCNC: 100 MMOL/L (ref 95–110)
CHLORIDE SERPL-SCNC: 100 MMOL/L (ref 95–110)
CO2 SERPL-SCNC: 25 MMOL/L (ref 23–29)
CO2 SERPL-SCNC: 25 MMOL/L (ref 23–29)
CREAT SERPL-MCNC: 3.2 MG/DL (ref 0.5–1.4)
CREAT SERPL-MCNC: 3.2 MG/DL (ref 0.5–1.4)
EST. GFR  (NO RACE VARIABLE): 22.8 ML/MIN/1.73 M^2
EST. GFR  (NO RACE VARIABLE): 22.8 ML/MIN/1.73 M^2
ESTIMATED AVG GLUCOSE: 232 MG/DL (ref 68–131)
GLUCOSE SERPL-MCNC: 286 MG/DL (ref 70–110)
GLUCOSE SERPL-MCNC: 286 MG/DL (ref 70–110)
HBA1C MFR BLD: 9.7 % (ref 4–5.6)
PHOSPHATE SERPL-MCNC: 4.2 MG/DL (ref 2.7–4.5)
PHOSPHATE SERPL-MCNC: 4.2 MG/DL (ref 2.7–4.5)
POTASSIUM SERPL-SCNC: 4.4 MMOL/L (ref 3.5–5.1)
POTASSIUM SERPL-SCNC: 4.4 MMOL/L (ref 3.5–5.1)
SODIUM SERPL-SCNC: 136 MMOL/L (ref 136–145)
SODIUM SERPL-SCNC: 136 MMOL/L (ref 136–145)

## 2023-01-19 PROCEDURE — 80069 RENAL FUNCTION PANEL: CPT | Performed by: INTERNAL MEDICINE

## 2023-01-19 PROCEDURE — 99999 PR PBB SHADOW E&M-EST. PATIENT-LVL IV: ICD-10-PCS | Mod: PBBFAC,,, | Performed by: INTERNAL MEDICINE

## 2023-01-19 PROCEDURE — 83036 HEMOGLOBIN GLYCOSYLATED A1C: CPT | Performed by: INTERNAL MEDICINE

## 2023-01-19 PROCEDURE — 99999 PR PBB SHADOW E&M-EST. PATIENT-LVL IV: CPT | Mod: PBBFAC,,, | Performed by: INTERNAL MEDICINE

## 2023-01-19 PROCEDURE — 36415 COLL VENOUS BLD VENIPUNCTURE: CPT | Performed by: INTERNAL MEDICINE

## 2023-01-19 RX ORDER — CHLORTHALIDONE 25 MG/1
25 TABLET ORAL DAILY
Qty: 90 TABLET | Refills: 3 | Status: ON HOLD | OUTPATIENT
Start: 2023-01-19 | End: 2023-03-03 | Stop reason: HOSPADM

## 2023-01-19 RX ORDER — VALSARTAN 320 MG/1
320 TABLET ORAL DAILY
Qty: 90 TABLET | Refills: 3 | Status: ON HOLD | OUTPATIENT
Start: 2023-01-19 | End: 2023-03-03 | Stop reason: HOSPADM

## 2023-01-19 RX ORDER — SODIUM BICARBONATE 650 MG/1
650 TABLET ORAL 3 TIMES DAILY
Qty: 270 TABLET | Refills: 3 | Status: SHIPPED | OUTPATIENT
Start: 2023-01-19 | End: 2023-03-23 | Stop reason: SDUPTHER

## 2023-01-19 RX ORDER — AMLODIPINE BESYLATE 10 MG/1
10 TABLET ORAL DAILY
Qty: 90 TABLET | Refills: 3 | Status: ON HOLD | OUTPATIENT
Start: 2023-01-19 | End: 2023-03-03 | Stop reason: SDUPTHER

## 2023-01-19 RX ORDER — CALCITRIOL 0.25 UG/1
0.25 CAPSULE ORAL DAILY
Qty: 90 CAPSULE | Refills: 3 | Status: SHIPPED | OUTPATIENT
Start: 2023-01-19 | End: 2024-03-01 | Stop reason: SDUPTHER

## 2023-01-19 NOTE — PROGRESS NOTES
Nephrology Clinic Note   1/19/2023    Chief Complaint   Patient presents with    Chronic Kidney Disease      History of present illness:  Patient is a 47 y.o. male.   Presents to the clinic today for medical conditions listed below.  Problem Noted   Iga Nephropathy 10/20/2022   Htn (Hypertension) 11/14/2012   Chronic Kidney Disease, Stage 4 (Severe) 11/14/2012    47 y/o M with hx of HTN, Gout and prior history of glomerulonephritis who comes in for follow up evaluation. Pt was last evaluated by a kidney doctor 5y ago in 2017. At that time it was suspected he had stable disease, possibly IgA nephropathy. Biopsy was deferred and expectant observation was recommended. Pt now comes back to the Nephrology clinic with worsening renal dysfunction and proteinuria. Blood pressure also significantly elevated. He refers his father suffered from kidney disease and had a complication of bleeding after a biopsy and required dialysis however he cannot remember the diagnosis. He believes it was related to hypertension. Pt denies any recent nausea, vomits, diarrhea, radiocontrast administration, dehydration or NSAID use.     Interval Hx:  9/15/22: Kidney biopsy results noted and consistent with IgA nephropathy. Worsening kidney function with creatinine up to 2.9.   10/20/22: Serum creatinine appears to have peaked at 3.6 and currently down to 3.2 as pt refers he has been working on lifestyle modifications. Tooth infection/abscess resolved after extraction and pt has completed his antibiotics.   11/10/22: sCr down to 2.6, UPCR up to 4. Pt refers he just realized he had not been taking the prednisone. [place] approved on appeal.        Review of Systems   Constitutional:  Negative for chills, fever and weight loss.   Respiratory:  Negative for cough, shortness of breath and wheezing.    Cardiovascular:  Negative for chest pain, claudication and leg swelling.   Gastrointestinal:  Negative for abdominal pain, diarrhea, nausea and  vomiting.   Genitourinary:  Negative for dysuria, flank pain, frequency, hematuria and urgency.   Skin:  Negative for rash.   Neurological:  Negative for weakness.     History:  Past Medical History:   Diagnosis Date    Chronic kidney disease     Chronic kidney disease stage II    Colon polyps     Hyperlipidemia 12/13/2012    Hypertension       Past Surgical History:   Procedure Laterality Date    COSMETIC SURGERY      pyloric stenosis      RENAL BIOPSY Left 9/1/2022    Procedure: BIOPSY, KIDNEY;  Surgeon: Sinan Abad MD;  Location: [facility] CATH LAB;  Service: Interventional Nephrology;  Laterality: Left;        Current Outpatient Medications:     allopurinoL (ZYLOPRIM) 100 MG tablet, Take 1 tablet (100 mg total) by mouth once daily., Disp: 30 tablet, Rfl: 3    atorvastatin (LIPITOR) 20 MG tablet, Take 1 tablet (20 mg total) by mouth once daily., Disp: 90 tablet, Rfl: 3    blood sugar diagnostic Strp, 1 strip by Misc.(Non-Drug; Combo Route) route 2 (two) times daily. ICD 10: E11.22, Disp: 100 each, Rfl: 6    blood-glucose meter kit, Use as instructed to check blood sugar two times a day. ICD 10: E11.22, Disp: 1 each, Rfl: 0    colchicine (COLCRYS) 0.6 mg tablet, Take by mouth., Disp: , Rfl:     lancets Misc, 1 lancet by Misc.(Non-Drug; Combo Route) route 2 (two) times daily. ICD 10: E11.22, Disp: 100 each, Rfl: 6    prednisoLONE acetate (PRED FORTE) 1 % DrpS, Instill 1 drop into right eye four times a day, Disp: , Rfl:     amLODIPine (NORVASC) 10 MG tablet, Take 1 tablet (10 mg total) by mouth once daily., Disp: 90 tablet, Rfl: 3    budesonide (TARPEYO) 4 mg CpDR, Take 16 mg by mouth once daily. (Patient not taking: Reported on 11/10/2022), Disp: 120 capsule, Rfl: 3    calcitRIOL (ROCALTROL) 0.25 MCG Cap, Take 1 capsule (0.25 mcg total) by mouth once daily., Disp: 90 capsule, Rfl: 3    chlorthalidone (HYGROTEN) 25 MG Tab, Take 1 tablet (25 mg total) by mouth once daily., Disp: 90 tablet, Rfl: 3    DAILY GARLIC  ONCE-A-DAY ORAL, Take by mouth., Disp: , Rfl:     fish oil-omega-3 fatty acids 300-1,000 mg capsule, Take 1 capsule by mouth once daily., Disp: , Rfl:     HYDROcodone-acetaminophen (NORCO) 5-325 mg per tablet, Take 1 tablet by mouth every 4 (four) hours as needed for Pain. (Patient not taking: Reported on 11/10/2022), Disp: 9 tablet, Rfl: 0    lactobacillus acidophilus & bulgar (LACTINEX) 100 million cell packet, Take 1 packet (1 each total) by mouth once daily. (Patient not taking: Reported on 1/19/2023), Disp: 90 packet, Rfl: 0    multivitamin (THERAGRAN) per tablet, Take 1 tablet by mouth., Disp: , Rfl:     sodium bicarbonate 650 MG tablet, Take 1 tablet (650 mg total) by mouth 3 (three) times daily., Disp: 270 tablet, Rfl: 3    sodium zirconium cyclosilicate (LOKELMA) 5 gram packet, Take 1 packet (5 g total) by mouth once daily. Mix entire contents of packet(s) into drinking glass containing 3 tablespoons of water; stir well and drink immediately. Add water and repeat until no powder remains to receive entire dose., Disp: 30 packet, Rfl: 2    valsartan (DIOVAN) 320 MG tablet, Take 1 tablet (320 mg total) by mouth once daily., Disp: 90 tablet, Rfl: 3  Review of patient's allergies indicates:  No Known Allergies   Social History     Tobacco Use    Smoking status: Every Day     Packs/day: 1.00     Years: 24.00     Pack years: 24.00     Types: Cigarettes    Smokeless tobacco: Current   Substance Use Topics    Alcohol use: Yes     Comment: Occasional      Family History   Problem Relation Age of Onset    COPD Mother     Heart disease Mother     Peripheral vascular disease Mother     Diabetes Father     Kidney disease Father     Stroke Father     Hypertension Father     Heart disease Father 70        CABG x 3    Kidney failure Father     Heart disease Maternal Grandfather         Physical Exam :  Vitals:    01/19/23 1511   BP: 134/86   Pulse: 105     Physical Exam  Constitutional:       General: He is not in acute  distress.     Appearance: He is not ill-appearing or toxic-appearing.   HENT:      Head: Atraumatic.      Nose: Nose normal.      Mouth/Throat:      Mouth: Mucous membranes are moist.   Cardiovascular:      Rate and Rhythm: Normal rate.   Pulmonary:      Effort: Pulmonary effort is normal.   Abdominal:      General: There is no distension.      Palpations: Abdomen is soft.      Tenderness: There is no abdominal tenderness.   Musculoskeletal:         General: No swelling. Normal range of motion.      Right lower leg: No edema.      Left lower leg: No edema.   Skin:     General: Skin is warm and dry.   Neurological:      Mental Status: He is alert and oriented to person, place, and time.   Psychiatric:         Mood and Affect: Mood normal.         Behavior: Behavior normal.       Labs reviewed   Images Reviewed    Assessment:    1. Chronic kidney disease, stage 4 (severe)    2. IgA nephropathy    3. Hypertension, unspecified type        Plan:    Chronic kidney disease, stage 4 (severe)  Cr appears to have peaked at 3.6 and is currently down to 3.0 with a GFR of 24  Proteinuria improved to 1.8 g/g; continue Valsartan 320mg  Trace blood with 2 RBCs in UA   No edema on examination   Urine microscopy (8/25) with many acanthocytes  Kidney biopsy results noted; consistent with chronic IgA nephropathy; no significant disease activity noted however pts renal function continues to deteriorate   Serum creatinine stabilizing around 4   Hyperkalemia well controlled on lokelma; will keep TIW  Kidney US performed in clinic (9/15) with no hydronephrosis  Continue Budesonide 16mg PO daily   Chlorthalidone to 25mg PO daily  Sodium bicarb 650mg PO TID and Lokelma 10mg PO TIW  Oriented on the importance of weight loss, BP control and smoking cessation  Referred to dietitian  Also recommend to follow up or establish care with his primary care physician       IgA nephropathy  Kidney biopsy (9/8/22): IgA dominant diffuse global granular  mesangial with segmental capillary loop staining by IF, diffuse mild increase in mesangial cellularity and 30-40% interstitial fibrosis. There is no activity in that isaiah is no endocapillary proliferation, crescents, or fibrinoid necrosis. Three worse prognostic indicators present; significant mesangial proliferation, segmental sclerosis and interstitial fibrosis.      Continue Tarpeyo   Will need to continue working on lifestyle modifications including weight loss, BP control, tobacco cessation and glucose controled   No follow-ups on file.     Orders Placed This Encounter   Procedures    RENAL FUNCTION PANEL    Urinalysis    Protein / creatinine ratio, urine    HEMOGLOBIN A1C    Ambulatory referral/consult to Family Practice     Medications Discontinued During This Encounter   Medication Reason    sodium zirconium cyclosilicate (LOKELMA) 5 gram packet Reorder    sodium bicarbonate 650 MG tablet Reorder    calcitRIOL (ROCALTROL) 0.25 MCG Cap Reorder    chlorthalidone (HYGROTEN) 25 MG Tab Reorder    valsartan (DIOVAN) 320 MG tablet Reorder    amLODIPine (NORVASC) 10 MG tablet Reorder      No future appointments.    All Benavides

## 2023-01-19 NOTE — ASSESSMENT & PLAN NOTE
Kidney biopsy (9/8/22): IgA dominant diffuse global granular mesangial with segmental capillary loop staining by IF, diffuse mild increase in mesangial cellularity and 30-40% interstitial fibrosis. There is no activity in that isaiah is no endocapillary proliferation, crescents, or fibrinoid necrosis. Three worse prognostic indicators present; significant mesangial proliferation, segmental sclerosis and interstitial fibrosis.      Continue Tarpeyo   Will need to continue working on lifestyle modifications including weight loss, BP control, tobacco cessation and glucose controled

## 2023-01-19 NOTE — ASSESSMENT & PLAN NOTE
Cr appears to have peaked at 3.6 and is currently down to 3.0 with a GFR of 24  Proteinuria improved to 1.8 g/g; continue Valsartan 320mg  Trace blood with 2 RBCs in UA   No edema on examination   Urine microscopy (8/25) with many acanthocytes  Kidney biopsy results noted; consistent with chronic IgA nephropathy; no significant disease activity noted however pts renal function continues to deteriorate   Serum creatinine stabilizing around 4   Hyperkalemia well controlled on lokelma; will keep TIW  Kidney US performed in clinic (9/15) with no hydronephrosis  Continue Budesonide 16mg PO daily   Chlorthalidone to 25mg PO daily  Sodium bicarb 650mg PO TID and Lokelma 10mg PO TIW  Oriented on the importance of weight loss, BP control and smoking cessation  Referred to dietitian  Also recommend to follow up or establish care with his primary care physician

## 2023-02-10 ENCOUNTER — PATIENT MESSAGE (OUTPATIENT)
Dept: PHARMACY | Facility: CLINIC | Age: 48
End: 2023-02-10
Payer: COMMERCIAL

## 2023-02-10 RX ORDER — SODIUM ZIRCONIUM CYCLOSILICATE 5 G/5G
5 POWDER, FOR SUSPENSION ORAL DAILY
Qty: 30 PACKET | Refills: 2 | Status: CANCELLED | OUTPATIENT
Start: 2023-02-10

## 2023-02-15 ENCOUNTER — HOSPITAL ENCOUNTER (OUTPATIENT)
Facility: HOSPITAL | Age: 48
Discharge: LEFT BEFORE DISCHARGE | End: 2023-02-15
Attending: EMERGENCY MEDICINE | Admitting: HOSPITALIST
Payer: COMMERCIAL

## 2023-02-15 ENCOUNTER — TELEPHONE (OUTPATIENT)
Dept: NEPHROLOGY | Facility: CLINIC | Age: 48
End: 2023-02-15
Payer: COMMERCIAL

## 2023-02-15 ENCOUNTER — LAB VISIT (OUTPATIENT)
Dept: LAB | Facility: HOSPITAL | Age: 48
End: 2023-02-15
Payer: COMMERCIAL

## 2023-02-15 ENCOUNTER — SPECIALTY PHARMACY (OUTPATIENT)
Dept: PHARMACY | Facility: CLINIC | Age: 48
End: 2023-02-15
Payer: COMMERCIAL

## 2023-02-15 ENCOUNTER — HOSPITAL ENCOUNTER (OUTPATIENT)
Facility: HOSPITAL | Age: 48
Discharge: HOME | End: 2023-02-16
Attending: EMERGENCY MEDICINE | Admitting: HOSPITALIST
Payer: COMMERCIAL

## 2023-02-15 VITALS
HEART RATE: 82 BPM | TEMPERATURE: 98 F | HEIGHT: 71 IN | DIASTOLIC BLOOD PRESSURE: 67 MMHG | WEIGHT: 245 LBS | RESPIRATION RATE: 18 BRPM | BODY MASS INDEX: 34.3 KG/M2 | OXYGEN SATURATION: 96 % | SYSTOLIC BLOOD PRESSURE: 144 MMHG

## 2023-02-15 DIAGNOSIS — R73.9 HYPERGLYCEMIA: ICD-10-CM

## 2023-02-15 DIAGNOSIS — N18.4 CHRONIC KIDNEY DISEASE, STAGE 4 (SEVERE): ICD-10-CM

## 2023-02-15 DIAGNOSIS — R07.9 CHEST PAIN: ICD-10-CM

## 2023-02-15 DIAGNOSIS — N17.9 AKI (ACUTE KIDNEY INJURY): Primary | ICD-10-CM

## 2023-02-15 DIAGNOSIS — E11.65 TYPE 2 DIABETES MELLITUS WITH HYPERGLYCEMIA, WITHOUT LONG-TERM CURRENT USE OF INSULIN: ICD-10-CM

## 2023-02-15 DIAGNOSIS — I10 HYPERTENSION, UNSPECIFIED TYPE: ICD-10-CM

## 2023-02-15 DIAGNOSIS — E78.5 HYPERLIPIDEMIA, UNSPECIFIED HYPERLIPIDEMIA TYPE: ICD-10-CM

## 2023-02-15 DIAGNOSIS — R42 DIZZINESS: ICD-10-CM

## 2023-02-15 DIAGNOSIS — N18.4 CHRONIC KIDNEY DISEASE, STAGE 4 (SEVERE): Primary | ICD-10-CM

## 2023-02-15 LAB
ALBUMIN SERPL BCP-MCNC: 3.3 G/DL (ref 3.5–5.2)
ALBUMIN SERPL BCP-MCNC: 3.9 G/DL (ref 3.5–5.2)
ALLENS TEST: ABNORMAL
ALP SERPL-CCNC: 77 U/L (ref 55–135)
ALT SERPL W/O P-5'-P-CCNC: 33 U/L (ref 10–44)
ANION GAP SERPL CALC-SCNC: 16 MMOL/L (ref 8–16)
ANION GAP SERPL CALC-SCNC: 16 MMOL/L (ref 8–16)
AST SERPL-CCNC: 19 U/L (ref 10–40)
B-OH-BUTYR BLD STRIP-SCNC: 0.1 MMOL/L (ref 0–0.5)
BACTERIA #/AREA URNS AUTO: NORMAL /HPF
BASOPHILS # BLD AUTO: 0.06 K/UL (ref 0–0.2)
BASOPHILS # BLD AUTO: 0.06 K/UL (ref 0–0.2)
BASOPHILS NFR BLD: 0.4 % (ref 0–1.9)
BASOPHILS NFR BLD: 0.5 % (ref 0–1.9)
BILIRUB SERPL-MCNC: 0.4 MG/DL (ref 0.1–1)
BILIRUB UR QL STRIP: NEGATIVE
BUN SERPL-MCNC: 64 MG/DL (ref 6–30)
BUN SERPL-MCNC: 65 MG/DL (ref 6–20)
BUN SERPL-MCNC: 66 MG/DL (ref 6–20)
CALCIUM SERPL-MCNC: 10 MG/DL (ref 8.7–10.5)
CALCIUM SERPL-MCNC: 9.1 MG/DL (ref 8.7–10.5)
CHLORIDE SERPL-SCNC: 102 MMOL/L (ref 95–110)
CHLORIDE SERPL-SCNC: 95 MMOL/L (ref 95–110)
CHLORIDE SERPL-SCNC: 99 MMOL/L (ref 95–110)
CLARITY UR REFRACT.AUTO: CLEAR
CO2 SERPL-SCNC: 19 MMOL/L (ref 23–29)
CO2 SERPL-SCNC: 20 MMOL/L (ref 23–29)
COLOR UR AUTO: YELLOW
CREAT SERPL-MCNC: 3.8 MG/DL (ref 0.5–1.4)
CREAT SERPL-MCNC: 4 MG/DL (ref 0.5–1.4)
CREAT SERPL-MCNC: 4.2 MG/DL (ref 0.5–1.4)
DIFFERENTIAL METHOD: ABNORMAL
DIFFERENTIAL METHOD: ABNORMAL
EOSINOPHIL # BLD AUTO: 0 K/UL (ref 0–0.5)
EOSINOPHIL # BLD AUTO: 0.1 K/UL (ref 0–0.5)
EOSINOPHIL NFR BLD: 0.2 % (ref 0–8)
EOSINOPHIL NFR BLD: 0.7 % (ref 0–8)
ERYTHROCYTE [DISTWIDTH] IN BLOOD BY AUTOMATED COUNT: 13.1 % (ref 11.5–14.5)
ERYTHROCYTE [DISTWIDTH] IN BLOOD BY AUTOMATED COUNT: 13.2 % (ref 11.5–14.5)
EST. GFR  (NO RACE VARIABLE): 17.5 ML/MIN/1.73 M^2
EST. GFR  (NO RACE VARIABLE): 18.6 ML/MIN/1.73 M^2
GLUCOSE SERPL-MCNC: 330 MG/DL (ref 70–110)
GLUCOSE SERPL-MCNC: 331 MG/DL (ref 70–110)
GLUCOSE SERPL-MCNC: 549 MG/DL (ref 70–110)
GLUCOSE UR QL STRIP: ABNORMAL
HCO3 UR-SCNC: 23.5 MMOL/L (ref 24–28)
HCT VFR BLD AUTO: 39 % (ref 40–54)
HCT VFR BLD AUTO: 42.6 % (ref 40–54)
HCT VFR BLD CALC: 44 %PCV (ref 36–54)
HCT VFR BLD CALC: 46 %PCV (ref 36–54)
HCV AB SERPL QL IA: NORMAL
HGB BLD-MCNC: 13 G/DL (ref 14–18)
HGB BLD-MCNC: 14.4 G/DL (ref 14–18)
HGB UR QL STRIP: ABNORMAL
HIV 1+2 AB+HIV1 P24 AG SERPL QL IA: NORMAL
HYALINE CASTS UR QL AUTO: 0 /LPF
IMM GRANULOCYTES # BLD AUTO: 0.08 K/UL (ref 0–0.04)
IMM GRANULOCYTES # BLD AUTO: 0.09 K/UL (ref 0–0.04)
IMM GRANULOCYTES NFR BLD AUTO: 0.6 % (ref 0–0.5)
IMM GRANULOCYTES NFR BLD AUTO: 0.7 % (ref 0–0.5)
KETONES UR QL STRIP: NEGATIVE
LEUKOCYTE ESTERASE UR QL STRIP: NEGATIVE
LYMPHOCYTES # BLD AUTO: 3.8 K/UL (ref 1–4.8)
LYMPHOCYTES # BLD AUTO: 3.9 K/UL (ref 1–4.8)
LYMPHOCYTES NFR BLD: 27.7 % (ref 18–48)
LYMPHOCYTES NFR BLD: 29.3 % (ref 18–48)
MCH RBC QN AUTO: 32.3 PG (ref 27–31)
MCH RBC QN AUTO: 32.3 PG (ref 27–31)
MCHC RBC AUTO-ENTMCNC: 33.3 G/DL (ref 32–36)
MCHC RBC AUTO-ENTMCNC: 33.8 G/DL (ref 32–36)
MCV RBC AUTO: 96 FL (ref 82–98)
MCV RBC AUTO: 97 FL (ref 82–98)
MICROSCOPIC COMMENT: NORMAL
MONOCYTES # BLD AUTO: 0.6 K/UL (ref 0.3–1)
MONOCYTES # BLD AUTO: 0.7 K/UL (ref 0.3–1)
MONOCYTES NFR BLD: 4.4 % (ref 4–15)
MONOCYTES NFR BLD: 4.9 % (ref 4–15)
NEUTROPHILS # BLD AUTO: 8.4 K/UL (ref 1.8–7.7)
NEUTROPHILS # BLD AUTO: 9.4 K/UL (ref 1.8–7.7)
NEUTROPHILS NFR BLD: 64.9 % (ref 38–73)
NEUTROPHILS NFR BLD: 65.7 % (ref 38–73)
NITRITE UR QL STRIP: NEGATIVE
NRBC BLD-RTO: 0 /100 WBC
NRBC BLD-RTO: 0 /100 WBC
PCO2 BLDA: 39.3 MMHG (ref 35–45)
PH SMN: 7.38 [PH] (ref 7.35–7.45)
PH UR STRIP: 6 [PH] (ref 5–8)
PHOSPHATE SERPL-MCNC: 4.1 MG/DL (ref 2.7–4.5)
PLATELET # BLD AUTO: 415 K/UL (ref 150–450)
PLATELET # BLD AUTO: 436 K/UL (ref 150–450)
PMV BLD AUTO: 9.1 FL (ref 9.2–12.9)
PMV BLD AUTO: 9.3 FL (ref 9.2–12.9)
PO2 BLDA: 27 MMHG (ref 40–60)
POC BE: -2 MMOL/L
POC IONIZED CALCIUM: 1.2 MMOL/L (ref 1.06–1.42)
POC SATURATED O2: 49 % (ref 95–100)
POC TCO2 (MEASURED): 22 MMOL/L (ref 23–29)
POC TCO2: 25 MMOL/L (ref 24–29)
POCT GLUCOSE: 186 MG/DL (ref 70–110)
POCT GLUCOSE: 372 MG/DL (ref 70–110)
POTASSIUM BLD-SCNC: 5 MMOL/L (ref 3.5–5.1)
POTASSIUM SERPL-SCNC: 4.6 MMOL/L (ref 3.5–5.1)
POTASSIUM SERPL-SCNC: 4.9 MMOL/L (ref 3.5–5.1)
PROT SERPL-MCNC: 8 G/DL (ref 6–8.4)
PROT UR QL STRIP: ABNORMAL
RBC # BLD AUTO: 4.02 M/UL (ref 4.6–6.2)
RBC # BLD AUTO: 4.46 M/UL (ref 4.6–6.2)
RBC #/AREA URNS AUTO: 3 /HPF (ref 0–4)
SAMPLE: ABNORMAL
SAMPLE: ABNORMAL
SITE: ABNORMAL
SODIUM BLD-SCNC: 133 MMOL/L (ref 136–145)
SODIUM SERPL-SCNC: 131 MMOL/L (ref 136–145)
SODIUM SERPL-SCNC: 134 MMOL/L (ref 136–145)
SP GR UR STRIP: 1.01 (ref 1–1.03)
URN SPEC COLLECT METH UR: ABNORMAL
WBC # BLD AUTO: 12.93 K/UL (ref 3.9–12.7)
WBC # BLD AUTO: 14.21 K/UL (ref 3.9–12.7)
WBC #/AREA URNS AUTO: 0 /HPF (ref 0–5)
YEAST UR QL AUTO: NORMAL

## 2023-02-15 PROCEDURE — 99223 1ST HOSP IP/OBS HIGH 75: CPT | Mod: ,,, | Performed by: PHYSICIAN ASSISTANT

## 2023-02-15 PROCEDURE — 63600175 PHARM REV CODE 636 W HCPCS: Performed by: PHYSICIAN ASSISTANT

## 2023-02-15 PROCEDURE — 99499 NO LOS: ICD-10-PCS | Mod: ,,, | Performed by: PHYSICIAN ASSISTANT

## 2023-02-15 PROCEDURE — 87389 HIV-1 AG W/HIV-1&-2 AB AG IA: CPT | Performed by: PHYSICIAN ASSISTANT

## 2023-02-15 PROCEDURE — 99285 EMERGENCY DEPT VISIT HI MDM: CPT | Mod: 25,27

## 2023-02-15 PROCEDURE — 82962 GLUCOSE BLOOD TEST: CPT

## 2023-02-15 PROCEDURE — 93005 ELECTROCARDIOGRAM TRACING: CPT

## 2023-02-15 PROCEDURE — 99223 PR INITIAL HOSPITAL CARE,LEVL III: ICD-10-PCS | Mod: ,,, | Performed by: PHYSICIAN ASSISTANT

## 2023-02-15 PROCEDURE — 82010 KETONE BODYS QUAN: CPT | Performed by: PHYSICIAN ASSISTANT

## 2023-02-15 PROCEDURE — 80069 RENAL FUNCTION PANEL: CPT | Performed by: INTERNAL MEDICINE

## 2023-02-15 PROCEDURE — 93010 ELECTROCARDIOGRAM REPORT: CPT | Mod: ,,, | Performed by: INTERNAL MEDICINE

## 2023-02-15 PROCEDURE — 36415 COLL VENOUS BLD VENIPUNCTURE: CPT | Performed by: INTERNAL MEDICINE

## 2023-02-15 PROCEDURE — 85025 COMPLETE CBC W/AUTO DIFF WBC: CPT | Mod: 91 | Performed by: PHYSICIAN ASSISTANT

## 2023-02-15 PROCEDURE — 99285 EMERGENCY DEPT VISIT HI MDM: CPT | Mod: 25

## 2023-02-15 PROCEDURE — 99285 PR EMERGENCY DEPT VISIT,LEVEL V: ICD-10-PCS | Mod: ,,, | Performed by: PHYSICIAN ASSISTANT

## 2023-02-15 PROCEDURE — 82800 BLOOD PH: CPT | Mod: 59

## 2023-02-15 PROCEDURE — G0378 HOSPITAL OBSERVATION PER HR: HCPCS

## 2023-02-15 PROCEDURE — 81001 URINALYSIS AUTO W/SCOPE: CPT | Performed by: PHYSICIAN ASSISTANT

## 2023-02-15 PROCEDURE — 82803 BLOOD GASES ANY COMBINATION: CPT

## 2023-02-15 PROCEDURE — 80053 COMPREHEN METABOLIC PANEL: CPT | Performed by: PHYSICIAN ASSISTANT

## 2023-02-15 PROCEDURE — 99900035 HC TECH TIME PER 15 MIN (STAT)

## 2023-02-15 PROCEDURE — 82330 ASSAY OF CALCIUM: CPT

## 2023-02-15 PROCEDURE — 85025 COMPLETE CBC W/AUTO DIFF WBC: CPT | Performed by: INTERNAL MEDICINE

## 2023-02-15 PROCEDURE — 99499 UNLISTED E&M SERVICE: CPT | Mod: ,,, | Performed by: PHYSICIAN ASSISTANT

## 2023-02-15 PROCEDURE — 80047 BASIC METABLC PNL IONIZED CA: CPT

## 2023-02-15 PROCEDURE — 93010 EKG 12-LEAD: ICD-10-PCS | Mod: ,,, | Performed by: INTERNAL MEDICINE

## 2023-02-15 PROCEDURE — 99285 EMERGENCY DEPT VISIT HI MDM: CPT | Mod: ,,, | Performed by: PHYSICIAN ASSISTANT

## 2023-02-15 PROCEDURE — 86803 HEPATITIS C AB TEST: CPT | Performed by: PHYSICIAN ASSISTANT

## 2023-02-15 RX ORDER — GLUCAGON 1 MG
1 KIT INJECTION
Status: DISCONTINUED | OUTPATIENT
Start: 2023-02-15 | End: 2023-02-15 | Stop reason: HOSPADM

## 2023-02-15 RX ORDER — BISACODYL 10 MG
10 SUPPOSITORY, RECTAL RECTAL DAILY PRN
Status: DISCONTINUED | OUTPATIENT
Start: 2023-02-15 | End: 2023-02-15 | Stop reason: HOSPADM

## 2023-02-15 RX ORDER — IBUPROFEN 200 MG
16 TABLET ORAL
Status: DISCONTINUED | OUTPATIENT
Start: 2023-02-15 | End: 2023-02-15 | Stop reason: HOSPADM

## 2023-02-15 RX ORDER — INSULIN ASPART 100 [IU]/ML
0-5 INJECTION, SOLUTION INTRAVENOUS; SUBCUTANEOUS
Status: DISCONTINUED | OUTPATIENT
Start: 2023-02-15 | End: 2023-02-15 | Stop reason: HOSPADM

## 2023-02-15 RX ORDER — INSULIN ASPART 100 [IU]/ML
0-5 INJECTION, SOLUTION INTRAVENOUS; SUBCUTANEOUS
Status: DISCONTINUED | OUTPATIENT
Start: 2023-02-16 | End: 2023-02-16 | Stop reason: HOSPADM

## 2023-02-15 RX ORDER — PROMETHAZINE HYDROCHLORIDE 25 MG/1
25 TABLET ORAL EVERY 6 HOURS PRN
Status: DISCONTINUED | OUTPATIENT
Start: 2023-02-15 | End: 2023-02-15 | Stop reason: HOSPADM

## 2023-02-15 RX ORDER — IBUPROFEN 200 MG
16 TABLET ORAL
Status: DISCONTINUED | OUTPATIENT
Start: 2023-02-16 | End: 2023-02-16 | Stop reason: HOSPADM

## 2023-02-15 RX ORDER — GLUCAGON 1 MG
1 KIT INJECTION
Status: DISCONTINUED | OUTPATIENT
Start: 2023-02-16 | End: 2023-02-16 | Stop reason: HOSPADM

## 2023-02-15 RX ORDER — BISACODYL 10 MG
10 SUPPOSITORY, RECTAL RECTAL DAILY PRN
Status: DISCONTINUED | OUTPATIENT
Start: 2023-02-16 | End: 2023-02-16 | Stop reason: HOSPADM

## 2023-02-15 RX ORDER — IBUPROFEN 200 MG
1 TABLET ORAL DAILY PRN
Status: DISCONTINUED | OUTPATIENT
Start: 2023-02-16 | End: 2023-02-16 | Stop reason: HOSPADM

## 2023-02-15 RX ORDER — ONDANSETRON 8 MG/1
8 TABLET, ORALLY DISINTEGRATING ORAL EVERY 8 HOURS PRN
Status: DISCONTINUED | OUTPATIENT
Start: 2023-02-15 | End: 2023-02-15 | Stop reason: HOSPADM

## 2023-02-15 RX ORDER — TALC
6 POWDER (GRAM) TOPICAL NIGHTLY PRN
Status: DISCONTINUED | OUTPATIENT
Start: 2023-02-15 | End: 2023-02-15 | Stop reason: HOSPADM

## 2023-02-15 RX ORDER — HEPARIN SODIUM 5000 [USP'U]/ML
5000 INJECTION, SOLUTION INTRAVENOUS; SUBCUTANEOUS EVERY 8 HOURS
Status: DISCONTINUED | OUTPATIENT
Start: 2023-02-16 | End: 2023-02-16 | Stop reason: HOSPADM

## 2023-02-15 RX ORDER — ACETAMINOPHEN 325 MG/1
650 TABLET ORAL EVERY 4 HOURS PRN
Status: DISCONTINUED | OUTPATIENT
Start: 2023-02-16 | End: 2023-02-16 | Stop reason: HOSPADM

## 2023-02-15 RX ORDER — HEPARIN SODIUM 5000 [USP'U]/ML
5000 INJECTION, SOLUTION INTRAVENOUS; SUBCUTANEOUS EVERY 8 HOURS
Status: DISCONTINUED | OUTPATIENT
Start: 2023-02-15 | End: 2023-02-15 | Stop reason: HOSPADM

## 2023-02-15 RX ORDER — IBUPROFEN 200 MG
1 TABLET ORAL DAILY PRN
Status: DISCONTINUED | OUTPATIENT
Start: 2023-02-15 | End: 2023-02-15 | Stop reason: HOSPADM

## 2023-02-15 RX ORDER — IBUPROFEN 200 MG
24 TABLET ORAL
Status: DISCONTINUED | OUTPATIENT
Start: 2023-02-16 | End: 2023-02-16 | Stop reason: HOSPADM

## 2023-02-15 RX ORDER — POLYETHYLENE GLYCOL 3350 17 G/17G
17 POWDER, FOR SOLUTION ORAL DAILY PRN
Status: DISCONTINUED | OUTPATIENT
Start: 2023-02-16 | End: 2023-02-16 | Stop reason: HOSPADM

## 2023-02-15 RX ORDER — PROMETHAZINE HYDROCHLORIDE 25 MG/1
25 TABLET ORAL EVERY 6 HOURS PRN
Status: DISCONTINUED | OUTPATIENT
Start: 2023-02-16 | End: 2023-02-16 | Stop reason: HOSPADM

## 2023-02-15 RX ORDER — IBUPROFEN 200 MG
24 TABLET ORAL
Status: DISCONTINUED | OUTPATIENT
Start: 2023-02-15 | End: 2023-02-15 | Stop reason: HOSPADM

## 2023-02-15 RX ORDER — TALC
6 POWDER (GRAM) TOPICAL NIGHTLY PRN
Status: DISCONTINUED | OUTPATIENT
Start: 2023-02-16 | End: 2023-02-16 | Stop reason: HOSPADM

## 2023-02-15 RX ORDER — ONDANSETRON 8 MG/1
8 TABLET, ORALLY DISINTEGRATING ORAL EVERY 8 HOURS PRN
Status: DISCONTINUED | OUTPATIENT
Start: 2023-02-16 | End: 2023-02-16 | Stop reason: HOSPADM

## 2023-02-15 RX ORDER — INSULIN ASPART 100 [IU]/ML
5 INJECTION, SOLUTION INTRAVENOUS; SUBCUTANEOUS
Status: DISCONTINUED | OUTPATIENT
Start: 2023-02-16 | End: 2023-02-16

## 2023-02-15 RX ORDER — ACETAMINOPHEN 325 MG/1
650 TABLET ORAL EVERY 4 HOURS PRN
Status: DISCONTINUED | OUTPATIENT
Start: 2023-02-15 | End: 2023-02-15 | Stop reason: HOSPADM

## 2023-02-15 RX ORDER — SODIUM CHLORIDE 9 MG/ML
INJECTION, SOLUTION INTRAVENOUS CONTINUOUS
Status: DISCONTINUED | OUTPATIENT
Start: 2023-02-16 | End: 2023-02-16

## 2023-02-15 RX ORDER — INSULIN ASPART 100 [IU]/ML
7 INJECTION, SOLUTION INTRAVENOUS; SUBCUTANEOUS
Status: DISCONTINUED | OUTPATIENT
Start: 2023-02-16 | End: 2023-02-15 | Stop reason: HOSPADM

## 2023-02-15 RX ORDER — POLYETHYLENE GLYCOL 3350 17 G/17G
17 POWDER, FOR SOLUTION ORAL DAILY PRN
Status: DISCONTINUED | OUTPATIENT
Start: 2023-02-15 | End: 2023-02-15 | Stop reason: HOSPADM

## 2023-02-15 RX ADMIN — SODIUM CHLORIDE, SODIUM LACTATE, POTASSIUM CHLORIDE, AND CALCIUM CHLORIDE 1000 ML: .6; .31; .03; .02 INJECTION, SOLUTION INTRAVENOUS at 05:02

## 2023-02-15 NOTE — TELEPHONE ENCOUNTER
Reached out to patient to confirm how he is currently taking Lokelma as current Rx on file is written for 1 packets once daily. However, pt previously reported taking 1 packet TIW. Latest OV notes also reflect TIW dosing. Will reach out to provider for updated Rx prior to next refill. Pending refill call based on 70 DS. Pt aware to contact [facility] if his dose changes or if he is in need a refill any sooner.

## 2023-02-15 NOTE — ED PROVIDER NOTES
Encounter Date: 2/15/2023       History     Chief Complaint   Patient presents with    Referral     Was told to come to ER for hyperglycemia. Reports dizziness     47-year-old male with history of hypertension, hyperlipidemia, CKD stage 2, Buerger's disease who presents to the emergency department for MARIE and hyperglycemia.  States yesterday felt fatigued with an episode of dizziness causing him to leave work early.  Got outpatient labs ordered by his nephrologist and noted to be hyperglycemic and with a new MARIE and advised to come to the ED. Patient denies history diabetes and is not currently on any diabetes medications.  States he had a few doctors mentioned diabetes was never formally diagnosed.    The history is provided by the patient.   Review of patient's allergies indicates:  No Known Allergies  Past Medical History:   Diagnosis Date    Chronic kidney disease     Chronic kidney disease stage II    Colon polyps     Hyperlipidemia 12/13/2012    Hypertension      Past Surgical History:   Procedure Laterality Date    COSMETIC SURGERY      pyloric stenosis      RENAL BIOPSY Left 9/1/2022    Procedure: BIOPSY, KIDNEY;  Surgeon: Sinan Abad MD;  Location: [facility] CATH LAB;  Service: Interventional Nephrology;  Laterality: Left;     Family History   Problem Relation Age of Onset    COPD Mother     Heart disease Mother     Peripheral vascular disease Mother     Diabetes Father     Kidney disease Father     Stroke Father     Hypertension Father     Heart disease Father 70        CABG x 3    Kidney failure Father     Heart disease Maternal Grandfather      Social History     Tobacco Use    Smoking status: Every Day     Packs/day: 1.00     Years: 24.00     Pack years: 24.00     Types: Cigarettes    Smokeless tobacco: Current   Substance Use Topics    Alcohol use: Yes     Comment: Occasional    Drug use: No     Review of Systems   Constitutional:  Positive for fatigue. Negative for chills and fever.    HENT:  Negative for sore throat.    Respiratory:  Negative for cough and shortness of breath.    Cardiovascular:  Negative for chest pain.   Gastrointestinal:  Negative for abdominal pain.   Genitourinary:  Negative for difficulty urinating.   Musculoskeletal: Negative.    Skin: Negative.    Neurological:  Positive for dizziness.   Psychiatric/Behavioral:  Negative for confusion.      Physical Exam     Initial Vitals [02/15/23 1541]   BP Pulse Resp Temp SpO2   139/84 97 16 98.5 °F (36.9 °C) 97 %      MAP       --         Physical Exam    Nursing note and vitals reviewed.  Constitutional: He appears well-developed and well-nourished.   HENT:   Head: Normocephalic and atraumatic.   Eyes: Conjunctivae are normal. Pupils are equal, round, and reactive to light.   Neck: Neck supple.   Normal range of motion.  Cardiovascular:  Normal rate, regular rhythm, normal heart sounds and intact distal pulses.           Pulmonary/Chest: Breath sounds normal.   Abdominal: Abdomen is soft. There is no abdominal tenderness.   Musculoskeletal:         General: Normal range of motion.      Cervical back: Normal range of motion and neck supple.     Neurological: He is alert and oriented to person, place, and time. GCS score is 15. GCS eye subscore is 4. GCS verbal subscore is 5. GCS motor subscore is 6.   Skin: Skin is warm and dry. Capillary refill takes less than 2 seconds.   Psychiatric: He has a normal mood and affect.       ED Course   Procedures  Labs Reviewed   HIV 1 / 2 ANTIBODY   HEPATITIS C ANTIBODY   CBC W/ AUTO DIFFERENTIAL   COMPREHENSIVE METABOLIC PANEL   BETA - HYDROXYBUTYRATE, SERUM   URINALYSIS, REFLEX TO URINE CULTURE   POCT GLUCOSE MONITORING CONTINUOUS   ISTAT CHEM8          Imaging Results    None          Medications   lactated ringers bolus 1,000 mL (has no administration in time range)                 ED Course as of 02/16/23 0033   Wed Feb 15, 2023   1553 EKG received/reviewed. No STEMI. [LP]   1844  Beta-Hydroxybutyrate: 0.1 [HJ]      ED Course User Index  [HJ] Terrance Verdugo PA-C  [LP] Chon Sarmiento III, MD                 Clinical Impression:   Final diagnoses:  [R73.9] Hyperglycemia  [N17.9] MARIE (acute kidney injury) (Primary)        ED Disposition Condition    Admit Stable                Terrance Verdugo PA-C  02/16/23 0033

## 2023-02-15 NOTE — TELEPHONE ENCOUNTER
Specialty Pharmacy - Refill Coordination    Specialty Medication Orders Linked to Encounter      Flowsheet Row Most Recent Value   Medication #1 sodium zirconium cyclosilicate (LOKELMA) 5 gram packet (Order#[id number], Rx#[id number])            Refill Questions - Documented Responses      Flowsheet Row Most Recent Value   Patient Availability and HIPAA Verification    Does patient want to proceed with activity? Yes   HIPAA/medical authority confirmed? Yes   Relationship to patient of person spoken to? Self   Refill Screening Questions    Changes to allergies? No   Changes to medications? No   New conditions since last clinic visit? No   Unplanned office visit, urgent care, ED, or hospital admission in the last 4 weeks? No   How does patient/caregiver feel medication is working? Good   Financial problems or insurance changes? No   How many doses of your specialty medications were missed in the last 4 weeks? 0   Would patient like to speak to a pharmacist? No   When does the patient need to receive the medication? 02/16/23   Refill Delivery Questions    How will the patient receive the medication? MEDRx   When does the patient need to receive the medication? 02/16/23   Shipping Address Home   Address in [facility] confirmed and updated if neccessary? Yes   Expected Copay ($) 0   Is the patient able to afford the medication copay? Yes   Payment Method zero copay   Days supply of Refill 30   Supplies needed? No supplies needed   Refill activity completed? Yes   Refill activity plan Refill scheduled   Shipment/Pickup Date: 02/15/23            Current Outpatient Medications   Medication Sig    allopurinoL (ZYLOPRIM) 100 MG tablet Take 1 tablet (100 mg total) by mouth once daily.    amLODIPine (NORVASC) 10 MG tablet Take 1 tablet (10 mg total) by mouth once daily.    atorvastatin (LIPITOR) 20 MG tablet Take 1 tablet (20 mg total) by mouth once daily.    blood sugar diagnostic Strp 1 strip by Misc.(Non-Drug; Combo  Route) route 2 (two) times daily. ICD 10: E11.22    blood-glucose meter kit Use as instructed to check blood sugar two times a day. ICD 10: E11.22    budesonide (TARPEYO) 4 mg CpDR Take 16 mg by mouth once daily. (Patient not taking: Reported on 11/10/2022)    calcitRIOL (ROCALTROL) 0.25 MCG Cap Take 1 capsule (0.25 mcg total) by mouth once daily.    chlorthalidone (HYGROTEN) 25 MG Tab Take 1 tablet (25 mg total) by mouth once daily.    colchicine (COLCRYS) 0.6 mg tablet Take by mouth.    DAILY GARLIC ONCE-A-DAY ORAL Take by mouth.    fish oil-omega-3 fatty acids 300-1,000 mg capsule Take 1 capsule by mouth once daily.    HYDROcodone-acetaminophen (NORCO) 5-325 mg per tablet Take 1 tablet by mouth every 4 (four) hours as needed for Pain. (Patient not taking: Reported on 11/10/2022)    lactobacillus acidophilus & bulgar (LACTINEX) 100 million cell packet Take 1 packet (1 each total) by mouth once daily. (Patient not taking: Reported on 1/19/2023)    lancets Misc 1 lancet by Misc.(Non-Drug; Combo Route) route 2 (two) times daily. ICD 10: E11.22    multivitamin (THERAGRAN) per tablet Take 1 tablet by mouth.    prednisoLONE acetate (PRED FORTE) 1 % DrpS Instill 1 drop into right eye four times a day    sodium bicarbonate 650 MG tablet Take 1 tablet (650 mg total) by mouth 3 (three) times daily.    sodium zirconium cyclosilicate (LOKELMA) 5 gram packet Take 1 packet (5 g total) by mouth once daily. Mix entire contents of packet(s) into drinking glass containing 3 tablespoons of water; stir well and drink immediately. Add water and repeat until no powder remains to receive entire dose.    valsartan (DIOVAN) 320 MG tablet Take 1 tablet (320 mg total) by mouth once daily.   Last reviewed on 1/19/2023  3:14 PM by Marbella Finch RN    Review of patient's allergies indicates:  No Known Allergies Last reviewed on  1/19/2023 3:15 PM by Marbella Finch      Tasks added this encounter   3/11/2023 - Refill Call (Auto Added)   Tasks  due within next 3 months   No tasks due.     [facility]  [address]  [facility] [address]  Phone: [phone]  Fax: [phone]

## 2023-02-15 NOTE — ED NOTES
I-STAT Chem-8+ Results:   Value Reference Range   Sodium 133 136-145 mmol/L   Potassium  5.0 3.5-5.1 mmol/L   Chloride 102  mmol/L   Ionized Calcium 1.20 1.06-1.42 mmol/L   CO2 (measured) 22 23-29 mmol/L   Glucose 331  mg/dL   BUN 64 6-30 mg/dL   Creatinine 4.2 0.5-1.4 mg/dL   Hematocrit 44 36-54%

## 2023-02-15 NOTE — ED NOTES
Patient identifiers verified and correct for Edouard Jean  LOC: The patient is awake, alert and aware of environment with an appropriate affect, the patient is oriented x 3 and speaking appropriately.   APPEARANCE: Patient appears comfortable and in no acute distress, patient is clean and well groomed.  SKIN: The skin is warm and dry, color consistent with ethnicity, patient has normal skin turgor and moist mucus membranes, skin intact, no breakdown or bruising noted.   MUSCULOSKELETAL: Patient moving all extremities spontaneously, no swelling noted.  RESPIRATORY: Airway is open and patent, respirations are spontaneous, patient has a normal effort and rate, no accessory muscle use noted, pt pulse ox with O2 sats noted at 97% on room air.  CARDIAC: Patient has a normal rate and regular rhythm, no edema noted, capillary refill < 3 seconds.   GASTRO: Soft and non tender to palpation, no distention noted, normoactive bowel sounds present in all four quadrants. Pt states bowel movements have been regular.  : Pt denies any pain or frequency with urination.  NEURO: Pt opens eyes spontaneously, behavior appropriate to situation, follows commands, facial expression symmetrical, bilateral hand grasp equal and even, purposeful motor response noted, normal sensation in all extremities when touched with a finger.

## 2023-02-15 NOTE — ED TRIAGE NOTES
Feeling dizzy yesterday, went home from work, blood pressure was 103/52. Had patient go do labs today, and was told to come to the ER by Dr. Blue

## 2023-02-15 NOTE — TELEPHONE ENCOUNTER
Appointment Access  Sebastien Carrizales Staff  Caller: @[phone] (Today, 11:49 AM)  Caller wife is calling in stating that the pt refuses to go the ER and would like to get labs done today if possible. Please call to discuss further.

## 2023-02-16 ENCOUNTER — PATIENT MESSAGE (OUTPATIENT)
Dept: ENDOCRINOLOGY | Facility: HOSPITAL | Age: 48
End: 2023-02-16
Payer: COMMERCIAL

## 2023-02-16 ENCOUNTER — TELEPHONE (OUTPATIENT)
Dept: ENDOCRINOLOGY | Facility: HOSPITAL | Age: 48
End: 2023-02-16
Payer: COMMERCIAL

## 2023-02-16 VITALS
BODY MASS INDEX: 34.29 KG/M2 | WEIGHT: 244.94 LBS | TEMPERATURE: 98 F | HEIGHT: 71 IN | SYSTOLIC BLOOD PRESSURE: 174 MMHG | RESPIRATION RATE: 18 BRPM | OXYGEN SATURATION: 95 % | HEART RATE: 75 BPM | DIASTOLIC BLOOD PRESSURE: 86 MMHG

## 2023-02-16 DIAGNOSIS — E11.65 TYPE 2 DIABETES MELLITUS WITH HYPERGLYCEMIA, WITHOUT LONG-TERM CURRENT USE OF INSULIN: Primary | ICD-10-CM

## 2023-02-16 PROBLEM — D72.829 LEUKOCYTOSIS: Status: ACTIVE | Noted: 2023-02-16

## 2023-02-16 PROBLEM — N17.9 AKI (ACUTE KIDNEY INJURY): Status: RESOLVED | Noted: 2023-02-15 | Resolved: 2023-02-16

## 2023-02-16 LAB
ALBUMIN SERPL BCP-MCNC: 3.1 G/DL (ref 3.5–5.2)
ALP SERPL-CCNC: 61 U/L (ref 55–135)
ALT SERPL W/O P-5'-P-CCNC: 26 U/L (ref 10–44)
ANION GAP SERPL CALC-SCNC: 11 MMOL/L (ref 8–16)
AST SERPL-CCNC: 14 U/L (ref 10–40)
BASOPHILS # BLD AUTO: 0.06 K/UL (ref 0–0.2)
BASOPHILS NFR BLD: 0.5 % (ref 0–1.9)
BILIRUB SERPL-MCNC: 0.4 MG/DL (ref 0.1–1)
BUN SERPL-MCNC: 55 MG/DL (ref 6–20)
CALCIUM SERPL-MCNC: 9.2 MG/DL (ref 8.7–10.5)
CHLORIDE SERPL-SCNC: 105 MMOL/L (ref 95–110)
CO2 SERPL-SCNC: 23 MMOL/L (ref 23–29)
CREAT SERPL-MCNC: 3.1 MG/DL (ref 0.5–1.4)
DIFFERENTIAL METHOD: ABNORMAL
EOSINOPHIL # BLD AUTO: 0.1 K/UL (ref 0–0.5)
EOSINOPHIL NFR BLD: 1 % (ref 0–8)
ERYTHROCYTE [DISTWIDTH] IN BLOOD BY AUTOMATED COUNT: 13.5 % (ref 11.5–14.5)
EST. GFR  (NO RACE VARIABLE): 23.7 ML/MIN/1.73 M^2
GLUCOSE SERPL-MCNC: 177 MG/DL (ref 70–110)
HCT VFR BLD AUTO: 37.1 % (ref 40–54)
HGB BLD-MCNC: 12.3 G/DL (ref 14–18)
IMM GRANULOCYTES # BLD AUTO: 0.07 K/UL (ref 0–0.04)
IMM GRANULOCYTES NFR BLD AUTO: 0.5 % (ref 0–0.5)
LYMPHOCYTES # BLD AUTO: 6.1 K/UL (ref 1–4.8)
LYMPHOCYTES NFR BLD: 47.2 % (ref 18–48)
MCH RBC QN AUTO: 32.5 PG (ref 27–31)
MCHC RBC AUTO-ENTMCNC: 33.2 G/DL (ref 32–36)
MCV RBC AUTO: 98 FL (ref 82–98)
MONOCYTES # BLD AUTO: 0.7 K/UL (ref 0.3–1)
MONOCYTES NFR BLD: 5.4 % (ref 4–15)
NEUTROPHILS # BLD AUTO: 5.9 K/UL (ref 1.8–7.7)
NEUTROPHILS NFR BLD: 45.4 % (ref 38–73)
NRBC BLD-RTO: 0 /100 WBC
PLATELET # BLD AUTO: 349 K/UL (ref 150–450)
PLATELET BLD QL SMEAR: ABNORMAL
PMV BLD AUTO: 9.7 FL (ref 9.2–12.9)
POCT GLUCOSE: 166 MG/DL (ref 70–110)
POCT GLUCOSE: 217 MG/DL (ref 70–110)
POTASSIUM SERPL-SCNC: 3.9 MMOL/L (ref 3.5–5.1)
PROT SERPL-MCNC: 6.2 G/DL (ref 6–8.4)
RBC # BLD AUTO: 3.79 M/UL (ref 4.6–6.2)
SMUDGE CELLS BLD QL SMEAR: PRESENT
SODIUM SERPL-SCNC: 139 MMOL/L (ref 136–145)
WBC # BLD AUTO: 12.98 K/UL (ref 3.9–12.7)

## 2023-02-16 PROCEDURE — 82962 GLUCOSE BLOOD TEST: CPT

## 2023-02-16 PROCEDURE — 96360 HYDRATION IV INFUSION INIT: CPT

## 2023-02-16 PROCEDURE — 96361 HYDRATE IV INFUSION ADD-ON: CPT

## 2023-02-16 PROCEDURE — 96372 THER/PROPH/DIAG INJ SC/IM: CPT | Mod: 59 | Performed by: PHYSICIAN ASSISTANT

## 2023-02-16 PROCEDURE — 99214 PR OFFICE/OUTPT VISIT, EST, LEVL IV, 30-39 MIN: ICD-10-PCS | Mod: ,,, | Performed by: NURSE PRACTITIONER

## 2023-02-16 PROCEDURE — 99239 HOSP IP/OBS DSCHRG MGMT >30: CPT | Mod: ,,,

## 2023-02-16 PROCEDURE — 63600175 PHARM REV CODE 636 W HCPCS: Performed by: PHYSICIAN ASSISTANT

## 2023-02-16 PROCEDURE — 25000003 PHARM REV CODE 250: Performed by: PHYSICIAN ASSISTANT

## 2023-02-16 PROCEDURE — 99239 PR HOSPITAL DISCHARGE DAY,>30 MIN: ICD-10-PCS | Mod: ,,,

## 2023-02-16 PROCEDURE — G0378 HOSPITAL OBSERVATION PER HR: HCPCS

## 2023-02-16 PROCEDURE — 96372 THER/PROPH/DIAG INJ SC/IM: CPT | Performed by: PHYSICIAN ASSISTANT

## 2023-02-16 PROCEDURE — 85025 COMPLETE CBC W/AUTO DIFF WBC: CPT | Performed by: PHYSICIAN ASSISTANT

## 2023-02-16 PROCEDURE — 80053 COMPREHEN METABOLIC PANEL: CPT

## 2023-02-16 PROCEDURE — 99214 OFFICE O/P EST MOD 30 MIN: CPT | Mod: ,,, | Performed by: NURSE PRACTITIONER

## 2023-02-16 RX ORDER — INSULIN LISPRO 100 [IU]/ML
2-10 INJECTION, SOLUTION INTRAVENOUS; SUBCUTANEOUS 3 TIMES DAILY PRN
Qty: 45 ML | Refills: 1 | Status: SHIPPED | OUTPATIENT
Start: 2023-02-16 | End: 2023-03-29 | Stop reason: SDUPTHER

## 2023-02-16 RX ORDER — AMLODIPINE BESYLATE 10 MG/1
10 TABLET ORAL DAILY
Status: DISCONTINUED | OUTPATIENT
Start: 2023-02-16 | End: 2023-02-16 | Stop reason: HOSPADM

## 2023-02-16 RX ORDER — ACETAMINOPHEN 500 MG
1000 TABLET ORAL EVERY 4 HOURS PRN
COMMUNITY

## 2023-02-16 RX ORDER — INSULIN DETEMIR 100 [IU]/ML
16 INJECTION, SOLUTION SUBCUTANEOUS NIGHTLY
Qty: 14.4 ML | Refills: 3 | Status: SHIPPED | OUTPATIENT
Start: 2023-02-16 | End: 2023-05-01 | Stop reason: SDUPTHER

## 2023-02-16 RX ORDER — CALCITRIOL 0.25 UG/1
0.25 CAPSULE ORAL DAILY
Status: DISCONTINUED | OUTPATIENT
Start: 2023-02-16 | End: 2023-02-16 | Stop reason: HOSPADM

## 2023-02-16 RX ORDER — SODIUM BICARBONATE 650 MG/1
650 TABLET ORAL 3 TIMES DAILY
Status: DISCONTINUED | OUTPATIENT
Start: 2023-02-16 | End: 2023-02-16 | Stop reason: HOSPADM

## 2023-02-16 RX ORDER — TIRZEPATIDE 5 MG/.5ML
5 INJECTION, SOLUTION SUBCUTANEOUS
Qty: 4 PEN | Refills: 2 | Status: ON HOLD | OUTPATIENT
Start: 2023-03-17 | End: 2023-03-03 | Stop reason: HOSPADM

## 2023-02-16 RX ORDER — TIRZEPATIDE 2.5 MG/.5ML
2.5 INJECTION, SOLUTION SUBCUTANEOUS
Qty: 4 PEN | Refills: 0 | Status: ON HOLD | OUTPATIENT
Start: 2023-02-16 | End: 2023-03-03 | Stop reason: HOSPADM

## 2023-02-16 RX ADMIN — AMLODIPINE BESYLATE 10 MG: 10 TABLET ORAL at 09:02

## 2023-02-16 RX ADMIN — INSULIN DETEMIR 8 UNITS: 100 INJECTION, SOLUTION SUBCUTANEOUS at 09:02

## 2023-02-16 RX ADMIN — CALCITRIOL CAPSULES 0.25 MCG 0.25 MCG: 0.25 CAPSULE ORAL at 09:02

## 2023-02-16 RX ADMIN — HEPARIN SODIUM 5000 UNITS: 5000 INJECTION INTRAVENOUS; SUBCUTANEOUS at 05:02

## 2023-02-16 RX ADMIN — SODIUM BICARBONATE 650 MG: 650 TABLET ORAL at 09:02

## 2023-02-16 RX ADMIN — SODIUM CHLORIDE: 9 INJECTION, SOLUTION INTRAVENOUS at 12:02

## 2023-02-16 RX ADMIN — INSULIN ASPART 5 UNITS: 100 INJECTION, SOLUTION INTRAVENOUS; SUBCUTANEOUS at 07:02

## 2023-02-16 RX ADMIN — INSULIN DETEMIR 8 UNITS: 100 INJECTION, SOLUTION SUBCUTANEOUS at 12:02

## 2023-02-16 RX ADMIN — SODIUM CHLORIDE: 9 INJECTION, SOLUTION INTRAVENOUS at 07:02

## 2023-02-16 NOTE — ASSESSMENT & PLAN NOTE
- WBC 12.93, remains afebrile  - likely reactionary vs hemo concentrated from dehydration  - no evidence of infectious etiology thus far  - IVFs  - trend CBC

## 2023-02-16 NOTE — DISCHARGE INSTRUCTIONS
Education:  Discharge Teaching:    Reviewed topics related to DM including: the need for insulin, how insulin works, what makes it a high risk medication, the importance of immediate follow up with either PCP or endocrine provider, importance of and how to check BG, how to record BG on logs, how to administer insulin, appropriate insulin administration sites, importance of rotating injection sites, hyper/hypoglycemia, how and when to treat hypoglycemia, when to hold insulin, how the correction scale works, importance of storing unused insulin in the refrigerator, and when to seek medical attention.  Patient verbalized understanding, answered all questions to patient's satisfaction. Blood sugar logs given to patient.      Hypoglycemia (Low Blood Sugar)  Too little glucose (sugar) in your blood is called hypoglycemia or low blood sugar. Diabetes itself doesnt cause low blood sugar. But some of the treatments for diabetes, such as pills or insulin, may increase your risk for it. Low blood sugar may cause you to lose consciousness or have a seizure. So always treat low blood sugar right away.     Special note: Always carry a source of fast-acting sugar and a snack in case of hypoglycemia      What You May Notice  If you have low blood sugar, you may have these symptoms:  Shakiness or dizziness  Cold, clammy skin or sweating  Feelings of hunger  Headache  Nervousness  A hard, fast heartbeat  Weakness  Confusion or irritability  Blurred vision  What You Should Do  First, check your blood sugar. If it is too low (out of your target range), eat or drink 15 to 20 grams of fast-acting sugar. This may be 3 to 4 glucose tablets, 4 oz (half a cup) fruit juice or regular (non-diet) soda, 8 oz (one cup ) fat-free milk, or 1 tablespoon of honey. Dont take more than this, or your blood sugar may go too high.  Wait 15 minutes. Then recheck your blood sugar if you can.  If your blood sugar is still too low, repeat the steps above  and check your blood sugar again. If your blood sugar still has not returned to your target range, contact your healthcare provider or seek emergency care.  Once your blood sugar returns to target range, eat a snack or meal.  Preventing Low Blood Sugar  Eat your meals and snacks at the same times each day. Dont skip meals!  Ask your healthcare provider if it is safe for you to drink alcohol. Never drink on an empty stomach.  Take your medication at the prescribed times.  Always carry a source of fast-acting sugar and a snack when youre away from home.  Other Things to Do  Carry a medical ID card or wear a medical alert bracelet or necklace. It should say that you have diabetes. It should also say what to do if you pass out or have a seizure.  Make sure your family, friends, and coworkers know the signs of low blood sugar. Tell them what to do if your blood sugar falls very low and you cant treat yourself.  Keep a glucagon emergency kit handy. Be sure your family, friends, and coworkers know how and when to use it. Check it regularly and replace the glucagon before it expires.  Talk to your healthcare team about other things you can do to prevent low blood sugar.     If you experience hypoglycemia several times, call your doctor.   © 1151-5274 [facility], [address] [place] [address], [place] [address]. All rights reserved. This information is not intended as a substitute for professional medical care. Always follow your healthcare professional's instructions.

## 2023-02-16 NOTE — ASSESSMENT & PLAN NOTE
- BG uncontrolled but no evidence of DKA  - not on any home meds  - start weight based insulin regimen: detemir 8U BID + aspart 5U TIDWM  - INEZ BYNUM  - diabetic diet  - endocrine consulted; mario joy  - RD consult for diabetic diet education  Lab Results   Component Value Date    HGBA1C 9.7 (H) 01/19/2023

## 2023-02-16 NOTE — DISCHARGE SUMMARY
[facility] - Emergency Dept  [facility] Medicine  Discharge Summary      Patient Name: Edouard Pena  MRN: [id number]  MARLEE: [id number]  Patient Class: OP- Observation  Admission Date: 2/15/2023  Hospital Length of Stay: 0 days  Discharge Date and Time:  02/16/2023 5:03 PM  Attending Physician: Kenna Cristobal MD   Discharging Provider: Ishmael Robb PA-C  Primary Care Provider: Primary Doctor [facility] Medicine Team: [facility] MED  Ishmael Robb PA-C  Primary Care Team: [facility]    HPI:   Edouard Pena is a 47 y.o. male with a PMHx of T2DM, HTN, HLD, CKD4 who [place] [facility] and hyperglycemia. Patient reports fatigue and generlaized weakness began yesterday. He had an episode of dizziness which caused him to leave work early today. His nephrologist ordered outpatient labs which showed hyperglycmia and new MARIE so he was advised to present to the ED. Patient with hx of DM (A1c 6.7 3 years ago, most recent is 9.7) but is not on any home meds. Denies fever, chills, N/V, abdominal pain, HA, vision changes, or syncope. Of note, patient was seen by myself earlier tonight to be admitted to hospital medicine. However, he eloped to go let his dogs out and came back. He has no new complaints of reevaluation.     ED: AFVSS. WBC 12.93, , bicarb 19, no increased AG. Given 1L IVFs earlier in the ED.       * No surgery found *      Hospital Course:   Edouard Pena placed in  Observation for management of hyperglycemia secondary to T2DM. Blood glucose controlled during admission. Endocrine consulted, recommendations implemented and patient to start new DM medication regimen at discharge. Diabetes education provided to patient and information/education provided in AVS. Patient medically ready for discharge. Patient will follow up with PCP and Endocrine outpatient. Plan of care discussed with patient, patient agreeable to plan, and all questions answered.        Goals of Care  Treatment Preferences:  Code Status: Full Code      Consults:   Consults (From admission, onward)        Status Ordering Provider     Inpatient consult to Registered Dietitian/Nutritionist  Once        Provider:  (Not yet assigned)    Acknowledged DOROTHY SHEPARD     Inpatient consult to Endocrinology  Once        Provider:  (Not yet assigned)    Completed DOROTHY SHEPARD          Cardiac/Vascular  Hyperlipidemia  - non compliant with statin  - repeat lipid panel    HTN (hypertension)  - continue amlodipine 10mg daily  - hold chlorthalidone and valsartan given MARIE    Renal/  MARIE (acute kidney injury)-resolved as of 2/16/2023  Chronic kidney disease, stage 4 (severe)  - Cr 3.8, baseline ~3.2  - suspect from dehydration and uncontrolled hyperglycemia  - cIVF overnight  - glycemic control as above  - continue Na bicarb  - avoid nephrotoxins, renally dose meds  - trend BMP    Oncology  Leukocytosis  - WBC 12.93, remains afebrile  - likely reactionary vs hemo concentrated from dehydration  - no evidence of infectious etiology thus far  - IVFs  - trend CBC    Endocrine  * Type 2 diabetes mellitus with hyperglycemia, without long-term current use of insulin  - BG uncontrolled but no evidence of DKA  - not on any home meds  - start weight based insulin regimen: detemir 8U BID + aspart 5U TIDWM  - INEZ BYNUM  - diabetic diet  - endocrine consulted; mario joy  - RD consult for diabetic diet education  Lab Results   Component Value Date    HGBA1C 9.7 (H) 01/19/2023     Discharge Planning:   - Jardiance 10 mg QD (Strated to help co-treat T2DM and CKD; see EMPA-KIDNEY trial)  - Mounjaro 2.5 mg weekly x4 weeks then advance to 5 mg weekly thereafter. (No history of medullary thyroid CA or pancreatitis)   - Levemir 16 units h.s. (Eat a snack before bed if BG is < 100 mg/dl)  - Novolog SSI for BG excursions:  180 - 230 + 2 unit  231- 280  + 4 units  281 - 330 + 6 units  331 - 380 + 8 units      > 380    + 10 units  - Insurance approved diabetes testing supplies to check blood sugar 4 times a day (Before meals and at bedtime) and as needed.  - BD pen needles   - Send BG logs in 4 days  - Follow-up in discharge clinic in 2 weeks.   - Follow-up in diabetes education ASAP      Final Active Diagnoses:    Diagnosis Date Noted POA    PRINCIPAL PROBLEM:  Type 2 diabetes mellitus with hyperglycemia, without long-term current use of insulin [E11.65] 10/03/2019 Yes    Leukocytosis [D72.829] 02/16/2023 Yes    Hyperlipidemia [E78.5] 12/13/2012 Yes    HTN (hypertension) [I10] 11/14/2012 Yes    Chronic kidney disease, stage 4 (severe) [N18.4] 11/14/2012 Yes      Problems Resolved During this Admission:    Diagnosis Date Noted Date Resolved POA    MARIE (acute kidney injury) [N17.9] 02/15/2023 02/16/2023 Yes       Discharged Condition: stable    Disposition: Home or Self Care    Follow Up:   Follow-up Information     Primary Doctor No .                     Patient Instructions:      Ambulatory referral/consult to Endocrinology   Standing Status: Future   Referral Priority: Urgent Referral Type: Consultation   Requested Specialty: Endocrinology   Number of Visits Requested: 1     Ambulatory referral/consult to Internal Medicine   Standing Status: Future   Referral Priority: Urgent Referral Type: Consultation   Referral Reason: Specialty Services Required   Requested Specialty: Internal Medicine   Number of Visits Requested: 1     Diet diabetic     Diet renal     Notify your health care provider if you experience any of the following:  persistent dizziness, light-headedness, or visual disturbances     Notify your health care provider if you experience any of the following:  increased confusion or weakness     Activity as tolerated       Significant Diagnostic Studies: Labs:   CMP   Recent Labs   Lab 02/15/23  1308 02/15/23  1744 02/16/23  1020   * 134* 139   K 4.6 4.9 3.9   CL 95 99 105   CO2 20* 19* 23   * 330*  177*   BUN 65* 66* 55*   CREATININE 4.0* 3.8* 3.1*   CALCIUM 9.1 10.0 9.2   PROT  --  8.0 6.2   ALBUMIN 3.3* 3.9 3.1*   BILITOT  --  0.4 0.4   ALKPHOS  --  77 61   AST  --  19 14   ALT  --  33 26   ANIONGAP 16 16 11   , CBC   Recent Labs   Lab 02/15/23  1308 02/15/23  1744 02/15/23  1751 02/16/23  0459   WBC 14.21* 12.93*  --  12.98*   HGB 13.0* 14.4  --  12.3*   HCT 39.0* 42.6   < > 37.1*    436  --  349    < > = values in this interval not displayed.    and A1C:   Recent Labs   Lab 01/19/23  1617   HGBA1C 9.7*       Pending Diagnostic Studies:     None         Medications:  Reconciled Home Medications:      Medication List      START taking these medications    empagliflozin 10 mg tablet  Commonly known as: JARDIANCE  Take 1 tablet (10 mg total) by mouth once daily.     HumaLOG KwikPen Insulin 100 unit/mL pen  Generic drug: insulin lispro  Inject 2-10 Units into the skin 3 (three) times daily as needed. Blood Glucose: 180 - 230 + 2 unit, 231- 280  + 4 units, 281 - 330 + 6 units, 331 - 380 + 8 units, > 380   + 10 units     LEVEMIR FLEXTOUCH U-100 INSULN 100 unit/mL (3 mL) Inpn pen  Generic drug: insulin detemir U-100  Inject 16 Units into the skin every evening. Eat a snack before bed if BG is < 100 mg/dl     * MOUNJARO 2.5 mg/0.5 mL Pnij  Generic drug: tirzepatide  Inject 2.5 mg into the skin every 7 days. for 4 doses     * MOUNJARO 5 mg/0.5 mL Pnij  Generic drug: tirzepatide  Inject 5 mg into the skin every 7 days.  Start taking on: March 17, 2023         * This list has 2 medication(s) that are the same as other medications prescribed for you. Read the directions carefully, and ask your doctor or other care provider to review them with you.            CONTINUE taking these medications    acetaminophen 500 MG tablet  Commonly known as: TYLENOL  Take 1,000 mg by mouth every 4 (four) hours as needed for Pain.     allopurinoL 100 MG tablet  Commonly known as: ZYLOPRIM  Take 1 tablet (100 mg total) by mouth  "once daily.     amLODIPine 10 MG tablet  Commonly known as: NORVASC  Take 1 tablet (10 mg total) by mouth once daily.     blood sugar diagnostic Strp  1 strip by Misc.(Non-Drug; Combo Route) route 2 (two) times daily. ICD 10: E11.22     blood-glucose meter kit  Use as instructed to check blood sugar two times a day. ICD 10: E11.22     calcitRIOL 0.25 MCG Cap  Commonly known as: ROCALTROL  Take 1 capsule (0.25 mcg total) by mouth once daily.     chlorthalidone 25 MG Tab  Commonly known as: HYGROTEN  Take 1 tablet (25 mg total) by mouth once daily.     colchicine 0.6 mg tablet  Commonly known as: COLCRYS  0.6 mg daily as needed (gout flare-up).     lancets Misc  1 lancet by Misc.(Non-Drug; Combo Route) route 2 (two) times daily. ICD 10: E11.22     prednisoLONE acetate 1 % Drps  Commonly known as: PRED FORTE  Place 1 drop into both eyes daily as needed (Inflammation).     sodium bicarbonate 650 MG tablet  Take 1 tablet (650 mg total) by mouth 3 (three) times daily.     TARPEYO 4 mg Cpdr  Generic drug: budesonide  Take 16 mg by mouth once daily.     TUMS ORAL  Take 1 tablet by mouth daily as needed (Heartburn).     valsartan 320 MG tablet  Commonly known as: DIOVAN  Take 1 tablet (320 mg total) by mouth once daily.        STOP taking these medications    HYDROcodone-acetaminophen 5-325 mg per tablet  Commonly known as: NORCO     lactobacillus acidophilus & bulgar 100 million cell packet  Commonly known as: LACTINEX     LOKELMA 5 gram packet  Generic drug: sodium zirconium cyclosilicate        ASK your doctor about these medications    BD ULTRA-FINE DAVID PEN NEEDLE 32 gauge x 5/32" Ndle  Generic drug: pen needle, diabetic  Use to inject insulin 4 times daily            Indwelling Lines/Drains at time of discharge:   Lines/Drains/Airways     None                 Time spent on the discharge of patient: 33 minutes         Ishmael Robb PA-C  Department of Hospital Medicine  [facility] - Emergency Dept  "

## 2023-02-16 NOTE — HPI
Reason for Consult: Management of T2DM, Hyperglycemia     Diabetes diagnosis year: A1c 6.7 3 years ago    Home Diabetes Medications:  None    How often checking glucose at home? Infrequently    BG readings on regimen: > 200's   Hypoglycemia on the regimen?  No  Missed doses on regimen?  No    Diabetes Complications include:     Hyperglycemia    Complicating diabetes co morbidities:   CKD      HPI:   Patient is a 47 y.o. male with who [place] [facility] [place] and hyperglycemia. Patient reports fatigue and generlaized weakness began yesterday. He had an episode of dizziness which caused him to leave work early today. His nephrologist ordered outpatient labs which showed hyperglycmia and new MARIE so he was advised to present to the ED. Patient with hx of DM (A1c 6.7 3 years ago, most recent is 9.7) but is not on any home meds. Denies fever, chills, N/V, abdominal pain, HA, vision changes, or syncope. Of note, patient was seen by myself earlier tonight to be admitted to hospital medicine. Endocrine consulted for management of pt's blood glucose.    Hemoglobin A1C   Date Value Ref Range Status   01/19/2023 9.7 (H) 4.0 - 5.6 % Final     Comment:     ADA Screening Guidelines:  5.7-6.4%  Consistent with prediabetes  >or=6.5%  Consistent with diabetes    High levels of fetal hemoglobin interfere with the HbA1C  assay. Heterozygous hemoglobin variants (HbS, HgC, etc)do  not significantly interfere with this assay.   However, presence of multiple variants may affect accuracy.     09/30/2019 6.7 (H) 4.0 - 5.6 % Final     Comment:     ADA Screening Guidelines:  5.7-6.4%  Consistent with prediabetes  >or=6.5%  Consistent with diabetes  High levels of fetal hemoglobin interfere with the HbA1C  assay. Heterozygous hemoglobin variants (HbS, HgC, etc)do  not significantly interfere with this assay.   However, presence of multiple variants may affect accuracy.     11/08/2016 6.2 4.5 - 6.2 % Final     Comment:      According to ADA guidelines, hemoglobin A1C <7.0% represents  optimal control in non-pregnant diabetic patients.  Different  metrics may apply to specific populations.   Standards of Medical Care in Diabetes - 2016.  For the purpose of screening for the presence of diabetes:  <5.7%     Consistent with the absence of diabetes  5.7-6.4%  Consistent with increasing risk for diabetes   (prediabetes)  >or=6.5%  Consistent with diabetes  Currently no consensus exists for use of hemoglobin A1C  for diagnosis of diabetes for children.

## 2023-02-16 NOTE — ASSESSMENT & PLAN NOTE
Chronic kidney disease, stage 4 (severe)  - Cr 3.8, baseline ~3.2  - suspect from dehydration and uncontrolled hyperglycemia  - cIVF overnight  - glycemic control as above  - continue Na bicarb  - avoid nephrotoxins, renally dose meds  - trend BMP

## 2023-02-16 NOTE — H&P
[facility] - Emergency Dept  Hospital Medicine  History & Physical    Patient Name: Edouard Pena  MRN: [id number]  Patient Class: OP- Observation  Admission Date: 2/15/2023  Attending Physician: Kenna Ji MD   Primary Care Provider: Primary Doctor No         Patient information was obtained from patient, spouse/SO, past medical records and ER records.     Subjective:     Principal Problem:Type 2 diabetes mellitus with hyperglycemia, without long-term current use of insulin    Chief Complaint:   Chief Complaint   Patient presents with    Hyperglycemia     States was admitted earlier for MARIE and hyperglycemia but had to leave to let dogs out. Is now back to be readmitted. States is no longer feeling dizzy or fatigued.        HPI: Edouard Pena is a 47 y.o. male with a PMHx of T2DM, HTN, HLD, CKD4 who presents to [facility] [place] [facility] for evaluation of fatigue and hyperglycemia. Patient reports fatigue and generlaized weakness began yesterday. He had an episode of dizziness which caused him to leave work early today. His nephrologist ordered outpatient labs which showed hyperglycmia and new MARIE so he was advised to present to the ED. Patient with hx of DM (A1c 6.7 3 years ago, most recent is 9.7) but is not on any home meds. Denies fever, chills, N/V, abdominal pain, HA, vision changes, or syncope. Of note, patient was seen by myself earlier tonight to be admitted to hospital medicine. However, he eloped to go let his dogs out and came back. He has no new complaints of reevaluation.     ED: AFVSS. WBC 12.93, , bicarb 19, no increased AG. Given 1L IVFs earlier in the ED.       Past Medical History:   Diagnosis Date    Chronic kidney disease     Chronic kidney disease stage II    Colon polyps     Hyperlipidemia 12/13/2012    Hypertension        Past Surgical History:   Procedure Laterality Date    COSMETIC SURGERY      pyloric stenosis      RENAL BIOPSY Left 9/1/2022    Procedure: BIOPSY, KIDNEY;  Surgeon: Sinan Abad  MD;  Location: [facility] CATH LAB;  Service: Interventional Nephrology;  Laterality: Left;       Review of patient's allergies indicates:  No Known Allergies    Current Facility-Administered Medications on File Prior to Encounter   Medication    [COMPLETED] lactated ringers bolus 1,000 mL    [DISCONTINUED] acetaminophen tablet 650 mg    [DISCONTINUED] bisacodyL suppository 10 mg    [DISCONTINUED] dextrose 10% bolus 125 mL 125 mL    [DISCONTINUED] dextrose 10% bolus 250 mL 250 mL    [DISCONTINUED] glucagon (human recombinant) injection 1 mg    [DISCONTINUED] glucose chewable tablet 16 g    [DISCONTINUED] glucose chewable tablet 24 g    [DISCONTINUED] heparin (porcine) injection 5,000 Units    [DISCONTINUED] insulin aspart U-100 pen 0-5 Units    [DISCONTINUED] insulin aspart U-100 pen 7 Units    [DISCONTINUED] insulin detemir U-100 pen 10 Units    [DISCONTINUED] melatonin tablet 6 mg    [DISCONTINUED] nicotine 14 mg/24 hr 1 patch    [DISCONTINUED] ondansetron disintegrating tablet 8 mg    [DISCONTINUED] polyethylene glycol packet 17 g    [DISCONTINUED] promethazine tablet 25 mg     Current Outpatient Medications on File Prior to Encounter   Medication Sig    amLODIPine (NORVASC) 10 MG tablet Take 1 tablet (10 mg total) by mouth once daily.    budesonide (TARPEYO) 4 mg CpDR Take 16 mg by mouth once daily.    calcitRIOL (ROCALTROL) 0.25 MCG Cap Take 1 capsule (0.25 mcg total) by mouth once daily.    sodium bicarbonate 650 MG tablet Take 1 tablet (650 mg total) by mouth 3 (three) times daily.    sodium zirconium cyclosilicate (LOKELMA) 5 gram packet Take 1 packet (5 g total) by mouth once daily. Mix entire contents of packet(s) into drinking glass containing 3 tablespoons of water; stir well and drink immediately. Add water and repeat until no powder remains to receive entire dose.    valsartan (DIOVAN) 320 MG tablet Take 1 tablet (320 mg total) by mouth once daily.    allopurinoL (ZYLOPRIM) 100 MG tablet Take 1 tablet (100  mg total) by mouth once daily.    atorvastatin (LIPITOR) 20 MG tablet Take 1 tablet (20 mg total) by mouth once daily.    blood sugar diagnostic Strp 1 strip by Misc.(Non-Drug; Combo Route) route 2 (two) times daily. ICD 10: E11.22    blood-glucose meter kit Use as instructed to check blood sugar two times a day. ICD 10: E11.22    chlorthalidone (HYGROTEN) 25 MG Tab Take 1 tablet (25 mg total) by mouth once daily.    colchicine (COLCRYS) 0.6 mg tablet Take by mouth.    DAILY GARLIC ONCE-A-DAY ORAL Take by mouth.    fish oil-omega-3 fatty acids 300-1,000 mg capsule Take 1 capsule by mouth once daily.    HYDROcodone-acetaminophen (NORCO) 5-325 mg per tablet Take 1 tablet by mouth every 4 (four) hours as needed for Pain. (Patient not taking: Reported on 11/10/2022)    lactobacillus acidophilus & bulgar (LACTINEX) 100 million cell packet Take 1 packet (1 each total) by mouth once daily. (Patient not taking: Reported on 1/19/2023)    lancets Misc 1 lancet by Misc.(Non-Drug; Combo Route) route 2 (two) times daily. ICD 10: E11.22    multivitamin (THERAGRAN) per tablet Take 1 tablet by mouth.    prednisoLONE acetate (PRED FORTE) 1 % DrpS Instill 1 drop into right eye four times a day     Family History       Problem Relation (Age of Onset)    COPD Mother    Diabetes Father    Heart disease Mother, Father (70), Maternal Grandfather    Hypertension Father    Kidney disease Father    Kidney failure Father    Peripheral vascular disease Mother    Stroke Father          Tobacco Use    Smoking status: Every Day     Packs/day: 1.00     Years: 24.00     Pack years: 24.00     Types: Cigarettes    Smokeless tobacco: Current   Substance and Sexual Activity    Alcohol use: Yes     Comment: Occasional    Drug use: No    Sexual activity: Yes     Partners: Female     Review of Systems   Constitutional:  Positive for fatigue. Negative for activity change, chills and fever.   HENT:  Negative for trouble swallowing.    Eyes:  Negative for  photophobia and visual disturbance.   Respiratory:  Negative for chest tightness, shortness of breath and wheezing.    Cardiovascular:  Negative for chest pain, palpitations and leg swelling.   Gastrointestinal:  Negative for abdominal pain, constipation, diarrhea, nausea and vomiting.   Endocrine: Positive for polydipsia.   Genitourinary:  Negative for dysuria, frequency, hematuria and urgency.   Musculoskeletal:  Negative for arthralgias, back pain and gait problem.   Skin:  Negative for color change and rash.   Neurological:  Negative for dizziness, syncope, weakness, light-headedness, numbness and headaches.   Psychiatric/Behavioral:  Negative for agitation and confusion. The patient is not nervous/anxious.    Objective:     Vital Signs (Most Recent):  Temp: 98.5 °F (36.9 °C) (02/16/23 0031)  Pulse: 80 (02/16/23 0031)  Resp: 19 (02/16/23 0031)  BP: 137/74 (02/16/23 0031)  SpO2: 96 % (02/16/23 0031) Vital Signs (24h Range):  Temp:  [97.8 °F (36.6 °C)-98.5 °F (36.9 °C)] 98.5 °F (36.9 °C)  Pulse:  [80-97] 80  Resp:  [16-19] 19  SpO2:  [96 %-98 %] 96 %  BP: (137-144)/(67-84) 137/74        There is no height or weight on file to calculate BMI.    Physical Exam  Vitals and nursing note reviewed.   Constitutional:       General: He is not in acute distress.     Appearance: He is well-developed. He is obese.   HENT:      Head: Normocephalic and atraumatic.      Mouth/Throat:      Pharynx: No oropharyngeal exudate.   Eyes:      General: No scleral icterus.     Conjunctiva/sclera: Conjunctivae normal.   Cardiovascular:      Rate and Rhythm: Normal rate and regular rhythm.      Heart sounds: Normal heart sounds.   Pulmonary:      Effort: Pulmonary effort is normal. No respiratory distress.      Breath sounds: Normal breath sounds. No wheezing.   Abdominal:      General: Bowel sounds are normal. There is no distension.      Palpations: Abdomen is soft.      Tenderness: There is no abdominal tenderness.   Musculoskeletal:          General: No tenderness. Normal range of motion.      Cervical back: Normal range of motion and neck supple.   Lymphadenopathy:      Cervical: No cervical adenopathy.   Skin:     General: Skin is warm and dry.      Capillary Refill: Capillary refill takes less than 2 seconds.      Findings: No rash.   Neurological:      Mental Status: He is alert and oriented to person, place, and time.      Cranial Nerves: No cranial nerve deficit.      Sensory: No sensory deficit.      Coordination: Coordination normal.   Psychiatric:         Behavior: Behavior normal.         Thought Content: Thought content normal.         Judgment: Judgment normal.           Significant Labs: All pertinent labs within the past 24 hours have been reviewed.  CBC:   Recent Labs   Lab 02/15/23  1308 02/15/23  1744 02/15/23  1751 02/15/23  1752   WBC 14.21* 12.93*  --   --    HGB 13.0* 14.4  --   --    HCT 39.0* 42.6 44 46    436  --   --      CMP:   Recent Labs   Lab 02/15/23  1308 02/15/23  1744   * 134*   K 4.6 4.9   CL 95 99   CO2 20* 19*   * 330*   BUN 65* 66*   CREATININE 4.0* 3.8*   CALCIUM 9.1 10.0   PROT  --  8.0   ALBUMIN 3.3* 3.9   BILITOT  --  0.4   ALKPHOS  --  77   AST  --  19   ALT  --  33   ANIONGAP 16 16       Significant Imaging: I have reviewed all pertinent imaging results/findings within the past 24 hours.      Assessment/Plan:     * Type 2 diabetes mellitus with hyperglycemia, without long-term current use of insulin  - BG uncontrolled but no evidence of DKA  - not on any home meds  - start weight based insulin regimen: detemir 8U BID + aspart 5U TIDWM  - INEZ BYNUM  - diabetic diet  - endocrine consulted; appreciate recs  - RD consult for diabetic diet education  Lab Results   Component Value Date    HGBA1C 9.7 (H) 01/19/2023       Leukocytosis  - WBC 12.93, remains afebrile  - likely reactionary vs hemo concentrated from dehydration  - no evidence of infectious etiology thus far  - IVFs  -  trend CBC    MARIE (acute kidney injury)  Chronic kidney disease, stage 4 (severe)  - Cr 3.8, baseline ~3.2  - suspect from dehydration and uncontrolled hyperglycemia  - cIVF overnight  - glycemic control as above  - continue Na bicarb  - avoid nephrotoxins, renally dose meds  - trend BMP    Hyperlipidemia  - non compliant with statin  - repeat lipid panel    HTN (hypertension)  - continue amlodipine 10mg daily  - hold chlorthalidone and valsartan given MARIE      VTE Risk Mitigation (From admission, onward)           Ordered     heparin (porcine) injection 5,000 Units  Every 8 hours         02/15/23 2347     IP VTE HIGH RISK PATIENT  Once         02/15/23 2347                       Summer Hawk PA-C  Department of Hospital Medicine   [facility] - Emergency Dept

## 2023-02-16 NOTE — SUBJECTIVE & OBJECTIVE
Past Medical History:   Diagnosis Date    Chronic kidney disease     Chronic kidney disease stage II    Colon polyps     Hyperlipidemia 12/13/2012    Hypertension        Past Surgical History:   Procedure Laterality Date    COSMETIC SURGERY      pyloric stenosis      RENAL BIOPSY Left 9/1/2022    Procedure: BIOPSY, KIDNEY;  Surgeon: Sinan Abad MD;  Location: [facility] CATH LAB;  Service: Interventional Nephrology;  Laterality: Left;       Review of patient's allergies indicates:  No Known Allergies    Current Facility-Administered Medications on File Prior to Encounter   Medication    [COMPLETED] lactated ringers bolus 1,000 mL    [DISCONTINUED] acetaminophen tablet 650 mg    [DISCONTINUED] bisacodyL suppository 10 mg    [DISCONTINUED] dextrose 10% bolus 125 mL 125 mL    [DISCONTINUED] dextrose 10% bolus 250 mL 250 mL    [DISCONTINUED] glucagon (human recombinant) injection 1 mg    [DISCONTINUED] glucose chewable tablet 16 g    [DISCONTINUED] glucose chewable tablet 24 g    [DISCONTINUED] heparin (porcine) injection 5,000 Units    [DISCONTINUED] insulin aspart U-100 pen 0-5 Units    [DISCONTINUED] insulin aspart U-100 pen 7 Units    [DISCONTINUED] insulin detemir U-100 pen 10 Units    [DISCONTINUED] melatonin tablet 6 mg    [DISCONTINUED] nicotine 14 mg/24 hr 1 patch    [DISCONTINUED] ondansetron disintegrating tablet 8 mg    [DISCONTINUED] polyethylene glycol packet 17 g    [DISCONTINUED] promethazine tablet 25 mg     Current Outpatient Medications on File Prior to Encounter   Medication Sig    amLODIPine (NORVASC) 10 MG tablet Take 1 tablet (10 mg total) by mouth once daily.    budesonide (TARPEYO) 4 mg CpDR Take 16 mg by mouth once daily.    calcitRIOL (ROCALTROL) 0.25 MCG Cap Take 1 capsule (0.25 mcg total) by mouth once daily.    sodium bicarbonate 650 MG tablet Take 1 tablet (650 mg total) by mouth 3 (three) times daily.    sodium zirconium cyclosilicate (LOKELMA) 5 gram packet Take 1 packet (5 g total) by mouth  once daily. Mix entire contents of packet(s) into drinking glass containing 3 tablespoons of water; stir well and drink immediately. Add water and repeat until no powder remains to receive entire dose.    valsartan (DIOVAN) 320 MG tablet Take 1 tablet (320 mg total) by mouth once daily.    allopurinoL (ZYLOPRIM) 100 MG tablet Take 1 tablet (100 mg total) by mouth once daily.    atorvastatin (LIPITOR) 20 MG tablet Take 1 tablet (20 mg total) by mouth once daily.    blood sugar diagnostic Strp 1 strip by Misc.(Non-Drug; Combo Route) route 2 (two) times daily. ICD 10: E11.22    blood-glucose meter kit Use as instructed to check blood sugar two times a day. ICD 10: E11.22    chlorthalidone (HYGROTEN) 25 MG Tab Take 1 tablet (25 mg total) by mouth once daily.    colchicine (COLCRYS) 0.6 mg tablet Take by mouth.    DAILY GARLIC ONCE-A-DAY ORAL Take by mouth.    fish oil-omega-3 fatty acids 300-1,000 mg capsule Take 1 capsule by mouth once daily.    HYDROcodone-acetaminophen (NORCO) 5-325 mg per tablet Take 1 tablet by mouth every 4 (four) hours as needed for Pain. (Patient not taking: Reported on 11/10/2022)    lactobacillus acidophilus & bulgar (LACTINEX) 100 million cell packet Take 1 packet (1 each total) by mouth once daily. (Patient not taking: Reported on 1/19/2023)    lancets Misc 1 lancet by Misc.(Non-Drug; Combo Route) route 2 (two) times daily. ICD 10: E11.22    multivitamin (THERAGRAN) per tablet Take 1 tablet by mouth.    prednisoLONE acetate (PRED FORTE) 1 % DrpS Instill 1 drop into right eye four times a day     Family History       Problem Relation (Age of Onset)    COPD Mother    Diabetes Father    Heart disease Mother, Father (70), Maternal Grandfather    Hypertension Father    Kidney disease Father    Kidney failure Father    Peripheral vascular disease Mother    Stroke Father          Tobacco Use    Smoking status: Every Day     Packs/day: 1.00     Years: 24.00     Pack years: 24.00     Types:  Cigarettes    Smokeless tobacco: Current   Substance and Sexual Activity    Alcohol use: Yes     Comment: Occasional    Drug use: No    Sexual activity: Yes     Partners: Female     Review of Systems   Constitutional:  Positive for fatigue. Negative for activity change, chills and fever.   HENT:  Negative for trouble swallowing.    Eyes:  Negative for photophobia and visual disturbance.   Respiratory:  Negative for chest tightness, shortness of breath and wheezing.    Cardiovascular:  Negative for chest pain, palpitations and leg swelling.   Gastrointestinal:  Negative for abdominal pain, constipation, diarrhea, nausea and vomiting.   Endocrine: Positive for polydipsia.   Genitourinary:  Negative for dysuria, frequency, hematuria and urgency.   Musculoskeletal:  Negative for arthralgias, back pain and gait problem.   Skin:  Negative for color change and rash.   Neurological:  Negative for dizziness, syncope, weakness, light-headedness, numbness and headaches.   Psychiatric/Behavioral:  Negative for agitation and confusion. The patient is not nervous/anxious.    Objective:     Vital Signs (Most Recent):  Temp: 98.5 °F (36.9 °C) (02/16/23 0031)  Pulse: 80 (02/16/23 0031)  Resp: 19 (02/16/23 0031)  BP: 137/74 (02/16/23 0031)  SpO2: 96 % (02/16/23 0031) Vital Signs (24h Range):  Temp:  [97.8 °F (36.6 °C)-98.5 °F (36.9 °C)] 98.5 °F (36.9 °C)  Pulse:  [80-97] 80  Resp:  [16-19] 19  SpO2:  [96 %-98 %] 96 %  BP: (137-144)/(67-84) 137/74        There is no height or weight on file to calculate BMI.    Physical Exam  Vitals and nursing note reviewed.   Constitutional:       General: He is not in acute distress.     Appearance: He is well-developed. He is obese.   HENT:      Head: Normocephalic and atraumatic.      Mouth/Throat:      Pharynx: No oropharyngeal exudate.   Eyes:      General: No scleral icterus.     Conjunctiva/sclera: Conjunctivae normal.   Cardiovascular:      Rate and Rhythm: Normal rate and regular rhythm.       Heart sounds: Normal heart sounds.   Pulmonary:      Effort: Pulmonary effort is normal. No respiratory distress.      Breath sounds: Normal breath sounds. No wheezing.   Abdominal:      General: Bowel sounds are normal. There is no distension.      Palpations: Abdomen is soft.      Tenderness: There is no abdominal tenderness.   Musculoskeletal:         General: No tenderness. Normal range of motion.      Cervical back: Normal range of motion and neck supple.   Lymphadenopathy:      Cervical: No cervical adenopathy.   Skin:     General: Skin is warm and dry.      Capillary Refill: Capillary refill takes less than 2 seconds.      Findings: No rash.   Neurological:      Mental Status: He is alert and oriented to person, place, and time.      Cranial Nerves: No cranial nerve deficit.      Sensory: No sensory deficit.      Coordination: Coordination normal.   Psychiatric:         Behavior: Behavior normal.         Thought Content: Thought content normal.         Judgment: Judgment normal.           Significant Labs: All pertinent labs within the past 24 hours have been reviewed.  CBC:   Recent Labs   Lab 02/15/23  1308 02/15/23  1744 02/15/23  1751 02/15/23  1752   WBC 14.21* 12.93*  --   --    HGB 13.0* 14.4  --   --    HCT 39.0* 42.6 44 46    436  --   --      CMP:   Recent Labs   Lab 02/15/23  1308 02/15/23  1744   * 134*   K 4.6 4.9   CL 95 99   CO2 20* 19*   * 330*   BUN 65* 66*   CREATININE 4.0* 3.8*   CALCIUM 9.1 10.0   PROT  --  8.0   ALBUMIN 3.3* 3.9   BILITOT  --  0.4   ALKPHOS  --  77   AST  --  19   ALT  --  33   ANIONGAP 16 16       Significant Imaging: I have reviewed all pertinent imaging results/findings within the past 24 hours.

## 2023-02-16 NOTE — NURSING
Discharge instructions given. All questions and concerns addressed.IV removed. Telemetry monitor removed.

## 2023-02-16 NOTE — PLAN OF CARE
02/16/23 1006   Post-Acute Status   Post-Acute Authorization Other   Other Status No Post-Acute Service Needs   Discharge Delays None known at this time   Discharge Plan   Discharge Plan A Home     Pt does not require post acute services at this time      Tamia Damon CD, MSW, LMSW, RSW   Case Management  [facility]  Email: [email or web address]

## 2023-02-16 NOTE — HPI
Edouard Pena is a 47 y.o. male with a PMHx of T2DM, HTN, HLD, CKD4 who [place] [facility] and hyperglycemia. Patient reports fatigue and generlaized weakness began yesterday. He had an episode of dizziness which caused him to leave work early today. His nephrologist ordered outpatient labs which showed hyperglycmia and new MARIE so he was advised to present to the ED. Patient with hx of DM (A1c 6.7 3 years ago, most recent is 9.7) but is not on any home meds. Denies fever, chills, N/V, abdominal pain, HA, vision changes, or syncope. Of note, patient was seen by myself earlier tonight to be admitted to hospital medicine. However, he eloped to go let his dogs out and came back. He has no new complaints of reevaluation.     ED: AFVSS. WBC 12.93, , bicarb 19, no increased AG. Given 1L IVFs earlier in the ED.    No s/s of seizure noted. No s/s of barotrauma noted. Pt demonstrated equalization of ears during pressurization. Care plan reviewed with patient. Patient  verbalize understanding of the plan of care and contribute to goal setting.

## 2023-02-16 NOTE — ED NOTES
Pt insistent on leaving to take care of business at home. Instructed pt on risks of doing so and recommended pt stay to complete treatment but patient stated he would be right back. PA and MD made aware.

## 2023-02-16 NOTE — ED PROVIDER NOTES
Encounter Date: 2/15/2023       History     Chief Complaint   Patient presents with    Hyperglycemia     States was admitted earlier for MARIE and hyperglycemia but had to leave to let dogs out. Is now back to be readmitted. States is no longer feeling dizzy or fatigued.     47-year-old male with history of hypertension, hyperlipidemia, CKD stage 2, Buerger's disease who presents to the emergency department for MARIE and hyperglycemia.  Patient was seen by me earlier today and admitted for hyperglycemia and MARIE.  Reports he had to go home to let his dogs out and return to the ED today to be readmitted.  Per my previous HPI he states yesterday felt fatigued with an episode of dizziness causing him to leave work early.  Got outpatient labs ordered by his nephrologist and noted to be hyperglycemic and with a new MAREI and advised to come to the ED. Patient denies history diabetes and is not currently on any diabetes medications.  States he had a few doctors mentioned diabetes was never formally diagnosed.      Review of patient's allergies indicates:  No Known Allergies  Past Medical History:   Diagnosis Date    Chronic kidney disease     Chronic kidney disease stage II    Colon polyps     Hyperlipidemia 12/13/2012    Hypertension      Past Surgical History:   Procedure Laterality Date    COSMETIC SURGERY      pyloric stenosis      RENAL BIOPSY Left 9/1/2022    Procedure: BIOPSY, KIDNEY;  Surgeon: Sinan Abad MD;  Location: [facility] CATH LAB;  Service: Interventional Nephrology;  Laterality: Left;     Family History   Problem Relation Age of Onset    COPD Mother     Heart disease Mother     Peripheral vascular disease Mother     Diabetes Father     Kidney disease Father     Stroke Father     Hypertension Father     Heart disease Father 70        CABG x 3    Kidney failure Father     Heart disease Maternal Grandfather      Social History     Tobacco Use    Smoking status: Every Day     Packs/day: 1.00     Years: 24.00     Pack  years: 24.00     Types: Cigarettes    Smokeless tobacco: Current   Substance Use Topics    Alcohol use: Yes     Comment: Occasional    Drug use: No     Review of Systems   Constitutional:  Positive for fatigue. Negative for chills and fever.   HENT:  Negative for sore throat.    Respiratory:  Negative for cough and shortness of breath.    Cardiovascular:  Negative for chest pain.   Gastrointestinal:  Negative for abdominal pain, nausea and vomiting.   Genitourinary:  Negative for difficulty urinating and dysuria.   Musculoskeletal: Negative.    Skin: Negative.    Psychiatric/Behavioral:  Negative for confusion.      Physical Exam     Initial Vitals [02/15/23 2233]   BP Pulse Resp Temp SpO2   138/81 85 18 97.8 °F (36.6 °C) 98 %      MAP       --         Physical Exam    Nursing note and vitals reviewed.  Constitutional: He appears well-developed and well-nourished. He is not diaphoretic. No distress.   HENT:   Head: Normocephalic and atraumatic.   Eyes: Conjunctivae are normal. Pupils are equal, round, and reactive to light.   Neck: Neck supple.   Normal range of motion.  Cardiovascular:  Normal rate, regular rhythm, normal heart sounds and intact distal pulses.           Pulmonary/Chest: Breath sounds normal.   Abdominal: Abdomen is soft. Bowel sounds are normal. There is no abdominal tenderness.   Musculoskeletal:         General: Normal range of motion.      Cervical back: Normal range of motion and neck supple.     Neurological: He is alert and oriented to person, place, and time. GCS score is 15. GCS eye subscore is 4. GCS verbal subscore is 5. GCS motor subscore is 6.   Skin: Skin is warm and dry. Capillary refill takes less than 2 seconds.   Psychiatric: He has a normal mood and affect.       ED Course   Procedures  Labs Reviewed   POCT GLUCOSE - Abnormal; Notable for the following components:       Result Value    POCT Glucose 186 (*)     All other components within normal limits          Imaging Results     None          Medications   melatonin tablet 6 mg (has no administration in time range)   ondansetron disintegrating tablet 8 mg (has no administration in time range)   promethazine tablet 25 mg (has no administration in time range)   polyethylene glycol packet 17 g (has no administration in time range)   bisacodyL suppository 10 mg (has no administration in time range)   acetaminophen tablet 650 mg (has no administration in time range)   glucose chewable tablet 16 g (has no administration in time range)   glucose chewable tablet 24 g (has no administration in time range)   glucagon (human recombinant) injection 1 mg (has no administration in time range)   nicotine 14 mg/24 hr 1 patch (has no administration in time range)   dextrose 10% bolus 125 mL 125 mL (has no administration in time range)   dextrose 10% bolus 250 mL 250 mL (has no administration in time range)   heparin (porcine) injection 5,000 Units (has no administration in time range)   insulin aspart U-100 pen 0-5 Units (has no administration in time range)   insulin detemir U-100 pen 8 Units (has no administration in time range)   insulin aspart U-100 pen 5 Units (has no administration in time range)     Medical Decision Making:   History:   Old Medical Records: I decided to obtain old medical records.  Initial Assessment:   47-year-old male was advised to come to the ED due to hyperglycemia and MARIE.  I have seen him earlier today and admitted him for MARIE and he returns to the ED for readmission as he had to leave to light his dogs out.  Differential Diagnosis:   DKA, hyperglycemia, dehydration, HHS, MARIE  Clinical Tests:   Lab Tests: Reviewed  ED Management:  Reviewed labs from earlier ED visit doubt DKA.  He was hydrated and blood glucose appears to be improved.  Will readmit to hospital medicine for continued management of his hyperglycemia and MARIE.                        Clinical Impression:   Final diagnoses:  [N17.9] MARIE (acute kidney injury)  (Primary)  [R73.9] Hyperglycemia        ED Disposition Condition    Observation                 Terrance Verdugo PA-C  02/15/23 1971

## 2023-02-16 NOTE — ASSESSMENT & PLAN NOTE
- BG uncontrolled but no evidence of DKA  - not on any home meds  - start weight based insulin regimen: detemir 8U BID + aspart 5U TIDWM  - INEZ BYNUM  - diabetic diet  - endocrine consulted; mario joy  - RD consult for diabetic diet education  Lab Results   Component Value Date    HGBA1C 9.7 (H) 01/19/2023     Discharge Planning:   - Jardiance 10 mg QD (Strated to help co-treat T2DM and CKD; see EMPA-KIDNEY trial)  - Mounjaro 2.5 mg weekly x4 weeks then advance to 5 mg weekly thereafter. (No history of medullary thyroid CA or pancreatitis)   - Levemir 16 units h.s. (Eat a snack before bed if BG is < 100 mg/dl)  - Novolog SSI for BG excursions:  180 - 230 + 2 unit  231- 280  + 4 units  281 - 330 + 6 units  331 - 380 + 8 units      > 380   + 10 units  - Insurance approved diabetes testing supplies to check blood sugar 4 times a day (Before meals and at bedtime) and as needed.  - BD pen needles   - Send BG logs in 4 days  - Follow-up in discharge clinic in 2 weeks.   - Follow-up in diabetes education ASAP

## 2023-02-16 NOTE — SUBJECTIVE & OBJECTIVE
Interval HPI:   Admitted yesterday with elevated BG. MARIE.Patient in room OBS09/EDOU09. Blood glucose improving. BG at goal on current insulin regimen (SSI, prandial, and basal insulin ). Steroid use- None      Renal function- Abnormal - Cr 3.8   Vasopressors-  None     Diet consistent carbohydrate     Eatin%  Nausea: No  Hypoglycemia and intervention: No  Fever: No  TPN and/or TF: No    PMH, PSH, FH, SH updated and reviewed     ROS:    Review of Systems   Constitutional:  Negative for unexpected weight change.   Eyes:  Positive for visual disturbance.   Respiratory:  Negative for cough.    Cardiovascular:  Negative for chest pain.   Gastrointestinal:  Negative for nausea and vomiting.   Endocrine: Positive for polydipsia, polyphagia and polyuria.   Musculoskeletal:  Negative for back pain.   Skin:  Negative for rash.   Neurological:  Negative for syncope.   Psychiatric/Behavioral:  Negative for agitation and dysphoric mood.      Current Medications and/or Treatments Impacting Glycemic Control  Immunotherapy:    Immunosuppressants       None          Steroids:   Hormones (From admission, onward)      Start     Stop Route Frequency Ordered    23 0044  melatonin tablet 6 mg         -- Oral Nightly PRN 02/15/23 2347          Pressors:    Autonomic Drugs (From admission, onward)      None          Hyperglycemia/Diabetes Medications:   Antihyperglycemics (From admission, onward)      Start     Stop Route Frequency Ordered    23 0715  insulin aspart U-100 pen 5 Units         -- SubQ 3 times daily with meals 02/15/23 2347    02/16/23 0045  insulin aspart U-100 pen 0-5 Units         -- SubQ Before meals & nightly PRN 02/15/23 2347    23 0000  insulin detemir U-100 pen 8 Units         -- SubQ 2 times daily 02/15/23 2354             PHYSICAL EXAMINATION:  Vitals:    23 0822   BP: 135/76   Pulse: 70   Resp: 18   Temp: 97.7 °F (36.5 °C)     There is no height or weight on file to calculate  BMI.    Physical Exam  Constitutional:       General: He is not in acute distress.     Appearance: Normal appearance. He is not ill-appearing.   HENT:      Head: Normocephalic and atraumatic.      Right Ear: External ear normal.      Left Ear: External ear normal.      Nose: Nose normal.   Cardiovascular:      Rate and Rhythm: Normal rate and regular rhythm.      Heart sounds: No murmur heard.  Pulmonary:      Effort: Pulmonary effort is normal. No respiratory distress.   Abdominal:      General: Bowel sounds are normal. There is no distension.      Tenderness: There is no abdominal tenderness.   Musculoskeletal:         General: No swelling.      Right lower leg: No edema.      Left lower leg: No edema.   Skin:     Findings: No erythema.   Neurological:      General: No focal deficit present.      Mental Status: He is alert and oriented to person, place, and time.   Psychiatric:         Mood and Affect: Mood normal.         Behavior: Behavior normal.

## 2023-02-16 NOTE — PHARMACY MED REC
"Admission Medication History     The home medication history was taken by Madhu Givens.    You may go to "Admission" then "Reconcile Home Medications" tabs to review and/or act upon these items.     The home medication list has been updated by the Pharmacy department.   Please read ALL comments highlighted in yellow.   Please address this information as you see fit.    Feel free to contact us if you have any questions or require assistance.      The medications listed below were removed from the home medication list. Please reorder if appropriate:  Patient reports no longer taking the following medication(s):  ATORVASTATIN 20 MG TAB  DAILY GARLIC ONCE-A-DAY ORAL  FISH OIL-OMEGA-3 FATTY ACIDS 300-1,000 MG CAP  LACTOBACILLUS ACIDOPHILUS & BULGAR (LACTINEX)     Medications listed below were obtained from: Patient/family  Current Outpatient Medications on File Prior to Encounter   Medication Sig    acetaminophen (TYLENOL) 500 MG tablet   Take 1,000 mg by mouth every 4 (four) hours as needed for Pain.    amLODIPine (NORVASC) 10 MG tablet   Take 1 tablet (10 mg total) by mouth once daily.    budesonide (TARPEYO) 4 mg CpDR   Take 16 mg by mouth once daily.    calcitRIOL (ROCALTROL) 0.25 MCG Cap   Take 1 capsule (0.25 mcg total) by mouth once daily.    calcium carbonate (TUMS ORAL)   Take 1 tablet by mouth daily as needed (Heartburn).    chlorthalidone (HYGROTEN) 25 MG Tab   Take 1 tablet (25 mg total) by mouth once daily.    colchicine (COLCRYS) 0.6 mg tablet   0.6 mg daily as needed (gout flare-up).    prednisoLONE acetate (PRED FORTE) 1 % DrpS   Place 1 drop into both eyes daily as needed (Inflammation).    sodium bicarbonate 650 MG tablet Take 1 tablet (650 mg total) by mouth 3 (three) times daily.      sodium zirconium cyclosilicate (LOKELMA) 5 gram packet Take 1 packet (5 g) by mouth 3 (three) times a week. Mix entire contents of packet(s) into drinking glass containing 3 tablespoons of water; stir well and drink " immediately. Add water and repeat until no powder remains to receive entire dose.      valsartan (DIOVAN) 320 MG tablet   Take 1 tablet (320 mg total) by mouth once daily.    allopurinoL (ZYLOPRIM) 100 MG tablet   Take 1 tablet (100 mg total) by mouth once daily.    blood sugar diagnostic Strp 1 strip by Misc.(Non-Drug; Combo Route) route 2 (two) times daily. ICD 10: E11.22      blood-glucose meter kit Use as instructed to check blood sugar two times a day. ICD 10: E11.22      HYDROcodone-acetaminophen (NORCO) 5-325 mg per tablet Take 1 tablet by mouth every 4 (four) hours as needed for Pain. (Patient not taking: Reported on 02/16/2023)      lancets Misc 1 lancet by Misc.(Non-Drug; Combo Route) route 2 (two) times daily. ICD 10: E11.22         Potential issues to be addressed PRIOR TO DISCHARGE  Patient reported not taking the following medications: (NORCO). These medications remain on the home medication list. Please address accordingly.     Madhu Givens  EXT [id number]                .

## 2023-02-16 NOTE — CONSULTS
[facility] - Emergency Dept  Endocrinology  Diabetes Consult Note    Consult Requested by: Kenna Cristobal MD   Reason for admit: Type 2 diabetes mellitus with hyperglycemia, without long-term current use of insulin    HISTORY OF PRESENT ILLNESS:  Reason for Consult: Management of T2DM, Hyperglycemia     Diabetes diagnosis year: A1c 6.7 3 years ago    Home Diabetes Medications:  None    How often checking glucose at home? Infrequently    BG readings on regimen: > 200's   Hypoglycemia on the regimen?  No  Missed doses on regimen?  No    Diabetes Complications include:     Hyperglycemia    Complicating diabetes co morbidities:   CKD      HPI:   Patient is a 47 y.o. male with who presents to [facility] for evaluation of fatigue and hyperglycemia. Patient reports fatigue and generlaized weakness began yesterday. He had an episode of dizziness which caused him to leave work early today. His nephrologist ordered outpatient labs which showed hyperglycmia and new MARIE so he was advised to present to the ED. Patient with hx of DM (A1c 6.7 3 years ago, most recent is 9.7) but is not on any home meds. Denies fever, chills, N/V, abdominal pain, HA, vision changes, or syncope. Of note, patient was seen by myself earlier tonight to be admitted to hospital medicine. Endocrine consulted for management of pt's blood glucose.    Hemoglobin A1C   Date Value Ref Range Status   01/19/2023 9.7 (H) 4.0 - 5.6 % Final     Comment:     ADA Screening Guidelines:  5.7-6.4%  Consistent with prediabetes  >or=6.5%  Consistent with diabetes    High levels of fetal hemoglobin interfere with the HbA1C  assay. Heterozygous hemoglobin variants (HbS, HgC, etc)do  not significantly interfere with this assay.   However, presence of multiple variants may affect accuracy.     09/30/2019 6.7 (H) 4.0 - 5.6 % Final     Comment:     ADA Screening Guidelines:  5.7-6.4%  Consistent with prediabetes  >or=6.5%  Consistent with diabetes  High levels of fetal hemoglobin  interfere with the HbA1C  assay. Heterozygous hemoglobin variants (HbS, HgC, etc)do  not significantly interfere with this assay.   However, presence of multiple variants may affect accuracy.     2016 6.2 4.5 - 6.2 % Final     Comment:     According to ADA guidelines, hemoglobin A1C <7.0% represents  optimal control in non-pregnant diabetic patients.  Different  metrics may apply to specific populations.   Standards of Medical Care in Diabetes - 2016.  For the purpose of screening for the presence of diabetes:  <5.7%     Consistent with the absence of diabetes  5.7-6.4%  Consistent with increasing risk for diabetes   (prediabetes)  >or=6.5%  Consistent with diabetes  Currently no consensus exists for use of hemoglobin A1C  for diagnosis of diabetes for children.                Interval HPI:   Admitted yesterday with elevated BG. MARIE.Patient in room OBS09/EDOU09. Blood glucose improving. BG at goal on current insulin regimen (SSI, prandial, and basal insulin ). Steroid use- None      Renal function- Abnormal - Cr 3.8   Vasopressors-  None     Diet consistent carbohydrate     Eatin%  Nausea: No  Hypoglycemia and intervention: No  Fever: No  TPN and/or TF: No    PMH, PSH, FH, SH updated and reviewed     ROS:    Review of Systems   Constitutional:  Negative for unexpected weight change.   Eyes:  Positive for visual disturbance.   Respiratory:  Negative for cough.    Cardiovascular:  Negative for chest pain.   Gastrointestinal:  Negative for nausea and vomiting.   Endocrine: Positive for polydipsia, polyphagia and polyuria.   Musculoskeletal:  Negative for back pain.   Skin:  Negative for rash.   Neurological:  Negative for syncope.   Psychiatric/Behavioral:  Negative for agitation and dysphoric mood.      Current Medications and/or Treatments Impacting Glycemic Control  Immunotherapy:    Immunosuppressants       None          Steroids:   Hormones (From admission, onward)      Start     Stop Route Frequency  Ordered    02/16/23 0044  melatonin tablet 6 mg         -- Oral Nightly PRN 02/15/23 2347          Pressors:    Autonomic Drugs (From admission, onward)      None          Hyperglycemia/Diabetes Medications:   Antihyperglycemics (From admission, onward)      Start     Stop Route Frequency Ordered    02/16/23 0715  insulin aspart U-100 pen 5 Units         -- SubQ 3 times daily with meals 02/15/23 2347    02/16/23 0045  insulin aspart U-100 pen 0-5 Units         -- SubQ Before meals & nightly PRN 02/15/23 2347    02/16/23 0000  insulin detemir U-100 pen 8 Units         -- SubQ 2 times daily 02/15/23 2354             PHYSICAL EXAMINATION:  Vitals:    02/16/23 0822   BP: 135/76   Pulse: 70   Resp: 18   Temp: 97.7 °F (36.5 °C)     There is no height or weight on file to calculate BMI.    Physical Exam  Constitutional:       General: He is not in acute distress.     Appearance: Normal appearance. He is not ill-appearing.   HENT:      Head: Normocephalic and atraumatic.      Right Ear: External ear normal.      Left Ear: External ear normal.      Nose: Nose normal.   Cardiovascular:      Rate and Rhythm: Normal rate and regular rhythm.      Heart sounds: No murmur heard.  Pulmonary:      Effort: Pulmonary effort is normal. No respiratory distress.   Abdominal:      General: Bowel sounds are normal. There is no distension.      Tenderness: There is no abdominal tenderness.   Musculoskeletal:         General: No swelling.      Right lower leg: No edema.      Left lower leg: No edema.   Skin:     Findings: No erythema.   Neurological:      General: No focal deficit present.      Mental Status: He is alert and oriented to person, place, and time.   Psychiatric:         Mood and Affect: Mood normal.         Behavior: Behavior normal.         Labs Reviewed and Include   Recent Labs   Lab 02/16/23  1020   *   CALCIUM 9.2   ALBUMIN 3.1*   PROT 6.2      K 3.9   CO2 23      BUN 55*   CREATININE 3.1*   ALKPHOS 61    ALT 26   AST 14   BILITOT 0.4     Lab Results   Component Value Date    WBC 12.98 (H) 02/16/2023    HGB 12.3 (L) 02/16/2023    HCT 37.1 (L) 02/16/2023    MCV 98 02/16/2023     02/16/2023     No results for input(s): TSH, FREET4 in the last 168 hours.  Lab Results   Component Value Date    HGBA1C 9.7 (H) 01/19/2023       Nutritional status:   Body mass index is 34.16 kg/m².  Lab Results   Component Value Date    ALBUMIN 3.1 (L) 02/16/2023    ALBUMIN 3.9 02/15/2023    ALBUMIN 3.3 (L) 02/15/2023     No results found for: PREALBUMIN    Estimated Creatinine Clearance: 36.5 mL/min (A) (based on SCr of 3.1 mg/dL (H)).    Accu-Checks  Recent Labs     02/15/23  1544 02/15/23  2231 02/16/23  0026 02/16/23  0732   POCTGLUCOSE 372* 186* 217* 166*        ASSESSMENT and PLAN    * Type 2 diabetes mellitus with hyperglycemia, without long-term current use of insulin    BG goal: 140-180     T2DM not on any medications.  MARIE here.  Hyperglycemia on admission quickly improved with insulin    WBD 0.3 units/kg/day  - Levemir 8 units BID  - Novolog 5 units with meals   - Novolog correction scale LDC (150/50) SSI  - POCT Glucose before meals and at bedtime  - Hypoglycemia protocol in place      ** Please notify Endocrine for any change and/or advance in diet**  ** Please call Endocrine for any BG related issues **     Discharge Planning:   - Jardiance 10 mg QD (Strated to help co-treat T2DM and CKD; see EMPA-KIDNEY trial)  - Mounjaro 2.5 mg weekly x4 weeks then advance to 5 mg weekly thereafter. (No history of medullary thyroid CA or pancreatitis)   - Levemir 16 units h.s. (Eat a snack before bed if BG is < 100 mg/dl)  - Novolog SSI for BG excursions:  180 - 230 + 2 unit  231- 280  + 4 units  281 - 330 + 6 units  331 - 380 + 8 units      > 380   + 10 units  - Insurance approved diabetes testing supplies to check blood sugar 4 times a day (Before meals and at bedtime) and as needed.  - BD pen needles   - Send BG logs in 4 days  -  Follow-up in discharge clinic in 2 weeks.   - Follow-up in diabetes education ASAP    Education:  Discharge Teaching:    Reviewed topics related to DM including: the need for insulin, how insulin works, what makes it a high risk medication, the importance of immediate follow up with either PCP or endocrine provider, importance of and how to check BG, how to record BG on logs, how to administer insulin, appropriate insulin administration sites, importance of rotating injection sites, hyper/hypoglycemia, how and when to treat hypoglycemia, when to hold insulin, how the correction scale works, importance of storing unused insulin in the refrigerator, and when to seek medical attention.  Patient verbalized understanding, answered all questions to patient's satisfaction. Blood sugar logs given to patient.     Hypoglycemia (Low Blood Sugar)  Too little glucose (sugar) in your blood is called hypoglycemia or low blood sugar. Diabetes itself doesnt cause low blood sugar. But some of the treatments for diabetes, such as pills or insulin, may increase your risk for it. Low blood sugar may cause you to lose consciousness or have a seizure. So always treat low blood sugar right away.    Special note: Always carry a source of fast-acting sugar and a snack in case of hypoglycemia     What You May Notice  If you have low blood sugar, you may have these symptoms:   Shakiness or dizziness   Cold, clammy skin or sweating   Feelings of hunger   Headache   Nervousness   A hard, fast heartbeat   Weakness   Confusion or irritability   Blurred vision  What You Should Do   First, check your blood sugar. If it is too low (out of your target range), eat or drink 15 to 20 grams of fast-acting sugar. This may be 3 to 4 glucose tablets, 4 oz (half a cup) fruit juice or regular (non-diet) soda, 8 oz (one cup ) fat-free milk, or 1 tablespoon of honey. Dont take more than this, or your blood sugar may go too high.   Wait 15 minutes.  Then recheck your blood sugar if you can.   If your blood sugar is still too low, repeat the steps above and check your blood sugar again. If your blood sugar still has not returned to your target range, contact your healthcare provider or seek emergency care.   Once your blood sugar returns to target range, eat a snack or meal.  Preventing Low Blood Sugar   Eat your meals and snacks at the same times each day. Dont skip meals!   Ask your healthcare provider if it is safe for you to drink alcohol. Never drink on an empty stomach.   Take your medication at the prescribed times.   Always carry a source of fast-acting sugar and a snack when youre away from home.  Other Things to Do   Carry a medical ID card or wear a medical alert bracelet or necklace. It should say that you have diabetes. It should also say what to do if you pass out or have a seizure.   Make sure your family, friends, and coworkers know the signs of low blood sugar. Tell them what to do if your blood sugar falls very low and you cant treat yourself.   Keep a glucagon emergency kit handy. Be sure your family, friends, and coworkers know how and when to use it. Check it regularly and replace the glucagon before it expires.   Talk to your healthcare team about other things you can do to prevent low blood sugar.     If you experience hypoglycemia several times, call your doctor.   © 7244-4092 [facility], [address] [place] [address], [place] [address]. All rights reserved. This information is not intended as a substitute for professional medical care. Always follow your healthcare professional's instructions.           HTN (hypertension)    Uncontrolled hypertension can result in worsening insulin resistance.  On an ARB per ADA guidelines.     Chronic kidney disease, stage 4 (severe)    Titrate insulin slowly to avoid hypoglycemia as the risk of hypoglycemia increases with decreased creatinine clearance.        Hyperlipidemia    May benefit  from statin therapy per ADA guidelines.         Plan discussed with patient, family, and RN at bedside.        Randy Perez, DNP, FNP  Endocrinology  [facility] - Emergency Dept

## 2023-02-16 NOTE — ASSESSMENT & PLAN NOTE
BG goal: 140-180     T2DM not on any medications.  MARIE here.  Hyperglycemia on admission quickly improved with insulin    WBD 0.3 units/kg/day  - Levemir 8 units BID  - Novolog 5 units with meals   - Novolog correction scale LDC (150/50) SSI  - POCT Glucose before meals and at bedtime  - Hypoglycemia protocol in place      ** Please notify Endocrine for any change and/or advance in diet**  ** Please call Endocrine for any BG related issues **     Discharge Planning:   - Jardiance 10 mg QD (Strated to help co-treat T2DM and CKD; see EMPA-KIDNEY trial)  - Mounjaro 2.5 mg weekly x4 weeks then advance to 5 mg weekly thereafter. (No history of medullary thyroid CA or pancreatitis)   - Levemir 16 units h.s. (Eat a snack before bed if BG is < 100 mg/dl)  - Novolog SSI for BG excursions:  180 - 230 + 2 unit  231- 280  + 4 units  281 - 330 + 6 units  331 - 380 + 8 units      > 380   + 10 units  - Insurance approved diabetes testing supplies to check blood sugar 4 times a day (Before meals and at bedtime) and as needed.  - BD pen needles   - Send BG logs in 4 days  - Follow-up in discharge clinic in 2 weeks.   - Follow-up in diabetes education ASAP    Education:  Discharge Teaching:    Reviewed topics related to DM including: the need for insulin, how insulin works, what makes it a high risk medication, the importance of immediate follow up with either PCP or endocrine provider, importance of and how to check BG, how to record BG on logs, how to administer insulin, appropriate insulin administration sites, importance of rotating injection sites, hyper/hypoglycemia, how and when to treat hypoglycemia, when to hold insulin, how the correction scale works, importance of storing unused insulin in the refrigerator, and when to seek medical attention.  Patient verbalized understanding, answered all questions to patient's satisfaction. Blood sugar logs given to patient.     Hypoglycemia (Low Blood Sugar)  Too little glucose  (sugar) in your blood is called hypoglycemia or low blood sugar. Diabetes itself doesnt cause low blood sugar. But some of the treatments for diabetes, such as pills or insulin, may increase your risk for it. Low blood sugar may cause you to lose consciousness or have a seizure. So always treat low blood sugar right away.    Special note: Always carry a source of fast-acting sugar and a snack in case of hypoglycemia     What You May Notice  If you have low blood sugar, you may have these symptoms:   Shakiness or dizziness   Cold, clammy skin or sweating   Feelings of hunger   Headache   Nervousness   A hard, fast heartbeat   Weakness   Confusion or irritability   Blurred vision  What You Should Do   First, check your blood sugar. If it is too low (out of your target range), eat or drink 15 to 20 grams of fast-acting sugar. This may be 3 to 4 glucose tablets, 4 oz (half a cup) fruit juice or regular (non-diet) soda, 8 oz (one cup ) fat-free milk, or 1 tablespoon of honey. Dont take more than this, or your blood sugar may go too high.   Wait 15 minutes. Then recheck your blood sugar if you can.   If your blood sugar is still too low, repeat the steps above and check your blood sugar again. If your blood sugar still has not returned to your target range, contact your healthcare provider or seek emergency care.   Once your blood sugar returns to target range, eat a snack or meal.  Preventing Low Blood Sugar   Eat your meals and snacks at the same times each day. Dont skip meals!   Ask your healthcare provider if it is safe for you to drink alcohol. Never drink on an empty stomach.   Take your medication at the prescribed times.   Always carry a source of fast-acting sugar and a snack when youre away from home.  Other Things to Do   Carry a medical ID card or wear a medical alert bracelet or necklace. It should say that you have diabetes. It should also say what to do if you pass out or have a  seizure.   Make sure your family, friends, and coworkers know the signs of low blood sugar. Tell them what to do if your blood sugar falls very low and you cant treat yourself.   Keep a glucagon emergency kit handy. Be sure your family, friends, and coworkers know how and when to use it. Check it regularly and replace the glucagon before it expires.   Talk to your healthcare team about other things you can do to prevent low blood sugar.     If you experience hypoglycemia several times, call your doctor.   © 4688-3145 [facility], [address] [place] [address], [place] [address]. All rights reserved. This information is not intended as a substitute for professional medical care. Always follow your healthcare professional's instructions.

## 2023-02-16 NOTE — HOSPITAL COURSE
Edouard Pena placed in  Observation for management of hyperglycemia secondary to T2DM. Blood glucose controlled during admission. Endocrine consulted, recommendations implemented and patient to start new DM medication regimen at discharge. Diabetes education provided to patient and information/education provided in AVS. Patient medically ready for discharge. Patient will follow up with PCP and Endocrine outpatient. Plan of care discussed with patient, patient agreeable to plan, and all questions answered.

## 2023-02-16 NOTE — SIGNIFICANT EVENT
Patient was to be admitted to hospital medicine for management of MARIE and hyperglycemia. Placed admit orders and evaluated patient at bedside. Shortly after this, patient decided to leave the hospital to take care of business at home despite education from his nurse. No AMA paperwork was signed. Discharged patient as eloped. He will have to be readmitted through the ED if he returns.

## 2023-02-16 NOTE — PLAN OF CARE
[facility] - [facility] Dept  Initial Discharge Assessment       Primary Care Provider: Primary Doctor No    Admission Diagnosis: MARIE (acute kidney injury) [N17.9]    Admission Date: 2/15/2023  Expected Discharge Date: 2/17/2023    Pt stated he is independent with his ADL's and ambulation.      Pt stated he does not require post-acute services at this time, SW discussed PCP with pt and informed him that she could set him with a PCP appointment.  Pt agreed to PCP appointment for his health management    Discharge Barriers Identified: (P) None    Payor: BLUE CROSS BLUE SHIELD / Plan: BCBS ALL OUT OF STATE / Product Type: PPO /     Extended Emergency Contact Information  Primary Emergency Contact: Patricia Pena  Address: [address]           [place] [address] [facility]  Home Phone: [phone]  Mobile Phone: [phone]  Relation: Spouse  Secondary Emergency Contact: LELO FISH  Mobile Phone: [phone]  Relation: Relative  Preferred language: English   needed? No    Discharge Plan A: (P) Home with family  Discharge Plan B: (P) Home      [facility] [place], [place] - [address]  [address]  [place] [address]  Phone: [phone] Fax: [phone]      Initial Assessment (most recent)       Adult Discharge Assessment - 02/16/23 1001          Discharge Assessment    Assessment Type Discharge Planning Assessment (P)      Confirmed/corrected address, phone number and insurance Yes (P)      Confirmed Demographics Correct on Facesheet (P)      Source of Information patient (P)      Does patient/caregiver understand observation status Yes (P)      Reason For Admission Type 2 diabetes mellitus with hyperglycemia, without long-term current use of insulin (P)      People in Home spouse (P)      Facility Arrived From: home (P)      Do you expect to return to your current living situation? Yes (P)      Do you have help at home or someone to help  you manage your care at home? No (P)      Prior to hospitilization cognitive status: Alert/Oriented;No Deficits (P)      Current cognitive status: Alert/Oriented;No Deficits (P)      Home Accessibility wheelchair accessible (P)      Home Layout Able to live on 1st floor (P)      Equipment Currently Used at Home none (P)      Patient currently being followed by outpatient case management? No (P)      Do you currently have service(s) that help you manage your care at home? No (P)      Do you have any problems affording any of your prescribed medications? No (P)      Is the patient taking medications as prescribed? yes (P)      Who is going to help you get home at discharge? spouse Patricia (P)      How do you get to doctors appointments? car, drives self;family or friend will provide (P)      Are you on dialysis? No (P)      Do you take coumadin? No (P)      Discharge Plan A Home with family (P)      Discharge Plan B Home (P)      DME Needed Upon Discharge  none (P)      Discharge Plan discussed with: Patient (P)      Discharge Barriers Identified None (P)         OTHER    Name(s) of People in Home spouse Patricia (P)                    Tamia Damon CD, MSW, LMSW, RSW   Case Management  [facility]  Email: [email or web address]

## 2023-02-16 NOTE — PLAN OF CARE
SW called central scheduling to schedule PCP hospital follow-up for pt.  They had no appointments available until March 01.      Future Appointments   Date Time Provider Department Center   3/1/2023  9:30 AM [facility]       Tamia Damon CD, MSW, LMSW, RSW   Case Management  [facility]  Email: [email or web address]

## 2023-02-16 NOTE — ASSESSMENT & PLAN NOTE
Titrate insulin slowly to avoid hypoglycemia as the risk of hypoglycemia increases with decreased creatinine clearance.

## 2023-02-16 NOTE — TELEPHONE ENCOUNTER
Patient needs a follow-up appointment in the discharge clinic in 1-2 weeks.    Patient needs diabetes education.     Lab Results   Component Value Date    HGBA1C 9.7 (H) 01/19/2023       POCT Glucose   Date Value Ref Range Status   02/16/2023 166 (H) 70 - 110 mg/dL Final   02/16/2023 217 (H) 70 - 110 mg/dL Final   02/15/2023 186 (H) 70 - 110 mg/dL Final   02/15/2023 372 (H) 70 - 110 mg/dL Final       Diabetes Medications               empagliflozin (JARDIANCE) 10 mg tablet Take 1 tablet (10 mg total) by mouth once daily.    insulin detemir U-100 (LEVEMIR FLEXTOUCH U-100 INSULN) 100 unit/mL (3 mL) InPn pen Inject 16 Units into the skin every evening. Eat a snack before bed if BG is < 100 mg/dl    insulin lispro (HUMALOG KWIKPEN INSULIN) 100 unit/mL pen Inject 2-10 Units into the skin 3 (three) times daily as needed. Blood Glucose: 180 - 230 + 2 unit, 231- 280  + 4 units, 281 - 330 + 6 units, 331 - 380 + 8 units, > 380   + 10 units    tirzepatide (MOUNJARO) 2.5 mg/0.5 mL PnIj Inject 2.5 mg into the skin every 7 days. for 4 doses    tirzepatide (MOUNJARO) 5 mg/0.5 mL PnIj Starting on 3/17/2023. Inject 5 mg into the skin every 7 days.          Hospital Medications               glucagon (human recombinant) injection 1 mg 1 mg, Intramuscular, As needed (PRN), Turn patient on their side, give IM, and NOTIFY MD IMMEDIATELY.<BR><BR>Feed the patient as soon as patient awakens and is able to swallow.    glucose chewable tablet 16 g 16 g, Oral, As needed (PRN), (16 grams = #  four 4gm glucose tablets)    glucose chewable tablet 24 g 24 g, Oral, As needed (PRN), (24 grams = # six 4gm glucose tablets)    insulin aspart U-100 pen 0-5 Units 0-5 Units, Subcutaneous, Before meals &amp; nightly PRN, **LOW CORRECTION DOSE**<BR>Blood Glucose<BR>mg/dL                  Pre-meal                2200<BR>151-200                0 unit                      0 unit<BR>201-250                2 units                    1 unit<BR>251-300                 3 units                    1 unit<BR>301-350                4 units                    2 units<BR>&gt;350                     5 units                    3 units<BR>Administer subcutaneously if needed at times designated by monitoring schedule. <BR>DO NOT HOLD correction dose insulin for patients who are  NPO.<BR>&quot;HIGH ALERT MEDICATION&quot; - Administer with meals or TF/TPN.            Thanks,    Randy Perez DNP, FNP-C, BC-ADM  Department of Endocrinology  Inpatient Glycemic Management

## 2023-02-16 NOTE — ASSESSMENT & PLAN NOTE
Uncontrolled hypertension can result in worsening insulin resistance.  On an ARB per ADA guidelines.

## 2023-02-16 NOTE — HPI
Edouard Pena is a     admit for MARIE. Fatigue yesterday that we saw his nephrologist and got labs. Told to come to ED due to hyperglycemia 549 and MARIE. Hx of buergers disease. Not on meds for diabetes. No DKA today.  Uptrending creatinine from his baseline. Given 1 L LR so far.    ED:

## 2023-02-17 ENCOUNTER — TELEPHONE (OUTPATIENT)
Dept: PHARMACY | Facility: CLINIC | Age: 48
End: 2023-02-17
Payer: COMMERCIAL

## 2023-02-17 NOTE — TELEPHONE ENCOUNTER
Called to schedule patient for hospital follow up and diabetes education appointment, wife asked to call back later patient sleeping.

## 2023-03-01 ENCOUNTER — OFFICE VISIT (OUTPATIENT)
Dept: SURGERY | Facility: CLINIC | Age: 48
End: 2023-03-01
Payer: COMMERCIAL

## 2023-03-01 ENCOUNTER — TELEPHONE (OUTPATIENT)
Dept: ENDOSCOPY | Facility: HOSPITAL | Age: 48
End: 2023-03-01
Payer: COMMERCIAL

## 2023-03-01 ENCOUNTER — TELEPHONE (OUTPATIENT)
Dept: INTERNAL MEDICINE | Facility: CLINIC | Age: 48
End: 2023-03-01

## 2023-03-01 ENCOUNTER — NURSE TRIAGE (OUTPATIENT)
Dept: ADMINISTRATIVE | Facility: CLINIC | Age: 48
End: 2023-03-01
Payer: COMMERCIAL

## 2023-03-01 ENCOUNTER — HOSPITAL ENCOUNTER (INPATIENT)
Facility: HOSPITAL | Age: 48
LOS: 2 days | Discharge: HOME | DRG: 683 | End: 2023-03-03
Attending: EMERGENCY MEDICINE | Admitting: HOSPITALIST
Payer: COMMERCIAL

## 2023-03-01 ENCOUNTER — OFFICE VISIT (OUTPATIENT)
Dept: INTERNAL MEDICINE | Facility: CLINIC | Age: 48
End: 2023-03-01
Payer: COMMERCIAL

## 2023-03-01 ENCOUNTER — CLINICAL SUPPORT (OUTPATIENT)
Dept: DIABETES | Facility: CLINIC | Age: 48
End: 2023-03-01
Payer: COMMERCIAL

## 2023-03-01 VITALS
HEIGHT: 71 IN | DIASTOLIC BLOOD PRESSURE: 60 MMHG | SYSTOLIC BLOOD PRESSURE: 138 MMHG | OXYGEN SATURATION: 96 % | HEART RATE: 100 BPM | BODY MASS INDEX: 33.16 KG/M2 | WEIGHT: 236.88 LBS

## 2023-03-01 VITALS
WEIGHT: 236 LBS | WEIGHT: 236 LBS | SYSTOLIC BLOOD PRESSURE: 138 MMHG | BODY MASS INDEX: 32.92 KG/M2 | HEART RATE: 100 BPM | DIASTOLIC BLOOD PRESSURE: 60 MMHG | HEIGHT: 71 IN | BODY MASS INDEX: 33.04 KG/M2

## 2023-03-01 DIAGNOSIS — K62.5 BRBPR (BRIGHT RED BLOOD PER RECTUM): Primary | ICD-10-CM

## 2023-03-01 DIAGNOSIS — K62.5 RECTAL BLEEDING: ICD-10-CM

## 2023-03-01 DIAGNOSIS — K62.5 BRBPR (BRIGHT RED BLOOD PER RECTUM): ICD-10-CM

## 2023-03-01 DIAGNOSIS — I10 HYPERTENSION, UNSPECIFIED TYPE: ICD-10-CM

## 2023-03-01 DIAGNOSIS — N18.4 CHRONIC KIDNEY DISEASE, STAGE 4 (SEVERE): ICD-10-CM

## 2023-03-01 DIAGNOSIS — Z86.010 PERSONAL HISTORY OF COLONIC POLYPS: Primary | ICD-10-CM

## 2023-03-01 DIAGNOSIS — Z86.010 HISTORY OF COLON POLYPS: Primary | ICD-10-CM

## 2023-03-01 DIAGNOSIS — E78.5 HYPERLIPIDEMIA, UNSPECIFIED HYPERLIPIDEMIA TYPE: ICD-10-CM

## 2023-03-01 DIAGNOSIS — D64.9 ANEMIA, UNSPECIFIED TYPE: ICD-10-CM

## 2023-03-01 DIAGNOSIS — K59.00 CONSTIPATION, UNSPECIFIED CONSTIPATION TYPE: ICD-10-CM

## 2023-03-01 DIAGNOSIS — E11.9 NEWLY DIAGNOSED DIABETES: ICD-10-CM

## 2023-03-01 DIAGNOSIS — E11.65 TYPE 2 DIABETES MELLITUS WITH HYPERGLYCEMIA, WITHOUT LONG-TERM CURRENT USE OF INSULIN: ICD-10-CM

## 2023-03-01 DIAGNOSIS — R94.31 QT PROLONGATION: ICD-10-CM

## 2023-03-01 DIAGNOSIS — N17.9 AKI (ACUTE KIDNEY INJURY): Primary | ICD-10-CM

## 2023-03-01 PROBLEM — E66.9 OBESITY (BMI 30-39.9): Status: ACTIVE | Noted: 2023-03-01

## 2023-03-01 PROBLEM — H53.8 BLURRED VISION, BILATERAL: Status: ACTIVE | Noted: 2023-03-01

## 2023-03-01 PROBLEM — E83.52 HYPERCALCEMIA: Status: ACTIVE | Noted: 2023-03-01

## 2023-03-01 LAB
CREAT UR-MCNC: 101 MG/DL (ref 23–375)
EOSINOPHIL URNS QL WRIGHT STN: NORMAL
POCT GLUCOSE: 130 MG/DL (ref 70–110)
POCT GLUCOSE: 138 MG/DL (ref 70–110)
SODIUM UR-SCNC: 66 MMOL/L (ref 20–250)
UUN UR-MCNC: 323 MG/DL (ref 140–1050)

## 2023-03-01 PROCEDURE — 4010F PR ACE/ARB THEARPY RXD/TAKEN: ICD-10-PCS | Mod: CPTII,S$GLB,, | Performed by: NURSE PRACTITIONER

## 2023-03-01 PROCEDURE — 4010F ACE/ARB THERAPY RXD/TAKEN: CPT | Mod: CPTII,S$GLB,, | Performed by: NURSE PRACTITIONER

## 2023-03-01 PROCEDURE — 3046F PR MOST RECENT HEMOGLOBIN A1C LEVEL > 9.0%: ICD-10-PCS | Mod: CPTII,S$GLB,, | Performed by: PHYSICIAN ASSISTANT

## 2023-03-01 PROCEDURE — 3066F NEPHROPATHY DOC TX: CPT | Mod: CPTII,S$GLB,, | Performed by: PHYSICIAN ASSISTANT

## 2023-03-01 PROCEDURE — 99285 EMERGENCY DEPT VISIT HI MDM: CPT | Mod: 25

## 2023-03-01 PROCEDURE — 12000002 HC ACUTE/MED SURGE SEMI-PRIVATE ROOM

## 2023-03-01 PROCEDURE — 3008F BODY MASS INDEX DOCD: CPT | Mod: CPTII,S$GLB,, | Performed by: PHYSICIAN ASSISTANT

## 2023-03-01 PROCEDURE — 82570 ASSAY OF URINE CREATININE: CPT | Performed by: HOSPITALIST

## 2023-03-01 PROCEDURE — 84300 ASSAY OF URINE SODIUM: CPT | Performed by: HOSPITALIST

## 2023-03-01 PROCEDURE — 3046F HEMOGLOBIN A1C LEVEL >9.0%: CPT | Mod: CPTII,S$GLB,, | Performed by: PHYSICIAN ASSISTANT

## 2023-03-01 PROCEDURE — 99999 PR PBB SHADOW E&M-EST. PATIENT-LVL V: CPT | Mod: PBBFAC,,, | Performed by: PHYSICIAN ASSISTANT

## 2023-03-01 PROCEDURE — 87205 SMEAR GRAM STAIN: CPT | Performed by: HOSPITALIST

## 2023-03-01 PROCEDURE — 3078F PR MOST RECENT DIASTOLIC BLOOD PRESSURE < 80 MM HG: ICD-10-PCS | Mod: CPTII,S$GLB,, | Performed by: NURSE PRACTITIONER

## 2023-03-01 PROCEDURE — 4010F ACE/ARB THERAPY RXD/TAKEN: CPT | Mod: CPTII,S$GLB,, | Performed by: PHYSICIAN ASSISTANT

## 2023-03-01 PROCEDURE — 3008F BODY MASS INDEX DOCD: CPT | Mod: CPTII,S$GLB,, | Performed by: NURSE PRACTITIONER

## 2023-03-01 PROCEDURE — 99999 PR PBB SHADOW E&M-EST. PATIENT-LVL II: ICD-10-PCS | Mod: PBBFAC,,,

## 2023-03-01 PROCEDURE — 99204 PR OFFICE/OUTPT VISIT, NEW, LEVL IV, 45-59 MIN: ICD-10-PCS | Mod: S$GLB,,, | Performed by: PHYSICIAN ASSISTANT

## 2023-03-01 PROCEDURE — 3066F PR DOCUMENTATION OF TREATMENT FOR NEPHROPATHY: ICD-10-PCS | Mod: CPTII,S$GLB,, | Performed by: PHYSICIAN ASSISTANT

## 2023-03-01 PROCEDURE — 3066F PR DOCUMENTATION OF TREATMENT FOR NEPHROPATHY: ICD-10-PCS | Mod: CPTII,S$GLB,, | Performed by: NURSE PRACTITIONER

## 2023-03-01 PROCEDURE — 4010F PR ACE/ARB THEARPY RXD/TAKEN: ICD-10-PCS | Mod: CPTII,S$GLB,, | Performed by: PHYSICIAN ASSISTANT

## 2023-03-01 PROCEDURE — 93010 ELECTROCARDIOGRAM REPORT: CPT | Mod: ,,, | Performed by: INTERNAL MEDICINE

## 2023-03-01 PROCEDURE — 99204 OFFICE O/P NEW MOD 45 MIN: CPT | Mod: S$GLB,,, | Performed by: PHYSICIAN ASSISTANT

## 2023-03-01 PROCEDURE — 99204 OFFICE O/P NEW MOD 45 MIN: CPT | Mod: S$GLB,,, | Performed by: NURSE PRACTITIONER

## 2023-03-01 PROCEDURE — 99284 EMERGENCY DEPT VISIT MOD MDM: CPT | Mod: ,,, | Performed by: EMERGENCY MEDICINE

## 2023-03-01 PROCEDURE — 99204 PR OFFICE/OUTPT VISIT, NEW, LEVL IV, 45-59 MIN: ICD-10-PCS | Mod: S$GLB,,, | Performed by: NURSE PRACTITIONER

## 2023-03-01 PROCEDURE — 99284 PR EMERGENCY DEPT VISIT,LEVEL IV: ICD-10-PCS | Mod: ,,, | Performed by: EMERGENCY MEDICINE

## 2023-03-01 PROCEDURE — G0108 DIAB MANAGE TRN  PER INDIV: HCPCS | Mod: S$GLB,,, | Performed by: REGISTERED NURSE

## 2023-03-01 PROCEDURE — G0108 PR DIAB MANAGE TRN  PER INDIV: ICD-10-PCS | Mod: S$GLB,,, | Performed by: REGISTERED NURSE

## 2023-03-01 PROCEDURE — 3066F NEPHROPATHY DOC TX: CPT | Mod: CPTII,S$GLB,, | Performed by: NURSE PRACTITIONER

## 2023-03-01 PROCEDURE — 93005 ELECTROCARDIOGRAM TRACING: CPT

## 2023-03-01 PROCEDURE — 63600175 PHARM REV CODE 636 W HCPCS: Performed by: EMERGENCY MEDICINE

## 2023-03-01 PROCEDURE — 99999 PR PBB SHADOW E&M-EST. PATIENT-LVL V: CPT | Mod: PBBFAC,,, | Performed by: NURSE PRACTITIONER

## 2023-03-01 PROCEDURE — 3046F HEMOGLOBIN A1C LEVEL >9.0%: CPT | Mod: CPTII,S$GLB,, | Performed by: NURSE PRACTITIONER

## 2023-03-01 PROCEDURE — 84540 ASSAY OF URINE/UREA-N: CPT | Performed by: HOSPITALIST

## 2023-03-01 PROCEDURE — 3075F PR MOST RECENT SYSTOLIC BLOOD PRESS GE 130-139MM HG: ICD-10-PCS | Mod: CPTII,S$GLB,, | Performed by: PHYSICIAN ASSISTANT

## 2023-03-01 PROCEDURE — 3075F PR MOST RECENT SYSTOLIC BLOOD PRESS GE 130-139MM HG: ICD-10-PCS | Mod: CPTII,S$GLB,, | Performed by: NURSE PRACTITIONER

## 2023-03-01 PROCEDURE — 3008F PR BODY MASS INDEX (BMI) DOCUMENTED: ICD-10-PCS | Mod: CPTII,S$GLB,, | Performed by: NURSE PRACTITIONER

## 2023-03-01 PROCEDURE — 99999 PR PBB SHADOW E&M-EST. PATIENT-LVL V: ICD-10-PCS | Mod: PBBFAC,,, | Performed by: PHYSICIAN ASSISTANT

## 2023-03-01 PROCEDURE — 99999 PR PBB SHADOW E&M-EST. PATIENT-LVL II: CPT | Mod: PBBFAC,,,

## 2023-03-01 PROCEDURE — 93010 EKG 12-LEAD: ICD-10-PCS | Mod: ,,, | Performed by: INTERNAL MEDICINE

## 2023-03-01 PROCEDURE — 3078F DIAST BP <80 MM HG: CPT | Mod: CPTII,S$GLB,, | Performed by: PHYSICIAN ASSISTANT

## 2023-03-01 PROCEDURE — 99999 PR PBB SHADOW E&M-EST. PATIENT-LVL V: ICD-10-PCS | Mod: PBBFAC,,, | Performed by: NURSE PRACTITIONER

## 2023-03-01 PROCEDURE — 99223 1ST HOSP IP/OBS HIGH 75: CPT | Mod: ,,, | Performed by: HOSPITALIST

## 2023-03-01 PROCEDURE — 99223 PR INITIAL HOSPITAL CARE,LEVL III: ICD-10-PCS | Mod: ,,, | Performed by: HOSPITALIST

## 2023-03-01 PROCEDURE — 3075F SYST BP GE 130 - 139MM HG: CPT | Mod: CPTII,S$GLB,, | Performed by: NURSE PRACTITIONER

## 2023-03-01 PROCEDURE — 3046F PR MOST RECENT HEMOGLOBIN A1C LEVEL > 9.0%: ICD-10-PCS | Mod: CPTII,S$GLB,, | Performed by: NURSE PRACTITIONER

## 2023-03-01 PROCEDURE — 3075F SYST BP GE 130 - 139MM HG: CPT | Mod: CPTII,S$GLB,, | Performed by: PHYSICIAN ASSISTANT

## 2023-03-01 PROCEDURE — 3078F PR MOST RECENT DIASTOLIC BLOOD PRESSURE < 80 MM HG: ICD-10-PCS | Mod: CPTII,S$GLB,, | Performed by: PHYSICIAN ASSISTANT

## 2023-03-01 PROCEDURE — 3008F PR BODY MASS INDEX (BMI) DOCUMENTED: ICD-10-PCS | Mod: CPTII,S$GLB,, | Performed by: PHYSICIAN ASSISTANT

## 2023-03-01 PROCEDURE — 3078F DIAST BP <80 MM HG: CPT | Mod: CPTII,S$GLB,, | Performed by: NURSE PRACTITIONER

## 2023-03-01 RX ORDER — TALC
6 POWDER (GRAM) TOPICAL NIGHTLY PRN
Status: CANCELLED | OUTPATIENT
Start: 2023-03-01

## 2023-03-01 RX ORDER — HYDROCORTISONE 25 MG/G
CREAM TOPICAL 2 TIMES DAILY
Status: DISCONTINUED | OUTPATIENT
Start: 2023-03-01 | End: 2023-03-03 | Stop reason: HOSPADM

## 2023-03-01 RX ORDER — NALOXONE HCL 0.4 MG/ML
0.02 VIAL (ML) INJECTION
Status: DISCONTINUED | OUTPATIENT
Start: 2023-03-01 | End: 2023-03-03 | Stop reason: HOSPADM

## 2023-03-01 RX ORDER — GLUCAGON 1 MG
1 KIT INJECTION
Status: DISCONTINUED | OUTPATIENT
Start: 2023-03-01 | End: 2023-03-03 | Stop reason: HOSPADM

## 2023-03-01 RX ORDER — INSULIN ASPART 100 [IU]/ML
0-5 INJECTION, SOLUTION INTRAVENOUS; SUBCUTANEOUS
Status: DISCONTINUED | OUTPATIENT
Start: 2023-03-01 | End: 2023-03-03 | Stop reason: HOSPADM

## 2023-03-01 RX ORDER — DEXTROSE 40 %
15 GEL (GRAM) ORAL
Status: DISCONTINUED | OUTPATIENT
Start: 2023-03-01 | End: 2023-03-03 | Stop reason: HOSPADM

## 2023-03-01 RX ORDER — SODIUM CHLORIDE 0.9 % (FLUSH) 0.9 %
10 SYRINGE (ML) INJECTION
Status: CANCELLED | OUTPATIENT
Start: 2023-03-01

## 2023-03-01 RX ORDER — POLYETHYLENE GLYCOL 3350 17 G/17G
17 POWDER, FOR SOLUTION ORAL DAILY
Qty: 510 G | Refills: 11 | Status: SHIPPED | OUTPATIENT
Start: 2023-03-01 | End: 2024-02-24

## 2023-03-01 RX ORDER — DEXTROSE 40 %
30 GEL (GRAM) ORAL
Status: DISCONTINUED | OUTPATIENT
Start: 2023-03-01 | End: 2023-03-03 | Stop reason: HOSPADM

## 2023-03-01 RX ORDER — HYDROCORTISONE 25 MG/G
CREAM TOPICAL 2 TIMES DAILY
Qty: 28 G | Refills: 2 | Status: SHIPPED | OUTPATIENT
Start: 2023-03-01

## 2023-03-01 RX ADMIN — SODIUM CHLORIDE, POTASSIUM CHLORIDE, SODIUM LACTATE AND CALCIUM CHLORIDE 1000 ML: 600; 310; 30; 20 INJECTION, SOLUTION INTRAVENOUS at 02:03

## 2023-03-01 NOTE — ASSESSMENT & PLAN NOTE
Patient with leucocytosis Recent Labs   Lab 03/01/23  1021   WBC 13.07*     . Afebrile. BCX 2, urine culture pending . likely secondary to stress..  WBC counts uptrending.

## 2023-03-01 NOTE — PROGRESS NOTES
Subjective:       Patient ID: Edouard Pena is a 47 y.o. male.        Chief Complaint: Follow-up    Edouard Pena is an established patient of [facility] here today for follow up visit.    Admitted 2/15-2/16 for hyperglycemia, MARIE.  Endocrine consulted while admitted for medication recommendations.      He erroneously took mounjaro 2.5 mg once daily x 4 days instead of once weekly x 4 weeks, realized the mistake and has since d/c, nausea from taking the mounjaro and little appetite  Endocrine f/u scheduled 3/8    Blood sugar  , 230, 310, 196, 228, 208, 231, 213, 209, 214  , 226, 367, 327, 197, 201    Levemir 16 U daily   Lispro 2-10 U TID prn with sliding scale  Jardiance 10 mg  Mounjaro (now holding due to home erroneous dosing)    He saw diabetes education today who discussed appropriate sliding scale, he was just using 2 U in the morning    Lab Results       Component                Value               Date                       HGBA1C                   9.7 (H)             01/19/2023                 HGBA1C                   6.7 (H)             09/30/2019                 HGBA1C                   6.2                 11/08/2016              HTN -   Amlodipine 10 mg   Valsartan 320 mg  Chlorthalidone 25 mg  BP fine today    CKD/MARIE -   Seeing nephrology tomorrow    Gout -   Allopurinol 100 mg    He has passed bright red blood per rectum for a few days then stopped, occurs with bowel movements at times but also without  This has happened to him multiple times in the past  No abdominal pain  No NSAIDs or aspirin, no blood thinner  Last colonoscopy was EJ 5 years ago and he had polyps         Review of Systems   Constitutional:  Negative for appetite change, chills, fatigue and fever.   HENT:  Negative for congestion and sore throat.    Eyes:  Negative for visual disturbance.   Respiratory:  Negative for cough, chest tightness and shortness of breath.    Cardiovascular:  Negative for chest  pain, palpitations and leg swelling.   Gastrointestinal:  Positive for nausea. Negative for abdominal pain, blood in stool, constipation, diarrhea and vomiting.   Genitourinary:  Negative for dysuria, frequency, hematuria and urgency.   Musculoskeletal:  Negative for arthralgias and back pain.   Skin:  Negative for rash.   Neurological:  Negative for dizziness, syncope, weakness and headaches.   Psychiatric/Behavioral:  Negative for dysphoric mood and sleep disturbance. The patient is not nervous/anxious.      Objective:      Physical Exam  Vitals and nursing note reviewed.   Constitutional:       Appearance: He is well-developed.   HENT:      Head: Normocephalic.      Right Ear: External ear normal.      Left Ear: External ear normal.   Eyes:      Pupils: Pupils are equal, round, and reactive to light.   Cardiovascular:      Rate and Rhythm: Normal rate and regular rhythm.      Heart sounds: Normal heart sounds. No murmur heard.    No friction rub. No gallop.   Pulmonary:      Effort: Pulmonary effort is normal. No respiratory distress.      Breath sounds: Normal breath sounds.   Abdominal:      Palpations: Abdomen is soft.      Tenderness: There is no abdominal tenderness.   Musculoskeletal:         General: No swelling.   Skin:     General: Skin is warm and dry.   Neurological:      General: No focal deficit present.      Mental Status: He is alert.   Psychiatric:         Mood and Affect: Mood normal.       Assessment:       1. BRBPR (bright red blood per rectum)    2. Chronic kidney disease, stage 4 (severe)    3. Hypertension, unspecified type    4. Newly diagnosed diabetes    5. Hyperlipidemia, unspecified hyperlipidemia type        Plan:       Edouard was seen today for follow-up.    Diagnoses and all orders for this visit:    BRBPR (bright red blood per rectum) - currently resolved, will check lab work today and see colorectal today for evaluation  -     Ambulatory referral/consult to Colorectal Surgery;  "Future  -     CBC Auto Differential; Future    Chronic kidney disease, stage 4 (severe) - keep f/u with nephrology tomorrow, continue all baseline medications  -     Comprehensive Metabolic Panel; Future    Hypertension, unspecified type - stable and controlled, continue baseline medications, check CMP today  BP Readings from Last 5 Encounters:   03/01/23 138/60   02/16/23 (!) 174/86   02/15/23 (!) 144/67   01/19/23 134/86   11/10/22 (!) 168/84      Newly diagnosed diabetes - he saw diabetes education today who educated on proper sliding scale, will hold mounjaro for now give that he took erroneously, continue levemir/lispro, blood sugar logs given, keep endo appointment 3/8, check blood sugar before meals and before bed  Lab Results   Component Value Date    HGBA1C 9.7 (H) 01/19/2023    HGBA1C 6.7 (H) 09/30/2019    HGBA1C 6.2 11/08/2016     Labs today to check kidney function and blood counts.  Rectal bleeding has resolved and he will see CRS today.  He has nephrology appointment tomorrow.  ED prompts reviewed.  Needs to establish with a PCP.    Addendum - creatinine up to 5.2 - 3.1 at discharge - he agrees to ED evaluation and will go now    Pt has been given instructions populated from patient instructions database and has verbalized understanding of the after visit summary and information contained wherein.    Follow up with a primary care provider. May go to ER for acute shortness of breath, lightheadedness, fever, or any other emergent complaints or changes in condition.    "This note will be shared with the patient"    Future Appointments   Date Time Provider Department Center   3/1/2023  2:00 PM Herminia Trevino NP [facility] COLON [facility]   3/2/2023  2:00 PM All Benavides MD [facility] NEPHRO [facility]   3/8/2023 10:00 AM Adriana Lehman NP [facility] ENDODIA [facility]   3/17/2023  7:00 AM Peggy Tello RN [facility] INTLDIA [facility] PCW   3/29/2023  9:30 AM Kristofer Menard MD [facility] IM [facility] W                 "

## 2023-03-01 NOTE — CONSULTS
[facility] - Emergency Dept  Nephrology  Consult Note    Patient Name: Edouard Pena  MRN: [id number]  Admission Date: 3/1/2023  Hospital Length of Stay: 0 days  Attending Provider: Edd Shepard MD   Primary Care Physician: [facility]  Principal Problem:MARIE (acute kidney injury)    Inpatient consult to Nephrology  Consult performed by: Micky Izquierdo MD  Consult ordered by: Edd Shepard MD        Subjective:     HPI: 47 year old man with CKD 4 (baseline serum creatinine 3) due to IgA nephropathy, DM, HTN, tobacco abuse presents with MARIE. He was recently seen in nephrology clinic and was sent to the ED for sugar of 600+ management. He was given IVF and started on Manjaro. After he was discharged, he picked up his medication. He misread the label and took his the manjaro once daily for 4 days rather than once a week for 4 weeks. He states that he was nauseous, but did not have diarrhea or vomiting. He reports that he has been forcing himself to drink 2-3 bottles of water daily. He furthermore reports that on 2/25, his blood pressure was 90/50 and he was lightheaded at that time     He was recently seen in diabetes clinic where it was noted that his creatinine had increased from 3 to 5.       Past Medical History:   Diagnosis Date    Anemia 3/1/2023    Chronic kidney disease     Chronic kidney disease stage II    Colon polyps     Hyperlipidemia 12/13/2012    Hypertension        Past Surgical History:   Procedure Laterality Date    COSMETIC SURGERY      pyloric stenosis      RENAL BIOPSY Left 9/1/2022    Procedure: BIOPSY, KIDNEY;  Surgeon: Sinan Abad MD;  Location[facility] CATH LAB;  Service: Interventional Nephrology;  Laterality: Left;       Review of patient's allergies indicates:  No Known Allergies  Current Facility-Administered Medications   Medication Frequency    dextrose 10% bolus 125 mL 125 mL PRN    dextrose 10% bolus 250 mL 250 mL PRN    dextrose 40 % gel 15,000 mg PRN    dextrose  "40 % gel 30,000 mg PRN    glucagon (human recombinant) injection 1 mg PRN    insulin aspart U-100 pen 0-5 Units QID (AC + HS) PRN    lactated ringers bolus 1,000 mL ED 1 Time    lactated ringers bolus 1,000 mL ED 1 Time    naloxone 0.4 mg/mL injection 0.02 mg PRN     Current Outpatient Medications   Medication    acetaminophen (TYLENOL) 500 MG tablet    allopurinoL (ZYLOPRIM) 100 MG tablet    amLODIPine (NORVASC) 10 MG tablet    blood sugar diagnostic Strp    blood-glucose meter kit    budesonide (TARPEYO) 4 mg CpDR    calcitRIOL (ROCALTROL) 0.25 MCG Cap    calcium carbonate (TUMS ORAL)    chlorthalidone (HYGROTEN) 25 MG Tab    colchicine (COLCRYS) 0.6 mg tablet    empagliflozin (JARDIANCE) 10 mg tablet    hydrocortisone (ANUSOL-HC) 2.5 % rectal cream    insulin detemir U-100 (LEVEMIR FLEXTOUCH U-100 INSULN) 100 unit/mL (3 mL) InPn pen    insulin lispro (HUMALOG KWIKPEN INSULIN) 100 unit/mL pen    lancets Misc    pen needle, diabetic 32 gauge x 5/32" Ndle    polyethylene glycol (GLYCOLAX) 17 gram/dose powder    prednisoLONE acetate (PRED FORTE) 1 % DrpS    sodium bicarbonate 650 MG tablet    tirzepatide (MOUNJARO) 2.5 mg/0.5 mL PnIj    [START ON 3/17/2023] tirzepatide (MOUNJARO) 5 mg/0.5 mL PnIj    valsartan (DIOVAN) 320 MG tablet     Family History       Problem Relation (Age of Onset)    COPD Mother    Diabetes Father    Heart disease Mother, Father (70), Maternal Grandfather    Hypertension Father    Kidney disease Father    Kidney failure Father    Peripheral vascular disease Mother    Stroke Father          Tobacco Use    Smoking status: Every Day     Packs/day: 1.00     Years: 24.00     Pack years: 24.00     Types: Cigarettes    Smokeless tobacco: Current   Substance and Sexual Activity    Alcohol use: Yes     Comment: Occasional    Drug use: No    Sexual activity: Yes     Partners: Female     Review of Systems   Constitutional:  Negative for chills and fever.   HENT:  Negative " for rhinorrhea and sore throat.    Eyes:  Negative for pain and visual disturbance.   Respiratory:  Negative for cough and shortness of breath.    Cardiovascular:  Negative for chest pain and palpitations.   Gastrointestinal:  Positive for nausea. Negative for abdominal pain, constipation, diarrhea, rectal pain and vomiting.   Endocrine: Negative for cold intolerance, heat intolerance and polyuria.   Genitourinary:  Negative for dysuria and flank pain.   Musculoskeletal:  Negative for back pain, gait problem and joint swelling.   Allergic/Immunologic: Negative for immunocompromised state.   Neurological:  Negative for light-headedness, numbness and headaches.   Objective:     Vital Signs (Most Recent):  Temp: 98.2 °F (36.8 °C) (03/01/23 1403)  Pulse: 100 (03/01/23 1403)  Resp: 18 (03/01/23 1403)  BP: 116/64 (03/01/23 1403)  SpO2: 98 % (03/01/23 1403) Vital Signs (24h Range):  Temp:  [98.2 °F (36.8 °C)] 98.2 °F (36.8 °C)  Pulse:  [100] 100  Resp:  [18] 18  SpO2:  [96 %-98 %] 98 %  BP: (116-138)/(60-64) 116/64     Weight: 107.5 kg (237 lb) (03/01/23 1403)  Body mass index is 33.05 kg/m².  Body surface area is 2.32 meters squared.    No intake/output data recorded.    Physical Exam  Constitutional:       General: He is not in acute distress.     Appearance: He is obese. He is not ill-appearing or toxic-appearing.   HENT:      Head: Normocephalic and atraumatic.      Nose: Nose normal. No congestion.      Mouth/Throat:      Mouth: Mucous membranes are dry.   Eyes:      General: No scleral icterus.        Right eye: No discharge.         Left eye: No discharge.      Pupils: Pupils are equal, round, and reactive to light.   Cardiovascular:      Rate and Rhythm: Normal rate.      Heart sounds: No murmur heard.  Pulmonary:      Effort: Pulmonary effort is normal. No respiratory distress.      Breath sounds: No wheezing.   Abdominal:      General: Abdomen is flat. There is no distension.      Tenderness: There is no  abdominal tenderness.   Musculoskeletal:      Cervical back: Normal range of motion.      Right lower leg: No edema.      Left lower leg: No edema.   Skin:     General: Skin is warm.      Coloration: Skin is not jaundiced.   Neurological:      Mental Status: He is alert.       Significant Labs:  All labs within the past 24 hours have been reviewed.    Significant Imaging:  Labs: Reviewed    Assessment/Plan:     Renal/  * MARIE (acute kidney injury)  CKD 4 with IgA nephropathy presents with MARIE  Baseline creatinine 3, admission 5.    His story is concerning for volume depletion, though he reports that he was hypotensive and symptomatic several days ago. Chart review shows that he has not had a history of hypotension. This is all compounded by baseline CKD 4 and IgA nephropathy    Of note: there have been several case reports of GLP-1 association with MARIE in CKD    Plan/Recommendation:  Agree with IVF  Agree with renal US and follow up urine studies  -Keep MAP > 65  -Keep hemoglobin > 7  -Strict ins and outs  -Avoid nephrotoxic agents if possible  -Avoid drastic hemodynamic changes if possible      IgA nephropathy  Kidney biopsy (9/8/22): IgA dominant diffuse global granular mesangial with segmental capillary loop staining by IF, diffuse mild increase in mesangial cellularity and 30-40% interstitial fibrosis. There is no activity in that isaiah is no endocapillary proliferation, crescents, or fibrinoid necrosis. Three worse prognostic indicators present; significant mesangial proliferation, segmental sclerosis and interstitial fibrosis.     Oncology  Anemia  Goal hemoglobin in CKD 4 is 10-12  Hemoglobin   Date Value Ref Range Status   03/01/2023 12.8 (L) 14.0 - 18.0 g/dL Final               Thank you for your consult. I will follow-up with patient. Please contact us if you have any additional questions.    Micky Izquierdo MD  Nephrology  [facility] Dept

## 2023-03-01 NOTE — ASSESSMENT & PLAN NOTE
Kidney biopsy (9/8/22): IgA dominant diffuse global granular mesangial with segmental capillary loop staining by IF, diffuse mild increase in mesangial cellularity and 30-40% interstitial fibrosis. There is no activity in that isaiah is no endocapillary proliferation, crescents, or fibrinoid necrosis. Three worse prognostic indicators present; significant mesangial proliferation, segmental sclerosis and interstitial fibrosis.

## 2023-03-01 NOTE — Clinical Note
Diagnosis: MARIE (acute kidney injury) [[id number]]   Future Attending Provider: ZANA FLOWERS [[id number]]   Admitting Provider:: SAILAJA SANDRA [[id number]]

## 2023-03-01 NOTE — SUBJECTIVE & OBJECTIVE
"Past Medical History:   Diagnosis Date    Anemia 3/1/2023    Chronic kidney disease     Chronic kidney disease stage II    Colon polyps     Hyperlipidemia 12/13/2012    Hypertension        Past Surgical History:   Procedure Laterality Date    COSMETIC SURGERY      pyloric stenosis      RENAL BIOPSY Left 9/1/2022    Procedure: BIOPSY, KIDNEY;  Surgeon: Sinan Abad MD;  Location: [facility] CATH LAB;  Service: Interventional Nephrology;  Laterality: Left;       Review of patient's allergies indicates:  No Known Allergies  Current Facility-Administered Medications   Medication Frequency    dextrose 10% bolus 125 mL 125 mL PRN    dextrose 10% bolus 250 mL 250 mL PRN    dextrose 40 % gel 15,000 mg PRN    dextrose 40 % gel 30,000 mg PRN    glucagon (human recombinant) injection 1 mg PRN    insulin aspart U-100 pen 0-5 Units QID (AC + HS) PRN    lactated ringers bolus 1,000 mL ED 1 Time    lactated ringers bolus 1,000 mL ED 1 Time    naloxone 0.4 mg/mL injection 0.02 mg PRN     Current Outpatient Medications   Medication    acetaminophen (TYLENOL) 500 MG tablet    allopurinoL (ZYLOPRIM) 100 MG tablet    amLODIPine (NORVASC) 10 MG tablet    blood sugar diagnostic Strp    blood-glucose meter kit    budesonide (TARPEYO) 4 mg CpDR    calcitRIOL (ROCALTROL) 0.25 MCG Cap    calcium carbonate (TUMS ORAL)    chlorthalidone (HYGROTEN) 25 MG Tab    colchicine (COLCRYS) 0.6 mg tablet    empagliflozin (JARDIANCE) 10 mg tablet    hydrocortisone (ANUSOL-HC) 2.5 % rectal cream    insulin detemir U-100 (LEVEMIR FLEXTOUCH U-100 INSULN) 100 unit/mL (3 mL) InPn pen    insulin lispro (HUMALOG KWIKPEN INSULIN) 100 unit/mL pen    lancets Misc    pen needle, diabetic 32 gauge x 5/32" Ndle    polyethylene glycol (GLYCOLAX) 17 gram/dose powder    prednisoLONE acetate (PRED FORTE) 1 % DrpS    sodium bicarbonate 650 MG tablet    tirzepatide (MOUNJARO) 2.5 mg/0.5 mL PnIj    [START ON 3/17/2023] tirzepatide (MOUNJARO) 5 mg/0.5 mL PnIj    valsartan " (DIOVAN) 320 MG tablet     Family History       Problem Relation (Age of Onset)    COPD Mother    Diabetes Father    Heart disease Mother, Father (70), Maternal Grandfather    Hypertension Father    Kidney disease Father    Kidney failure Father    Peripheral vascular disease Mother    Stroke Father          Tobacco Use    Smoking status: Every Day     Packs/day: 1.00     Years: 24.00     Pack years: 24.00     Types: Cigarettes    Smokeless tobacco: Current   Substance and Sexual Activity    Alcohol use: Yes     Comment: Occasional    Drug use: No    Sexual activity: Yes     Partners: Female     Review of Systems   Constitutional:  Negative for chills and fever.   HENT:  Negative for rhinorrhea and sore throat.    Eyes:  Negative for pain and visual disturbance.   Respiratory:  Negative for cough and shortness of breath.    Cardiovascular:  Negative for chest pain and palpitations.   Gastrointestinal:  Positive for nausea. Negative for abdominal pain, constipation, diarrhea, rectal pain and vomiting.   Endocrine: Negative for cold intolerance, heat intolerance and polyuria.   Genitourinary:  Negative for dysuria and flank pain.   Musculoskeletal:  Negative for back pain, gait problem and joint swelling.   Allergic/Immunologic: Negative for immunocompromised state.   Neurological:  Negative for light-headedness, numbness and headaches.   Objective:     Vital Signs (Most Recent):  Temp: 98.2 °F (36.8 °C) (03/01/23 1403)  Pulse: 100 (03/01/23 1403)  Resp: 18 (03/01/23 1403)  BP: 116/64 (03/01/23 1403)  SpO2: 98 % (03/01/23 1403) Vital Signs (24h Range):  Temp:  [98.2 °F (36.8 °C)] 98.2 °F (36.8 °C)  Pulse:  [100] 100  Resp:  [18] 18  SpO2:  [96 %-98 %] 98 %  BP: (116-138)/(60-64) 116/64     Weight: 107.5 kg (237 lb) (03/01/23 1403)  Body mass index is 33.05 kg/m².  Body surface area is 2.32 meters squared.    No intake/output data recorded.    Physical Exam  Constitutional:       General: He is not in acute distress.      Appearance: He is obese. He is not ill-appearing or toxic-appearing.   HENT:      Head: Normocephalic and atraumatic.      Nose: Nose normal. No congestion.      Mouth/Throat:      Mouth: Mucous membranes are dry.   Eyes:      General: No scleral icterus.        Right eye: No discharge.         Left eye: No discharge.      Pupils: Pupils are equal, round, and reactive to light.   Cardiovascular:      Rate and Rhythm: Normal rate.      Heart sounds: No murmur heard.  Pulmonary:      Effort: Pulmonary effort is normal. No respiratory distress.      Breath sounds: No wheezing.   Abdominal:      General: Abdomen is flat. There is no distension.      Tenderness: There is no abdominal tenderness.   Musculoskeletal:      Cervical back: Normal range of motion.      Right lower leg: No edema.      Left lower leg: No edema.   Skin:     General: Skin is warm.      Coloration: Skin is not jaundiced.   Neurological:      Mental Status: He is alert.       Significant Labs:  All labs within the past 24 hours have been reviewed.    Significant Imaging:  Labs: Reviewed

## 2023-03-01 NOTE — PATIENT INSTRUCTIONS
Schedule colonoscopy [phone]  Check with nephrologist concerning prep for colonoscopy  Anusol cream rectally 2x/day for 2 weeks.  Miralax daily for 3 soft formed easy to pass bowel movements per week  If not covered by insurance, any generic otc brand is fine  No sitting/straining > 5 mins

## 2023-03-01 NOTE — ED PROVIDER NOTES
Encounter Date: 3/1/2023       History     Chief Complaint   Patient presents with    Abnormal Lab     New diabetic, elevated cr, lab today     47-year-old male, history of CKD secondary to IgA nephropathy, hypertension, recent diagnosis of diabetes, referred to the ED for MARIE.  Patient was recently admitted 2 weeks ago for an MARIE and hyperglycemia.  He was started on a new diabetes medication, tears appetite, and unfortunately was supposed to be injected once weekly but he missed read the instructions and has injected it every day for a week.  He reported decreased p.o. intake to his PCP though to me says he has been drinking enrolled amount of fluids.  He reports a normal urine output as well.  He had follow-up lab work today and his creatinine had increased from 3.1 to 5.2..  His calcium was also 11.    The history is provided by the patient.   Review of patient's allergies indicates:  No Known Allergies  Past Medical History:   Diagnosis Date    Anemia 3/1/2023    Chronic kidney disease     Chronic kidney disease stage II    Colon polyps     Hyperlipidemia 12/13/2012    Hypertension      Past Surgical History:   Procedure Laterality Date    COSMETIC SURGERY      pyloric stenosis      RENAL BIOPSY Left 9/1/2022    Procedure: BIOPSY, KIDNEY;  Surgeon: Sinan Abad MD;  Location: [facility] CATH LAB;  Service: Interventional Nephrology;  Laterality: Left;     Family History   Problem Relation Age of Onset    COPD Mother     Heart disease Mother     Peripheral vascular disease Mother     Diabetes Father     Kidney disease Father     Stroke Father     Hypertension Father     Heart disease Father 70        CABG x 3    Kidney failure Father     Heart disease Maternal Grandfather      Social History     Tobacco Use    Smoking status: Every Day     Packs/day: 1.00     Years: 24.00     Pack years: 24.00     Types: Cigarettes    Smokeless tobacco: Current   Substance Use Topics    Alcohol use: Yes     Comment: Occasional     Drug use: No     Review of Systems    Physical Exam     Initial Vitals [03/01/23 1403]   BP Pulse Resp Temp SpO2   116/64 100 18 98.2 °F (36.8 °C) 98 %      MAP       --         Physical Exam    Nursing note and vitals reviewed.  Constitutional: Vital signs are normal. He appears well-developed and well-nourished. He is not diaphoretic.  Non-toxic appearance. He does not appear ill. No distress.   HENT:   Head: Normocephalic and atraumatic.   Mouth/Throat: Mucous membranes are normal. Mucous membranes are not dry.   Eyes: Conjunctivae and lids are normal.   Neck: Neck supple.   Normal range of motion.  Cardiovascular:  Normal rate.           Pulmonary/Chest: No respiratory distress.   Musculoskeletal:         General: No edema.      Cervical back: Normal range of motion and neck supple.     Neurological: He is alert and oriented to person, place, and time.   Skin: Skin is dry and intact. No pallor.   Psychiatric: He has a normal mood and affect. His speech is normal and behavior is normal.       ED Course   Procedures  Labs Reviewed   POCT GLUCOSE - Abnormal; Notable for the following components:       Result Value    POCT Glucose 138 (*)     All other components within normal limits   TROPONIN I   HEMOGLOBIN A1C   SODIUM, URINE, RANDOM    Narrative:     Specimen Source->Urine   CREATININE, URINE, RANDOM    Narrative:     Specimen Source->Urine   UREA NITROGEN, URINE, RANDOM    Narrative:     Specimen Source->Urine   DILLARD'S STAIN, URINE RANDOM    Narrative:     Specimen Source->Urine   PTH, INTACT   LIPASE   URINALYSIS, REFLEX TO URINE CULTURE   POCT GLUCOSE MONITORING CONTINUOUS          Imaging Results    None          Medications   naloxone 0.4 mg/mL injection 0.02 mg (has no administration in time range)   glucagon (human recombinant) injection 1 mg (has no administration in time range)   dextrose 10% bolus 125 mL 125 mL (has no administration in time range)   dextrose 10% bolus 250 mL 250 mL (has no  administration in time range)   dextrose 40 % gel 15,000 mg (has no administration in time range)   dextrose 40 % gel 30,000 mg (has no administration in time range)   insulin aspart U-100 pen 0-5 Units (has no administration in time range)   hydrocortisone 2.5 % rectal cream (has no administration in time range)   lactated ringers bolus 1,000 mL (0 mLs Intravenous Stopped 3/1/23 1556)   lactated ringers bolus 1,000 mL (0 mLs Intravenous Stopped 3/1/23 1556)     Medical Decision Making:   History:   Old Medical Records: I decided to obtain old medical records.  Initial Assessment:   1. MARIE.  Acute on chronic.  Likely multifactorial, possibly worsened by hyperglycemia at home and or decreased p.o. intake/dehydration.  -will give IV fluids, no history of CHF documented  -will need to be admitted    2. Rectal bleeding, already being worked up by Colorectal.  Hemodynamically stable and on CBC this morning patient's hemoglobin was stable  Clinical Tests:   Lab Tests: Ordered and Reviewed                        Clinical Impression:   Final diagnoses:  [N17.9] MARIE (acute kidney injury) (Primary)        ED Disposition Condition    Admit                 Brianna Martinez MD  03/01/23 1933

## 2023-03-01 NOTE — TELEPHONE ENCOUNTER
Patient was advised to go to the nearest ED this morning by [facility] internal medicine due to an elevated creatinine and possible MARIE. Patient is calling to notify his nephrologist about his current condition.         Reason for Disposition   [1] Other NON-URGENT information for PCP AND [2] does not require PCP response    Protocols used: PCP Call - No Triage-A-

## 2023-03-01 NOTE — ASSESSMENT & PLAN NOTE
was seen by colorectal surgery clinic today for rectal bleeding x 1 week . Last Colonoscopy: 15 yrs ago at [facility] with  polyps per patient t. was recommended Anusol bid for 2 weeks and plan for  colonoscopy

## 2023-03-01 NOTE — ED NOTES
Bed: EDOU07  Expected date:   Expected time:   Means of arrival:   Comments:  [facility]

## 2023-03-01 NOTE — PROGRESS NOTES
Diabetes Care Specialist Progress Note  Author: Peggy Tello RN  Date: 3/1/2023    Program Intake  Reason for Diabetes Program Visit:: Initial Diabetes Assessment  Current diabetes risk level:: high  In the last 12 months, have you:: been admitted to a hospital  Was the ER or hospital admission related to diabetes?: Yes  Permission to speak with others about care:: yes    Lab Results   Component Value Date    HGBA1C 9.7 (H) 01/19/2023       Clinical    Patient Health Rating  Compared to other people your age, how would you rate your health?: Fair    Problem Review  Reviewed Problem List with Patient: yes  Active comorbidities affecting diabetes self-care.: yes  Comorbidities: Hypertension, Chronic Kidney Disease  Reviewed health maintenance: yes    Clinical Assessment  Current Diabetes Treatment: Oral Medication, Diet, Injectable, Exercise, Insulin  Have you ever experienced hypoglycemia (low blood sugar)?: no  Have you ever experienced hyperglycemia (high blood sugar)?: yes  In the last month, how often have you experienced high blood sugar?: more than once a day  Are you able to tell when your blood sugar is high?: No (comment)  Have you ever been hospitalized because your blood sugar was high?: yes (see comments)    Medication Information  How do you obtain your medications?: Patient drives  How many days a week do you miss your medications?: 3 or more (misinformed about medications and has not been taking them correctly)  Do you sometimes have difficulty refilling your medications?: No  Medication adherence impacting ability to self-manage diabetes?: Yes    Labs  Do you have regular lab work to monitor your medications?: Yes  Type of Regular Lab Work: A1c, BMP  Where do you get your labs drawn?: [facility]  Lab Compliance Barriers: No    Nutritional Status  Diet: Regular  Meal Plan 24 Hour Recall: Breakfast, Lunch, Dinner, Snack  Meal Plan 24 Hour Recall - Breakfast: nothing  Meal Plan 24 Hour Recall - Lunch:  nothing  Meal Plan 24 Hour Recall - Dinner: nothing  Meal Plan 24 Hour Recall - Snack: nothing  Change in appetite?: Yes  Recent Changes in Weight: No Recent Weight Change  Current nutritional status an area of need that is impacting patient's ability to self-manage diabetes?: Yes    Additional Social History    Support  Does anyone support you with your diabetes care?: yes  Who takes you to your medical appointments?: self    Access to Mass Media & Technology  Does the patient have access to any of the following devices or technologies?: Smart phone  Media or technology needs impacting ability to self-manage diabetes?: No    Cognitive/Behavioral Health  Alert and Oriented: Yes  Difficulty Thinking: No  Requires Prompting: No  Requires assistance for routine expression?: No  Cognitive or behavioral barriers impacting ability to self-manage diabetes?: No    Culture/Quaker  Culture or Congregation beliefs that may impact ability to access healthcare: No    Communication  Language preference: English  Hearing Problems: No  Vision Problems: No    Health Literacy  Preferred Learning Method: Face to Face, Demonstration, Reading Materials  How often do you need to have someone help you read instructions, pamphlets, or written material from your doctor or pharmacy?: Never  Health literacy needs impacting ability to self-manage diabetes?: No      Diabetes Self-Management Skills Assessment    Diabetes Disease Process/Treatment Options  Patient/caregiver knows what type of diabetes they have.: yes  Diabetes Type : Type II  Patient/caregiver able to identify at least three signs and symptoms of diabetes.: no  Patient able to identify at least three risk factors for diabetes.: no  Diabetes Disease Process/Treatment Options: Skills Assessment Completed: Yes  Assessment indicates:: Knowledge deficit, Instruction Needed  Area of need?: Yes    Nutrition/Healthy Eating  Method of carbohydrate measurement:: no method  Patient can  identify foods that impact blood sugar.: no (see comments)  Nutrition/Healthy Eating Skills Assessment Completed:: Yes  Assessment indicates:: Knowledge deficit, Instruction Needed  Area of need?: Yes    Physical Activity/Exercise  Patient's daily activity level:: sedentary  Patient formally exercises outside of work.: no  Reasons for not exercising:: time constraints  Patient can identify reasons why exercise/physical activity is important in diabetes management.: no  Physical Activity/Exercise Skills Assessment Completed: : Yes  Assessment indicates:: Knowledge deficit, Instruction Needed  Area of need?: Yes    Medications  Patient is able to describe current diabetes management routine.: yes  Diabetes management routine:: diet, exercise, injectable medications, insulin, oral medications  Patient is able to identify current diabetes medications, dosages, and appropriate timing of medications.: yes (medications being taken incorrectly)  Patient understands the purpose of the medications taken for diabetes.: no  Patient reports problems or concerns with current medication regimen.: yes  Medication regimen problems/concerns:: unsure of doses  Medication Skills Assessment Completed:: Yes  Assessment indicates:: Knowledge deficit, Instruction Needed  Area of need?: Yes    Home Blood Glucose Monitoring  Patient states that blood sugar is checked at home daily.: yes  Monitoring Method:: home glucometer  How often do you check your blood sugar?: Twice a day  When do you check your blood sugar?: Before breakfast, Before dinner  When you check what is your typical blood sugar range? : 180-500  Blood glucose logs:: yes  Blood glucose logs reviewed today?: yes  Home Blood Glucose Monitoring Skills Assessment Completed: : Yes  Assessment indicates:: Knowledge deficit, Instruction Needed    Acute Complications  Patient is able to identify types of acute complications: No  Acute Complications Skills Assessment Completed: :  Yes  Assessment indicates:: Knowledge deficit, Instruction Needed  Area of need?: Yes    Chronic Complications  Patient can identify ways to prevent or delay diabetes complications.: no  Patient is aware that having diabetes increases risk of heart disease?: No  Patient is aware that heart disease is the leading cause of death and disability in people with diabetes?: No  Patient able to state risk factors for heart disease?: No  Patient is taking statin?: No  Has your doctor talked to you about Statin use?: No  Do you want more information on Statins?: No  Chronic Complications Skills Assessment Completed: : Yes  Assessment indicates:: Knowledge deficit, Instruction Needed  Area of need?: Yes    Psychosocial/Coping  Patient can identify ways of coping with chronic disease.: yes  Patient-stated ways of coping with chronic disease:: support from loved ones  Psychosocial/Coping Skills Assessment Completed: : Yes  Assessment indicates:: Adequate understanding  Area of need?: No      Diabetes Self Support Plan         Assessment Summary and Plan    Based on today's diabetes care assessment, the following areas of need were identified:      Social 3/1/2023   Access to Mass Media/Tech No   Cognitive/Behavioral Health No   Culture/Sabianism No   Health Literacy No        Clinical 3/1/2023   Medication Adherence Yes   Lab Compliance No   Nutritional Status Yes        Diabetes Self-Management Skills 3/1/2023   Diabetes Disease Process/Treatment Options YesProvided Diabetes management guide and provided instruction regarding SS and consume increased amount of carbohydrate foods and risk factors of diabetes.Instructed patient on what is Diabetes and the progression of uncontrolled diabetes. Discussed qualifying parameters of diabetes diagnosis. Reviewed/Instructed on disease process. Discussed current treatment options, especially dietary and lifestyle changes as well as possible medication additions and/or changes. Patient  verbalizes understanding of received information/education.    Nutrition/Healthy Eating YesEmphasized importance of eliminating all SSB. Discussed SF drink options. Discussed carb vs non-carb foods and reviewed appropriate amounts of carbs to have at meals vs snacks. Recommended 45 - 60 gm carb at meals and 15 gm carb at snacks. Instructed on appropriate label reading and serving sizes of specific carb containing foods. Reviewed need to limit total/saturated fats. Discussed meal plans and snack ideas amenable to pt. Reviewed the plate method. Patient verbalizes understanding of received information/education.        Physical Activity/Exercise YesDiscussed importance of adding physical activity in effort to help manage DM. Goal to increase physical activity to 5 times per week for 30 minutes. Provide different examples of exercise, including walking, jogging, cycling. Also provide pt with examples of chair exercise that can be done.     Medication YesDiscussed timing and mechanism of action of Humalog  insulin. Reviewed need for rotation of injection sites, appropriate insulin storage, timing of insulin, safe disposal of used sharps and insulin pen preparation and use, including performing a prime shot of 2 units prior to injection and keeping pen in skin for a count of 10 before removing. Discussed possible side effects and how to avoid these. Reviewed hypoglycemia and treatment with Rule of 15. Discussed general vs severe hyperglycemia and risk of DKA. Emphasized importance of taking Humalog insulin at same time each day. Emphasized need to take novolog >4 hours apart. Emphasized need to take Humalog injections 5-10 min prior to eating meals. Patient verbalizes understanding of received information/education.     Acute Complications YesDiscussed hypoglycemia vs hyperglycemia symptoms and discussed appropriate treatments for each. Reviewed that pt is at high risk of hypo with current medication regimen. Discussed  general vs severe hyperglycemia and risk of DKA.    Chronic Complications YesProvided Diabetes management guide and provided instruction regarding the disease progression if diabetes is uncontrolled. Reviewed ways to decrease risk of chronic complications by healthy eating and having regular activity. Maintaining optimal blood glucose control. Following up with Diabetic team which includes PCP, ANYI MD (if applicable), yearly eye exam, yearly foot exam and Diabetes care specialist .Patient verbalizes understanding of received information/education.     Psychosocial/Coping No          Today's interventions were provided through individual discussion, instruction, and written materials were provided.      Patient verbalized understanding of instruction and written materials.  Pt was able to return back demonstration of instructions today. Patient understood key points, needs reinforcement and further instruction.     Diabetes Self-Management Care Plan:    Today's Diabetes Self-Management Care Plan was developed with Edouard's input. Edouard has agreed to work toward the following goal(s) to improve his/her overall diabetes control.      Care Plan: Diabetes Management   Updates made since 1/30/2023 12:00 AM        Problem: Healthy Eating         Goal: Eat 3 meals daily with 45-60g/3-4 servings of Carbohydrate per meal.    Start Date: 3/1/2023   Expected End Date: 3/17/2023   Priority: High   Barriers: Knowledge deficit   Note:    Instructed pt on the food groups, how to read labels and count carbs.Pt was given sample menus and meal plans as examples. Pt misunderstood and took Mounjaro for 4 days in a row resulting in no appetite.Discussed with pt the 3 insulins he is taking, the doses, uses, length of actions, when to take these medications and effects. Pt was given log sheets to record his bs's and insulin taken. Discussed with pt how to put his meals together. Pt has had no appetite due to Mounjaro. Pt usually skips meals  every day due to lack of time to eat. Stressed to pt the importance of eating 3 balanced and portioned meals per day. Discussed with pt how to put his meals together. Discussed with pt the need to take his sliding scale with meals and eat a full meal. Pt will work on taking his breakfast and lunch with him and stopping to eat.        Task: Reviewed the sources and role of Carbohydrate, Protein, and Fat and how each nutrient impacts blood sugar.         Task: Provided visual examples using dry measuring cups, food models, and other familiar objects such as computer mouse, deck or cards, tennis ball etc. to help with visualization of portions.         Task: Explained how to count carbohydrates using the food label and the use of dry measuring cups for accurate carb counting. Completed 3/1/2023        Task: Discussed strategies for choosing healthier menu options when dining out. Completed 3/1/2023        Task: Recommended replacing beverages containing high sugar content with noncaloric/sugar free options and/or water. Completed 3/1/2023        Task: Review the importance of balancing carbohydrates with each meal using portion control techniques to count servings of carbohydrate and label reading to identify serving size and amount of total carbs per serving. Completed 3/1/2023        Task: Provided Sample plate method and reviewed the use of the plate to estimate amounts of carbohydrate per meal. Completed 3/1/2023          Follow Up Plan     Follow up in about 16 days (around 3/17/2023).    Today's care plan and follow up schedule was discussed with patient.  Edouard verbalized understanding of the care plan, goals, and agrees to follow up plan.        The patient was encouraged to communicate with his/her health care provider/physician and care team regarding his/her condition(s) and treatment.  I provided the patient with my contact information today and encouraged to contact me via phone or [facility] Patient Portal  as needed.     Length of Visit   Total Time: 90 Minutes

## 2023-03-01 NOTE — ASSESSMENT & PLAN NOTE
baseline CKD -4. Patient with acute kidney injury likely due to IVVD/dehydration MARIE is currently worsening. Labs reviewed- Renal function/electrolytes with Estimated Creatinine Clearance: 21.4 mL/min (A) (based on SCr of 5.2 mg/dL (H)). according to latest data. Monitor urine output and serial BMP and adjust therapy as needed. Avoid nephrotoxins and renally dose meds for GFR listed above.   3/1    Strict I/O monitor . Obtain urine lytes , renal sonogram  and bladder scans q 6h  Recent Labs   Lab 03/01/23  1021   BUN 65*   CREATININE 5.2*    hold valsartan

## 2023-03-01 NOTE — ASSESSMENT & PLAN NOTE
Patient's FSGs are not controlled on current hypoglycemics.   Last A1c reviewed-   Lab Results   Component Value Date    HGBA1C 9.7 (H) 01/19/2023    HGBA1C 6.7 (H) 09/30/2019    HGBA1C 6.2 11/08/2016     Will hold PO hypoglycemics and will start correctional scale insulin  Most recent fingerstick glucose reviewed- No results for input(s): POCTGLUCOSE in the last 24 hours.  currently on   Antihyperglycemics (From admission, onward)    Start     Stop Route Frequency Ordered    03/01/23 1600  insulin aspart U-100 pen 0-5 Units         -- SubQ Before meals & nightly PRN 03/01/23 1500       . Patient was recently admitted 2 weeks ago for an MARIE and hyperglycemia 2/15 and discharged 2/16 . was started on a new diabetes medication, MOUNJARO   2.5 mg into the skin every 7 days. he was supposed to inject once weekly but he missed read the instructions and has injected it every day for a week.   consider Endorcine eval

## 2023-03-01 NOTE — ASSESSMENT & PLAN NOTE
Patient's with Normocytic anemia.. Hemoglobin downtrending. Etiology likely due to chronic disease  and acute blood loss.  Current CBC reviewed-  Recent Labs   Lab 03/01/23  1021   HGB 12.8*      Monitor CBC and transfuse if H/H drops below 7/21.

## 2023-03-01 NOTE — ASSESSMENT & PLAN NOTE
CKD 4 with IgA nephropathy presents with MARIE  Baseline creatinine 3, admission 5.    His story is concerning for volume depletion, though he reports that he was hypotensive and symptomatic several days ago. Chart review shows that he has not had a history of hypotension. This is all compounded by baseline CKD 4 and IgA nephropathy    Of note: there have been several case reports of GLP-1 association with MARIE in CKD    Plan/Recommendation:  Agree with IVF  Agree with renal US and follow up urine studies  -Keep MAP > 65  -Keep hemoglobin > 7  -Strict ins and outs  -Avoid nephrotoxic agents if possible  -Avoid drastic hemodynamic changes if possible

## 2023-03-01 NOTE — ASSESSMENT & PLAN NOTE
Chronic, controlled.  Latest blood pressure and vitals reviewed-   Temp:  [98.2 °F (36.8 °C)]   Pulse:  [100]   Resp:  [18]   BP: (116-138)/(60-64)   SpO2:  [96 %-98 %] .   Home meds for hypertension were reviewed and old amlodipine, chlorthalidone and valsartan for now  PRN meds if BP> 180/110 mm HG

## 2023-03-01 NOTE — PROGRESS NOTES
"CRS Office Visit History and Physical    Referring Md:   Angie Ricks Pa-c  [address] [place] [address]    SUBJECTIVE:     Chief Complaint: rectal bleeding    History of Present Illness:  The patient is new patient to this practice.   Course is as follows:  Patient is a 47 y.o. male with hx of diabetes and CKD presents with rectal bleeding.  Symptoms have been present for 15 years intermittently, however for 1 week now passing large # BRBPR with bm. 1 occurrence with very large clot.  Previous anorectal procedures: no  confirms occasional straining/prolonged time on toilet with bowel movements.  is not currently taking fiber supplement or stool softener. 1-2 "soft-serve" bm/day  Blood thinners: No    Of note recently took 1 months worth of monjouro injections in 4 days time.  Currently with little appetite, heart burn, and constipation.    Last Colonoscopy: 15 yrs ago at [facility] + polyps per pt report      Review of patient's allergies indicates:  No Known Allergies    Past Medical History:   Diagnosis Date    Chronic kidney disease     Chronic kidney disease stage II    Colon polyps     Hyperlipidemia 12/13/2012    Hypertension      Past Surgical History:   Procedure Laterality Date    COSMETIC SURGERY      pyloric stenosis      RENAL BIOPSY Left 9/1/2022    Procedure: BIOPSY, KIDNEY;  Surgeon: Sinan Abad MD;  Location: [facility] CATH LAB;  Service: Interventional Nephrology;  Laterality: Left;     Family History   Problem Relation Age of Onset    COPD Mother     Heart disease Mother     Peripheral vascular disease Mother     Diabetes Father     Kidney disease Father     Stroke Father     Hypertension Father     Heart disease Father 70        CABG x 3    Kidney failure Father     Heart disease Maternal Grandfather      Social History     Tobacco Use    Smoking status: Every Day     Packs/day: 1.00     Years: 24.00     Pack years: 24.00     Types: Cigarettes    Smokeless tobacco: Current   Substance Use " "Topics    Alcohol use: Yes     Comment: Occasional    Drug use: No        Review of Systems:  Review of Systems   Eyes:  Positive for blurred vision.        New onset of 1 week   Gastrointestinal:  Positive for blood in stool, constipation and heartburn.     OBJECTIVE:     Vital Signs (Most Recent)  Blood Pressure 138/60 (BP Location: Right arm, Patient Position: Sitting, BP Method: Large (Automatic))   Pulse 100   Height 5' 11" (1.803 m)   Weight 107 kg (236 lb)   Body Mass Index 32.92 kg/m²     Physical Exam:  General: White male in no distress   Neuro: Alert and oriented to person, place, and time.  Moves all extremities.     HEENT: No icterus.  Trachea midline  Respiratory: Respirations are even and unlabored, no cough or audible wheezing  Skin: Warm dry and intact, No visible rashes, no jaundice    Labs reviewed today:  Lab Results   Component Value Date    WBC 12.98 (H) 02/16/2023    HGB 12.3 (L) 02/16/2023    HCT 37.1 (L) 02/16/2023     02/16/2023    CHOL 292 (H) 09/30/2019    TRIG 509 (H) 09/30/2019    HDL 34 (L) 09/30/2019    ALT 26 02/16/2023    AST 14 02/16/2023     02/16/2023    K 3.9 02/16/2023     02/16/2023    CREATININE 3.1 (H) 02/16/2023    BUN 55 (H) 02/16/2023    CO2 23 02/16/2023    TSH 0.914 09/30/2019    PSA 1.8 09/30/2019    INR 1.0 08/29/2022    HGBA1C 9.7 (H) 01/19/2023       Imaging reviewed today:  none    Endoscopy reviewed today:  none    Anorectal Exam:  Pt refused    ASSESSMENT/PLAN:     Diagnoses and all orders for this visit:    Personal history of colonic polyps  -     Ambulatory referral/consult to Endo Procedure ; Future    Anemia, unspecified type    Rectal bleeding  -     hydrocortisone (ANUSOL-HC) 2.5 % rectal cream; Place rectally 2 (two) times daily.    BRBPR (bright red blood per rectum)  -     Ambulatory referral/consult to Colorectal Surgery    Constipation, unspecified constipation type  -     polyethylene glycol (GLYCOLAX) 17 gram/dose " powder; Take 17 g by mouth once daily.        The patient was instructed to:  Schedule colonoscopy- meeting with nephrology tomorrow. Will wait for recs on prep.  Anusol bid for 2 weeks  Miralax daily as needed for soft easy to pass bowel movements  Follow-up in clinic prn      LYNNETTE Wilkerson-ML  Colon and Rectal Surgery

## 2023-03-01 NOTE — HPI
47 year old man with CKD 4 (baseline serum creatinine 3) due to IgA nephropathy, DM, HTN, tobacco abuse presents with MARIE. He was recently seen in nephrology clinic and was sent to the ED for sugar of 600+ management. He was given IVF and started on Manjaro. After he was discharged, he picked up his medication. He misread the label and took his the manjaro once daily for 4 days rather than once a week for 4 weeks. He states that he was nauseous, but did not have diarrhea or vomiting. He reports that he has been forcing himself to drink 2-3 bottles of water daily. He furthermore reports that on 2/25, his blood pressure was 90/50 and he was lightheaded at that time     He was recently seen in diabetes clinic where it was noted that his creatinine had increased from 3 to 5.

## 2023-03-01 NOTE — TELEPHONE ENCOUNTER
Creatinine up to 5.2  At hospital discharge he was down to 3.1  He has not been eating or drinking much due to taking mounjaro incorrectly and appetite severely diminished  Also resumed his ARB and chlorthalidone at hospital d/c  Instructed to present to ED for further evaluation of MARIE

## 2023-03-01 NOTE — ASSESSMENT & PLAN NOTE
atleast for 4 days. mentions he had a blood sugar spike of 500s on last saturday.denies flashes or floaters . has been using reading glasses used by his wife.  ophthalmology consulted

## 2023-03-01 NOTE — HPI
MR. Edouard Pena is a 47 y.o. male with a PMHx of T2DM, HTN, HLD, CKD 4 CKD secondary to IgA nephropathy referred  to ER  for  MARIE.      AAOX3. Patient was recently admitted 2 weeks ago for an MARIE and hyperglycemia 2/15 and discharged 2/16 . was started on a new diabetes medication, MOUNJARO   2.5 mg into the skin every 7 days. he was supposed to inject once weekly but he missed read the instructions and has injected it every day for a week.  decreased  oral intakex 4 days with poor appetite.  was seen by colorectal surgery clinic today for rectal bleeding x 1 week . Last Colonoscopy: 15 yrs ago at [facility] with polyps per patient report. was recommended Anusol bid for 2 weeks and plan for  colonoscopy. c/o  Bilateral blurred vision atleast for 4 days. mentions he had a blood sugar spike of 500s on last saturday.denies flashes or floaters . has been using reading glasses used by his wife.       ER course - Lab work  Cr  3.1 to 5.2. calcium  11. Hb 14.4-->12.4 received LR bolus 2L in ER . patient supposed to follow up with  nephrology clinc tomorrow.  Nephrology consulted

## 2023-03-01 NOTE — H&P
[facility] - Emergency Dept  [facility]  History & Physical    Patient Name: Edouard Pena  MRN: [id number]  Patient Class: IP- Inpatient  Admission Date: 3/1/2023  Attending Physician: Edd Shepard MD   Primary Care Provider: Primary Doctor No         Patient information was obtained from patient, past medical records and ER records.     Subjective:     Principal Problem:MARIE (acute kidney injury)    Chief Complaint:   Chief Complaint   Patient presents with    Abnormal Lab     New diabetic, elevated cr, lab today        HPI: MR. Edouard Pena is a 47 y.o. male with a PMHx of T2DM, HTN, HLD, CKD 4 CKD secondary to IgA nephropathy referred  to ER  for  MARIE.      AAOX3. Patient was recently admitted 2 weeks ago for an MARIE and hyperglycemia 2/15 and discharged 2/16 . was started on a new diabetes medication, MOUNJARO   2.5 mg into the skin every 7 days. he was supposed to inject once weekly but he missed read the instructions and has injected it every day for a week.  decreased  oral intakex 4 days with poor appetite.  was seen by colorectal surgery clinic today for rectal bleeding x 1 week . Last Colonoscopy: 15 yrs ago at [facility] with polyps per patient report. was recommended Anusol bid for 2 weeks and plan for  colonoscopy. c/o  Bilateral blurred vision atleast for 4 days. mentions he had a blood sugar spike of 500s on last saturday.denies flashes or floaters . has been using reading glasses used by his wife.       ER course - Lab work  Cr  3.1 to 5.2. calcium  11. Hb 14.4-->12.4 received LR bolus 2L in ER . patient supposed to follow up with  nephrology clinc tomorrow.  Nephrology consulted       Past Medical History:   Diagnosis Date    Chronic kidney disease       Chronic kidney disease stage II    Colon polyps      Hyperlipidemia 12/13/2012    Hypertension              Past Surgical History:   Procedure Laterality Date    COSMETIC SURGERY        pyloric stenosis        RENAL BIOPSY Left 9/1/2022      Procedure: BIOPSY, KIDNEY;  Surgeon: Sinan Abad MD;  Location: [facility] CATH LAB;  Service: Interventional Nephrology;  Laterality: Left;            Family History   Problem Relation Age of Onset    COPD Mother      Heart disease Mother      Peripheral vascular disease Mother      Diabetes Father      Kidney disease Father      Stroke Father      Hypertension Father      Heart disease Father 70         CABG x 3    Kidney failure Father      Heart disease Maternal Grandfather        Social History            Tobacco Use    Smoking status: Every Day       Packs/day: 1.00       Years: 24.00       Pack years: 24.00       Types: Cigarettes    Smokeless tobacco: Current   Substance Use Topics    Alcohol use: Yes       Comment: Occasional    Drug use: No          Review of Systems:   Pain scale:   Constitutional:  fever,  chills, headache, vision loss, hearing loss, weight loss, Generalized weakness, falls, loss of smell, loss of taste, poor appetite, Poor oral intake  sore throat, blurred vision  Respiratory: cough, shortness of breath.   Cardiovascular: chest pain, dizziness, palpitations, orthopnea, swelling of feet, syncope  Gastrointestinal: nausea, vomiting, abdominal pain, diarrhea, black stool,  blood in stool, change in bowel habits, rectal bleed - intermittent, last episdoe 2 days back   Genitourinary: hematuria, dysuria, urgency, frequency  Integument/Breast: rash,  pruritis  Hematologic/Lymphatic: easy bruising, lymphadenopathy  Musculoskeletal: arthralgias , myalgias, back pain, neck pain, knee pain  Neurological: confusion, seizures, tremors, slurred speech  Behavioral/Psych:  depression, anxiety, auditory or visual hallucinations     OBJECTIVE:     Physical Exam:  Body mass index is 33.05 kg/m².    Constitutional: Appears well-developed and well-nourished. obesity   Head: Normocephalic and atraumatic.   Neck: Normal range of motion. Neck supple.   Cardiovascular: Normal heart rate.  Regular heart  rhythm.  Pulmonary/Chest: Effort normal.   Abdominal: No distension.  No tenderness  Musculoskeletal: Normal range of motion. No edema.   Neurological: Alert and oriented to person, place, and time.   Skin: Skin is warm and dry.   Psychiatric: Normal mood and affect. Behavior is normal.                  Vital Signs  Temp: 98.2 °F (36.8 °C) (03/01/23 1403)  Pulse: 87 (03/01/23 1853)  Resp: 18 (03/01/23 1403)  BP: 116/64 (03/01/23 1403)  SpO2: 98 % (03/01/23 1403)     24 Hour VS Range    Temp:  [98.2 °F (36.8 °C)]   Pulse:  []   Resp:  [18]   BP: (116-138)/(60-64)   SpO2:  [96 %-98 %]   No intake or output data in the 24 hours ending 03/01/23 1902      I/O This Shift:  No intake/output data recorded.    Wt Readings from Last 3 Encounters:   03/01/23 107.5 kg (237 lb)   03/01/23 107 kg (236 lb)   03/01/23 107.5 kg (236 lb 14.2 oz)       I have personally reviewed the vitals and recorded Intake/Output     Laboratory/Diagnostic Data:    CBC/Anemia Labs: Coags:    Recent Labs   Lab 03/01/23  1021   WBC 13.07*   HGB 12.8*   HCT 38.8*   *   MCV 98   RDW 13.2    No results for input(s): PT, INR, APTT in the last 168 hours.     Chemistries: ABG:   Recent Labs   Lab 03/01/23  1021      K 5.1      CO2 23   BUN 65*   CREATININE 5.2*   CALCIUM 11.0*   PROT 8.2   BILITOT 0.4   ALKPHOS 75   ALT 20   AST 16    No results for input(s): PH, PCO2, PO2, HCO3, POCSATURATED, BE in the last 168 hours.     POCT Glucose: HbA1c:    Recent Labs   Lab 03/01/23  1537   POCTGLUCOSE 138*    Hemoglobin A1C   Date Value Ref Range Status   03/01/2023 10.1 (H) 4.0 - 5.6 % Final     Comment:     ADA Screening Guidelines:  5.7-6.4%  Consistent with prediabetes  >or=6.5%  Consistent with diabetes    High levels of fetal hemoglobin interfere with the HbA1C  assay. Heterozygous hemoglobin variants (HbS, HgC, etc)do  not significantly interfere with this assay.   However, presence of multiple variants may affect accuracy.      01/19/2023 9.7 (H) 4.0 - 5.6 % Final     Comment:     ADA Screening Guidelines:  5.7-6.4%  Consistent with prediabetes  >or=6.5%  Consistent with diabetes    High levels of fetal hemoglobin interfere with the HbA1C  assay. Heterozygous hemoglobin variants (HbS, HgC, etc)do  not significantly interfere with this assay.   However, presence of multiple variants may affect accuracy.     09/30/2019 6.7 (H) 4.0 - 5.6 % Final     Comment:     ADA Screening Guidelines:  5.7-6.4%  Consistent with prediabetes  >or=6.5%  Consistent with diabetes  High levels of fetal hemoglobin interfere with the HbA1C  assay. Heterozygous hemoglobin variants (HbS, HgC, etc)do  not significantly interfere with this assay.   However, presence of multiple variants may affect accuracy.          Cardiac Enzymes: Ejection Fractions:    Recent Labs     03/01/23  1021   TROPONINI 0.009    No results found for: EF       No results for input(s): COLORU, APPEARANCEUA, PHUR, SPECGRAV, PROTEINUA, GLUCUA, KETONESU, BILIRUBINUA, OCCULTUA, NITRITE, UROBILINOGEN, LEUKOCYTESUR, RBCUA, WBCUA, BACTERIA, SQUAMEPITHEL, HYALINECASTS in the last 48 hours.    Invalid input(s): WRIGHTSUR    No results found for: PROCAL, LACTATE  No results found for: BNP  Sed Rate (mm/hr)   Date Value   09/01/2010 2     No results found for: DDIMER  No results found for: FERRITIN  No results found for: LDH  Troponin I (ng/mL)   Date Value   03/01/2023 0.009     Results for orders placed or performed in visit on 09/30/19   Vitamin D   Result Value Ref Range    Vit D, 25-Hydroxy 37 30 - 96 ng/mL   Results for orders placed or performed in visit on 11/28/07   Vitamin D   Result Value Ref Range    25-Hydroxy D2 <4.0 ng/mL    25-Hydroxy D3 40 ng/mL    Vit D, 25-Hydroxy 40 25 - 80 ng/mL     No results found for: [id number]    Microbiology labs for the last week  Microbiology Results (last 7 days)       ** No results found for the last 168 hours. **            Reviewed and noted in plan  where applicable- Please see chart for full lab data.    Lines/Drains:       Peripheral IV - Single Lumen 03/01/23 1451 Anterior;Right Forearm (Active)   Number of days: 0       Imaging      No results found for this or any previous visit.      X-Ray Chest AP Portable  Narrative: EXAMINATION:  XR CHEST AP PORTABLE    CLINICAL HISTORY:  hyperglycemia;    TECHNIQUE:  Single frontal view of the chest was performed.    COMPARISON:  07/24/2017    FINDINGS:  The cardiomediastinal silhouette is not enlarged.  There is no pleural effusion.  The trachea is midline.  The lungs are symmetrically expanded bilaterally with mildly prominent central hilar interstitial attenuation, similar to the previous exam..  No large focal consolidation seen.  There is no pneumothorax.  The osseous structures are unremarkable.  Impression: 1. Central hilar findings appears similar to the previous exam, no large focal consolidation.    Electronically signed by: Reagan Carrion MD  Date:    02/15/2023  Time:    18:48      Labs, Imaging, EKG and Diagnostic results from 3/1/2023 were reviewed.    Medications:  Medication list was reviewed and changes noted under Assessment/Plan.  No current facility-administered medications on file prior to encounter.     Current Outpatient Medications on File Prior to Encounter   Medication Sig Dispense Refill    amLODIPine (NORVASC) 10 MG tablet Take 1 tablet (10 mg total) by mouth once daily. 90 tablet 3    calcitRIOL (ROCALTROL) 0.25 MCG Cap Take 1 capsule (0.25 mcg total) by mouth once daily. 90 capsule 3    calcium carbonate (TUMS ORAL) Take 1 tablet by mouth daily as needed (Heartburn).      chlorthalidone (HYGROTEN) 25 MG Tab Take 1 tablet (25 mg total) by mouth once daily. 90 tablet 3    colchicine (COLCRYS) 0.6 mg tablet 0.6 mg daily as needed (gout flare-up).      insulin detemir U-100 (LEVEMIR FLEXTOUCH U-100 INSULN) 100 unit/mL (3 mL) InPn pen Inject 16 Units into the skin every evening. Eat a snack  "before bed if BG is < 100 mg/dl 14.4 mL 3    valsartan (DIOVAN) 320 MG tablet Take 1 tablet (320 mg total) by mouth once daily. 90 tablet 3    acetaminophen (TYLENOL) 500 MG tablet Take 1,000 mg by mouth every 4 (four) hours as needed for Pain.      allopurinoL (ZYLOPRIM) 100 MG tablet Take 1 tablet (100 mg total) by mouth once daily. 30 tablet 3    blood sugar diagnostic Strp 1 strip by Misc.(Non-Drug; Combo Route) route 2 (two) times daily. ICD 10: E11.22 100 each 6    blood-glucose meter kit Use as instructed to check blood sugar two times a day. ICD 10: E11.22 1 each 0    budesonide (TARPEYO) 4 mg CpDR Take 16 mg by mouth once daily. 120 capsule 3    empagliflozin (JARDIANCE) 10 mg tablet Take 1 tablet (10 mg total) by mouth once daily. 90 tablet 1    hydrocortisone (ANUSOL-HC) 2.5 % rectal cream Place rectally 2 (two) times daily. 28 g 2    insulin lispro (HUMALOG KWIKPEN INSULIN) 100 unit/mL pen Inject 2-10 Units into the skin 3 (three) times daily as needed. Blood Glucose: 180 - 230 + 2 unit, 231- 280  + 4 units, 281 - 330 + 6 units, 331 - 380 + 8 units, > 380   + 10 units 45 mL 1    lancets Misc 1 lancet by Misc.(Non-Drug; Combo Route) route 2 (two) times daily. ICD 10: E11.22 100 each 6    pen needle, diabetic 32 gauge x 5/32" Ndle Use to inject insulin 4 times daily 100 each 0    polyethylene glycol (GLYCOLAX) 17 gram/dose powder Take 17 g by mouth once daily. 510 g 11    prednisoLONE acetate (PRED FORTE) 1 % DrpS Place 1 drop into both eyes daily as needed (Inflammation).      sodium bicarbonate 650 MG tablet Take 1 tablet (650 mg total) by mouth 3 (three) times daily. 270 tablet 3    tirzepatide (MOUNJARO) 2.5 mg/0.5 mL PnIj Inject 2.5 mg into the skin every 7 days. for 4 doses 4 pen 0    [START ON 3/17/2023] tirzepatide (MOUNJARO) 5 mg/0.5 mL PnIj Inject 5 mg into the skin every 7 days. 4 pen 2     Scheduled Medications:  hydrocortisone, , Rectal, BID      PRN: dextrose 10%, dextrose 10%, dextrose, " dextrose, glucagon (human recombinant), insulin aspart U-100, naloxone  Infusions:   Estimated Creatinine Clearance: 21.4 mL/min (A) (based on SCr of 5.2 mg/dL (H)).  Assessment/Plan:     * MARIE (acute kidney injury)  baseline CKD -4. Patient with acute kidney injury likely due to IVVD/dehydration MARIE is currently worsening. Labs reviewed- Renal function/electrolytes with Estimated Creatinine Clearance: 21.4 mL/min (A) (based on SCr of 5.2 mg/dL (H)). according to latest data. Monitor urine output and serial BMP and adjust therapy as needed. Avoid nephrotoxins and renally dose meds for GFR listed above.   3/1    Strict I/O monitor . Obtain urine lytes , renal sonogram  and bladder scans q 6h  Recent Labs   Lab 03/01/23  1021   BUN 65*   CREATININE 5.2*    hold valsartan     Blurred vision, bilateral  atleast for 4 days. mentions he had a blood sugar spike of 500s on last saturday.denies flashes or floaters . has been using reading glasses used by his wife. likely lens swelling from his acute hyperglycemia.   glycemic control. needs eye exam to r/o diabetic retinopathy     Obesity (BMI 30-39.9)  Body mass index is 33.05 kg/m². Morbid obesity complicates all aspects of disease management from diagnostic modalities to treatment. Weight loss encouraged     Anemia  Patient's with Normocytic anemia.. Hemoglobin downtrending. Etiology likely due to chronic disease  and acute blood loss.  Current CBC reviewed-  Recent Labs   Lab 03/01/23  1021   HGB 12.8*      Monitor CBC and transfuse if H/H drops below 7/21.       Rectal bleed   was seen by colorectal surgery clinic today for rectal bleeding x 1 week . Last Colonoscopy: 15 yrs ago at [facility] with  polyps per patient t. was recommended Anusol bid for 2 weeks and plan for  colonoscopy     Hypercalcemia  Recent Labs   Lab 03/01/23  1021   CALCIUM 11.0*      obtain PTH. monitor     Leucocytosis    Patient with leucocytosis Recent Labs   Lab 03/01/23  1021   WBC 13.07*     .  Afebrile. BCX 2, urine culture pending . likely secondary to stress..  WBC counts uptrending.      IgA nephropathy  with CKD  at baseline       Newly diagnosed diabetes  Patient's FSGs are not controlled on current hypoglycemics.   Last A1c reviewed-   Lab Results   Component Value Date    HGBA1C 9.7 (H) 01/19/2023    HGBA1C 6.7 (H) 09/30/2019    HGBA1C 6.2 11/08/2016     Will hold PO hypoglycemics and will start correctional scale insulin  Most recent fingerstick glucose reviewed- No results for input(s): POCTGLUCOSE in the last 24 hours.  currently on   Antihyperglycemics (From admission, onward)      Start     Stop Route Frequency Ordered    03/01/23 1600  insulin aspart U-100 pen 0-5 Units         -- SubQ Before meals & nightly PRN 03/01/23 1500         . Patient was recently admitted 2 weeks ago for an MARIE and hyperglycemia 2/15 and discharged 2/16 . was started on a new diabetes medication, MOUNJARO   2.5 mg into the skin every 7 days. he was supposed to inject once weekly but he missed read the instructions and has injected it every day for a week.   consider Endorcine eval     Hyperuricemia  on allopurinol       Chronic kidney disease, stage 4 (severe)  follows up with nephrology.      HTN (hypertension)    Chronic, controlled.  Latest blood pressure and vitals reviewed-   Temp:  [98.2 °F (36.8 °C)]   Pulse:  [100]   Resp:  [18]   BP: (116-138)/(60-64)   SpO2:  [96 %-98 %] .   Home meds for hypertension were reviewed and old amlodipine, chlorthalidone and valsartan for now  PRN meds if BP> 180/110 mm HG        VTE Risk Mitigation (From admission, onward)           Ordered     IP VTE HIGH RISK PATIENT  Once         03/01/23 1500     Place sequential compression device  Until discontinued         03/01/23 1500                       Edd Shepard MD  Department of Hospital Medicine   [facility] - Emergency Dept

## 2023-03-01 NOTE — ASSESSMENT & PLAN NOTE
Goal hemoglobin in CKD 4 is 10-12  Hemoglobin   Date Value Ref Range Status   03/01/2023 12.8 (L) 14.0 - 18.0 g/dL Final

## 2023-03-02 LAB
ALBUMIN SERPL BCP-MCNC: 3.1 G/DL (ref 3.5–5.2)
ALP SERPL-CCNC: 76 U/L (ref 55–135)
ALT SERPL W/O P-5'-P-CCNC: 19 U/L (ref 10–44)
ANION GAP SERPL CALC-SCNC: 13 MMOL/L (ref 8–16)
AST SERPL-CCNC: 14 U/L (ref 10–40)
BASOPHILS # BLD AUTO: 0.05 K/UL (ref 0–0.2)
BASOPHILS NFR BLD: 0.5 % (ref 0–1.9)
BILIRUB SERPL-MCNC: 0.4 MG/DL (ref 0.1–1)
BUN SERPL-MCNC: 59 MG/DL (ref 6–20)
CALCIUM SERPL-MCNC: 10.1 MG/DL (ref 8.7–10.5)
CHLORIDE SERPL-SCNC: 104 MMOL/L (ref 95–110)
CO2 SERPL-SCNC: 21 MMOL/L (ref 23–29)
CREAT SERPL-MCNC: 3.8 MG/DL (ref 0.5–1.4)
DIFFERENTIAL METHOD: ABNORMAL
EOSINOPHIL # BLD AUTO: 0.2 K/UL (ref 0–0.5)
EOSINOPHIL NFR BLD: 1.7 % (ref 0–8)
ERYTHROCYTE [DISTWIDTH] IN BLOOD BY AUTOMATED COUNT: 13.2 % (ref 11.5–14.5)
EST. GFR  (NO RACE VARIABLE): 18.6 ML/MIN/1.73 M^2
GLUCOSE SERPL-MCNC: 123 MG/DL (ref 70–110)
HCT VFR BLD AUTO: 36.1 % (ref 40–54)
HGB BLD-MCNC: 11.7 G/DL (ref 14–18)
IMM GRANULOCYTES # BLD AUTO: 0.05 K/UL (ref 0–0.04)
IMM GRANULOCYTES NFR BLD AUTO: 0.5 % (ref 0–0.5)
LYMPHOCYTES # BLD AUTO: 3.7 K/UL (ref 1–4.8)
LYMPHOCYTES NFR BLD: 35.8 % (ref 18–48)
MAGNESIUM SERPL-MCNC: 2.3 MG/DL (ref 1.6–2.6)
MCH RBC QN AUTO: 32.1 PG (ref 27–31)
MCHC RBC AUTO-ENTMCNC: 32.4 G/DL (ref 32–36)
MCV RBC AUTO: 99 FL (ref 82–98)
MONOCYTES # BLD AUTO: 1 K/UL (ref 0.3–1)
MONOCYTES NFR BLD: 9.3 % (ref 4–15)
NEUTROPHILS # BLD AUTO: 5.4 K/UL (ref 1.8–7.7)
NEUTROPHILS NFR BLD: 52.2 % (ref 38–73)
NRBC BLD-RTO: 0 /100 WBC
PHOSPHATE SERPL-MCNC: 4.9 MG/DL (ref 2.7–4.5)
PLATELET # BLD AUTO: 470 K/UL (ref 150–450)
PMV BLD AUTO: 9.4 FL (ref 9.2–12.9)
POCT GLUCOSE: 130 MG/DL (ref 70–110)
POCT GLUCOSE: 133 MG/DL (ref 70–110)
POCT GLUCOSE: 135 MG/DL (ref 70–110)
POCT GLUCOSE: 156 MG/DL (ref 70–110)
POTASSIUM SERPL-SCNC: 4.3 MMOL/L (ref 3.5–5.1)
PROT SERPL-MCNC: 7.1 G/DL (ref 6–8.4)
RBC # BLD AUTO: 3.65 M/UL (ref 4.6–6.2)
SODIUM SERPL-SCNC: 138 MMOL/L (ref 136–145)
WBC # BLD AUTO: 10.24 K/UL (ref 3.9–12.7)

## 2023-03-02 PROCEDURE — 80053 COMPREHEN METABOLIC PANEL: CPT | Performed by: HOSPITALIST

## 2023-03-02 PROCEDURE — 85025 COMPLETE CBC W/AUTO DIFF WBC: CPT | Performed by: HOSPITALIST

## 2023-03-02 PROCEDURE — 84100 ASSAY OF PHOSPHORUS: CPT | Performed by: HOSPITALIST

## 2023-03-02 PROCEDURE — 36415 COLL VENOUS BLD VENIPUNCTURE: CPT | Performed by: HOSPITALIST

## 2023-03-02 PROCEDURE — 12000002 HC ACUTE/MED SURGE SEMI-PRIVATE ROOM

## 2023-03-02 PROCEDURE — 99233 PR SUBSEQUENT HOSPITAL CARE,LEVL III: ICD-10-PCS | Mod: ,,, | Performed by: HOSPITALIST

## 2023-03-02 PROCEDURE — 99233 SBSQ HOSP IP/OBS HIGH 50: CPT | Mod: ,,, | Performed by: HOSPITALIST

## 2023-03-02 PROCEDURE — 83735 ASSAY OF MAGNESIUM: CPT | Performed by: HOSPITALIST

## 2023-03-02 RX ORDER — ALUMINUM HYDROXIDE, MAGNESIUM HYDROXIDE, AND SIMETHICONE 2400; 240; 2400 MG/30ML; MG/30ML; MG/30ML
30 SUSPENSION ORAL EVERY 6 HOURS PRN
Status: DISCONTINUED | OUTPATIENT
Start: 2023-03-02 | End: 2023-03-03 | Stop reason: HOSPADM

## 2023-03-02 NOTE — PLAN OF CARE
03/02/23 1441   Discharge Assessment   Assessment Type Discharge Planning Assessment   Confirmed/corrected address, phone number and insurance Yes   Confirmed Demographics Correct on Facesheet   Source of Information patient   Communicated MINDY with patient/caregiver Date not available/Unable to determine   Reason For Admission MARIE   People in Home spouse   Facility Arrived From: Home   Do you expect to return to your current living situation? Yes   Do you have help at home or someone to help you manage your care at home? Yes   Who are your caregiver(s) and their phone number(s)? Alhaji Pena [phone]   Prior to hospitilization cognitive status: Alert/Oriented   Current cognitive status: Alert/Oriented   Home Accessibility not wheelchair accessible   Home Layout Able to live on 1st floor   Equipment Currently Used at Home none   Readmission within 30 days? No   Patient currently being followed by outpatient case management? No   Do you currently have service(s) that help you manage your care at home? No   Do you take prescription medications? Yes   Do you have prescription coverage? Yes   Coverage BCBS   Do you have any problems affording any of your prescribed medications? TBD   Is the patient taking medications as prescribed? yes   Who is going to help you get home at discharge? Alhaji Pena [phone]   How do you get to doctors appointments? car, drives self;family or friend will provide   Are you on dialysis? No   Do you take coumadin? No   Discharge Plan A Home with family   Discharge Plan B Home   DME Needed Upon Discharge  none     Pt lives alone with his spouse in a one story home with 1 SARA. Pt was independent prior to admission and uses no DME, is not on dialysis or Coumadin. Pt has transportation home with family. SW will follow for all discharge planning needs.     JOSÉ ANTONIO Duarte LMSW  [facility]  [id number]

## 2023-03-02 NOTE — ASSESSMENT & PLAN NOTE
Patient with leucocytosis   Recent Labs   Lab 03/01/23  1021 03/02/23  0504   WBC 13.07* 10.24     . Afebrile. BCX 2, urine culture pending . likely secondary to stress..  WBC counts uptrending.

## 2023-03-02 NOTE — ASSESSMENT & PLAN NOTE
was seen by colorectal surgery clinic today for rectal bleeding x 1 week . Last Colonoscopy: 15 yrs ago at [facility] with  polyps per patient t. was recommended Anusol bid for 2 weeks and plan for  colonoscopy   3/2- H/H stable

## 2023-03-02 NOTE — ASSESSMENT & PLAN NOTE
atleast for 4 days. mentions he had a blood sugar spike of 500s on last saturday.denies flashes or floaters . has been using reading glasses used by his wife. likely lens swelling from his acute hyperglycemia.   glycemic control. needs eye exam to r/o diabetic retinopathy

## 2023-03-02 NOTE — ASSESSMENT & PLAN NOTE
baseline CKD -4. Patient with acute kidney injury likely due to IVVD/dehydration MARIE is currently worsening. Labs reviewed- Renal function/electrolytes with Estimated Creatinine Clearance: 29.3 mL/min (A) (based on SCr of 3.8 mg/dL (H)). according to latest data. Monitor urine output and serial BMP and adjust therapy as needed. Avoid nephrotoxins and renally dose meds for GFR listed above.   3/1    Strict I/O monitor . Obtain urine lytes , renal sonogram  and bladder scans q 6h  Recent Labs   Lab 03/01/23  1021 03/02/23  0504   BUN 65* 59*   CREATININE 5.2* 3.8*    hold valsartan     3/2- on IVF, MOUNJARO ( diabetes med)  overdose as outpt.

## 2023-03-02 NOTE — HOSPITAL COURSE
"3/2 - /65 VSS sat 92%.  Cr 3.8 improved, BS stable. Not on IVFs.  Gave mylanta for heartburn. Pt eating and hydrating self orally. Ambulating. Here for monitoring BS after ingestion of long acting hypoglycemics.     3/3- /68   Pulse 77    Recent Labs     03/01/23 2037 03/02/23  0729 03/02/23  1122 03/02/23  1529 03/02/23  2000 03/03/23  0815   POCTGLUCOSE 130* 135* 156* 130* 133* 128*     Pt has home glucose monitor. Instructed to monitor closely next week. Will f/u Endocrine and Pcp for diabetes management. Instructed not to take Mounjaro until follow up. In retrospect, pt has misread the instructions because the paper script had folded removing the words "Once weekly" from his view. 2022 baseline cr was 1-9 to 2.6. Today's cr improving, on oral hydration and food. Good urinations 4 x, high volumes. Pt is ambulatory. Will dc pt to home.  "

## 2023-03-02 NOTE — ASSESSMENT & PLAN NOTE
Patient's with Normocytic anemia.. Hemoglobin downtrending. Etiology likely due to chronic disease  and acute blood loss.  Current CBC reviewed-    Recent Labs   Lab 03/01/23  1021 03/02/23  0504   HGB 12.8* 11.7*      Monitor CBC and transfuse if H/H drops below 7/21.

## 2023-03-02 NOTE — ASSESSMENT & PLAN NOTE
CKD 4 with IgA nephropathy presents with MARIE  Baseline creatinine 3, admission 5.    His story is concerning for volume depletion, though he reports that he was hypotensive and symptomatic several days ago. Chart review shows that he has not had a history of hypotension. This is all compounded by baseline CKD 4 and IgA nephropathy    Of note: there have been several case reports of GLP-1 association with MARIE in CKD    Plan/Recommendation:  -MARIE improving, near baseline.   -Renal diabetic diet, will need close follow up with nephrology following discharge.   -Keep MAP > 65  -Keep hemoglobin > 7  -Strict ins and outs  -Avoid nephrotoxic agents if possible  -Avoid drastic hemodynamic changes if possible

## 2023-03-02 NOTE — SUBJECTIVE & OBJECTIVE
Interval History: no acute events overnight. Serum creatinine improved with IVF    Review of patient's allergies indicates:  No Known Allergies  Current Facility-Administered Medications   Medication Frequency    dextrose 10% bolus 125 mL 125 mL PRN    dextrose 10% bolus 250 mL 250 mL PRN    dextrose 40 % gel 15,000 mg PRN    dextrose 40 % gel 30,000 mg PRN    glucagon (human recombinant) injection 1 mg PRN    hydrocortisone 2.5 % rectal cream BID    insulin aspart U-100 pen 0-5 Units QID (AC + HS) PRN    naloxone 0.4 mg/mL injection 0.02 mg PRN       Objective:     Vital Signs (Most Recent):  Temp: 98 °F (36.7 °C) (03/02/23 1124)  Pulse: 80 (03/02/23 1124)  Resp: 18 (03/02/23 1124)  BP: 129/65 (03/02/23 1124)  SpO2: (!) 92 % (03/02/23 1124)   Vital Signs (24h Range):  Temp:  [98 °F (36.7 °C)-98.6 °F (37 °C)] 98 °F (36.7 °C)  Pulse:  [] 80  Resp:  [17-20] 18  SpO2:  [91 %-98 %] 92 %  BP: (116-139)/(61-73) 129/65     Weight: 107.5 kg (237 lb) (03/01/23 1403)  Body mass index is 33.05 kg/m².  Body surface area is 2.32 meters squared.    No intake/output data recorded.    Physical Exam  Constitutional:       General: He is not in acute distress.     Appearance: He is obese. He is not ill-appearing or toxic-appearing.   HENT:      Head: Normocephalic and atraumatic.      Nose: Nose normal. No congestion.      Mouth/Throat:      Mouth: Mucous membranes are dry.   Eyes:      General: No scleral icterus.        Right eye: No discharge.         Left eye: No discharge.      Pupils: Pupils are equal, round, and reactive to light.   Cardiovascular:      Rate and Rhythm: Normal rate.      Heart sounds: No murmur heard.  Pulmonary:      Effort: Pulmonary effort is normal. No respiratory distress.      Breath sounds: No wheezing.   Abdominal:      General: Abdomen is flat. There is no distension.      Tenderness: There is no abdominal tenderness.   Musculoskeletal:      Cervical back: Normal range of motion.      Right  lower leg: No edema.      Left lower leg: No edema.   Skin:     General: Skin is warm.      Coloration: Skin is not jaundiced.   Neurological:      Mental Status: He is alert.       Significant Labs:  All labs within the past 24 hours have been reviewed.     Significant Imaging:  Labs: Reviewed

## 2023-03-02 NOTE — ASSESSMENT & PLAN NOTE
Chronic, controlled.  Latest blood pressure and vitals reviewed-   Temp:  [98 °F (36.7 °C)-98.6 °F (37 °C)]   Pulse:  []   Resp:  [17-20]   BP: (116-139)/(61-73)   SpO2:  [91 %-98 %] .   Home meds for hypertension were reviewed and old amlodipine, chlorthalidone and valsartan for now  PRN meds if BP> 180/110 mm HG

## 2023-03-02 NOTE — ASSESSMENT & PLAN NOTE
Body mass index is 33.05 kg/m². Morbid obesity complicates all aspects of disease management from diagnostic modalities to treatment. Weight loss encouraged

## 2023-03-02 NOTE — SUBJECTIVE & OBJECTIVE
Interval History: see above    Review of Systems   Constitutional:  Negative for activity change, appetite change and fatigue.   HENT:  Negative for trouble swallowing.    Respiratory:  Negative for shortness of breath.    Cardiovascular:  Negative for chest pain.   Gastrointestinal:  Negative for constipation, diarrhea, nausea and rectal pain.   Genitourinary:  Negative for difficulty urinating.   Musculoskeletal:  Negative for arthralgias, gait problem and neck stiffness.   Neurological:  Negative for headaches.   Psychiatric/Behavioral:  Negative for behavioral problems and confusion. The patient is not nervous/anxious and is not hyperactive.    Objective:     Vital Signs (Most Recent):  Temp: 98 °F (36.7 °C) (03/02/23 1124)  Pulse: 80 (03/02/23 1124)  Resp: 18 (03/02/23 1124)  BP: 129/65 (03/02/23 1124)  SpO2: (!) 92 % (03/02/23 1124)   Vital Signs (24h Range):  Temp:  [98 °F (36.7 °C)-98.6 °F (37 °C)] 98 °F (36.7 °C)  Pulse:  [] 80  Resp:  [17-20] 18  SpO2:  [91 %-98 %] 92 %  BP: (116-139)/(61-73) 129/65     Weight: 107.5 kg (237 lb)  Body mass index is 33.05 kg/m².  No intake or output data in the 24 hours ending 03/02/23 1148   Physical Exam  Constitutional:       General: He is not in acute distress.     Appearance: Normal appearance. He is not ill-appearing, toxic-appearing or diaphoretic.   HENT:      Head: Normocephalic and atraumatic.      Nose: Nose normal.      Mouth/Throat:      Mouth: Mucous membranes are moist.      Pharynx: Oropharynx is clear.   Eyes:      General: No scleral icterus.     Extraocular Movements: Extraocular movements intact.      Conjunctiva/sclera: Conjunctivae normal.      Pupils: Pupils are equal, round, and reactive to light.   Cardiovascular:      Rate and Rhythm: Normal rate and regular rhythm.      Pulses: Normal pulses.      Heart sounds: Normal heart sounds.   Pulmonary:      Effort: Pulmonary effort is normal. No respiratory distress.      Breath sounds: Normal  breath sounds. No stridor. No wheezing, rhonchi or rales.   Chest:      Chest wall: No tenderness.   Abdominal:      General: Abdomen is flat. Bowel sounds are normal. There is no distension.      Palpations: Abdomen is soft.      Tenderness: There is no abdominal tenderness. There is no right CVA tenderness, guarding or rebound.   Musculoskeletal:         General: No swelling, tenderness, deformity or signs of injury. Normal range of motion.      Cervical back: Normal range of motion and neck supple. No rigidity or tenderness.      Right lower leg: No edema.      Left lower leg: No edema.   Skin:     General: Skin is warm and dry.      Coloration: Skin is not jaundiced.      Findings: No erythema or rash.   Neurological:      General: No focal deficit present.      Mental Status: He is alert and oriented to person, place, and time. Mental status is at baseline.      Motor: No weakness.   Psychiatric:         Mood and Affect: Mood normal.         Behavior: Behavior normal.         Thought Content: Thought content normal.         Judgment: Judgment normal.       Significant Labs: All pertinent labs within the past 24 hours have been reviewed.  CBC:   Recent Labs   Lab 03/01/23  1021 03/02/23  0504   WBC 13.07* 10.24   HGB 12.8* 11.7*   HCT 38.8* 36.1*   * 470*     CMP:   Recent Labs   Lab 03/01/23  1021 03/02/23  0504    138   K 5.1 4.3    104   CO2 23 21*   * 123*   BUN 65* 59*   CREATININE 5.2* 3.8*   CALCIUM 11.0* 10.1   PROT 8.2 7.1   ALBUMIN 3.6 3.1*   BILITOT 0.4 0.4   ALKPHOS 75 76   AST 16 14   ALT 20 19   ANIONGAP 14 13       Significant Imaging: I have reviewed all pertinent imaging results/findings within the past 24 hours.

## 2023-03-02 NOTE — PROGRESS NOTES
[facility] ([address] Medicine  Progress Note    Patient Name: Edouard Pena  MRN: [id number]  Patient Class: IP- Inpatient   Admission Date: 3/1/2023  Length of Stay: 1 days  Attending Physician: Mary Adan MD  Primary Care Provider: Primary Doctor No        Subjective:     Principal Problem:MARIE (acute kidney injury)        HPI:  MR. Edouard Pena is a 47 y.o. male with a PMHx of T2DM, HTN, HLD, CKD 4 CKD secondary to IgA nephropathy referred  to ER  for  MARIE.      AAOX3. Patient was recently admitted 2 weeks ago for an MARIE and hyperglycemia 2/15 and discharged 2/16 . was started on a new diabetes medication, MOUNJARO   2.5 mg into the skin every 7 days. he was supposed to inject once weekly but he missed read the instructions and has injected it every day for a week.  decreased  oral intakex 4 days with poor appetite.  was seen by colorectal surgery clinic today for rectal bleeding x 1 week . Last Colonoscopy: 15 yrs ago at [facility] with polyps per patient report. was recommended Anusol bid for 2 weeks and plan for  colonoscopy. c/o  Bilateral blurred vision atleast for 4 days. mentions he had a blood sugar spike of 500s on last saturday.denies flashes or floaters . has been using reading glasses used by his wife.       ER course - Lab work  Cr  3.1 to 5.2. calcium  11. Hb 14.4-->12.4 received LR bolus 2L in ER . patient supposed to follow up with  nephrology clinc tomorrow.  Nephrology consulted       Overview/Hospital Course:  3/2 - /65 VSS sat 92%.  Cr 3.8 improved, BS stable. Not on IVFs.  Gave mylanta for heartburn. Pt eating and hydrating self orally. Ambulating. Here for monitoring BS after ingestion of long acting hypoglycemics.       Interval History: see above    Review of Systems   Constitutional:  Negative for activity change, appetite change and fatigue.   HENT:  Negative for trouble swallowing.    Respiratory:  Negative for shortness of breath.     Cardiovascular:  Negative for chest pain.   Gastrointestinal:  Negative for constipation, diarrhea, nausea and rectal pain.   Genitourinary:  Negative for difficulty urinating.   Musculoskeletal:  Negative for arthralgias, gait problem and neck stiffness.   Neurological:  Negative for headaches.   Psychiatric/Behavioral:  Negative for behavioral problems and confusion. The patient is not nervous/anxious and is not hyperactive.    Objective:     Vital Signs (Most Recent):  Temp: 98 °F (36.7 °C) (03/02/23 1124)  Pulse: 80 (03/02/23 1124)  Resp: 18 (03/02/23 1124)  BP: 129/65 (03/02/23 1124)  SpO2: (!) 92 % (03/02/23 1124)   Vital Signs (24h Range):  Temp:  [98 °F (36.7 °C)-98.6 °F (37 °C)] 98 °F (36.7 °C)  Pulse:  [] 80  Resp:  [17-20] 18  SpO2:  [91 %-98 %] 92 %  BP: (116-139)/(61-73) 129/65     Weight: 107.5 kg (237 lb)  Body mass index is 33.05 kg/m².  No intake or output data in the 24 hours ending 03/02/23 1148   Physical Exam  Constitutional:       General: He is not in acute distress.     Appearance: Normal appearance. He is not ill-appearing, toxic-appearing or diaphoretic.   HENT:      Head: Normocephalic and atraumatic.      Nose: Nose normal.      Mouth/Throat:      Mouth: Mucous membranes are moist.      Pharynx: Oropharynx is clear.   Eyes:      General: No scleral icterus.     Extraocular Movements: Extraocular movements intact.      Conjunctiva/sclera: Conjunctivae normal.      Pupils: Pupils are equal, round, and reactive to light.   Cardiovascular:      Rate and Rhythm: Normal rate and regular rhythm.      Pulses: Normal pulses.      Heart sounds: Normal heart sounds.   Pulmonary:      Effort: Pulmonary effort is normal. No respiratory distress.      Breath sounds: Normal breath sounds. No stridor. No wheezing, rhonchi or rales.   Chest:      Chest wall: No tenderness.   Abdominal:      General: Abdomen is flat. Bowel sounds are normal. There is no distension.      Palpations: Abdomen is soft.       Tenderness: There is no abdominal tenderness. There is no right CVA tenderness, guarding or rebound.   Musculoskeletal:         General: No swelling, tenderness, deformity or signs of injury. Normal range of motion.      Cervical back: Normal range of motion and neck supple. No rigidity or tenderness.      Right lower leg: No edema.      Left lower leg: No edema.   Skin:     General: Skin is warm and dry.      Coloration: Skin is not jaundiced.      Findings: No erythema or rash.   Neurological:      General: No focal deficit present.      Mental Status: He is alert and oriented to person, place, and time. Mental status is at baseline.      Motor: No weakness.   Psychiatric:         Mood and Affect: Mood normal.         Behavior: Behavior normal.         Thought Content: Thought content normal.         Judgment: Judgment normal.       Significant Labs: All pertinent labs within the past 24 hours have been reviewed.  CBC:   Recent Labs   Lab 03/01/23  1021 03/02/23  0504   WBC 13.07* 10.24   HGB 12.8* 11.7*   HCT 38.8* 36.1*   * 470*     CMP:   Recent Labs   Lab 03/01/23  1021 03/02/23  0504    138   K 5.1 4.3    104   CO2 23 21*   * 123*   BUN 65* 59*   CREATININE 5.2* 3.8*   CALCIUM 11.0* 10.1   PROT 8.2 7.1   ALBUMIN 3.6 3.1*   BILITOT 0.4 0.4   ALKPHOS 75 76   AST 16 14   ALT 20 19   ANIONGAP 14 13       Significant Imaging: I have reviewed all pertinent imaging results/findings within the past 24 hours.      Assessment/Plan:      * MARIE (acute kidney injury)  baseline CKD -4. Patient with acute kidney injury likely due to IVVD/dehydration MARIE is currently worsening. Labs reviewed- Renal function/electrolytes with Estimated Creatinine Clearance: 29.3 mL/min (A) (based on SCr of 3.8 mg/dL (H)). according to latest data. Monitor urine output and serial BMP and adjust therapy as needed. Avoid nephrotoxins and renally dose meds for GFR listed above.   3/1    Strict I/O monitor . Obtain  urine lytes , renal sonogram  and bladder scans q 6h  Recent Labs   Lab 03/01/23  1021 03/02/23  0504   BUN 65* 59*   CREATININE 5.2* 3.8*    hold valsartan     3/2- on IVF, MOUNJARO ( diabetes med)  overdose as outpt.     Chronic kidney disease, stage 4 (severe)  follows up with nephrology.  IGa Nephropathy per biopsy 9/2022.      HTN (hypertension)  Chronic, controlled.  Latest blood pressure and vitals reviewed-   Temp:  [98 °F (36.7 °C)-98.6 °F (37 °C)]   Pulse:  []   Resp:  [17-20]   BP: (116-139)/(61-73)   SpO2:  [91 %-98 %] .   Home meds for hypertension were reviewed and old amlodipine, chlorthalidone and valsartan for now  PRN meds if BP> 180/110 mm HG      Hypercalcemia  Recent Labs   Lab 03/01/23  1021 03/02/23  0504   CALCIUM 11.0* 10.1      obtain PTH. monitor     Leucocytosis    Patient with leucocytosis   Recent Labs   Lab 03/01/23  1021 03/02/23  0504   WBC 13.07* 10.24     . Afebrile. BCX 2, urine culture pending . likely secondary to stress..  WBC counts uptrending.      Newly diagnosed diabetes  Patient's FSGs are not controlled on current hypoglycemics.   Last A1c reviewed-   Lab Results   Component Value Date    HGBA1C 10.1 (H) 03/01/2023    HGBA1C 9.7 (H) 01/19/2023    HGBA1C 6.7 (H) 09/30/2019     Will hold PO hypoglycemics and will start correctional scale insulin  Most recent fingerstick glucose reviewed-   Recent Labs   Lab 03/01/23  1537 03/01/23  2037 03/02/23  0729 03/02/23  1122   POCTGLUCOSE 138* 130* 135* 156*     currently on   Antihyperglycemics (From admission, onward)    Start     Stop Route Frequency Ordered    03/01/23 1600  insulin aspart U-100 pen 0-5 Units         -- SubQ Before meals & nightly PRN 03/01/23 1500       . Patient was recently admitted 2 weeks ago for an MARIE and hyperglycemia 2/15 and discharged 2/16 . was started on a new diabetes medication, MOUNJARO   2.5 mg into the skin every 7 days. he was supposed to inject once weekly but he missed read the  instructions and has injected it every day for a week.   consider Endorcine eval     Anemia  Patient's with Normocytic anemia.. Hemoglobin downtrending. Etiology likely due to chronic disease  and acute blood loss.  Current CBC reviewed-    Recent Labs   Lab 03/01/23  1021 03/02/23  0504   HGB 12.8* 11.7*      Monitor CBC and transfuse if H/H drops below 7/21.       Blurred vision, bilateral  atleast for 4 days. mentions he had a blood sugar spike of 500s on last saturday.denies flashes or floaters . has been using reading glasses used by his wife. likely lens swelling from his acute hyperglycemia.   glycemic control. needs eye exam to r/o diabetic retinopathy     Hyperuricemia  on allopurinol       Rectal bleed   was seen by colorectal surgery clinic today for rectal bleeding x 1 week . Last Colonoscopy: 15 yrs ago at [facility] with  polyps per patient t. was recommended Anusol bid for 2 weeks and plan for  colonoscopy   3/2- H/H stable    IgA nephropathy  with CKD  at baseline       Obesity (BMI 30-39.9)  Body mass index is 33.05 kg/m². Morbid obesity complicates all aspects of disease management from diagnostic modalities to treatment. Weight loss encouraged     VTE Risk Mitigation (From admission, onward)         Ordered     IP VTE HIGH RISK PATIENT  Once         03/01/23 1500     Place sequential compression device  Until discontinued         03/01/23 1500                Discharge Planning   MINDY:      Code Status: Full Code   Is the patient medically ready for discharge?:     Reason for patient still in hospital (select all that apply): Patient trending condition  Discharge Plan A: Home with family          Mary Adan MD  Senior Hospitalist  Department of Hospital Medicine  [facility]  [id number], [phone]    [facility] - Intensive Care ([place]-[address])

## 2023-03-02 NOTE — MEDICAL/APP STUDENT
[facility] Medicine  Progress Note    Patient Name: Edouard Pena  MRN: [id number]  YOB: 1975  Patient Class: IP- Inpatient  Admission Date: 3/1/2023  Length of Stay: 1  Today's Date: 03/02/2023  Code Status: Full Code  Attending Physician: Mary Adan MD       Subjective     Principal Problem: MARIE (acute kidney injury)    HPI:    Edouard Pena is a 47 y.o. male with pmhx significant for IgA nephropathy, CKD 4 (baseline creatinine 3), newly diagnosed T2DM, tobacco abuse, HTN, and hyperuricemia, referred to the ED by PCP for evaluation of MARIE.      Pt reports that he was diagnosed with T2DM by an ED physician 2 weeks ago. At that time, he describes experiencing blurry vision while at work. He had outpatient labs ordered by his nephrologist and was noted to be hyperglycemic (blood glucose 600+) and with a new MARIE and advised to come to the ED.      He reports that he was discharged with insulin and Mounjaro for diabetes control, but due to delay in insurance coverage of Mounjaro, did not begin administration until 5 days ago. He states that he misread the instructions and administered Mounjaro once daily for 4 days rather than once a week for 4 weeks, in addition to his insulin. Yesterday he experienced bilateral blurry vision and presented to his new diabetes clinic. Outpatient labs showed creatinine had increased from 3 to 5, and he was instructed to present to the ED.     Today Mr. Pena reports he is largely at his baseline, but does endorse persistent blurry vision. He denies urinary symptoms, NVD, dizziness, or further acute complaints.     Hospital Course: No notes on file     Interval History: No events overnight.    Review of Systems:  Review of Systems   Constitutional:  Negative for chills and fever.   Eyes:  Positive for blurred vision.   Respiratory:  Negative for cough and shortness of breath.    Cardiovascular:  Negative for chest pain and leg swelling.    Gastrointestinal:  Negative for diarrhea, nausea and vomiting.   Genitourinary:  Negative for dysuria and hematuria.   Neurological:  Negative for dizziness.     Objective     Vital Signs Recent:  Temp: 98 °F (36.7 °C) (03/02/23 1124)  Pulse: 80 (03/02/23 1124)  Resp: 18 (03/02/23 1124)  BP: 129/65 (03/02/23 1124)  SpO2: (!) 92 % (03/02/23 1124)    Oxygen Documentation:                          Vital Signs Range (Last 24H):  Temp:  [98 °F (36.7 °C)-98.6 °F (37 °C)]   Pulse:  []   Resp:  [17-20]   BP: (116-139)/(61-73)   SpO2:  [91 %-98 %]        I & O (Last 24H):No intake or output data in the 24 hours ending 03/02/23 1131     Physical Exam:  Physical Exam  Constitutional:       Appearance: He is obese.   HENT:      Head: Normocephalic and atraumatic.   Eyes:      Extraocular Movements: Extraocular movements intact.   Cardiovascular:      Rate and Rhythm: Normal rate and regular rhythm.      Heart sounds: Normal heart sounds.   Pulmonary:      Effort: Pulmonary effort is normal.      Breath sounds: Normal breath sounds.   Abdominal:      Palpations: Abdomen is soft.      Tenderness: There is no abdominal tenderness.   Musculoskeletal:         General: No swelling or tenderness.   Skin:     General: Skin is warm and dry.   Neurological:      Mental Status: He is alert and oriented to person, place, and time.   Psychiatric:         Mood and Affect: Mood normal.         Behavior: Behavior normal.       Labs:   Recent Labs   Lab 03/01/23  1021 03/02/23  0504    138   K 5.1 4.3    104   CO2 23 21*   BUN 65* 59*   CREATININE 5.2* 3.8*   * 123*   CALCIUM 11.0* 10.1   MG  --  2.3   PHOS  --  4.9*     Recent Labs   Lab 03/01/23  1021 03/02/23  0504   ALKPHOS 75 76   ALT 20 19   AST 16 14   ALBUMIN 3.6 3.1*   PROT 8.2 7.1   BILITOT 0.4 0.4     Recent Labs   Lab 03/01/23  1021 03/02/23  0504   WBC 13.07* 10.24   HGB 12.8* 11.7*   HCT 38.8* 36.1*   * 470*       Diagnostic Results:  ***    Scheduled Meds:   hydrocortisone   Rectal BID     Continuous Infusions:  PRN Meds:dextrose 10%, dextrose 10%, dextrose, dextrose, glucagon (human recombinant), insulin aspart U-100, naloxone    Assessment/Plan     Mr. Edouard Pena is a 47 y.o. male with a relevant medical history of IgA nephropathy, CKD 4 (baseline creatinine 3), newly diagnosed T2DM, tobacco abuse, and HTN who is being followed up for MARIE (acute kidney injury). Admission sCr 5.2 (baseline sCr 3 )    -- Ddx ***  -- Trend sCr  -- Trend RFP; replete electrolytes PRN for goal K > 4, Phos > 3, Mg > 2  -- Strict I&Os  -- Avoid nephrotoxic agents  -- Renally adjust medications  -- Consult nephrology    Active Hospital Problems    Diagnosis  POA    *MARIE (acute kidney injury) [N17.9]  Unknown    Hypercalcemia [E83.52]  Yes    Rectal bleed [K62.5]  Yes    Anemia [D64.9]  Yes    Obesity (BMI 30-39.9) [E66.9]  Unknown    Blurred vision, bilateral [H53.8]  Yes    Leucocytosis [D72.829]  Unknown    IgA nephropathy [N02.8]  Yes    Newly diagnosed diabetes [E11.9]  Yes    Hyperuricemia [E79.0]  Yes    HTN (hypertension) [I10]  Yes    Chronic kidney disease, stage 4 (severe) [N18.4]  Yes     47 y/o M with hx of HTN, Gout and prior history of glomerulonephritis who comes in for follow up evaluation. Pt was last evaluated by a kidney doctor 5y ago in 2017. At that time it was suspected he had stable disease, possibly IgA nephropathy. Biopsy was deferred and expectant observation was recommended. Pt now comes back to the Nephrology clinic with worsening renal dysfunction and proteinuria. Blood pressure also significantly elevated. He refers his father suffered from kidney disease and had a complication of bleeding after a biopsy and required dialysis however he cannot remember the diagnosis. He believes it was related to hypertension. Pt denies any recent nausea, vomits, diarrhea, radiocontrast administration, dehydration or NSAID use.     Interval  Hx:  9/15/22: Kidney biopsy results noted and consistent with IgA nephropathy. Worsening kidney function with creatinine up to 2.9.   10/20/22: Serum creatinine appears to have peaked at 3.6 and currently down to 3.2 as pt refers he has been working on lifestyle modifications. Tooth infection/abscess resolved after extraction and pt has completed his antibiotics.   11/10/22: sCr down to 2.6, UPCR up to 4. Pt refers he just realized he had not been taking the prednisone. [place] approved on appeal.         Resolved Hospital Problems   No resolved problems to display.         Diet:  Dietary Orders (From admission, onward)       Start     Ordered    03/01/23 1558  Diet diabetic [facility]; 2000 Calorie; Renal  Diet effective now        Question Answer Comment   Indicate patient location for additional diet options: [facility]    Total calories: 2000 Calorie    Additional Diet Options: Renal        03/01/23 1557                     DVT Ppx:    VTE Risk Mitigation (From admission, onward)           Ordered     IP VTE HIGH RISK PATIENT  Once         03/01/23 1500     Place sequential compression device  Until discontinued         03/01/23 1500                     DISCHARGE PLANNING:  MINDY:   Code Status: Full Code  Is the patient medically ready for discharge:   Reason for patient still in hospital:  Discharge Plan A:        Katie Gerber, MS3

## 2023-03-02 NOTE — ASSESSMENT & PLAN NOTE
Patient's FSGs are not controlled on current hypoglycemics.   Last A1c reviewed-   Lab Results   Component Value Date    HGBA1C 10.1 (H) 03/01/2023    HGBA1C 9.7 (H) 01/19/2023    HGBA1C 6.7 (H) 09/30/2019     Will hold PO hypoglycemics and will start correctional scale insulin  Most recent fingerstick glucose reviewed-   Recent Labs   Lab 03/01/23  1537 03/01/23  2037 03/02/23  0729 03/02/23  1122   POCTGLUCOSE 138* 130* 135* 156*     currently on   Antihyperglycemics (From admission, onward)    Start     Stop Route Frequency Ordered    03/01/23 1600  insulin aspart U-100 pen 0-5 Units         -- SubQ Before meals & nightly PRN 03/01/23 1500       . Patient was recently admitted 2 weeks ago for an MARIE and hyperglycemia 2/15 and discharged 2/16 . was started on a new diabetes medication, MOUNJARO   2.5 mg into the skin every 7 days. he was supposed to inject once weekly but he missed read the instructions and has injected it every day for a week.   consider Endorcine eval

## 2023-03-02 NOTE — PROGRESS NOTES
[facility] ([place] [address])  Nephrology  Progress Note    Patient Name: Edouard Pena  MRN: [id number]  Admission Date: 3/1/2023  Hospital Length of Stay: 1 days  Attending Provider: Mary Adan MD   Primary Care Physician: Primary Doctor No  Principal Problem:MARIE (acute kidney injury)    Subjective:     HPI: 47 year old man with CKD 4 (baseline serum creatinine 3) due to IgA nephropathy, DM, HTN, tobacco abuse presents with MARIE. He was recently seen in nephrology clinic and was sent to the ED for sugar of 600+ management. He was given IVF and started on Manjaro. After he was discharged, he picked up his medication. He misread the label and took his the manjaro once daily for 4 days rather than once a week for 4 weeks. He states that he was nauseous, but did not have diarrhea or vomiting. He reports that he has been forcing himself to drink 2-3 bottles of water daily. He furthermore reports that on 2/25, his blood pressure was 90/50 and he was lightheaded at that time     He was recently seen in diabetes clinic where it was noted that his creatinine had increased from 3 to 5.       Interval History: no acute events overnight. Serum creatinine improved with IVF    Review of patient's allergies indicates:  No Known Allergies  Current Facility-Administered Medications   Medication Frequency    dextrose 10% bolus 125 mL 125 mL PRN    dextrose 10% bolus 250 mL 250 mL PRN    dextrose 40 % gel 15,000 mg PRN    dextrose 40 % gel 30,000 mg PRN    glucagon (human recombinant) injection 1 mg PRN    hydrocortisone 2.5 % rectal cream BID    insulin aspart U-100 pen 0-5 Units QID (AC + HS) PRN    naloxone 0.4 mg/mL injection 0.02 mg PRN       Objective:     Vital Signs (Most Recent):  Temp: 98 °F (36.7 °C) (03/02/23 1124)  Pulse: 80 (03/02/23 1124)  Resp: 18 (03/02/23 1124)  BP: 129/65 (03/02/23 1124)  SpO2: (!) 92 % (03/02/23 1124)   Vital Signs (24h Range):  Temp:  [98 °F (36.7 °C)-98.6 °F (37 °C)]  98 °F (36.7 °C)  Pulse:  [] 80  Resp:  [17-20] 18  SpO2:  [91 %-98 %] 92 %  BP: (116-139)/(61-73) 129/65     Weight: 107.5 kg (237 lb) (03/01/23 1403)  Body mass index is 33.05 kg/m².  Body surface area is 2.32 meters squared.    No intake/output data recorded.    Physical Exam  Constitutional:       General: He is not in acute distress.     Appearance: He is obese. He is not ill-appearing or toxic-appearing.   HENT:      Head: Normocephalic and atraumatic.      Nose: Nose normal. No congestion.      Mouth/Throat:      Mouth: Mucous membranes are dry.   Eyes:      General: No scleral icterus.        Right eye: No discharge.         Left eye: No discharge.      Pupils: Pupils are equal, round, and reactive to light.   Cardiovascular:      Rate and Rhythm: Normal rate.      Heart sounds: No murmur heard.  Pulmonary:      Effort: Pulmonary effort is normal. No respiratory distress.      Breath sounds: No wheezing.   Abdominal:      General: Abdomen is flat. There is no distension.      Tenderness: There is no abdominal tenderness.   Musculoskeletal:      Cervical back: Normal range of motion.      Right lower leg: No edema.      Left lower leg: No edema.   Skin:     General: Skin is warm.      Coloration: Skin is not jaundiced.   Neurological:      Mental Status: He is alert.       Significant Labs:  All labs within the past 24 hours have been reviewed.     Significant Imaging:  Labs: Reviewed    Assessment/Plan:     Renal/  * MARIE (acute kidney injury)  CKD 4 with IgA nephropathy presents with MARIE  Baseline creatinine 3, admission 5.    His story is concerning for volume depletion, though he reports that he was hypotensive and symptomatic several days ago. Chart review shows that he has not had a history of hypotension. This is all compounded by baseline CKD 4 and IgA nephropathy    Of note: there have been several case reports of GLP-1 association with MARIE in CKD    Plan/Recommendation:  -MARIE improving, near  baseline.   -Renal diabetic diet, will need close follow up with nephrology following discharge.   -Keep MAP > 65  -Keep hemoglobin > 7  -Strict ins and outs  -Avoid nephrotoxic agents if possible  -Avoid drastic hemodynamic changes if possible      IgA nephropathy  Kidney biopsy (9/8/22): IgA dominant diffuse global granular mesangial with segmental capillary loop staining by IF, diffuse mild increase in mesangial cellularity and 30-40% interstitial fibrosis. There is no activity in that isaiah is no endocapillary proliferation, crescents, or fibrinoid necrosis. Three worse prognostic indicators present; significant mesangial proliferation, segmental sclerosis and interstitial fibrosis.     Oncology  Anemia  Goal hemoglobin in CKD 4 is 10-12  Hemoglobin   Date Value Ref Range Status   03/01/2023 12.8 (L) 14.0 - 18.0 g/dL Final               Thank you for your consult. I will follow-up with patient. Please contact us if you have any additional questions.    Micky Izquierdo MD  Nephrology  [facility] ([place])

## 2023-03-02 NOTE — PROGRESS NOTES
Nutrition-Related Diabetes Education      Time Spent: 10 min    Learners: Pt    Current HbA1c: 10.0    Is patient aware of their A1c and their goal A1c? Yes    Nutrition Education with handouts: Educated pt on CHO counting for people with diabetes. Pt verbalized understanding. Emphasized the importance of diet adherence. Pt with no additional questions. No other needs identified. Left all education material with pt at bedside.    Comments: Pt stated he just started on insulin and Manjaro. He misread the label and took his the manjaro once daily for 4 days rather than once a week for 4 weeks. Stated that the medication suppressed his appetite in the past week. Per wt hx, noted wt loss of 7 lb (3%) x 2 weeks. Pt appears nourished physically. At risk of malnutrition if PO intake remains low.    Barriers to Learning: No    Follow up: Yes    Please consult as needed.  Thank you!

## 2023-03-03 ENCOUNTER — TELEPHONE (OUTPATIENT)
Dept: OPHTHALMOLOGY | Facility: CLINIC | Age: 48
End: 2023-03-03
Payer: COMMERCIAL

## 2023-03-03 VITALS
SYSTOLIC BLOOD PRESSURE: 126 MMHG | HEIGHT: 71 IN | WEIGHT: 237 LBS | HEART RATE: 86 BPM | DIASTOLIC BLOOD PRESSURE: 73 MMHG | TEMPERATURE: 97 F | OXYGEN SATURATION: 94 % | RESPIRATION RATE: 18 BRPM | BODY MASS INDEX: 33.18 KG/M2

## 2023-03-03 LAB
ALBUMIN SERPL BCP-MCNC: 3 G/DL (ref 3.5–5.2)
ALP SERPL-CCNC: 76 U/L (ref 55–135)
ALT SERPL W/O P-5'-P-CCNC: 16 U/L (ref 10–44)
ANION GAP SERPL CALC-SCNC: 12 MMOL/L (ref 8–16)
AST SERPL-CCNC: 13 U/L (ref 10–40)
BASOPHILS # BLD AUTO: 0.04 K/UL (ref 0–0.2)
BASOPHILS NFR BLD: 0.4 % (ref 0–1.9)
BILIRUB SERPL-MCNC: 0.3 MG/DL (ref 0.1–1)
BUN SERPL-MCNC: 60 MG/DL (ref 6–20)
CALCIUM SERPL-MCNC: 10.4 MG/DL (ref 8.7–10.5)
CHLORIDE SERPL-SCNC: 104 MMOL/L (ref 95–110)
CO2 SERPL-SCNC: 21 MMOL/L (ref 23–29)
CREAT SERPL-MCNC: 3.4 MG/DL (ref 0.5–1.4)
DIFFERENTIAL METHOD: ABNORMAL
EOSINOPHIL # BLD AUTO: 0.2 K/UL (ref 0–0.5)
EOSINOPHIL NFR BLD: 1.6 % (ref 0–8)
ERYTHROCYTE [DISTWIDTH] IN BLOOD BY AUTOMATED COUNT: 13.2 % (ref 11.5–14.5)
EST. GFR  (NO RACE VARIABLE): 21.2 ML/MIN/1.73 M^2
GLUCOSE SERPL-MCNC: 110 MG/DL (ref 70–110)
HCT VFR BLD AUTO: 38 % (ref 40–54)
HGB BLD-MCNC: 12.1 G/DL (ref 14–18)
IMM GRANULOCYTES # BLD AUTO: 0.04 K/UL (ref 0–0.04)
IMM GRANULOCYTES NFR BLD AUTO: 0.4 % (ref 0–0.5)
LYMPHOCYTES # BLD AUTO: 4.7 K/UL (ref 1–4.8)
LYMPHOCYTES NFR BLD: 43.8 % (ref 18–48)
MAGNESIUM SERPL-MCNC: 2.4 MG/DL (ref 1.6–2.6)
MCH RBC QN AUTO: 32 PG (ref 27–31)
MCHC RBC AUTO-ENTMCNC: 31.8 G/DL (ref 32–36)
MCV RBC AUTO: 101 FL (ref 82–98)
MONOCYTES # BLD AUTO: 0.9 K/UL (ref 0.3–1)
MONOCYTES NFR BLD: 8.6 % (ref 4–15)
NEUTROPHILS # BLD AUTO: 4.9 K/UL (ref 1.8–7.7)
NEUTROPHILS NFR BLD: 45.2 % (ref 38–73)
NRBC BLD-RTO: 0 /100 WBC
PHOSPHATE SERPL-MCNC: 4.5 MG/DL (ref 2.7–4.5)
PLATELET # BLD AUTO: 454 K/UL (ref 150–450)
PMV BLD AUTO: 9.1 FL (ref 9.2–12.9)
POCT GLUCOSE: 128 MG/DL (ref 70–110)
POCT GLUCOSE: 171 MG/DL (ref 70–110)
POTASSIUM SERPL-SCNC: 4.4 MMOL/L (ref 3.5–5.1)
PROT SERPL-MCNC: 7.1 G/DL (ref 6–8.4)
RBC # BLD AUTO: 3.78 M/UL (ref 4.6–6.2)
SODIUM SERPL-SCNC: 137 MMOL/L (ref 136–145)
WBC # BLD AUTO: 10.81 K/UL (ref 3.9–12.7)

## 2023-03-03 PROCEDURE — 36415 COLL VENOUS BLD VENIPUNCTURE: CPT | Performed by: HOSPITALIST

## 2023-03-03 PROCEDURE — 99239 PR HOSPITAL DISCHARGE DAY,>30 MIN: ICD-10-PCS | Mod: ,,, | Performed by: HOSPITALIST

## 2023-03-03 PROCEDURE — 85025 COMPLETE CBC W/AUTO DIFF WBC: CPT | Performed by: HOSPITALIST

## 2023-03-03 PROCEDURE — 99239 HOSP IP/OBS DSCHRG MGMT >30: CPT | Mod: ,,, | Performed by: HOSPITALIST

## 2023-03-03 PROCEDURE — 83735 ASSAY OF MAGNESIUM: CPT | Performed by: HOSPITALIST

## 2023-03-03 PROCEDURE — 84100 ASSAY OF PHOSPHORUS: CPT | Performed by: HOSPITALIST

## 2023-03-03 PROCEDURE — 80053 COMPREHEN METABOLIC PANEL: CPT | Performed by: HOSPITALIST

## 2023-03-03 RX ORDER — AMLODIPINE BESYLATE 10 MG/1
10 TABLET ORAL DAILY
Qty: 90 TABLET | Refills: 3 | Status: SHIPPED | OUTPATIENT
Start: 2023-03-03 | End: 2023-03-23

## 2023-03-03 NOTE — CLINICAL REVIEW
Nephrology Chart Review      Intake/Output Summary (Last 24 hours) at 3/3/2023 1310  Last data filed at 3/2/2023 1745  Gross per 24 hour   Intake 240 ml   Output --   Net 240 ml       Vitals:    03/03/23 0525 03/03/23 0816 03/03/23 1151 03/03/23 1202   BP: 125/70 130/68  126/73   BP Location: Right arm      Patient Position: Lying      Pulse: 77 77 90 86   Resp: 18 18  18   Temp: 98.2 °F (36.8 °C) 98 °F (36.7 °C)  97.4 °F (36.3 °C)   TempSrc: Oral      SpO2: 96% (!) 93%  (!) 94%   Weight:       Height:           Recent Labs   Lab 03/01/23  1021 03/02/23  0504 03/03/23  0454    138 137   K 5.1 4.3 4.4    104 104   CO2 23 21* 21*   BUN 65* 59* 60*   CREATININE 5.2* 3.8* 3.4*   CALCIUM 11.0* 10.1 10.4   PHOS  --  4.9* 4.5       Serum creatinine continuing to improve.   Follow up with nephrology on discharge    No other related issues identified. Please call Nephrology as needed; We will continue to follow.      Micky Izquierdo MD  PGY-4 Nephrology

## 2023-03-03 NOTE — PLAN OF CARE
[facility] ([facility]  Discharge Final Note    Primary Care Provider: Primary Doctor No    Expected Discharge Date: 3/3/2023    Final Discharge Note (most recent)       Final Note - 03/03/23 1147          Final Note    Assessment Type Final Discharge Note (P)      Anticipated Discharge Disposition Home or Self Care (P)      What phone number can be called within the next 1-3 days to see how you are doing after discharge? [phone] (P)      Hospital Resources/Appts/Education Provided Provided patient/caregiver with written discharge plan information;Appointments scheduled by Navigator/Coordinator (P)         Post-Acute Status    Discharge Delays None known at this time (P)                    Pt discharging home. Pt's appointments are scheduled and in his AVS, Dr. All Benavidse's office in nephrology will contact Pt with an appointment time. Pt has no other post acute needs and is cleared for discharge from a case management standpoint.     JOSÉ ANTONIO Duarte, LMSW [facility]  [phone]

## 2023-03-03 NOTE — ASSESSMENT & PLAN NOTE
Patient with leucocytosis   Recent Labs   Lab 03/01/23  1021 03/02/23  0504 03/03/23  0454   WBC 13.07* 10.24 10.81     . Afebrile. BCX 2, urine culture pending . likely secondary to stress..  WBC counts uptrending.

## 2023-03-03 NOTE — ASSESSMENT & PLAN NOTE
Patient's FSGs are not controlled on current hypoglycemics.   Last A1c reviewed-   Lab Results   Component Value Date    HGBA1C 10.1 (H) 03/01/2023    HGBA1C 9.7 (H) 01/19/2023    HGBA1C 6.7 (H) 09/30/2019     Will hold PO hypoglycemics and will start correctional scale insulin  Most recent fingerstick glucose reviewed-   Recent Labs   Lab 03/02/23  1122 03/02/23  1529 03/02/23  2000 03/03/23  0815   POCTGLUCOSE 156* 130* 133* 128*     currently on   Antihyperglycemics (From admission, onward)    Start     Stop Route Frequency Ordered    03/01/23 1600  insulin aspart U-100 pen 0-5 Units         -- SubQ Before meals & nightly PRN 03/01/23 1500       . Patient was recently admitted 2 weeks ago for an MARIE and hyperglycemia 2/15 and discharged 2/16 . was started on a new diabetes medication, MOUNJARO   2.5 mg into the skin every 7 days. he was supposed to inject once weekly but he missed read the instructions and has injected it every day for a week.   consider Endorcine eval   3/3 pt instructed to stop Mounjaro until follw up and repeat BMP.

## 2023-03-03 NOTE — ASSESSMENT & PLAN NOTE
Recent Labs   Lab 03/01/23  1021 03/02/23  0504 03/03/23  0454   CALCIUM 11.0* 10.1 10.4      obtain PTH. monitor

## 2023-03-03 NOTE — PLAN OF CARE
Problem: Adult Inpatient Plan of Care  Goal: Plan of Care Review  Outcome: Ongoing, Progressing  Goal: Optimal Comfort and Wellbeing  Outcome: Ongoing, Progressing  Goal: Readiness for Transition of Care  Outcome: Ongoing, Progressing     Problem: Diabetes Comorbidity  Goal: Blood Glucose Level Within Targeted Range  Outcome: Ongoing, Progressing     Problem: Fluid and Electrolyte Imbalance (Acute Kidney Injury/Impairment)  Goal: Fluid and Electrolyte Balance  Outcome: Ongoing, Progressing     Problem: Renal Function Impairment (Acute Kidney Injury/Impairment)  Goal: Effective Renal Function  Outcome: Ongoing, Progressing

## 2023-03-03 NOTE — ASSESSMENT & PLAN NOTE
baseline CKD -4. Patient with acute kidney injury likely due to IVVD/dehydration AMRIE is currently worsening. Labs reviewed- Renal function/electrolytes with Estimated Creatinine Clearance: 32.8 mL/min (A) (based on SCr of 3.4 mg/dL (H)). according to latest data. Monitor urine output and serial BMP and adjust therapy as needed. Avoid nephrotoxins and renally dose meds for GFR listed above.   3/1    Strict I/O monitor . Obtain urine lytes , renal sonogram  and bladder scans q 6h  Recent Labs   Lab 03/01/23  1021 03/02/23  0504 03/03/23  0454   BUN 65* 59* 60*   CREATININE 5.2* 3.8* 3.4*    hold valsartan     3/2- on IVF, MOUNJARO ( diabetes med)  overdose as outpt.   3/3- improving on oral hydration.  F/u endocrine and pcp. Hold Mounjaro until f/u and repeat BMP.

## 2023-03-03 NOTE — ASSESSMENT & PLAN NOTE
Patient's with Normocytic anemia.. Hemoglobin downtrending. Etiology likely due to chronic disease  and acute blood loss.  Current CBC reviewed-    Recent Labs   Lab 03/01/23  1021 03/02/23  0504 03/03/23  0454   HGB 12.8* 11.7* 12.1*      Monitor CBC and transfuse if H/H drops below 7/21.

## 2023-03-03 NOTE — ASSESSMENT & PLAN NOTE
Chronic, controlled.  Latest blood pressure and vitals reviewed-   Temp:  [98 °F (36.7 °C)-98.8 °F (37.1 °C)]   Pulse:  [67-83]   Resp:  [18-20]   BP: (115-132)/(65-71)   SpO2:  [92 %-96 %] .   Home meds for hypertension were reviewed and old amlodipine, chlorthalidone and valsartan for now  PRN meds if BP> 180/110 mm HG    3/3 - stable

## 2023-03-03 NOTE — PLAN OF CARE
[facility] scheduled pcp f/u appt   Future Appointments   Date Time Provider Department Center   3/8/2023 10:00 AM Adriana Lehman NP [facility] ENDOMATHIEU [place] [facility]   3/8/2023  1:30 PM Rocío Nance NP [facility]   3/17/2023  7:00 AM Peggy Tello RN [facility] INTSHATNI [place]   3/29/2023  9:30 AM Kristofer Menard MD [facility]   7/5/2023  9:00 AM Ky Walters, OD [facility] OPTOMTY [place]

## 2023-03-03 NOTE — ASSESSMENT & PLAN NOTE
was seen by colorectal surgery clinic today for rectal bleeding x 1 week . Last Colonoscopy: 15 yrs ago at [facility] with  polyps per patient t. was recommended Anusol bid for 2 weeks and plan for  colonoscopy   3/2- H/H stable

## 2023-03-03 NOTE — TELEPHONE ENCOUNTER
----- Message from Promise Walton MA sent at 3/2/2023  4:45 PM CST -----     Hospital follow up  Received: Yesterday  Fred Chapin MD  [facility] Ophthalmology Triage  [facility],     This patient needs a future apt for a diabetic eye screening with optometry, likely within 3-6 months but no rush.     Thank you,     Fred Chapin   [facility] ophthalmology

## 2023-03-04 NOTE — DISCHARGE SUMMARY
[facility] - Intensive Care ([place] [address])  [facility] Medicine  Discharge Summary      Patient Name: Edouard Pena  MRN: [id number]  MARLEE: [id number]  Patient Class: IP- Inpatient  Admission Date: 3/1/2023  Hospital Length of Stay: 2 days  Discharge Date and Time: 3/3/2023  3:19 PM  Attending Physician: Luann att. providers found   Discharging Provider: Mary Adan MD  Primary Care Provider: Primary Doctor Luann  [facility] Medicine Team: [facility] Mary Adan MD  Primary Care Team: [facility]    HPI:   MR. Edouard Pena is a 47 y.o. male with a PMHx of T2DM, HTN, HLD, CKD 4 CKD secondary to IgA nephropathy referred  to ER  for  MARIE.      AAOX3. Patient was recently admitted 2 weeks ago for an MARIE and hyperglycemia 2/15 and discharged 2/16 . was started on a new diabetes medication, MOUNJARO   2.5 mg into the skin every 7 days. he was supposed to inject once weekly but he missed read the instructions and has injected it every day for a week.  decreased  oral intakex 4 days with poor appetite.  was seen by colorectal surgery clinic today for rectal bleeding x 1 week . Last Colonoscopy: 15 yrs ago at [facility] with polyps per patient report. was recommended Anusol bid for 2 weeks and plan for  colonoscopy. c/o  Bilateral blurred vision atleast for 4 days. mentions he had a blood sugar spike of 500s on last saturday.denies flashes or floaters . has been using reading glasses used by his wife.       ER course - Lab work  Cr  3.1 to 5.2. calcium  11. Hb 14.4-->12.4 received LR bolus 2L in ER . patient supposed to follow up with  nephrology clinc tomorrow.  Nephrology consulted       * No surgery found *      Hospital Course:   3/2 - /65 VSS sat 92%.  Cr 3.8 improved, BS stable. Not on IVFs.  Gave mylanta for heartburn. Pt eating and hydrating self orally. Ambulating. Here for monitoring BS after ingestion of long acting hypoglycemics.     3/3- /68   Pulse 77    Recent Labs     03/01/23 2037  "03/02/23  0729 03/02/23  1122 03/02/23  1529 03/02/23 2000 03/03/23  0815   POCTGLUCOSE 130* 135* 156* 130* 133* 128*     Pt has home glucose monitor. Instructed to monitor closely next week. Will f/u Endocrine and Pcp for diabetes management. Instructed not to take Mounjaro until follow up. In retrospect, pt has misread the instructions because the paper script had folded removing the words "Once weekly" from his view. 2022 baseline cr was 1-9 to 2.6. Today's cr improving, on oral hydration and food. Good urinations 4 x, high volumes. Pt is ambulatory. Will dc pt to home.       Goals of Care Treatment Preferences:  Code Status: Full Code      Consults:   Consults (From admission, onward)        Status Ordering Provider     Inpatient consult to Nephrology  Once        Provider:  (Not yet assigned)    ZANA Azevedo  Blurred vision, bilateral  atleast for 4 days. mentions he had a blood sugar spike of 500s on last saturday.denies flashes or floaters . has been using reading glasses used by his wife. likely lens swelling from his acute hyperglycemia.   glycemic control. needs eye exam to r/o diabetic retinopathy     Cardiac/Vascular  HTN (hypertension)  Chronic, controlled.  Latest blood pressure and vitals reviewed-   Temp:  [98 °F (36.7 °C)-98.8 °F (37.1 °C)]   Pulse:  [67-83]   Resp:  [18-20]   BP: (115-132)/(65-71)   SpO2:  [92 %-96 %] .   Home meds for hypertension were reviewed and old amlodipine, chlorthalidone and valsartan for now  PRN meds if BP> 180/110 mm HG    3/3 - stable      Renal/  * MARIE (acute kidney injury)  baseline CKD -4. Patient with acute kidney injury likely due to IVVD/dehydration MARIE is currently worsening. Labs reviewed- Renal function/electrolytes with Estimated Creatinine Clearance: 32.8 mL/min (A) (based on SCr of 3.4 mg/dL (H)). according to latest data. Monitor urine output and serial BMP and adjust therapy as needed. Avoid nephrotoxins and renally dose " meds for GFR listed above.   3/1    Strict I/O monitor . Obtain urine lytes , renal sonogram  and bladder scans q 6h  Recent Labs   Lab 03/01/23  1021 03/02/23  0504 03/03/23  0454   BUN 65* 59* 60*   CREATININE 5.2* 3.8* 3.4*    hold valsartan     3/2- on IVF, MOUNJARO ( diabetes med)  overdose as outpt.   3/3- improving on oral hydration.  F/u endocrine and pcp. Hold Mounjaro until f/u and repeat BMP.    IgA nephropathy  with CKD  at baseline       Hypercalcemia  Recent Labs   Lab 03/01/23  1021 03/02/23  0504 03/03/23  0454   CALCIUM 11.0* 10.1 10.4      obtain PTH. monitor     Chronic kidney disease, stage 4 (severe)  follows up with nephrology.  IgA Nephropathy per biopsy 9/2022.  3/3- f/u nephrology      Oncology  Anemia  Patient's with Normocytic anemia.. Hemoglobin downtrending. Etiology likely due to chronic disease  and acute blood loss.  Current CBC reviewed-    Recent Labs   Lab 03/01/23  1021 03/02/23  0504 03/03/23  0454   HGB 12.8* 11.7* 12.1*      Monitor CBC and transfuse if H/H drops below 7/21.       Leucocytosis    Patient with leucocytosis   Recent Labs   Lab 03/01/23  1021 03/02/23  0504 03/03/23  0454   WBC 13.07* 10.24 10.81     . Afebrile. BCX 2, urine culture pending . likely secondary to stress..  WBC counts uptrending.      Endocrine  Obesity (BMI 30-39.9)  Body mass index is 33.05 kg/m². Morbid obesity complicates all aspects of disease management from diagnostic modalities to treatment. Weight loss encouraged     Newly diagnosed diabetes  Patient's FSGs are not controlled on current hypoglycemics.   Last A1c reviewed-   Lab Results   Component Value Date    HGBA1C 10.1 (H) 03/01/2023    HGBA1C 9.7 (H) 01/19/2023    HGBA1C 6.7 (H) 09/30/2019     Will hold PO hypoglycemics and will start correctional scale insulin  Most recent fingerstick glucose reviewed-   Recent Labs   Lab 03/02/23  1122 03/02/23  1529 03/02/23 2000 03/03/23  0815   POCTGLUCOSE 156* 130* 133* 128*     currently on    Antihyperglycemics (From admission, onward)    Start     Stop Route Frequency Ordered    03/01/23 1600  insulin aspart U-100 pen 0-5 Units         -- SubQ Before meals & nightly PRN 03/01/23 1500       . Patient was recently admitted 2 weeks ago for an MARIE and hyperglycemia 2/15 and discharged 2/16 . was started on a new diabetes medication, MOUNJARO   2.5 mg into the skin every 7 days. he was supposed to inject once weekly but he missed read the instructions and has injected it every day for a week.   consider Endorcine eval   3/3 pt instructed to stop Mounjaro until follw up and repeat BMP.     GI  Rectal bleed   was seen by colorectal surgery clinic today for rectal bleeding x 1 week . Last Colonoscopy: 15 yrs ago at [facility] with  polyps per patient t. was recommended Anusol bid for 2 weeks and plan for  colonoscopy   3/2- H/H stable    Orthopedic  Hyperuricemia  on allopurinol         Final Active Diagnoses:    Diagnosis Date Noted POA    PRINCIPAL PROBLEM:  MARIE (acute kidney injury) [N17.9] 02/15/2023 Yes    Chronic kidney disease, stage 4 (severe) [N18.4] 11/14/2012 Yes    HTN (hypertension) [I10] 11/14/2012 Yes    Hypercalcemia [E83.52] 03/01/2023 Yes    Leucocytosis [D72.829] 02/16/2023 Yes    Newly diagnosed diabetes [E11.9] 10/03/2019 Yes    Anemia [D64.9] 03/01/2023 Yes    Blurred vision, bilateral [H53.8] 03/01/2023 Yes    Hyperuricemia [E79.0] 11/09/2016 Yes    Rectal bleed [K62.5] 03/01/2023 Yes    IgA nephropathy [N02.8] 10/20/2022 Yes    Obesity (BMI 30-39.9) [E66.9] 03/01/2023 Yes      Problems Resolved During this Admission:       Discharged Condition: good    Disposition: Home or Self Care    Follow Up:    Patient Instructions:      BASIC METABOLIC PANEL   Standing Status: Future Standing Exp. Date: 05/01/24     Ambulatory referral/consult to Nephrology   Standing Status: Future   Referral Priority: Routine Referral Type: Consultation   Referral Reason: Specialty Services Required  "  Requested Specialty: Nephrology   Number of Visits Requested: 1     Ambulatory referral/consult to Internal Medicine   Standing Status: Future   Referral Priority: Routine Referral Type: Consultation   Referral Reason: Specialty Services Required   Requested Specialty: Internal Medicine   Number of Visits Requested: 1       Significant Diagnostic Studies: Labs:   CMP   Recent Labs   Lab 03/03/23  0454      K 4.4      CO2 21*      BUN 60*   CREATININE 3.4*   CALCIUM 10.4   PROT 7.1   ALBUMIN 3.0*   BILITOT 0.3   ALKPHOS 76   AST 13   ALT 16   ANIONGAP 12    and CBC   Recent Labs   Lab 03/03/23  0454   WBC 10.81   HGB 12.1*   HCT 38.0*   *       Pending Diagnostic Studies:     None         Medications:  Reconciled Home Medications:      Medication List      CONTINUE taking these medications    acetaminophen 500 MG tablet  Commonly known as: TYLENOL  Take 1,000 mg by mouth every 4 (four) hours as needed for Pain.     allopurinoL 100 MG tablet  Commonly known as: ZYLOPRIM  Take 1 tablet (100 mg total) by mouth once daily.     amLODIPine 10 MG tablet  Commonly known as: NORVASC  Take 1 tablet (10 mg total) by mouth once daily.hold if SBP less than 130.     BD ULTRA-FINE DAVID PEN NEEDLE 32 gauge x 5/32" Ndle  Generic drug: pen needle, diabetic  Use to inject insulin 4 times daily     blood sugar diagnostic Strp  1 strip by Misc.(Non-Drug; Combo Route) route 2 (two) times daily. ICD 10: E11.22     blood-glucose meter kit  Use as instructed to check blood sugar two times a day. ICD 10: E11.22     calcitRIOL 0.25 MCG Cap  Commonly known as: ROCALTROL  Take 1 capsule (0.25 mcg total) by mouth once daily.     colchicine 0.6 mg tablet  Commonly known as: COLCRYS  0.6 mg daily as needed (gout flare-up).     HumaLOG KwikPen Insulin 100 unit/mL pen  Generic drug: insulin lispro  Inject 2-10 Units into the skin 3 (three) times daily as needed. Blood Glucose: 180 - 230 + 2 unit, 231- 280  + 4 units, 281 - " 330 + 6 units, 331 - 380 + 8 units, > 380   + 10 units     hydrocortisone 2.5 % rectal cream  Commonly known as: ANUSOL-HC  Place rectally 2 (two) times daily.     JARDIANCE 10 mg tablet  Generic drug: empagliflozin  Take 1 tablet (10 mg total) by mouth once daily.     lancets Misc  1 lancet by Misc.(Non-Drug; Combo Route) route 2 (two) times daily. ICD 10: E11.22     LEVEMIR FLEXTOUCH U-100 INSULN 100 unit/mL (3 mL) Inpn pen  Generic drug: insulin detemir U-100  Inject 16 Units into the skin every evening. Eat a snack before bed if BG is < 100 mg/dl     polyethylene glycol 17 gram/dose powder  Commonly known as: GLYCOLAX  Take 17 g by mouth once daily.     prednisoLONE acetate 1 % Drps  Commonly known as: PRED FORTE  Place 1 drop into both eyes daily as needed (Inflammation).     sodium bicarbonate 650 MG tablet  Take 1 tablet (650 mg total) by mouth 3 (three) times daily.     TARPEYO 4 mg Cpdr  Generic drug: budesonide  Take 16 mg by mouth once daily.     TUMS ORAL  Take 1 tablet by mouth daily as needed (Heartburn).        STOP taking these medications    chlorthalidone 25 MG Tab  Commonly known as: HYGROTEN     MOUNJARO 2.5 mg/0.5 mL Pnij  Generic drug: tirzepatide     MOUNJARO 5 mg/0.5 mL Pnij  Generic drug: tirzepatide     valsartan 320 MG tablet  Commonly known as: DIOVAN            Indwelling Lines/Drains at time of discharge:   Lines/Drains/Airways     None                 Time spent on the discharge of patient: 35 minutes         Mary Adan MD  Department of Hospital Medicine  [facility] - Intensive Care ([place]-16)

## 2023-03-08 ENCOUNTER — OFFICE VISIT (OUTPATIENT)
Dept: INTERNAL MEDICINE | Facility: CLINIC | Age: 48
End: 2023-03-08
Payer: COMMERCIAL

## 2023-03-08 VITALS
OXYGEN SATURATION: 95 % | DIASTOLIC BLOOD PRESSURE: 86 MMHG | HEIGHT: 71 IN | WEIGHT: 230.19 LBS | HEART RATE: 86 BPM | SYSTOLIC BLOOD PRESSURE: 120 MMHG | BODY MASS INDEX: 32.23 KG/M2

## 2023-03-08 DIAGNOSIS — H53.8 BLURRED VISION, BILATERAL: ICD-10-CM

## 2023-03-08 DIAGNOSIS — E78.5 HYPERLIPIDEMIA, UNSPECIFIED HYPERLIPIDEMIA TYPE: ICD-10-CM

## 2023-03-08 DIAGNOSIS — E83.52 HYPERCALCEMIA: ICD-10-CM

## 2023-03-08 DIAGNOSIS — D64.9 ANEMIA, UNSPECIFIED TYPE: ICD-10-CM

## 2023-03-08 DIAGNOSIS — E11.9 NEWLY DIAGNOSED DIABETES: ICD-10-CM

## 2023-03-08 DIAGNOSIS — N02.B9 IGA NEPHROPATHY: ICD-10-CM

## 2023-03-08 DIAGNOSIS — N18.4 CHRONIC KIDNEY DISEASE, STAGE 4 (SEVERE): ICD-10-CM

## 2023-03-08 DIAGNOSIS — Z72.0 TOBACCO USE: ICD-10-CM

## 2023-03-08 DIAGNOSIS — R07.89 CHEST DISCOMFORT: ICD-10-CM

## 2023-03-08 DIAGNOSIS — I10 HYPERTENSION, UNSPECIFIED TYPE: Primary | ICD-10-CM

## 2023-03-08 PROCEDURE — 3066F NEPHROPATHY DOC TX: CPT | Mod: CPTII,S$GLB,, | Performed by: NURSE PRACTITIONER

## 2023-03-08 PROCEDURE — 3062F POS MACROALBUMINURIA REV: CPT | Mod: CPTII,S$GLB,, | Performed by: NURSE PRACTITIONER

## 2023-03-08 PROCEDURE — 3046F HEMOGLOBIN A1C LEVEL >9.0%: CPT | Mod: CPTII,S$GLB,, | Performed by: NURSE PRACTITIONER

## 2023-03-08 PROCEDURE — 3074F PR MOST RECENT SYSTOLIC BLOOD PRESSURE < 130 MM HG: ICD-10-PCS | Mod: CPTII,S$GLB,, | Performed by: NURSE PRACTITIONER

## 2023-03-08 PROCEDURE — 3062F PR POS MACROALBUMINURIA RESULT DOCUMENTED/REVIEW: ICD-10-PCS | Mod: CPTII,S$GLB,, | Performed by: NURSE PRACTITIONER

## 2023-03-08 PROCEDURE — 3074F SYST BP LT 130 MM HG: CPT | Mod: CPTII,S$GLB,, | Performed by: NURSE PRACTITIONER

## 2023-03-08 PROCEDURE — 3008F BODY MASS INDEX DOCD: CPT | Mod: CPTII,S$GLB,, | Performed by: NURSE PRACTITIONER

## 2023-03-08 PROCEDURE — 99999 PR PBB SHADOW E&M-EST. PATIENT-LVL IV: ICD-10-PCS | Mod: PBBFAC,,, | Performed by: NURSE PRACTITIONER

## 2023-03-08 PROCEDURE — 1159F PR MEDICATION LIST DOCUMENTED IN MEDICAL RECORD: ICD-10-PCS | Mod: CPTII,S$GLB,, | Performed by: NURSE PRACTITIONER

## 2023-03-08 PROCEDURE — 99204 PR OFFICE/OUTPT VISIT, NEW, LEVL IV, 45-59 MIN: ICD-10-PCS | Mod: S$GLB,,, | Performed by: NURSE PRACTITIONER

## 2023-03-08 PROCEDURE — 82570 ASSAY OF URINE CREATININE: CPT | Performed by: NURSE PRACTITIONER

## 2023-03-08 PROCEDURE — 3046F PR MOST RECENT HEMOGLOBIN A1C LEVEL > 9.0%: ICD-10-PCS | Mod: CPTII,S$GLB,, | Performed by: NURSE PRACTITIONER

## 2023-03-08 PROCEDURE — 99999 PR PBB SHADOW E&M-EST. PATIENT-LVL IV: CPT | Mod: PBBFAC,,, | Performed by: NURSE PRACTITIONER

## 2023-03-08 PROCEDURE — 1159F MED LIST DOCD IN RCRD: CPT | Mod: CPTII,S$GLB,, | Performed by: NURSE PRACTITIONER

## 2023-03-08 PROCEDURE — 3008F PR BODY MASS INDEX (BMI) DOCUMENTED: ICD-10-PCS | Mod: CPTII,S$GLB,, | Performed by: NURSE PRACTITIONER

## 2023-03-08 PROCEDURE — 1111F DSCHRG MED/CURRENT MED MERGE: CPT | Mod: CPTII,S$GLB,, | Performed by: NURSE PRACTITIONER

## 2023-03-08 PROCEDURE — 3079F PR MOST RECENT DIASTOLIC BLOOD PRESSURE 80-89 MM HG: ICD-10-PCS | Mod: CPTII,S$GLB,, | Performed by: NURSE PRACTITIONER

## 2023-03-08 PROCEDURE — 99204 OFFICE O/P NEW MOD 45 MIN: CPT | Mod: S$GLB,,, | Performed by: NURSE PRACTITIONER

## 2023-03-08 PROCEDURE — 3079F DIAST BP 80-89 MM HG: CPT | Mod: CPTII,S$GLB,, | Performed by: NURSE PRACTITIONER

## 2023-03-08 PROCEDURE — 3066F PR DOCUMENTATION OF TREATMENT FOR NEPHROPATHY: ICD-10-PCS | Mod: CPTII,S$GLB,, | Performed by: NURSE PRACTITIONER

## 2023-03-08 PROCEDURE — 1111F PR DISCHARGE MEDS RECONCILED W/ CURRENT OUTPATIENT MED LIST: ICD-10-PCS | Mod: CPTII,S$GLB,, | Performed by: NURSE PRACTITIONER

## 2023-03-08 RX ORDER — LANCETS
1 EACH MISCELLANEOUS 3 TIMES DAILY
Qty: 300 EACH | Refills: 3 | Status: SHIPPED | OUTPATIENT
Start: 2023-03-08

## 2023-03-08 NOTE — PROGRESS NOTES
INTERNAL MEDICINE PROGRESS NOTE    CHIEF COMPLAINT     Chief Complaint   Patient presents with    Hospital Follow Up       HPI     Edouard Pena is a 47 y.o. male with CKD stage 4 2/2 IgA nephropathy followed by nephrology, newly dx DM2, HTN, HLD, Anemia, and tobacco use who presents for an  ED follow up visit today.    Here for hospital follow up - was admitted 3/1/2023 for MARIE following poor PO intake x 4 days from taking monjura every day instead of weekly   D/c'ed 3/3/2023    DM2- taking levemir 16 untis and lispor 2-10 units with meals.   Also taking jardiance   Stopped monjura   Lab Results   Component Value Date    HGBA1C 10.1 (H) 03/01/2023   Eye- needs - having blurry vision   Foot-  Microalbumin-  Held ARB 2/2 MARIE   +numbness lower legs and feet     HTN- taking amlodipine    CKD 4 2/2 IgA  nephrology dx with bx 9/2022 - followed with nephrology - taking calcitriol and budesonide     Gout- taking allopurinol and colchicine       Past Medical History:  Past Medical History:   Diagnosis Date    Anemia 3/1/2023    Chronic kidney disease     Chronic kidney disease stage II    Colon polyps     Hyperlipidemia 12/13/2012    Hypertension        Home Medications:  Prior to Admission medications    Medication Sig Start Date End Date Taking? Authorizing Provider   acetaminophen (TYLENOL) 500 MG tablet Take 1,000 mg by mouth every 4 (four) hours as needed for Pain.    Historical Provider   allopurinoL (ZYLOPRIM) 100 MG tablet Take 1 tablet (100 mg total) by mouth once daily. 8/26/22   All Benavides MD   amLODIPine (NORVASC) 10 MG tablet Take 1 tablet (10 mg total) by mouth once daily.hold if SBP less than 130. 3/3/23 3/2/24  Mary Adan MD   blood sugar diagnostic Strp 1 strip by Misc.(Non-Drug; Combo Route) route 2 (two) times daily. ICD 10: E11.22 10/3/19   Jacki Mai MD   blood-glucose meter kit Use as instructed to check blood sugar two times a day. ICD 10: E11.22 10/3/19   Jacki Mai  "MD   budesonide (TARPEYO) 4 mg CpDR Take 16 mg by mouth once daily. 9/15/22 10/27/23  All Benavides MD   calcitRIOL (ROCALTROL) 0.25 MCG Cap Take 1 capsule (0.25 mcg total) by mouth once daily. 1/19/23   All Benavides MD   calcium carbonate (TUMS ORAL) Take 1 tablet by mouth daily as needed (Heartburn).    Historical Provider   colchicine (COLCRYS) 0.6 mg tablet 0.6 mg daily as needed (gout flare-up). 3/15/22   Historical Provider   empagliflozin (JARDIANCE) 10 mg tablet Take 1 tablet (10 mg total) by mouth once daily. 2/16/23 8/15/23  Ishmael Robb PA-C   hydrocortisone (ANUSOL-HC) 2.5 % rectal cream Place rectally 2 (two) times daily. 3/1/23   Herminia Trevino NP   insulin detemir U-100 (LEVEMIR FLEXTOUCH U-100 INSULN) 100 unit/mL (3 mL) InPn pen Inject 16 Units into the skin every evening. Eat a snack before bed if BG is < 100 mg/dl 2/16/23 2/16/24  Ishmael Robb PA-C   insulin lispro (HUMALOG KWIKPEN INSULIN) 100 unit/mL pen Inject 2-10 Units into the skin 3 (three) times daily as needed. Blood Glucose: 180 - 230 + 2 unit, 231- 280  + 4 units, 281 - 330 + 6 units, 331 - 380 + 8 units, > 380   + 10 units 2/16/23 5/30/24  Ishmael Robb PA-C   lancets Misc 1 lancet by Misc.(Non-Drug; Combo Route) route 2 (two) times daily. ICD 10: E11.22 10/3/19   Jacki Mai MD   OPW TEST CLAIM - DO NOT FILL OPW test claim. Do not fill. 3/3/23   Brittnee Carbajal, Liza   pen needle, diabetic 32 gauge x 5/32" Ndle Use to inject insulin 4 times daily 2/16/23   Ishmael Robb PA-C   polyethylene glycol (GLYCOLAX) 17 gram/dose powder Take 17 g by mouth once daily. 3/1/23 2/24/24  Herminia Trevino NP   prednisoLONE acetate (PRED FORTE) 1 % DrpS Place 1 drop into both eyes daily as needed (Inflammation). 5/3/22   Historical Provider   sodium bicarbonate 650 MG tablet Take 1 tablet (650 mg total) by mouth 3 (three) times daily. 1/19/23 1/19/24  All Benavides MD       Review of Systems:  Review of Systems " "  Eyes:  Positive for visual disturbance.   Respiratory:  Positive for chest tightness. Negative for cough, shortness of breath and wheezing.    Cardiovascular:  Negative for chest pain and palpitations.   Gastrointestinal:  Positive for blood in stool and diarrhea. Negative for abdominal pain, constipation, nausea and vomiting.   Genitourinary:  Negative for dysuria and hematuria.   Neurological:  Positive for numbness. Negative for dizziness, light-headedness and headaches.     Health Maintainence:   Immunizations:  Health Maintenance         Date Due Completion Date    COVID-19 Vaccine (1) Never done ---    Diabetes Urine Screening Never done ---    Pneumococcal Vaccines (Age 0-64) (1 - PCV) Never done ---    Foot Exam Never done ---    Eye Exam Never done ---    TETANUS VACCINE Never done ---    Low Dose Statin Never done ---    Colorectal Cancer Screening Never done ---    Lipid Panel 09/30/2020 9/30/2019    Influenza Vaccine (1) Never done ---    Hemoglobin A1c 06/01/2023 3/1/2023             PHYSICAL EXAM     /86 (BP Location: Left arm, Patient Position: Sitting, BP Method: Medium (Manual))   Pulse 86   Ht 5' 11" (1.803 m)   Wt 104.4 kg (230 lb 2.6 oz)   SpO2 95%   BMI 32.10 kg/m²     Physical Exam  Vitals reviewed.   Constitutional:       Appearance: He is well-developed.   HENT:      Head: Normocephalic.      Right Ear: External ear normal.      Left Ear: External ear normal.      Nose: Nose normal.      Mouth/Throat:      Pharynx: No oropharyngeal exudate.   Eyes:      Pupils: Pupils are equal, round, and reactive to light.   Neck:      Thyroid: No thyromegaly.      Vascular: No JVD.      Trachea: No tracheal deviation.   Cardiovascular:      Rate and Rhythm: Normal rate and regular rhythm.      Pulses:           Dorsalis pedis pulses are 2+ on the right side and 2+ on the left side.        Posterior tibial pulses are 2+ on the right side and 2+ on the left side.      Heart sounds: No murmur " heard.    No friction rub. No gallop.   Pulmonary:      Effort: No respiratory distress.      Breath sounds: Normal breath sounds. No wheezing or rales.   Chest:      Chest wall: No tenderness.   Abdominal:      General: Bowel sounds are normal. There is no distension.      Palpations: Abdomen is soft.      Tenderness: There is no abdominal tenderness.   Musculoskeletal:         General: No tenderness. Normal range of motion.   Feet:      Right foot:      Protective Sensation: 5 sites tested.  5 sites sensed.      Skin integrity: Skin integrity normal.      Toenail Condition: Right toenails are normal.      Left foot:      Protective Sensation: 5 sites tested.  5 sites sensed.      Skin integrity: Skin integrity normal.      Toenail Condition: Left toenails are normal.   Lymphadenopathy:      Cervical: No cervical adenopathy.   Skin:     General: Skin is warm and dry.      Findings: No rash.   Neurological:      Mental Status: He is alert and oriented to person, place, and time.   Psychiatric:         Behavior: Behavior normal.       LABS     Lab Results   Component Value Date    HGBA1C 10.1 (H) 03/01/2023     CMP  Sodium   Date Value Ref Range Status   03/03/2023 137 136 - 145 mmol/L Final     Potassium   Date Value Ref Range Status   03/03/2023 4.4 3.5 - 5.1 mmol/L Final     Chloride   Date Value Ref Range Status   03/03/2023 104 95 - 110 mmol/L Final     CO2   Date Value Ref Range Status   03/03/2023 21 (L) 23 - 29 mmol/L Final     Glucose   Date Value Ref Range Status   03/03/2023 110 70 - 110 mg/dL Final     BUN   Date Value Ref Range Status   03/03/2023 60 (H) 6 - 20 mg/dL Final     Creatinine   Date Value Ref Range Status   03/03/2023 3.4 (H) 0.5 - 1.4 mg/dL Final     Calcium   Date Value Ref Range Status   03/03/2023 10.4 8.7 - 10.5 mg/dL Final     Total Protein   Date Value Ref Range Status   03/03/2023 7.1 6.0 - 8.4 g/dL Final     Albumin   Date Value Ref Range Status   03/03/2023 3.0 (L) 3.5 - 5.2 g/dL  Final     Total Bilirubin   Date Value Ref Range Status   03/03/2023 0.3 0.1 - 1.0 mg/dL Final     Comment:     For infants and newborns, interpretation of results should be based  on gestational age, weight and in agreement with clinical  observations.    Premature Infant recommended reference ranges:  Up to 24 hours.............<8.0 mg/dL  Up to 48 hours............<12.0 mg/dL  3-5 days..................<15.0 mg/dL  6-29 days.................<15.0 mg/dL       Alkaline Phosphatase   Date Value Ref Range Status   03/03/2023 76 55 - 135 U/L Final     AST   Date Value Ref Range Status   03/03/2023 13 10 - 40 U/L Final     ALT   Date Value Ref Range Status   03/03/2023 16 10 - 44 U/L Final     Anion Gap   Date Value Ref Range Status   03/03/2023 12 8 - 16 mmol/L Final     eGFR if    Date Value Ref Range Status   05/05/2022 39.5 (A) >60 mL/min/1.73 m^2 Final     eGFR if non    Date Value Ref Range Status   05/05/2022 34.2 (A) >60 mL/min/1.73 m^2 Final     Comment:     Calculation used to obtain the estimated glomerular filtration  rate (eGFR) is the CKD-EPI equation.        Lab Results   Component Value Date    WBC 10.81 03/03/2023    HGB 12.1 (L) 03/03/2023    HCT 38.0 (L) 03/03/2023     (H) 03/03/2023     (H) 03/03/2023     Lab Results   Component Value Date    CHOL 292 (H) 09/30/2019    CHOL 243 (H) 11/08/2016    CHOL 245 (H) 12/12/2012     Lab Results   Component Value Date    HDL 34 (L) 09/30/2019    HDL 31 (L) 11/08/2016    HDL 35 (L) 12/12/2012     Lab Results   Component Value Date    LDLCALC Invalid, Trig>400.0 09/30/2019    LDLCALC Invalid, Trig>400.0 11/08/2016    LDLCALC 168.0 (H) 12/12/2012     Lab Results   Component Value Date    TRIG 509 (H) 09/30/2019    TRIG 425 (H) 11/08/2016    TRIG 208 (H) 12/12/2012     Lab Results   Component Value Date    CHOLHDL 11.6 (L) 09/30/2019    CHOLHDL 12.8 (L) 11/08/2016    CHOLHDL 14.3 (L) 12/12/2012     Lab Results    Component Value Date    TSH 0.914 09/30/2019       ASSESSMENT/PLAN     Edouard Pena is a 47 y.o. male     Hypertension, unspecified type- at goal. Will cont current meds. Low na diet   -     Foot Exam Performed  -     CBC Auto Differential; Future; Expected date: 03/08/2023  -     Comprehensive Metabolic Panel; Future; Expected date: 03/08/2023  -     Vitamin B12 Deficiency Panel; Future; Expected date: 03/08/2023  -     TSH; Future; Expected date: 03/08/2023  -     Lipid Panel; Future; Expected date: 03/08/2023  -     URIC ACID; Future; Expected date: 03/08/2023  -     Microalbumin/Creatinine Ratio, Urine    Hyperlipidemia, unspecified hyperlipidemia type- stable. Will cont current meds.   -     Foot Exam Performed  -     CBC Auto Differential; Future; Expected date: 03/08/2023  -     Comprehensive Metabolic Panel; Future; Expected date: 03/08/2023  -     Vitamin B12 Deficiency Panel; Future; Expected date: 03/08/2023  -     TSH; Future; Expected date: 03/08/2023  -     Lipid Panel; Future; Expected date: 03/08/2023  -     URIC ACID; Future; Expected date: 03/08/2023  -     Microalbumin/Creatinine Ratio, Urine    Chronic kidney disease, stage 4 (severe)- will f/u with nephrology and monitor. Stable.   -     Foot Exam Performed  -     CBC Auto Differential; Future; Expected date: 03/08/2023  -     Comprehensive Metabolic Panel; Future; Expected date: 03/08/2023  -     Vitamin B12 Deficiency Panel; Future; Expected date: 03/08/2023  -     TSH; Future; Expected date: 03/08/2023  -     Lipid Panel; Future; Expected date: 03/08/2023  -     URIC ACID; Future; Expected date: 03/08/2023  -     Microalbumin/Creatinine Ratio, Urine    IgA nephropathy- will f/u with nephrology and monitor. Stable.   -     Foot Exam Performed  -     CBC Auto Differential; Future; Expected date: 03/08/2023  -     Comprehensive Metabolic Panel; Future; Expected date: 03/08/2023  -     Vitamin B12 Deficiency Panel; Future; Expected date:  03/08/2023  -     TSH; Future; Expected date: 03/08/2023  -     Lipid Panel; Future; Expected date: 03/08/2023  -     URIC ACID; Future; Expected date: 03/08/2023  -     Microalbumin/Creatinine Ratio, Urine    Hypercalcemia- will monitor. Repeat CMP   -     Foot Exam Performed  -     CBC Auto Differential; Future; Expected date: 03/08/2023  -     Comprehensive Metabolic Panel; Future; Expected date: 03/08/2023  -     Vitamin B12 Deficiency Panel; Future; Expected date: 03/08/2023  -     TSH; Future; Expected date: 03/08/2023  -     Lipid Panel; Future; Expected date: 03/08/2023  -     URIC ACID; Future; Expected date: 03/08/2023  -     Microalbumin/Creatinine Ratio, Urine    Anemia, unspecified type- repeat CBC and check b12   -     Foot Exam Performed  -     CBC Auto Differential; Future; Expected date: 03/08/2023  -     Comprehensive Metabolic Panel; Future; Expected date: 03/08/2023  -     Vitamin B12 Deficiency Panel; Future; Expected date: 03/08/2023  -     TSH; Future; Expected date: 03/08/2023  -     Lipid Panel; Future; Expected date: 03/08/2023  -     URIC ACID; Future; Expected date: 03/08/2023  -     Microalbumin/Creatinine Ratio, Urine    Newly diagnosed diabetes- stable. Will cont current meds.   -     Foot Exam Performed  -     CBC Auto Differential; Future; Expected date: 03/08/2023  -     Comprehensive Metabolic Panel; Future; Expected date: 03/08/2023  -     Vitamin B12 Deficiency Panel; Future; Expected date: 03/08/2023  -     TSH; Future; Expected date: 03/08/2023  -     Lipid Panel; Future; Expected date: 03/08/2023  -     URIC ACID; Future; Expected date: 03/08/2023  -     Microalbumin/Creatinine Ratio, Urine    Tobacco use- recommend cessation, will consider   -     Foot Exam Performed  -     CBC Auto Differential; Future; Expected date: 03/08/2023  -     Comprehensive Metabolic Panel; Future; Expected date: 03/08/2023  -     Vitamin B12 Deficiency Panel; Future; Expected date: 03/08/2023  -      TSH; Future; Expected date: 03/08/2023  -     Lipid Panel; Future; Expected date: 03/08/2023  -     URIC ACID; Future; Expected date: 03/08/2023  -     Microalbumin/Creatinine Ratio, Urine    Blurred vision, bilateral  -     Foot Exam Performed  -     CBC Auto Differential; Future; Expected date: 03/08/2023  -     Comprehensive Metabolic Panel; Future; Expected date: 03/08/2023  -     Vitamin B12 Deficiency Panel; Future; Expected date: 03/08/2023  -     TSH; Future; Expected date: 03/08/2023  -     Lipid Panel; Future; Expected date: 03/08/2023  -     URIC ACID; Future; Expected date: 03/08/2023  -     Microalbumin/Creatinine Ratio, Urine    Chest discomfort  -     Stress Echo Which stress agent will be used? Treadmill Exercise; Color Flow Doppler? No; Future    Other orders  -     lancets Misc; 1 lancet by Misc.(Non-Drug; Combo Route) route 3 (three) times daily. ICD 10: E11.22  Dispense: 300 each; Refill: 3  -     blood sugar diagnostic Strp; 1 strip by Misc.(Non-Drug; Combo Route) route 2 (two) times daily. ICD 10: E11.22  Dispense: 100 each; Refill: 6           Follow up with PCP     Patient education provided from Carol. Patient was counseled on when and how to seek emergent care.       Rocío TIM, APRN, FNP-c   Department of Internal Medicine - [facility]  1:20 PM

## 2023-03-09 LAB
ALBUMIN/CREAT UR: 359.6 UG/MG (ref 0–30)
CREAT UR-MCNC: 235 MG/DL (ref 23–375)
MICROALBUMIN UR DL<=1MG/L-MCNC: 845 UG/ML

## 2023-03-15 ENCOUNTER — LAB VISIT (OUTPATIENT)
Dept: LAB | Facility: HOSPITAL | Age: 48
End: 2023-03-15
Payer: COMMERCIAL

## 2023-03-15 DIAGNOSIS — D64.9 ANEMIA, UNSPECIFIED TYPE: ICD-10-CM

## 2023-03-15 DIAGNOSIS — E83.52 HYPERCALCEMIA: ICD-10-CM

## 2023-03-15 DIAGNOSIS — I10 HYPERTENSION, UNSPECIFIED TYPE: ICD-10-CM

## 2023-03-15 DIAGNOSIS — E11.9 NEWLY DIAGNOSED DIABETES: ICD-10-CM

## 2023-03-15 DIAGNOSIS — E78.5 HYPERLIPIDEMIA, UNSPECIFIED HYPERLIPIDEMIA TYPE: ICD-10-CM

## 2023-03-15 DIAGNOSIS — Z72.0 TOBACCO USE: ICD-10-CM

## 2023-03-15 DIAGNOSIS — H53.8 BLURRED VISION, BILATERAL: ICD-10-CM

## 2023-03-15 DIAGNOSIS — N02.B9 IGA NEPHROPATHY: ICD-10-CM

## 2023-03-15 DIAGNOSIS — N18.4 CHRONIC KIDNEY DISEASE, STAGE 4 (SEVERE): ICD-10-CM

## 2023-03-15 LAB
ALBUMIN SERPL BCP-MCNC: 4 G/DL (ref 3.5–5.2)
ALP SERPL-CCNC: 69 U/L (ref 55–135)
ALT SERPL W/O P-5'-P-CCNC: 30 U/L (ref 10–44)
ANION GAP SERPL CALC-SCNC: 13 MMOL/L (ref 8–16)
ANISOCYTOSIS BLD QL SMEAR: SLIGHT
AST SERPL-CCNC: 21 U/L (ref 10–40)
BASOPHILS # BLD AUTO: 0.06 K/UL (ref 0–0.2)
BASOPHILS NFR BLD: 0.5 % (ref 0–1.9)
BILIRUB SERPL-MCNC: 0.3 MG/DL (ref 0.1–1)
BUN SERPL-MCNC: 51 MG/DL (ref 6–20)
CALCIUM SERPL-MCNC: 10.6 MG/DL (ref 8.7–10.5)
CHLORIDE SERPL-SCNC: 102 MMOL/L (ref 95–110)
CHOLEST SERPL-MCNC: 273 MG/DL (ref 120–199)
CHOLEST/HDLC SERPL: 8 {RATIO} (ref 2–5)
CO2 SERPL-SCNC: 24 MMOL/L (ref 23–29)
CREAT SERPL-MCNC: 3.3 MG/DL (ref 0.5–1.4)
DIFFERENTIAL METHOD: ABNORMAL
EOSINOPHIL # BLD AUTO: 0.2 K/UL (ref 0–0.5)
EOSINOPHIL NFR BLD: 1.5 % (ref 0–8)
ERYTHROCYTE [DISTWIDTH] IN BLOOD BY AUTOMATED COUNT: 13.4 % (ref 11.5–14.5)
EST. GFR  (NO RACE VARIABLE): 22 ML/MIN/1.73 M^2
GLUCOSE SERPL-MCNC: 132 MG/DL (ref 70–110)
HCT VFR BLD AUTO: 41.4 % (ref 40–54)
HDLC SERPL-MCNC: 34 MG/DL (ref 40–75)
HDLC SERPL: 12.5 % (ref 20–50)
HGB BLD-MCNC: 12.9 G/DL (ref 14–18)
IMM GRANULOCYTES # BLD AUTO: 0.05 K/UL (ref 0–0.04)
IMM GRANULOCYTES NFR BLD AUTO: 0.4 % (ref 0–0.5)
LDLC SERPL CALC-MCNC: ABNORMAL MG/DL (ref 63–159)
LYMPHOCYTES # BLD AUTO: 6.6 K/UL (ref 1–4.8)
LYMPHOCYTES NFR BLD: 49.9 % (ref 18–48)
MCH RBC QN AUTO: 31.6 PG (ref 27–31)
MCHC RBC AUTO-ENTMCNC: 31.2 G/DL (ref 32–36)
MCV RBC AUTO: 102 FL (ref 82–98)
MONOCYTES # BLD AUTO: 1.1 K/UL (ref 0.3–1)
MONOCYTES NFR BLD: 8.1 % (ref 4–15)
NEUTROPHILS # BLD AUTO: 5.2 K/UL (ref 1.8–7.7)
NEUTROPHILS NFR BLD: 39.6 % (ref 38–73)
NONHDLC SERPL-MCNC: 239 MG/DL
NRBC BLD-RTO: 0 /100 WBC
PLATELET # BLD AUTO: 602 K/UL (ref 150–450)
PLATELET BLD QL SMEAR: ABNORMAL
PMV BLD AUTO: 9.1 FL (ref 9.2–12.9)
POLYCHROMASIA BLD QL SMEAR: ABNORMAL
POTASSIUM SERPL-SCNC: 4.7 MMOL/L (ref 3.5–5.1)
PROT SERPL-MCNC: 8 G/DL (ref 6–8.4)
RBC # BLD AUTO: 4.08 M/UL (ref 4.6–6.2)
SODIUM SERPL-SCNC: 139 MMOL/L (ref 136–145)
TRIGL SERPL-MCNC: 421 MG/DL (ref 30–150)
TSH SERPL DL<=0.005 MIU/L-ACNC: 1.23 UIU/ML (ref 0.4–4)
URATE SERPL-MCNC: 10.2 MG/DL (ref 3.4–7)
WBC # BLD AUTO: 13.2 K/UL (ref 3.9–12.7)

## 2023-03-15 PROCEDURE — 84550 ASSAY OF BLOOD/URIC ACID: CPT | Performed by: NURSE PRACTITIONER

## 2023-03-15 PROCEDURE — 82941 ASSAY OF GASTRIN: CPT | Performed by: NURSE PRACTITIONER

## 2023-03-15 PROCEDURE — 83921 ORGANIC ACID SINGLE QUANT: CPT | Performed by: NURSE PRACTITIONER

## 2023-03-15 PROCEDURE — 80061 LIPID PANEL: CPT | Performed by: NURSE PRACTITIONER

## 2023-03-15 PROCEDURE — 86340 INTRINSIC FACTOR ANTIBODY: CPT | Performed by: NURSE PRACTITIONER

## 2023-03-15 PROCEDURE — 82607 VITAMIN B-12: CPT | Performed by: NURSE PRACTITIONER

## 2023-03-15 PROCEDURE — 84443 ASSAY THYROID STIM HORMONE: CPT | Performed by: NURSE PRACTITIONER

## 2023-03-15 PROCEDURE — 36415 COLL VENOUS BLD VENIPUNCTURE: CPT | Mod: PO | Performed by: NURSE PRACTITIONER

## 2023-03-15 PROCEDURE — 85025 COMPLETE CBC W/AUTO DIFF WBC: CPT | Performed by: NURSE PRACTITIONER

## 2023-03-15 PROCEDURE — 80053 COMPREHEN METABOLIC PANEL: CPT | Performed by: NURSE PRACTITIONER

## 2023-03-16 LAB — VIT B12 SERPL-MCNC: 237 NG/L (ref 180–914)

## 2023-03-17 ENCOUNTER — CLINICAL SUPPORT (OUTPATIENT)
Dept: DIABETES | Facility: CLINIC | Age: 48
End: 2023-03-17
Payer: COMMERCIAL

## 2023-03-17 VITALS — WEIGHT: 230 LBS | BODY MASS INDEX: 32.08 KG/M2

## 2023-03-17 DIAGNOSIS — E11.9 NEWLY DIAGNOSED DIABETES: ICD-10-CM

## 2023-03-17 DIAGNOSIS — Z83.3 FH: DIABETES MELLITUS: Primary | ICD-10-CM

## 2023-03-17 PROCEDURE — G0108 DIAB MANAGE TRN  PER INDIV: HCPCS | Mod: S$GLB,,, | Performed by: REGISTERED NURSE

## 2023-03-17 PROCEDURE — 99999 PR PBB SHADOW E&M-EST. PATIENT-LVL I: ICD-10-PCS | Mod: PBBFAC,,,

## 2023-03-17 PROCEDURE — G0108 PR DIAB MANAGE TRN  PER INDIV: ICD-10-PCS | Mod: S$GLB,,, | Performed by: REGISTERED NURSE

## 2023-03-17 PROCEDURE — 99999 PR PBB SHADOW E&M-EST. PATIENT-LVL I: CPT | Mod: PBBFAC,,,

## 2023-03-17 NOTE — PROGRESS NOTES
Diabetes Care Specialist Follow-up Note  Author: Peggy Tello RN  Date: 3/17/2023    Program Intake  Reason for Diabetes Program Visit:: Intervention  Type of Intervention:: Individual  Individual: Education  Education: Self-Management Skill Review, Nutrition and Meal Planning    Lab Results   Component Value Date    HGBA1C 10.1 (H) 03/01/2023     ANutritional Status  Diet: Regular  Meal Plan 24 Hour Recall: Breakfast, Lunch, Dinner, Snack  Meal Plan 24 Hour Recall - Breakfast: nothing  Meal Plan 24 Hour Recall - Lunch: turkey sandwich, tomato  Meal Plan 24 Hour Recall - Dinner: fish, potaotes  Meal Plan 24 Hour Recall - Snack: chex mix   drinks water, regular sprite and sweet tea    During today's follow-up visit,  the following areas required further assessment and content was provided/reviewed.    Based on today's diabetes care assessment, the following areas of need were identified:      Social 3/1/2023   Access to Mass Media/Tech No   Cognitive/Behavioral Health No   Culture/Christianity No   Health Literacy No        Clinical 3/1/2023   Medication Adherence Yes   Lab Compliance No   Nutritional Status Yes        Diabetes Self-Management Skills 3/1/2023   Diabetes Disease Process/Treatment Options Yes   Nutrition/Healthy Eating Yes   Physical Activity/Exercise Yes   Medication Yes   Acute Complications Yes   Chronic Complications Yes   Psychosocial/Coping No        Today's interventions were provided through individual discussion, instruction, and written materials were provided.    Patient verbalized understanding of instruction and written materials.  Pt was able to return back demonstration of instructions today. Patient understood key points, needs reinforcement and further instruction.     Diabetes Self-Management Care Plan Review:  Diabetes Self-Management Care Plan Review and Evaluation of Progress:    During today's follow-up Edouard's Diabetes Self-Management Care Plan progress was reviewed and progress  was evaluated including his/her input. Edouard has agreed to continue his/her journey to improve/maintain overall diabetes control by continuing to set health goals. See care plan progress below.      Care Plan: Diabetes Management   Updates made since 2/15/2023 12:00 AM        Problem: Healthy Eating         Goal: Eat 3 meals daily with 45-60g/3-4 servings of Carbohydrate per meal.    Start Date: 3/1/2023   Expected End Date: 3/17/2023   This Visit's Progress: Not met   Priority: High   Barriers: Knowledge deficit   Note:    Instructed pt on the food groups, how to read labels and count carbs.Pt was given sample menus and meal plans as examples. Pt misunderstood and took Mounjaro for 4 days in a row resulting in no appetite.Discussed with pt the 3 insulins he is taking, the doses, uses, length of actions, when to take these medications and effects. Pt was given log sheets to record his bs's and insulin taken. Discussed with pt how to put his meals together. Pt has had no appetite due to Mounjaro. Pt usually skips meals every day due to lack of time to eat. Stressed to pt the importance of eating 3 balanced and portioned meals per day. Discussed with pt how to put his meals together. Discussed with pt the need to take his sliding scale with meals and eat a full meal. Pt will work on taking his breakfast and lunch with him and stopping to eat.   3/17/23  Pt came to clinic for fu visit. Pt is not taking Monjuaro at presnet time- he has no more medication and needs to get a rx to start up again. Pt has been checking his bs's 4 times a day and they range 140-276. Pt states that his appetite is starting to come back. Pt is compliant with his medsl  Pt is working on his meal planning. He is still skipping breakfast. He is not eating enough carbs or food at his meals. Discussed with pt what needs to be added to his meals to make them more balanced. Pt will work on this. Pt is drinking occasional sprite and sugar tea.  Discussed with pt the amount of carbs in these drinks and if consumed it needs to be measured and counted as part of his carbs for his meal. Pt will switch to diet drinks. Discussed with pt the importance of eating 3 balanced and portioned meals per day. Pt will work on this.       Task: Reviewed the sources and role of Carbohydrate, Protein, and Fat and how each nutrient impacts blood sugar.         Task: Provided visual examples using dry measuring cups, food models, and other familiar objects such as computer mouse, deck or cards, tennis ball etc. to help with visualization of portions. Completed 3/17/2023        Task: Explained how to count carbohydrates using the food label and the use of dry measuring cups for accurate carb counting. Completed 3/1/2023        Task: Discussed strategies for choosing healthier menu options when dining out. Completed 3/1/2023        Task: Recommended replacing beverages containing high sugar content with noncaloric/sugar free options and/or water. Completed 3/1/2023        Task: Review the importance of balancing carbohydrates with each meal using portion control techniques to count servings of carbohydrate and label reading to identify serving size and amount of total carbs per serving. Completed 3/1/2023        Task: Provided Sample plate method and reviewed the use of the plate to estimate amounts of carbohydrate per meal. Completed 3/1/2023          Follow Up Plan     Follow up in about 5 weeks (around 4/21/2023).    Today's care plan and follow up schedule was discussed with patient.  Edouard verbalized understanding of the care plan, goals, and agrees to follow up plan.        The patient was encouraged to communicate with his/her health care provider/physician and care team regarding his/her condition(s) and treatment.  I provided the patient with my contact information today and encouraged to contact me via phone or [facility] Portal as needed.

## 2023-03-20 LAB
ANNOTATION COMMENT IMP: NORMAL
GASTRIN P FAST SERPL-MCNC: 34 PG/ML
IF BLOCK AB SER QL: NEGATIVE
METHYLMALONATE SERPL-SCNC: 0.46 NMOL/ML

## 2023-03-23 ENCOUNTER — OFFICE VISIT (OUTPATIENT)
Dept: NEPHROLOGY | Facility: CLINIC | Age: 48
End: 2023-03-23
Payer: COMMERCIAL

## 2023-03-23 VITALS
BODY MASS INDEX: 32.81 KG/M2 | SYSTOLIC BLOOD PRESSURE: 147 MMHG | DIASTOLIC BLOOD PRESSURE: 88 MMHG | WEIGHT: 235.25 LBS | HEART RATE: 96 BPM

## 2023-03-23 DIAGNOSIS — N02.B9 IGA NEPHROPATHY: ICD-10-CM

## 2023-03-23 DIAGNOSIS — N18.4 CHRONIC KIDNEY DISEASE, STAGE 4 (SEVERE): ICD-10-CM

## 2023-03-23 DIAGNOSIS — I10 HYPERTENSION, UNSPECIFIED TYPE: ICD-10-CM

## 2023-03-23 PROCEDURE — 99999 PR PBB SHADOW E&M-EST. PATIENT-LVL IV: CPT | Mod: PBBFAC,,, | Performed by: INTERNAL MEDICINE

## 2023-03-23 PROCEDURE — 99999 PR PBB SHADOW E&M-EST. PATIENT-LVL IV: ICD-10-PCS | Mod: PBBFAC,,, | Performed by: INTERNAL MEDICINE

## 2023-03-23 RX ORDER — SODIUM BICARBONATE 650 MG/1
650 TABLET ORAL 3 TIMES DAILY
Qty: 270 TABLET | Refills: 3 | Status: SHIPPED | OUTPATIENT
Start: 2023-03-23 | End: 2023-03-23

## 2023-03-23 RX ORDER — AMLODIPINE BESYLATE 10 MG/1
5 TABLET ORAL DAILY
Qty: 90 TABLET | Refills: 3 | Status: SHIPPED | OUTPATIENT
Start: 2023-03-23 | End: 2024-01-30 | Stop reason: SDUPTHER

## 2023-03-23 RX ORDER — SODIUM BICARBONATE 650 MG/1
650 TABLET ORAL DAILY
Qty: 90 TABLET | Refills: 3 | Status: SHIPPED | OUTPATIENT
Start: 2023-03-23 | End: 2024-02-26 | Stop reason: SDUPTHER

## 2023-03-23 RX ORDER — CHLORTHALIDONE 25 MG/1
25 TABLET ORAL DAILY
Qty: 30 TABLET | Refills: 11 | Status: SHIPPED | OUTPATIENT
Start: 2023-03-23 | End: 2023-03-23 | Stop reason: ALTCHOICE

## 2023-03-23 RX ORDER — VALSARTAN 320 MG/1
320 TABLET ORAL DAILY
Qty: 90 TABLET | Refills: 3 | Status: SHIPPED | OUTPATIENT
Start: 2023-03-23 | End: 2024-03-22

## 2023-03-23 NOTE — PATIENT INSTRUCTIONS
Will restart Valsartan 320mg once a day.   Decrease Amlodipine to 5mg every day   Discontinue calcium carbonate tabs (TUMS antacid)

## 2023-03-23 NOTE — ASSESSMENT & PLAN NOTE
Cr appears to have peaked at 5.2 during admission earlier this month for MARIE; currently down to 3.3 with a GFR of 22 consistent with CKD stage 4  UPCR at 2.09 g/g; continue Valsartan 320mg  Most recent UA with 1+ blood and 3 RBCs   No edema on examination   Urine microscopy with many acanthocytes  Kidney biopsy results consistent with chronic IgA nephropathy; no significant disease activity noted however pts renal function continues to deteriorate; 30-40 interstitial fibrosis present on biopsy  Hyperkalemia well controlled on lokelma; continue three times a week   Kidney US performed in clinic (9/15) with no hydronephrosis  Continue Budesonide 16mg PO daily to complete 9 months (around September 2023) followed by 2 weeks of 8mg PO daily prior to discontinuing   Restart Valsartan 320mg PO daily   Amlodipine 5mg PO daily   Sodium bicarb 650mg PO TID and Lokelma 10mg PO TIW  Oriented on the importance of weight loss, BP control, diabetes control and smoking cessation

## 2023-03-23 NOTE — PROGRESS NOTES
Nephrology Clinic Note   3/23/2023    Chief Complaint   Patient presents with    Chronic Kidney Disease      History of present illness:  Patient is a 47 y.o. male.   Presents to the clinic today for medical conditions listed below.  Problem Noted   Iga Nephropathy 10/20/2022   Htn (Hypertension) 11/14/2012   Chronic Kidney Disease, Stage 4 (Severe) 11/14/2012    45 y/o M with hx of HTN, Gout and prior history of glomerulonephritis who comes in for follow up evaluation. Pt was last evaluated by a kidney doctor 5y ago in 2017. At that time it was suspected he had stable disease, possibly IgA nephropathy. Biopsy was deferred and expectant observation was recommended. Pt now comes back to the Nephrology clinic with worsening renal dysfunction and proteinuria. Blood pressure also significantly elevated. He refers his father suffered from kidney disease and had a complication of bleeding after a biopsy and required dialysis however he cannot remember the diagnosis. He believes it was related to hypertension. Pt denies any recent nausea, vomits, diarrhea, radiocontrast administration, dehydration or NSAID use.     Interval Hx:  9/15/22: Kidney biopsy results noted and consistent with IgA nephropathy. Worsening kidney function with creatinine up to 2.9.   10/20/22: Serum creatinine appears to have peaked at 3.6 and currently down to 3.2 as pt refers he has been working on lifestyle modifications. Tooth infection/abscess resolved after extraction and pt has completed his antibiotics.   11/10/22: sCr down to 2.6, UPCR up to 4. Pt refers he just realized he had not been taking the prednisone. Tarpeyo approved on appeal.   3/23/22: Admitted earlier this month for MARIE and uncontrolled hyperglycemia. Serum creatinine peaked around 5.2 before improving to around 3.3 on his most recent labs.        Review of Systems   Constitutional:  Negative for chills, fever and weight loss.   Respiratory:  Negative for cough, shortness of  breath and wheezing.    Cardiovascular:  Negative for chest pain, claudication and leg swelling.   Gastrointestinal:  Negative for abdominal pain, diarrhea, nausea and vomiting.   Genitourinary:  Negative for dysuria, flank pain, frequency, hematuria and urgency.   Skin:  Negative for rash.   Neurological:  Negative for weakness.     History:  Past Medical History:   Diagnosis Date    Anemia 3/1/2023    Chronic kidney disease     Chronic kidney disease stage II    Colon polyps     Hyperlipidemia 12/13/2012    Hypertension       Past Surgical History:   Procedure Laterality Date    COSMETIC SURGERY      pyloric stenosis      RENAL BIOPSY Left 9/1/2022    Procedure: BIOPSY, KIDNEY;  Surgeon: Sinan Abad MD;  Location: [facility] CATH LAB;  Service: Interventional Nephrology;  Laterality: Left;        Current Outpatient Medications:     allopurinoL (ZYLOPRIM) 100 MG tablet, Take 1 tablet (100 mg total) by mouth once daily., Disp: 30 tablet, Rfl: 3    blood sugar diagnostic Strp, 1 strip by Misc.(Non-Drug; Combo Route) route 2 (two) times daily. ICD 10: E11.22, Disp: 100 each, Rfl: 6    blood-glucose meter kit, Use as instructed to check blood sugar two times a day. ICD 10: E11.22, Disp: 1 each, Rfl: 0    budesonide (TARPEYO) 4 mg CpDR, Take 16 mg by mouth once daily., Disp: 120 capsule, Rfl: 3    calcitRIOL (ROCALTROL) 0.25 MCG Cap, Take 1 capsule (0.25 mcg total) by mouth once daily., Disp: 90 capsule, Rfl: 3    calcium carbonate (TUMS ORAL), Take 1 tablet by mouth daily as needed (Heartburn)., Disp: , Rfl:     colchicine (COLCRYS) 0.6 mg tablet, 0.6 mg daily as needed (gout flare-up)., Disp: , Rfl:     empagliflozin (JARDIANCE) 10 mg tablet, Take 1 tablet (10 mg total) by mouth once daily., Disp: 90 tablet, Rfl: 1    hydrocortisone (ANUSOL-HC) 2.5 % rectal cream, Place rectally 2 (two) times daily., Disp: 28 g, Rfl: 2    insulin detemir U-100 (LEVEMIR FLEXTOUCH U-100 INSULN) 100 unit/mL (3 mL) InPn pen, Inject 16 Units  "into the skin every evening. Eat a snack before bed if BG is < 100 mg/dl, Disp: 14.4 mL, Rfl: 3    insulin lispro (HUMALOG KWIKPEN INSULIN) 100 unit/mL pen, Inject 2-10 Units into the skin 3 (three) times daily as needed. Blood Glucose: 180 - 230 + 2 unit, 231- 280  + 4 units, 281 - 330 + 6 units, 331 - 380 + 8 units, > 380   + 10 units, Disp: 45 mL, Rfl: 1    lancets Misc, 1 lancet by Misc.(Non-Drug; Combo Route) route 3 (three) times daily. ICD 10: E11.22, Disp: 300 each, Rfl: 3    OPW TEST CLAIM - DO NOT FILL, OPW test claim. Do not fill., Disp: 1 tablet, Rfl: 0    pen needle, diabetic 32 gauge x 5/32" Ndle, Use to inject insulin 4 times daily, Disp: 100 each, Rfl: 0    polyethylene glycol (GLYCOLAX) 17 gram/dose powder, Take 17 g by mouth once daily., Disp: 510 g, Rfl: 11    prednisoLONE acetate (PRED FORTE) 1 % DrpS, Place 1 drop into both eyes daily as needed (Inflammation)., Disp: , Rfl:     acetaminophen (TYLENOL) 500 MG tablet, Take 1,000 mg by mouth every 4 (four) hours as needed for Pain., Disp: , Rfl:     amLODIPine (NORVASC) 10 MG tablet, Take 0.5 tablets (5 mg total) by mouth once daily., Disp: 90 tablet, Rfl: 3    sodium bicarbonate 650 MG tablet, Take 1 tablet (650 mg total) by mouth once daily., Disp: 90 tablet, Rfl: 3    [START ON 3/24/2023] sodium zirconium cyclosilicate (LOKELMA) 10 gram packet, Take 1 packet (10 g total) by mouth every Mon, Wed, Fri. Mix entire contents of packet(s) into drinking glass containing 3 tablespoons of water; stir well and drink immediately. Add water and repeat until no powder remains to receive entire dose., Disp: 12 packet, Rfl: 2  Review of patient's allergies indicates:  No Known Allergies   Social History     Tobacco Use    Smoking status: Every Day     Packs/day: 1.00     Years: 24.00     Pack years: 24.00     Types: Cigarettes    Smokeless tobacco: Current   Substance Use Topics    Alcohol use: Yes     Comment: Occasional      Family History   Problem Relation " Age of Onset    COPD Mother     Heart disease Mother     Peripheral vascular disease Mother     Diabetes Father     Kidney disease Father     Stroke Father     Hypertension Father     Heart disease Father 70        CABG x 3    Kidney failure Father     Heart disease Maternal Grandfather         Physical Exam :  Vitals:    03/23/23 1542   BP: (!) 147/88   Pulse: 96     Physical Exam  Constitutional:       General: He is not in acute distress.     Appearance: He is not ill-appearing or toxic-appearing.   HENT:      Head: Atraumatic.      Nose: Nose normal.      Mouth/Throat:      Mouth: Mucous membranes are moist.   Cardiovascular:      Rate and Rhythm: Normal rate.   Pulmonary:      Effort: Pulmonary effort is normal.   Abdominal:      General: There is no distension.      Palpations: Abdomen is soft.      Tenderness: There is no abdominal tenderness.   Musculoskeletal:         General: No swelling. Normal range of motion.      Right lower leg: No edema.      Left lower leg: No edema.   Skin:     General: Skin is warm and dry.   Neurological:      Mental Status: He is alert and oriented to person, place, and time.   Psychiatric:         Mood and Affect: Mood normal.         Behavior: Behavior normal.       Labs reviewed   Images Reviewed    Assessment:    1. Chronic kidney disease, stage 4 (severe)    2. IgA nephropathy    3. Hypertension, unspecified type        Plan:    IgA nephropathy  Kidney biopsy (9/8/22): IgA dominant diffuse global granular mesangial with segmental capillary loop staining by IF, diffuse mild increase in mesangial cellularity and 30-40% interstitial fibrosis. There is no activity in that isaiah is no endocapillary proliferation, crescents, or fibrinoid necrosis. Three worse prognostic indicators present; significant mesangial proliferation, segmental sclerosis and interstitial fibrosis.      Continue Tarpeyo   Will need to continue working on lifestyle modifications including weight loss, BP  control, tobacco cessation and diabetes control     Chronic kidney disease, stage 4 (severe)  Cr appears to have peaked at 5.2 during admission earlier this month for MARIE; currently down to 3.3 with a GFR of 22 consistent with CKD stage 4  UPCR at 2.09 g/g; continue Valsartan 320mg  Most recent UA with 1+ blood and 3 RBCs   No edema on examination   Urine microscopy with many acanthocytes  Kidney biopsy results consistent with chronic IgA nephropathy; no significant disease activity noted however pts renal function continues to deteriorate; 30-40 interstitial fibrosis present on biopsy  Hyperkalemia well controlled on lokelma; continue three times a week   Kidney US performed in clinic (9/15) with no hydronephrosis  Continue Budesonide 16mg PO daily to complete 9 months (around September 2023) followed by 2 weeks of 8mg PO daily prior to discontinuing   Restart Valsartan 320mg PO daily   Amlodipine 5mg PO daily   Sodium bicarb 650mg PO TID and Lokelma 10mg PO TIW  Oriented on the importance of weight loss, BP control, diabetes control and smoking cessation    No follow-ups on file.     Orders Placed This Encounter   Procedures    Vitamin D    PTH, Intact    Protein/Creatinine Ratio, Urine    Urinalysis    Renal Function Panel     Medications Discontinued During This Encounter   Medication Reason    sodium bicarbonate 650 MG tablet Reorder    amLODIPine (NORVASC) 10 MG tablet     chlorthalidone (HYGROTEN) 25 MG Tab Alternate therapy    sodium bicarbonate 650 MG tablet       Future Appointments   Date Time Provider Department Center   3/29/2023  9:30 AM Kristofer Menard MD [facility]   4/21/2023  7:00 AM Peggy Tello RN [facility]   6/1/2023  3:30 PM [facility] [place]   7/5/2023  9:00 AM Ky Walters, [facility] OPTOMTY Pasquale Benavides

## 2023-03-23 NOTE — ASSESSMENT & PLAN NOTE
Kidney biopsy (9/8/22): IgA dominant diffuse global granular mesangial with segmental capillary loop staining by IF, diffuse mild increase in mesangial cellularity and 30-40% interstitial fibrosis. There is no activity in that isaiah is no endocapillary proliferation, crescents, or fibrinoid necrosis. Three worse prognostic indicators present; significant mesangial proliferation, segmental sclerosis and interstitial fibrosis.      Continue Tarpeyo   Will need to continue working on lifestyle modifications including weight loss, BP control, tobacco cessation and diabetes control

## 2023-03-24 ENCOUNTER — TELEPHONE (OUTPATIENT)
Dept: ENDOSCOPY | Facility: HOSPITAL | Age: 48
End: 2023-03-24
Payer: COMMERCIAL

## 2023-03-24 ENCOUNTER — PATIENT MESSAGE (OUTPATIENT)
Dept: ENDOSCOPY | Facility: HOSPITAL | Age: 48
End: 2023-03-24
Payer: COMMERCIAL

## 2023-03-24 DIAGNOSIS — Z12.11 SPECIAL SCREENING FOR MALIGNANT NEOPLASMS, COLON: Primary | ICD-10-CM

## 2023-03-24 RX ORDER — POLYETHYLENE GLYCOL 3350, SODIUM SULFATE ANHYDROUS, SODIUM BICARBONATE, SODIUM CHLORIDE, POTASSIUM CHLORIDE 236; 22.74; 6.74; 5.86; 2.97 G/4L; G/4L; G/4L; G/4L; G/4L
4 POWDER, FOR SOLUTION ORAL ONCE
Qty: 4000 ML | Refills: 0 | Status: SHIPPED | OUTPATIENT
Start: 2023-03-24 | End: 2023-03-24

## 2023-03-24 NOTE — TELEPHONE ENCOUNTER
----- Message from Najma Martinez NP sent at 3/24/2023  8:27 AM CDT -----    ----- Message -----  From: Mayelin Forte MD  Sent: 3/23/2023   5:33 PM CDT  To: Herminia Trevino NP      ----- Message -----  From: All Benavides MD  Sent: 3/23/2023   3:58 PM CDT  To: Mayelin Forte MD    Good afternoon,     Mr. Pena referred he is to undergo colonoscopy and there was a question regarding his prep. From our standpoint golytely should be fine, just need to avoid any phosphate based colonoscopy preps.     Please let me know if there are any additional questions.     Best regards,     All Blue, PGY V  Nephrology Fellow   [facility]

## 2023-03-29 ENCOUNTER — OFFICE VISIT (OUTPATIENT)
Dept: INTERNAL MEDICINE | Facility: CLINIC | Age: 48
End: 2023-03-29
Payer: COMMERCIAL

## 2023-03-29 VITALS
BODY MASS INDEX: 33.48 KG/M2 | SYSTOLIC BLOOD PRESSURE: 142 MMHG | HEART RATE: 85 BPM | TEMPERATURE: 99 F | OXYGEN SATURATION: 99 % | DIASTOLIC BLOOD PRESSURE: 78 MMHG | WEIGHT: 239.19 LBS | HEIGHT: 71 IN

## 2023-03-29 DIAGNOSIS — Z79.4 TYPE 2 DIABETES MELLITUS WITH STAGE 4 CHRONIC KIDNEY DISEASE, WITH LONG-TERM CURRENT USE OF INSULIN: ICD-10-CM

## 2023-03-29 DIAGNOSIS — Z76.89 ESTABLISHING CARE WITH NEW DOCTOR, ENCOUNTER FOR: Primary | ICD-10-CM

## 2023-03-29 DIAGNOSIS — N18.4 CHRONIC KIDNEY DISEASE, STAGE 4 (SEVERE): ICD-10-CM

## 2023-03-29 DIAGNOSIS — E11.22 TYPE 2 DIABETES MELLITUS WITH STAGE 4 CHRONIC KIDNEY DISEASE, WITH LONG-TERM CURRENT USE OF INSULIN: ICD-10-CM

## 2023-03-29 DIAGNOSIS — E78.5 HYPERLIPIDEMIA, UNSPECIFIED HYPERLIPIDEMIA TYPE: ICD-10-CM

## 2023-03-29 DIAGNOSIS — N17.9 AKI (ACUTE KIDNEY INJURY): ICD-10-CM

## 2023-03-29 DIAGNOSIS — N02.B9 IGA NEPHROPATHY: ICD-10-CM

## 2023-03-29 DIAGNOSIS — E11.9 NEWLY DIAGNOSED DIABETES: ICD-10-CM

## 2023-03-29 DIAGNOSIS — I10 PRIMARY HYPERTENSION: ICD-10-CM

## 2023-03-29 DIAGNOSIS — N18.4 TYPE 2 DIABETES MELLITUS WITH STAGE 4 CHRONIC KIDNEY DISEASE, WITH LONG-TERM CURRENT USE OF INSULIN: ICD-10-CM

## 2023-03-29 PROCEDURE — 3008F BODY MASS INDEX DOCD: CPT | Mod: CPTII,S$GLB,, | Performed by: STUDENT IN AN ORGANIZED HEALTH CARE EDUCATION/TRAINING PROGRAM

## 2023-03-29 PROCEDURE — 99999 PR PBB SHADOW E&M-EST. PATIENT-LVL V: ICD-10-PCS | Mod: PBBFAC,,, | Performed by: STUDENT IN AN ORGANIZED HEALTH CARE EDUCATION/TRAINING PROGRAM

## 2023-03-29 PROCEDURE — 4010F PR ACE/ARB THEARPY RXD/TAKEN: ICD-10-PCS | Mod: CPTII,S$GLB,, | Performed by: STUDENT IN AN ORGANIZED HEALTH CARE EDUCATION/TRAINING PROGRAM

## 2023-03-29 PROCEDURE — 3077F PR MOST RECENT SYSTOLIC BLOOD PRESSURE >= 140 MM HG: ICD-10-PCS | Mod: CPTII,S$GLB,, | Performed by: STUDENT IN AN ORGANIZED HEALTH CARE EDUCATION/TRAINING PROGRAM

## 2023-03-29 PROCEDURE — 3062F POS MACROALBUMINURIA REV: CPT | Mod: CPTII,S$GLB,, | Performed by: STUDENT IN AN ORGANIZED HEALTH CARE EDUCATION/TRAINING PROGRAM

## 2023-03-29 PROCEDURE — 1111F PR DISCHARGE MEDS RECONCILED W/ CURRENT OUTPATIENT MED LIST: ICD-10-PCS | Mod: CPTII,S$GLB,, | Performed by: STUDENT IN AN ORGANIZED HEALTH CARE EDUCATION/TRAINING PROGRAM

## 2023-03-29 PROCEDURE — 1159F MED LIST DOCD IN RCRD: CPT | Mod: CPTII,S$GLB,, | Performed by: STUDENT IN AN ORGANIZED HEALTH CARE EDUCATION/TRAINING PROGRAM

## 2023-03-29 PROCEDURE — 4010F ACE/ARB THERAPY RXD/TAKEN: CPT | Mod: CPTII,S$GLB,, | Performed by: STUDENT IN AN ORGANIZED HEALTH CARE EDUCATION/TRAINING PROGRAM

## 2023-03-29 PROCEDURE — 3046F PR MOST RECENT HEMOGLOBIN A1C LEVEL > 9.0%: ICD-10-PCS | Mod: CPTII,S$GLB,, | Performed by: STUDENT IN AN ORGANIZED HEALTH CARE EDUCATION/TRAINING PROGRAM

## 2023-03-29 PROCEDURE — 1160F RVW MEDS BY RX/DR IN RCRD: CPT | Mod: CPTII,S$GLB,, | Performed by: STUDENT IN AN ORGANIZED HEALTH CARE EDUCATION/TRAINING PROGRAM

## 2023-03-29 PROCEDURE — 99214 PR OFFICE/OUTPT VISIT, EST, LEVL IV, 30-39 MIN: ICD-10-PCS | Mod: S$GLB,,, | Performed by: STUDENT IN AN ORGANIZED HEALTH CARE EDUCATION/TRAINING PROGRAM

## 2023-03-29 PROCEDURE — 99214 OFFICE O/P EST MOD 30 MIN: CPT | Mod: S$GLB,,, | Performed by: STUDENT IN AN ORGANIZED HEALTH CARE EDUCATION/TRAINING PROGRAM

## 2023-03-29 PROCEDURE — 3066F PR DOCUMENTATION OF TREATMENT FOR NEPHROPATHY: ICD-10-PCS | Mod: CPTII,S$GLB,, | Performed by: STUDENT IN AN ORGANIZED HEALTH CARE EDUCATION/TRAINING PROGRAM

## 2023-03-29 PROCEDURE — 1111F DSCHRG MED/CURRENT MED MERGE: CPT | Mod: CPTII,S$GLB,, | Performed by: STUDENT IN AN ORGANIZED HEALTH CARE EDUCATION/TRAINING PROGRAM

## 2023-03-29 PROCEDURE — 1159F PR MEDICATION LIST DOCUMENTED IN MEDICAL RECORD: ICD-10-PCS | Mod: CPTII,S$GLB,, | Performed by: STUDENT IN AN ORGANIZED HEALTH CARE EDUCATION/TRAINING PROGRAM

## 2023-03-29 PROCEDURE — 3046F HEMOGLOBIN A1C LEVEL >9.0%: CPT | Mod: CPTII,S$GLB,, | Performed by: STUDENT IN AN ORGANIZED HEALTH CARE EDUCATION/TRAINING PROGRAM

## 2023-03-29 PROCEDURE — 1160F PR REVIEW ALL MEDS BY PRESCRIBER/CLIN PHARMACIST DOCUMENTED: ICD-10-PCS | Mod: CPTII,S$GLB,, | Performed by: STUDENT IN AN ORGANIZED HEALTH CARE EDUCATION/TRAINING PROGRAM

## 2023-03-29 PROCEDURE — 3077F SYST BP >= 140 MM HG: CPT | Mod: CPTII,S$GLB,, | Performed by: STUDENT IN AN ORGANIZED HEALTH CARE EDUCATION/TRAINING PROGRAM

## 2023-03-29 PROCEDURE — 3062F PR POS MACROALBUMINURIA RESULT DOCUMENTED/REVIEW: ICD-10-PCS | Mod: CPTII,S$GLB,, | Performed by: STUDENT IN AN ORGANIZED HEALTH CARE EDUCATION/TRAINING PROGRAM

## 2023-03-29 PROCEDURE — 99999 PR PBB SHADOW E&M-EST. PATIENT-LVL V: CPT | Mod: PBBFAC,,, | Performed by: STUDENT IN AN ORGANIZED HEALTH CARE EDUCATION/TRAINING PROGRAM

## 2023-03-29 PROCEDURE — 3066F NEPHROPATHY DOC TX: CPT | Mod: CPTII,S$GLB,, | Performed by: STUDENT IN AN ORGANIZED HEALTH CARE EDUCATION/TRAINING PROGRAM

## 2023-03-29 PROCEDURE — 3008F PR BODY MASS INDEX (BMI) DOCUMENTED: ICD-10-PCS | Mod: CPTII,S$GLB,, | Performed by: STUDENT IN AN ORGANIZED HEALTH CARE EDUCATION/TRAINING PROGRAM

## 2023-03-29 PROCEDURE — 3078F DIAST BP <80 MM HG: CPT | Mod: CPTII,S$GLB,, | Performed by: STUDENT IN AN ORGANIZED HEALTH CARE EDUCATION/TRAINING PROGRAM

## 2023-03-29 PROCEDURE — 3078F PR MOST RECENT DIASTOLIC BLOOD PRESSURE < 80 MM HG: ICD-10-PCS | Mod: CPTII,S$GLB,, | Performed by: STUDENT IN AN ORGANIZED HEALTH CARE EDUCATION/TRAINING PROGRAM

## 2023-03-29 RX ORDER — TIRZEPATIDE 2.5 MG/.5ML
2.5 INJECTION, SOLUTION SUBCUTANEOUS
Qty: 4 PEN | Refills: 0 | Status: SHIPPED | OUTPATIENT
Start: 2023-03-29 | End: 2023-04-20

## 2023-03-29 RX ORDER — INSULIN LISPRO 100 [IU]/ML
INJECTION, SOLUTION INTRAVENOUS; SUBCUTANEOUS
Qty: 45 ML | Refills: 1
Start: 2023-03-29 | End: 2023-05-01

## 2023-03-29 RX ORDER — LABETALOL 100 MG/1
100 TABLET, FILM COATED ORAL 2 TIMES DAILY
Qty: 180 TABLET | Refills: 3 | Status: SHIPPED | OUTPATIENT
Start: 2023-03-29 | End: 2024-03-28

## 2023-03-29 NOTE — PATIENT INSTRUCTIONS
Continue Levemir 16 units at night    Take Humalog 4 units 15 minutes before lunch and dinner, check blood sugar 2 hrs after every meal (including breakfast) and use sliding scale as necessary.     Continue Jardiance 10 mg daily.     Continue your Mounjaro ONCE A WEEK.     Hypoglycemia  Treat low glucose (sugar below 70) as per Rule of 15s: Give 15 gm of rapidly absorbed carbohydrate (ie: 1/2 cup juice). Recheck glucose level in 15 minutes. Give another 1/2 cup juice if glucose still under 70. Repeat until the  glucose level is above 70. Once glucose level returns to normal, consider follow with a snack or meal.

## 2023-03-29 NOTE — PROGRESS NOTES
SUBJECTIVE     Chief Complaint   Patient presents with    Follow-up     Est care       HPI  Edouard Pena is a 47 y.o. male with  HTN, HLD, CKD4, IgA nephropathy, T2DM, gout, tobacco use  that presents for establishment of care.     T2DM:   Last A1c 10.1% on 3/1/23.   Levemir 16 units qhs  Humalog 2-10 units TID PRN  Jardiance 10 mg qd   On an ARB  MACR 359 on 3/8/23  Recently started on medication due to diagnosis of diabetes at recent hospitalization.  Blood sugar log over the past week with AM BG frunning in the 157-222, lowest being 128 on 3/17.   Preprandial sugars being 169, 264, 318 before lung  Preprandial before dinner being in the 200s-300s.   Patient had started on Mounjaro for weight loss, but patient misunderstood instructions and took a dose of Mounjaro every day for 4 days which led to decreased appetite and ultimately an MARIE.    HTN:   Valsartan 320 mg qd  Amlodipine 10 mg qd  BP SBP running 140s-150s at home.     IgA Nephropathy:   Diet nurse to with kidney biopsy on 09/08/2022.  Three worst prognostic indicators present on biopsy.  Maintained on Tarpeyo managed by nephrology.     CKD4:   Recent admission earlier this month for MARIE her creatinine peaked to 5.2.  Most recent studies show that it is down to 3.3 with a GFR of 22.  Patient with proteinuria.  Kidney biopsy showing chronic IgA nephropathy.  Kidney ultrasound with no hydronephrosis.  Will complete a 9 month course of budesonide 16 mg p.o. q.d. in 09/2023 that will be followed by 2 weeks of budesonide 8 mg p.o. daily prior to discontinuing.  Maintained on valsartan and amlodipine for blood pressure.  Patient also maintained on sodium bicarb 650 mg t.i.d. and Lokelma 10 mg PO TIW.     PAST MEDICAL HISTORY:  Past Medical History:   Diagnosis Date    Anemia 3/1/2023    Chronic kidney disease     Chronic kidney disease stage II    Colon polyps     Hyperlipidemia 12/13/2012    Hypertension        PAST SURGICAL HISTORY:  Past Surgical  History:   Procedure Laterality Date    COSMETIC SURGERY      pyloric stenosis      RENAL BIOPSY Left 9/1/2022    Procedure: BIOPSY, KIDNEY;  Surgeon: Sinan Abad MD;  Location: [facility] CATH LAB;  Service: Interventional Nephrology;  Laterality: Left;       FAMILY HISTORY:  Family History   Problem Relation Age of Onset    COPD Mother     Heart disease Mother     Peripheral vascular disease Mother     Diabetes Father     Kidney disease Father     Stroke Father     Hypertension Father     Heart disease Father 70        CABG x 3    Kidney failure Father     Heart disease Maternal Grandfather        ALLERGIES AND MEDICATIONS: updated and reviewed.  Review of patient's allergies indicates:  No Known Allergies  Current Outpatient Medications   Medication Sig Dispense Refill    acetaminophen (TYLENOL) 500 MG tablet Take 1,000 mg by mouth every 4 (four) hours as needed for Pain.      allopurinoL (ZYLOPRIM) 100 MG tablet Take 1 tablet (100 mg total) by mouth once daily. 30 tablet 3    amLODIPine (NORVASC) 10 MG tablet Take 0.5 tablets (5 mg total) by mouth once daily. 90 tablet 3    blood sugar diagnostic Strp 1 strip by Misc.(Non-Drug; Combo Route) route 2 (two) times daily. ICD 10: E11.22 100 each 6    blood-glucose meter kit Use as instructed to check blood sugar two times a day. ICD 10: E11.22 1 each 0    budesonide (TARPEYO) 4 mg CpDR Take 16 mg by mouth once daily. 120 capsule 3    calcitRIOL (ROCALTROL) 0.25 MCG Cap Take 1 capsule (0.25 mcg total) by mouth once daily. 90 capsule 3    calcium carbonate (TUMS ORAL) Take 1 tablet by mouth daily as needed (Heartburn).      colchicine (COLCRYS) 0.6 mg tablet 0.6 mg daily as needed (gout flare-up).      empagliflozin (JARDIANCE) 10 mg tablet Take 1 tablet (10 mg total) by mouth once daily. 90 tablet 1    hydrocortisone (ANUSOL-HC) 2.5 % rectal cream Place rectally 2 (two) times daily. 28 g 2    insulin detemir U-100 (LEVEMIR FLEXTOUCH U-100 INSULN) 100 unit/mL (3 mL) InPn  "pen Inject 16 Units into the skin every evening. Eat a snack before bed if BG is < 100 mg/dl 14.4 mL 3    insulin lispro (HUMALOG KWIKPEN INSULIN) 100 unit/mL pen Inject 2-10 Units into the skin 3 (three) times daily as needed. Blood Glucose: 180 - 230 + 2 unit, 231- 280  + 4 units, 281 - 330 + 6 units, 331 - 380 + 8 units, > 380   + 10 units 45 mL 1    lancets Misc 1 lancet by Misc.(Non-Drug; Combo Route) route 3 (three) times daily. ICD 10: E11.22 300 each 3    OPW TEST CLAIM - DO NOT FILL OPW test claim. Do not fill. 1 tablet 0    pen needle, diabetic 32 gauge x 5/32" Ndle Use to inject insulin 4 times daily 100 each 0    polyethylene glycol (GLYCOLAX) 17 gram/dose powder Take 17 g by mouth once daily. 510 g 11    prednisoLONE acetate (PRED FORTE) 1 % DrpS Place 1 drop into both eyes daily as needed (Inflammation).      sodium bicarbonate 650 MG tablet Take 1 tablet (650 mg total) by mouth once daily. 90 tablet 3    sodium zirconium cyclosilicate (LOKELMA) 10 gram packet Take 1 packet (10 g total) by mouth every Mon, Wed, Fri. Mix entire contents of packet(s) into drinking glass containing 3 tablespoons of water; stir well and drink immediately. Add water and repeat until no powder remains to receive entire dose. 12 packet 2    valsartan (DIOVAN) 320 MG tablet Take 1 tablet (320 mg total) by mouth once daily. 90 tablet 3     No current facility-administered medications for this visit.       ROS  Review of Systems   Constitutional:  Negative for activity change, chills and fever.   HENT:  Negative for congestion and hearing loss.    Eyes:  Negative for pain and visual disturbance.   Respiratory:  Negative for cough and shortness of breath.    Cardiovascular:  Negative for chest pain and palpitations.   Gastrointestinal:  Negative for abdominal pain, constipation, diarrhea, nausea and vomiting.   Endocrine: Negative.    Genitourinary: Negative.    Musculoskeletal:  Negative for arthralgias and myalgias.   Skin: " Negative.    Allergic/Immunologic: Negative.    Neurological:  Negative for dizziness, light-headedness and headaches.   Hematological: Negative.        OBJECTIVE     Physical Exam  Vitals:    03/29/23 1000   BP: (!) 142/78   Pulse: 85   Temp:     Body mass index is 33.36 kg/m².            Physical Exam  Vitals reviewed.   Constitutional:       General: He is not in acute distress.     Appearance: Normal appearance.   HENT:      Head: Normocephalic and atraumatic.      Mouth/Throat:      Mouth: Mucous membranes are moist.      Pharynx: Oropharynx is clear.   Eyes:      Extraocular Movements: Extraocular movements intact.      Conjunctiva/sclera: Conjunctivae normal.      Pupils: Pupils are equal, round, and reactive to light.   Cardiovascular:      Rate and Rhythm: Normal rate and regular rhythm.      Pulses: Normal pulses.      Heart sounds: Normal heart sounds.   Pulmonary:      Effort: Pulmonary effort is normal.      Breath sounds: Normal breath sounds.   Abdominal:      General: Bowel sounds are normal. There is no distension.      Palpations: Abdomen is soft. There is no mass.      Tenderness: There is no abdominal tenderness. There is no guarding.   Musculoskeletal:         General: Normal range of motion.      Cervical back: Normal range of motion and neck supple. No rigidity or tenderness.      Right lower leg: No edema.      Left lower leg: No edema.   Lymphadenopathy:      Cervical: No cervical adenopathy.   Skin:     General: Skin is warm and dry.   Neurological:      General: No focal deficit present.      Mental Status: He is alert.   Psychiatric:         Mood and Affect: Mood normal.         Behavior: Behavior normal.         Health Maintenance         Date Due Completion Date    COVID-19 Vaccine (1) Never done ---    Pneumococcal Vaccines (Age 0-64) (1 - PCV) Never done ---    Eye Exam Never done ---    TETANUS VACCINE Never done ---    Low Dose Statin Never done ---    Colorectal Cancer Screening  Never done ---    Influenza Vaccine (1) Never done ---    Hemoglobin A1c 06/01/2023 3/1/2023    Diabetes Urine Screening 03/08/2024 3/8/2023    Foot Exam 03/08/2024 3/8/2023    Lipid Panel 03/15/2024 3/15/2023              ASSESSMENT     47 y.o. male with     1. Establishing care with new doctor, encounter for    2. Primary hypertension    3. Hyperlipidemia, unspecified hyperlipidemia type    4. Chronic kidney disease, stage 4 (severe)    5. IgA nephropathy    6. Type 2 diabetes mellitus with stage 4 chronic kidney disease, with long-term current use of insulin        PLAN:     1. Establishing care with new doctor, encounter for  - Prior notes/labs/imaging reviewed.  2. Primary hypertension  - Not controlled on amlodipine, valsartan. Add Labetalol.   - monitor blood pressure at home, write down.  Return to clinic in a month with home blood pressure cuff and home blood pressure log.  - labetaloL (NORMODYNE) 100 MG tablet; Take 1 tablet (100 mg total) by mouth 2 (two) times daily.  Dispense: 180 tablet; Refill: 3    3. Hyperlipidemia, unspecified hyperlipidemia type  - Will need to start statin.     4. Chronic kidney disease, stage 4 (severe)  - Stable. Continue follow up with nephrology.     5. IgA nephropathy  - Stable. Continue follow up with nephrology.     6. Type 2 diabetes mellitus with stage 4 chronic kidney disease, with long-term current use of insulin  - reviewed blood glucose log with patient, patient frequently not at goal (in the 180s to 190s) in the morning and preprandial levels routinely in the 200s to 300s.  Instructed patient to take Humalog 4 units 15 minutes prior to lunch and dinner and use a sliding scale after breakfast, lunch, dinner.  Continue Levemir 16 units nightly.  Patient understands that he is only to take majora once a week now, would be a good option to help control sugars further.  We will restart medication.  Patient was able to understand his new diabetes regimen and was able to  verbalize that with teach back method.  Patient also given a written copy of new insulin regimen.  - empagliflozin (JARDIANCE) 10 mg tablet; Take 1 tablet (10 mg total) by mouth once daily.  Dispense: 90 tablet; Refill: 1  - tirzepatide (MOUNJARO) 2.5 mg/0.5 mL PnIj; Inject 2.5 mg into the skin every 7 days. for 4 doses  Dispense: 4 pen; Refill: 0        RTC in 1 month with blood glucose log, blood pressure log.      Kristofer Menard MD  Family Medicine  [facility]  03/29/2023    This document was created using voice recognition software (M*Modal Fluency Direct). Although it may be edited, this document may contain errors related to incorrect recognition of the spoken word. Please call the physician if clarification is needed.           No follow-ups on file.

## 2023-03-29 NOTE — PLAN OF CARE
Pt ambulated on unit this morning with wife at side.  Denies pain or SOB.  Verbalized an understanding of procedure.  CBG = 154 this morning.  Oriented to room and call bell provided.  Will continue to monitor.    For information on Fall & Injury Prevention, visit: [email or web address] OR  [email or web address] OR  https://www.cdc.gov/steadi/patient.html

## 2023-04-03 RX ORDER — PEN NEEDLE, DIABETIC 30 GX3/16"
NEEDLE, DISPOSABLE MISCELLANEOUS
Qty: 100 EACH | Refills: 0 | Status: CANCELLED | OUTPATIENT
Start: 2023-04-03

## 2023-04-05 RX ORDER — PEN NEEDLE, DIABETIC 30 GX3/16"
NEEDLE, DISPOSABLE MISCELLANEOUS
Qty: 100 EACH | Refills: 0 | Status: CANCELLED | OUTPATIENT
Start: 2023-04-03

## 2023-04-06 RX ORDER — PEN NEEDLE, DIABETIC 30 GX3/16"
NEEDLE, DISPOSABLE MISCELLANEOUS
Qty: 100 EACH | Refills: 0 | Status: CANCELLED | OUTPATIENT
Start: 2023-04-06

## 2023-04-10 NOTE — TELEPHONE ENCOUNTER
Refill Routing Note   Medication(s) are not appropriate for processing by [facility] for the following reason(s):      Responsible provider unclear    [facility] action(s):  Route            Appointments  past 12m or future 3m with PCP    Date Provider   Last Visit   3/29/2023 Kristofer Menard MD   Next Visit   5/1/2023 Kristofer Menard MD   ED visits in past 90 days: 0        Note composed:4:34 PM 04/10/2023

## 2023-04-11 RX ORDER — PEN NEEDLE, DIABETIC 30 GX3/16"
NEEDLE, DISPOSABLE MISCELLANEOUS
Qty: 100 EACH | Refills: 11 | Status: SHIPPED | OUTPATIENT
Start: 2023-04-11

## 2023-04-11 RX ORDER — BUDESONIDE 4 MG/1
16 CAPSULE, DELAYED RELEASE ORAL DAILY
Qty: 120 CAPSULE | Refills: 3 | Status: CANCELLED | OUTPATIENT
Start: 2023-04-11 | End: 2023-05-11

## 2023-04-24 ENCOUNTER — ANESTHESIA EVENT (OUTPATIENT)
Dept: ENDOSCOPY | Facility: HOSPITAL | Age: 48
End: 2023-04-24
Payer: COMMERCIAL

## 2023-04-24 ENCOUNTER — ANESTHESIA (OUTPATIENT)
Dept: ENDOSCOPY | Facility: HOSPITAL | Age: 48
End: 2023-04-24
Payer: COMMERCIAL

## 2023-04-24 ENCOUNTER — HOSPITAL ENCOUNTER (OUTPATIENT)
Facility: HOSPITAL | Age: 48
Discharge: HOME | End: 2023-04-24
Attending: COLON & RECTAL SURGERY | Admitting: COLON & RECTAL SURGERY
Payer: COMMERCIAL

## 2023-04-24 VITALS
SYSTOLIC BLOOD PRESSURE: 156 MMHG | DIASTOLIC BLOOD PRESSURE: 83 MMHG | HEIGHT: 71 IN | HEART RATE: 83 BPM | TEMPERATURE: 98 F | RESPIRATION RATE: 17 BRPM | OXYGEN SATURATION: 98 % | BODY MASS INDEX: 33.32 KG/M2 | WEIGHT: 238 LBS

## 2023-04-24 DIAGNOSIS — Z12.11 SCREENING FOR MALIGNANT NEOPLASM OF COLON: Primary | ICD-10-CM

## 2023-04-24 PROBLEM — Z86.0100 HISTORY OF COLON POLYPS: Status: ACTIVE | Noted: 2023-04-24

## 2023-04-24 PROBLEM — Z86.010 HISTORY OF COLON POLYPS: Status: ACTIVE | Noted: 2023-04-24

## 2023-04-24 LAB — POCT GLUCOSE: 142 MG/DL (ref 70–110)

## 2023-04-24 PROCEDURE — 25000003 PHARM REV CODE 250: Performed by: COLON & RECTAL SURGERY

## 2023-04-24 PROCEDURE — 88305 TISSUE EXAM BY PATHOLOGIST: ICD-10-PCS | Mod: 26,,, | Performed by: PATHOLOGY

## 2023-04-24 PROCEDURE — 25000003 PHARM REV CODE 250: Performed by: NURSE ANESTHETIST, CERTIFIED REGISTERED

## 2023-04-24 PROCEDURE — 45385 COLONOSCOPY W/LESION REMOVAL: CPT | Mod: 33,,, | Performed by: COLON & RECTAL SURGERY

## 2023-04-24 PROCEDURE — 63600175 PHARM REV CODE 636 W HCPCS: Performed by: NURSE ANESTHETIST, CERTIFIED REGISTERED

## 2023-04-24 PROCEDURE — 37000008 HC ANESTHESIA 1ST 15 MINUTES: Performed by: COLON & RECTAL SURGERY

## 2023-04-24 PROCEDURE — 88305 TISSUE EXAM BY PATHOLOGIST: CPT | Mod: 26,,, | Performed by: PATHOLOGY

## 2023-04-24 PROCEDURE — E9220 PRA ENDO ANESTHESIA: HCPCS | Mod: 33,,, | Performed by: NURSE ANESTHETIST, CERTIFIED REGISTERED

## 2023-04-24 PROCEDURE — 45380 COLONOSCOPY AND BIOPSY: CPT | Mod: 33,59,, | Performed by: COLON & RECTAL SURGERY

## 2023-04-24 PROCEDURE — 88305 TISSUE EXAM BY PATHOLOGIST: CPT | Performed by: PATHOLOGY

## 2023-04-24 PROCEDURE — 45385 PR COLONOSCOPY,REMV LESN,SNARE: ICD-10-PCS | Mod: 33,,, | Performed by: COLON & RECTAL SURGERY

## 2023-04-24 PROCEDURE — 45385 COLONOSCOPY W/LESION REMOVAL: CPT | Mod: PT | Performed by: COLON & RECTAL SURGERY

## 2023-04-24 PROCEDURE — 45380 COLONOSCOPY AND BIOPSY: CPT | Mod: PT,59 | Performed by: COLON & RECTAL SURGERY

## 2023-04-24 PROCEDURE — 27201089 HC SNARE, DISP (ANY): Performed by: COLON & RECTAL SURGERY

## 2023-04-24 PROCEDURE — E9220 PRA ENDO ANESTHESIA: ICD-10-PCS | Mod: 33,,, | Performed by: NURSE ANESTHETIST, CERTIFIED REGISTERED

## 2023-04-24 PROCEDURE — 37000009 HC ANESTHESIA EA ADD 15 MINS: Performed by: COLON & RECTAL SURGERY

## 2023-04-24 PROCEDURE — 45380 PR COLONOSCOPY,BIOPSY: ICD-10-PCS | Mod: 33,59,, | Performed by: COLON & RECTAL SURGERY

## 2023-04-24 RX ORDER — PROPOFOL 10 MG/ML
VIAL (ML) INTRAVENOUS CONTINUOUS PRN
Status: DISCONTINUED | OUTPATIENT
Start: 2023-04-24 | End: 2023-04-24

## 2023-04-24 RX ORDER — LIDOCAINE HYDROCHLORIDE 20 MG/ML
INJECTION, SOLUTION EPIDURAL; INFILTRATION; INTRACAUDAL; PERINEURAL
Status: DISCONTINUED | OUTPATIENT
Start: 2023-04-24 | End: 2023-04-24

## 2023-04-24 RX ORDER — PROPOFOL 10 MG/ML
VIAL (ML) INTRAVENOUS
Status: DISCONTINUED | OUTPATIENT
Start: 2023-04-24 | End: 2023-04-24

## 2023-04-24 RX ORDER — SODIUM CHLORIDE 9 MG/ML
INJECTION, SOLUTION INTRAVENOUS CONTINUOUS
Status: DISCONTINUED | OUTPATIENT
Start: 2023-04-24 | End: 2023-04-24 | Stop reason: HOSPADM

## 2023-04-24 RX ADMIN — PROPOFOL 100 MG: 10 INJECTION, EMULSION INTRAVENOUS at 12:04

## 2023-04-24 RX ADMIN — PROPOFOL 30 MG: 10 INJECTION, EMULSION INTRAVENOUS at 12:04

## 2023-04-24 RX ADMIN — SODIUM CHLORIDE: 9 INJECTION, SOLUTION INTRAVENOUS at 10:04

## 2023-04-24 RX ADMIN — LIDOCAINE HYDROCHLORIDE 50 MG: 20 INJECTION, SOLUTION EPIDURAL; INFILTRATION; INTRACAUDAL; PERINEURAL at 12:04

## 2023-04-24 RX ADMIN — Medication 150 MCG/KG/MIN: at 12:04

## 2023-04-24 RX ADMIN — PROPOFOL 30 MG: 10 INJECTION, EMULSION INTRAVENOUS at 01:04

## 2023-04-24 NOTE — H&P
"COLONOSCOPY HISTORY AND PHYSICAL EXAM    Procedure : Colonoscopy      INDICATIONS: personal history of colon polyps    Family Hx of CRC: None    Last Colonoscopy:  15yrs ago  Findings: polyps (per pt)       Past Medical History:   Diagnosis Date    Anemia 3/1/2023    Chronic kidney disease     Chronic kidney disease stage II    Colon polyps     Hyperlipidemia 12/13/2012    Hypertension      Sedation Problems: NO  Family History   Problem Relation Age of Onset    COPD Mother     Heart disease Mother     Peripheral vascular disease Mother     Diabetes Father     Kidney disease Father     Stroke Father     Hypertension Father     Heart disease Father 70        CABG x 3    Kidney failure Father     Heart disease Maternal Grandfather      Fam Hx of Sedation Problems: NO  Social History     Socioeconomic History    Marital status:    Tobacco Use    Smoking status: Every Day     Packs/day: 1.00     Years: 24.00     Pack years: 24.00     Types: Cigarettes    Smokeless tobacco: Current   Substance and Sexual Activity    Alcohol use: Yes     Comment: Occasional    Drug use: No    Sexual activity: Yes     Partners: Female       Review of Systems - Negative except   Respiratory ROS: no dyspnea  Cardiovascular ROS: no exertional chest pain  Gastrointestinal ROS: NO abdominal discomfort,  NO rectal bleeding  Musculoskeletal ROS: no muscular pain  Neurological ROS: no recent stroke    Physical Exam:  /80 (BP Location: Left arm, Patient Position: Lying)   Pulse 80   Temp 98 °F (36.7 °C) (Temporal)   Resp 14   Ht 5' 11" (1.803 m)   Wt 108 kg (238 lb)   SpO2 98%   BMI 33.19 kg/m²   General: no distress  Head: normocephalic  Mallampati Score   Neck: supple, symmetrical, trachea midline  Lungs:  clear to auscultation bilaterally and normal respiratory effort  Heart: regular rate and rhythm and no murmur  Abdomen: soft, non-tender non-distented; bowel sounds normal; no masses,  no organomegaly  Extremities: no " cyanosis or edema, or clubbing    ASA:  II    PLAN  COLONOSCOPY.    SedationPlan :MAC    The details of the procedure, the possible need for biopsy or polypectomy and the potential risks including bleeding, perforation, missed polyps were discussed in detail.

## 2023-04-24 NOTE — PROVATION PATIENT INSTRUCTIONS
Discharge Summary/Instructions after an Endoscopic Procedure  Patient Name: Edouard Pena  Patient MRN: [id number]  Patient YOB: 1975 Monday, April 24, 2023  Mayelin Forte MD  Dear patient,  As a result of recent federal legislation (The Federal Cures Act), you may   receive lab or pathology results from your procedure in your [facility]   account before your physician is able to contact you. Your physician or   their representative will relay the results to you with their   recommendations at their soonest availability.  Thank you,  RESTRICTIONS:  During your procedure today, you received medications for sedation.  These   medications may affect your judgment, balance and coordination.  Therefore,   for 24 hours, you have the following restrictions:   - DO NOT drive a car, operate machinery, make legal/financial decisions,   sign important papers or drink alcohol.    ACTIVITY:  Today: no heavy lifting, straining or running due to procedural   sedation/anesthesia.  The following day: return to full activity including work.  DIET:  Eat and drink normally unless instructed otherwise.     TREATMENT FOR COMMON SIDE EFFECTS:  - Mild abdominal pain, nausea, belching, bloating or excessive gas:  rest,   eat lightly and use a heating pad.  - Sore Throat: treat with throat lozenges and/or gargle with warm salt   water.  - Because air was used during the procedure, expelling large amounts of air   from your rectum or belching is normal.  - If a bowel prep was taken, you may not have a bowel movement for 1-3 days.    This is normal.  SYMPTOMS TO WATCH FOR AND REPORT TO YOUR PHYSICIAN:  1. Abdominal pain or bloating, other than gas cramps.  2. Chest pain.  3. Back pain.  4. Signs of infection such as: chills or fever occurring within 24 hours   after the procedure.  5. Rectal bleeding, which would show as bright red, maroon, or black stools.   (A tablespoon of blood from the rectum is not serious, especially if    hemorrhoids are present.)  6. Vomiting.  7. Weakness or dizziness.  GO DIRECTLY TO THE NEAREST EMERGENCY ROOM IF YOU HAVE ANY OF THE FOLLOWING:      Difficulty breathing              Chills and/or fever over 101 F   Persistent vomiting and/or vomiting blood   Severe abdominal pain   Severe chest pain   Black, tarry stools   Bleeding- more than one tablespoon   Any other symptom or condition that you feel may need urgent attention  Your doctor recommends these additional instructions:  If any biopsies were taken, your doctors clinic will contact you in 1 to 2   weeks with any results.  - Discharge patient to home.   - Resume previous diet.   - Continue present medications.   - Await pathology results.   - Repeat colonoscopy date to be determined after pending pathology results   are reviewed for surveillance.   - Return to referring physician.   - Written discharge instructions were provided to the patient.   - The signs and symptoms of potential delayed complications were discussed   with the patient.   - Patient has a contact number available for emergencies.   - Return to normal activities tomorrow.  For questions, problems or results please call your physician - Mayelin Forte MD at Work:  [phone].  [facility], EMERGENCY ROOM PHONE NUMBER: [phone]  IF A COMPLICATION OR EMERGENCY SITUATION ARISES AND YOU ARE UNABLE TO REACH   YOUR PHYSICIAN - GO DIRECTLY TO THE EMERGENCY ROOM.  Mayelin Forte MD  4/24/2023 1:19:43 PM  This report has been verified and signed electronically.  Dear patient,  As a result of recent federal legislation (The Federal Cures Act), you may   receive lab or pathology results from your procedure in your [facility]   account before your physician is able to contact you. Your physician or   their representative will relay the results to you with their   recommendations at their soonest availability.  Thank you,  PROVATION

## 2023-04-24 NOTE — ANESTHESIA PREPROCEDURE EVALUATION
04/24/2023  Edouard Pena is a 47 y.o., male.  Past Medical History:   Diagnosis Date    Anemia 3/1/2023    Chronic kidney disease     Chronic kidney disease stage II    Colon polyps     Hyperlipidemia 12/13/2012    Hypertension      .  Past Surgical History:   Procedure Laterality Date    COSMETIC SURGERY      pyloric stenosis      RENAL BIOPSY Left 9/1/2022    Procedure: BIOPSY, KIDNEY;  Surgeon: Sinan Abad MD;  Location: [facility] CATH LAB;  Service: Interventional Nephrology;  Laterality: Left;     Patient Active Problem List   Diagnosis    HTN (hypertension)    Chronic kidney disease, stage 4 (severe)    Tobacco use    FH: heart disease    FH: diabetes mellitus    Hyperlipidemia    Hyperuricemia    Newly diagnosed diabetes    IgA nephropathy    MARIE (acute kidney injury)    Leucocytosis    Hypercalcemia    Rectal bleed    Anemia    Obesity (BMI 30-39.9)    Blurred vision, bilateral         Pre-op Assessment    I have reviewed the Patient Summary Reports.     I have reviewed the Nursing Notes. I have reviewed the NPO Status.   I have reviewed the Medications.     Review of Systems  Anesthesia Hx:  No problems with previous Anesthesia    Social:  Smoker, Alcohol Use    Cardiovascular:   Hypertension    Renal/:   Chronic Renal Disease    Endocrine:   Diabetes        Physical Exam  General: Alert and Oriented    Airway:  Mallampati: I   Mouth Opening: Normal  TM Distance: Normal  Tongue: Normal  Neck ROM: Normal ROM    Dental:  Intact        Anesthesia Plan  Type of Anesthesia, risks & benefits discussed:    Anesthesia Type: Gen Natural Airway  Intra-op Monitoring Plan: Standard ASA Monitors  Induction:  IV  Airway Plan: Direct  Informed Consent: Informed consent signed with the Patient and all parties understand the risks and agree with anesthesia plan.  All questions answered.    ASA Score: 2    Ready For Surgery From Anesthesia Perspective.     .

## 2023-04-24 NOTE — TRANSFER OF CARE
"Anesthesia Transfer of Care Note    Patient: Edouard Pena    Procedure(s) Performed: Procedure(s) (LRB):  COLONOSCOPY (N/A)    Patient location: PACU    Anesthesia Type: general    Transport from OR: Transported from OR on room air with adequate spontaneous ventilation    Post pain: adequate analgesia    Post assessment: no apparent anesthetic complications    Post vital signs: stable    Level of consciousness: awake    Nausea/Vomiting: no nausea/vomiting    Complications: none    Transfer of care protocol was followed      Last vitals:   Visit Vitals  BP (!) 143/83   Pulse 83   Temp 36.7 °C (98.1 °F)   Resp 16   Ht 5' 11" (1.803 m)   Wt 108 kg (238 lb)   SpO2 98%   BMI 33.19 kg/m²     "

## 2023-04-25 NOTE — ANESTHESIA POSTPROCEDURE EVALUATION
Anesthesia Post Evaluation    Patient: Edouard Pena    Procedure(s) Performed: Procedure(s) (LRB):  COLONOSCOPY (N/A)    Final Anesthesia Type: general      Patient location during evaluation: GI PACU  Patient participation: Yes- Able to Participate  Level of consciousness: awake  Post-procedure vital signs: reviewed and stable  Pain management: adequate  Airway patency: patent    PONV status at discharge: No PONV  Anesthetic complications: no      Cardiovascular status: blood pressure returned to baseline  Respiratory status: unassisted  Hydration status: euvolemic  Follow-up not needed.          Vitals Value Taken Time   /83 04/24/23 1355   Temp 36.7 °C (98.1 °F) 04/24/23 1325   Pulse 83 04/24/23 1355   Resp 17 04/24/23 1355   SpO2 98 % 04/24/23 1355         Event Time   Out of Recovery 14:13:19         Pain/Bhumika Score: Bhumika Score: 10 (4/24/2023  1:25 PM)

## 2023-04-28 LAB
FINAL PATHOLOGIC DIAGNOSIS: NORMAL
GROSS: NORMAL
Lab: NORMAL

## 2023-05-01 ENCOUNTER — OFFICE VISIT (OUTPATIENT)
Dept: INTERNAL MEDICINE | Facility: CLINIC | Age: 48
End: 2023-05-01
Payer: COMMERCIAL

## 2023-05-01 VITALS
BODY MASS INDEX: 33.48 KG/M2 | HEIGHT: 71 IN | OXYGEN SATURATION: 97 % | HEART RATE: 87 BPM | SYSTOLIC BLOOD PRESSURE: 122 MMHG | DIASTOLIC BLOOD PRESSURE: 80 MMHG | WEIGHT: 239.19 LBS

## 2023-05-01 DIAGNOSIS — N18.4 TYPE 2 DIABETES MELLITUS WITH STAGE 4 CHRONIC KIDNEY DISEASE, WITH LONG-TERM CURRENT USE OF INSULIN: ICD-10-CM

## 2023-05-01 DIAGNOSIS — E11.22 TYPE 2 DIABETES MELLITUS WITH STAGE 4 CHRONIC KIDNEY DISEASE, WITH LONG-TERM CURRENT USE OF INSULIN: ICD-10-CM

## 2023-05-01 DIAGNOSIS — Z79.4 TYPE 2 DIABETES MELLITUS WITH STAGE 4 CHRONIC KIDNEY DISEASE, WITH LONG-TERM CURRENT USE OF INSULIN: ICD-10-CM

## 2023-05-01 DIAGNOSIS — I10 PRIMARY HYPERTENSION: Primary | ICD-10-CM

## 2023-05-01 PROCEDURE — 1160F PR REVIEW ALL MEDS BY PRESCRIBER/CLIN PHARMACIST DOCUMENTED: ICD-10-PCS | Mod: CPTII,S$GLB,, | Performed by: STUDENT IN AN ORGANIZED HEALTH CARE EDUCATION/TRAINING PROGRAM

## 2023-05-01 PROCEDURE — 3074F SYST BP LT 130 MM HG: CPT | Mod: CPTII,S$GLB,, | Performed by: STUDENT IN AN ORGANIZED HEALTH CARE EDUCATION/TRAINING PROGRAM

## 2023-05-01 PROCEDURE — 3062F PR POS MACROALBUMINURIA RESULT DOCUMENTED/REVIEW: ICD-10-PCS | Mod: CPTII,S$GLB,, | Performed by: STUDENT IN AN ORGANIZED HEALTH CARE EDUCATION/TRAINING PROGRAM

## 2023-05-01 PROCEDURE — 99999 PR PBB SHADOW E&M-EST. PATIENT-LVL III: ICD-10-PCS | Mod: PBBFAC,,, | Performed by: STUDENT IN AN ORGANIZED HEALTH CARE EDUCATION/TRAINING PROGRAM

## 2023-05-01 PROCEDURE — 3079F PR MOST RECENT DIASTOLIC BLOOD PRESSURE 80-89 MM HG: ICD-10-PCS | Mod: CPTII,S$GLB,, | Performed by: STUDENT IN AN ORGANIZED HEALTH CARE EDUCATION/TRAINING PROGRAM

## 2023-05-01 PROCEDURE — 3008F BODY MASS INDEX DOCD: CPT | Mod: CPTII,S$GLB,, | Performed by: STUDENT IN AN ORGANIZED HEALTH CARE EDUCATION/TRAINING PROGRAM

## 2023-05-01 PROCEDURE — 3008F PR BODY MASS INDEX (BMI) DOCUMENTED: ICD-10-PCS | Mod: CPTII,S$GLB,, | Performed by: STUDENT IN AN ORGANIZED HEALTH CARE EDUCATION/TRAINING PROGRAM

## 2023-05-01 PROCEDURE — 4010F PR ACE/ARB THEARPY RXD/TAKEN: ICD-10-PCS | Mod: CPTII,S$GLB,, | Performed by: STUDENT IN AN ORGANIZED HEALTH CARE EDUCATION/TRAINING PROGRAM

## 2023-05-01 PROCEDURE — 1160F RVW MEDS BY RX/DR IN RCRD: CPT | Mod: CPTII,S$GLB,, | Performed by: STUDENT IN AN ORGANIZED HEALTH CARE EDUCATION/TRAINING PROGRAM

## 2023-05-01 PROCEDURE — 99214 OFFICE O/P EST MOD 30 MIN: CPT | Mod: S$GLB,,, | Performed by: STUDENT IN AN ORGANIZED HEALTH CARE EDUCATION/TRAINING PROGRAM

## 2023-05-01 PROCEDURE — 1159F PR MEDICATION LIST DOCUMENTED IN MEDICAL RECORD: ICD-10-PCS | Mod: CPTII,S$GLB,, | Performed by: STUDENT IN AN ORGANIZED HEALTH CARE EDUCATION/TRAINING PROGRAM

## 2023-05-01 PROCEDURE — 3066F PR DOCUMENTATION OF TREATMENT FOR NEPHROPATHY: ICD-10-PCS | Mod: CPTII,S$GLB,, | Performed by: STUDENT IN AN ORGANIZED HEALTH CARE EDUCATION/TRAINING PROGRAM

## 2023-05-01 PROCEDURE — 3062F POS MACROALBUMINURIA REV: CPT | Mod: CPTII,S$GLB,, | Performed by: STUDENT IN AN ORGANIZED HEALTH CARE EDUCATION/TRAINING PROGRAM

## 2023-05-01 PROCEDURE — 3046F HEMOGLOBIN A1C LEVEL >9.0%: CPT | Mod: CPTII,S$GLB,, | Performed by: STUDENT IN AN ORGANIZED HEALTH CARE EDUCATION/TRAINING PROGRAM

## 2023-05-01 PROCEDURE — 1159F MED LIST DOCD IN RCRD: CPT | Mod: CPTII,S$GLB,, | Performed by: STUDENT IN AN ORGANIZED HEALTH CARE EDUCATION/TRAINING PROGRAM

## 2023-05-01 PROCEDURE — 4010F ACE/ARB THERAPY RXD/TAKEN: CPT | Mod: CPTII,S$GLB,, | Performed by: STUDENT IN AN ORGANIZED HEALTH CARE EDUCATION/TRAINING PROGRAM

## 2023-05-01 PROCEDURE — 3079F DIAST BP 80-89 MM HG: CPT | Mod: CPTII,S$GLB,, | Performed by: STUDENT IN AN ORGANIZED HEALTH CARE EDUCATION/TRAINING PROGRAM

## 2023-05-01 PROCEDURE — 3074F PR MOST RECENT SYSTOLIC BLOOD PRESSURE < 130 MM HG: ICD-10-PCS | Mod: CPTII,S$GLB,, | Performed by: STUDENT IN AN ORGANIZED HEALTH CARE EDUCATION/TRAINING PROGRAM

## 2023-05-01 PROCEDURE — 99999 PR PBB SHADOW E&M-EST. PATIENT-LVL III: CPT | Mod: PBBFAC,,, | Performed by: STUDENT IN AN ORGANIZED HEALTH CARE EDUCATION/TRAINING PROGRAM

## 2023-05-01 PROCEDURE — 3046F PR MOST RECENT HEMOGLOBIN A1C LEVEL > 9.0%: ICD-10-PCS | Mod: CPTII,S$GLB,, | Performed by: STUDENT IN AN ORGANIZED HEALTH CARE EDUCATION/TRAINING PROGRAM

## 2023-05-01 PROCEDURE — 3066F NEPHROPATHY DOC TX: CPT | Mod: CPTII,S$GLB,, | Performed by: STUDENT IN AN ORGANIZED HEALTH CARE EDUCATION/TRAINING PROGRAM

## 2023-05-01 PROCEDURE — 99214 PR OFFICE/OUTPT VISIT, EST, LEVL IV, 30-39 MIN: ICD-10-PCS | Mod: S$GLB,,, | Performed by: STUDENT IN AN ORGANIZED HEALTH CARE EDUCATION/TRAINING PROGRAM

## 2023-05-01 RX ORDER — INSULIN DETEMIR 100 [IU]/ML
18 INJECTION, SOLUTION SUBCUTANEOUS NIGHTLY
Qty: 16.2 ML | Refills: 3 | Status: SHIPPED | OUTPATIENT
Start: 2023-05-01 | End: 2024-04-30

## 2023-05-01 RX ORDER — TIRZEPATIDE 5 MG/.5ML
5 INJECTION, SOLUTION SUBCUTANEOUS
Qty: 4 PEN | Refills: 11 | Status: SHIPPED | OUTPATIENT
Start: 2023-05-01 | End: 2023-12-28 | Stop reason: SDUPTHER

## 2023-05-01 RX ORDER — INSULIN LISPRO 100 [IU]/ML
INJECTION, SOLUTION INTRAVENOUS; SUBCUTANEOUS
Qty: 45 ML | Refills: 1
Start: 2023-05-01 | End: 2023-07-08 | Stop reason: SDUPTHER

## 2023-05-01 NOTE — PROGRESS NOTES
SUBJECTIVE     Chief Complaint   Patient presents with    Follow-up       HPI  Edouard Pena is a 47 y.o. male with  HTN, HLD, CKD4, IgA nephropathy, T2DM, gout, tobacco use    that presents for follow-up.     Last seen in clinic on 3/29/23.     HTN:   Amlodipine 10 mg   Valsartan 320 mg qd  Labetalol 100 mg BID  Blood pressure controlled today. Has been controlled when he checks at gym near work. No chest pain, SOB, palpitations.     T2DM:   Jardiance 10 mg qd, Levemir 16 units, Humalog 4 units with lunch & dinner with sliding scale.   Reports with blood sugar log. AM blood sugars running 150s to low 200s, evening sugars 150s-220s.     PAST MEDICAL HISTORY:  Past Medical History:   Diagnosis Date    Anemia 3/1/2023    Chronic kidney disease     Chronic kidney disease stage II    Colon polyps     Hyperlipidemia 12/13/2012    Hypertension        PAST SURGICAL HISTORY:  Past Surgical History:   Procedure Laterality Date    COLONOSCOPY N/A 4/24/2023    Procedure: COLONOSCOPY;  Surgeon: Mayelin Forte MD;  Location: [facility] ENDO [address]);  Service: Endoscopy;  Laterality: N/A;  Golytely ok per nephrologist-see note 3/24- instr to portal -MS  4-18-23- preop call- voice mail on- no message left- MMG    COSMETIC SURGERY      pyloric stenosis      RENAL BIOPSY Left 9/1/2022    Procedure: BIOPSY, KIDNEY;  Surgeon: Sinan Abad MD;  Location: [facility] CATH LAB;  Service: Interventional Nephrology;  Laterality: Left;       FAMILY HISTORY:  Family History   Problem Relation Age of Onset    COPD Mother     Heart disease Mother     Peripheral vascular disease Mother     Diabetes Father     Kidney disease Father     Stroke Father     Hypertension Father     Heart disease Father 70        CABG x 3    Kidney failure Father     Heart disease Maternal Grandfather        ALLERGIES AND MEDICATIONS: updated and reviewed.  Review of patient's allergies indicates:  No Known Allergies  Current Outpatient Medications   Medication Sig  Dispense Refill    acetaminophen (TYLENOL) 500 MG tablet Take 1,000 mg by mouth every 4 (four) hours as needed for Pain.      allopurinoL (ZYLOPRIM) 100 MG tablet Take 1 tablet (100 mg total) by mouth once daily. 30 tablet 3    amLODIPine (NORVASC) 10 MG tablet Take 0.5 tablets (5 mg total) by mouth once daily. 90 tablet 3    blood sugar diagnostic Strp 1 strip by Misc.(Non-Drug; Combo Route) route 2 (two) times daily. ICD 10: E11.22 100 each 6    blood-glucose meter kit Use as instructed to check blood sugar two times a day. ICD 10: E11.22 1 each 0    budesonide (TARPEYO) 4 mg CpDR Take 16 mg by mouth once daily. 120 capsule 3    calcitRIOL (ROCALTROL) 0.25 MCG Cap Take 1 capsule (0.25 mcg total) by mouth once daily. 90 capsule 3    calcium carbonate (TUMS ORAL) Take 1 tablet by mouth daily as needed (Heartburn).      colchicine (COLCRYS) 0.6 mg tablet 0.6 mg daily as needed (gout flare-up).      empagliflozin (JARDIANCE) 10 mg tablet Take 1 tablet (10 mg total) by mouth once daily. 90 tablet 1    empagliflozin (JARDIANCE) 10 mg tablet Take 1 tablet (10 mg total) by mouth once daily. 90 tablet 1    hydrocortisone (ANUSOL-HC) 2.5 % rectal cream Place rectally 2 (two) times daily. 28 g 2    insulin detemir U-100 (LEVEMIR FLEXTOUCH U-100 INSULN) 100 unit/mL (3 mL) InPn pen Inject 16 Units into the skin every evening. Eat a snack before bed if BG is < 100 mg/dl 14.4 mL 3    insulin lispro (HUMALOG KWIKPEN INSULIN) 100 unit/mL pen Inject 4 units into skin with lunch and dinner; 2-10 Units into the skin 3 (three) times daily as needed. Blood Glucose: 180 - 230 + 2 unit, 231- 280 + 4 units, 281 - 330 + 6 units, 331 - 380 + 8 units, > 380 + 10 units 45 mL 1    labetaloL (NORMODYNE) 100 MG tablet Take 1 tablet (100 mg total) by mouth 2 (two) times daily. 180 tablet 3    lancets Misc 1 lancet by Misc.(Non-Drug; Combo Route) route 3 (three) times daily. ICD 10: E11.22 300 each 3    OPW TEST CLAIM - DO NOT FILL OPW test claim.  "Do not fill. 1 tablet 0    pen needle, diabetic (BD ULTRA-FINE DAVID PEN NEEDLE) 32 gauge x 5/32" Ndle Use to inject insulin 4 times daily 100 each 11    polyethylene glycol (GLYCOLAX) 17 gram/dose powder Take 17 g by mouth once daily. 510 g 11    prednisoLONE acetate (PRED FORTE) 1 % DrpS Place 1 drop into both eyes daily as needed (Inflammation).      sodium bicarbonate 650 MG tablet Take 1 tablet (650 mg total) by mouth once daily. 90 tablet 3    sodium zirconium cyclosilicate (LOKELMA) 10 gram packet Take 1 packet (10 g total) by mouth every Mon, Wed, Fri. Mix entire contents of packet(s) into drinking glass containing 3 tablespoons of water; stir well and drink immediately. Add water and repeat until no powder remains to receive entire dose. 12 packet 2    valsartan (DIOVAN) 320 MG tablet Take 1 tablet (320 mg total) by mouth once daily. 90 tablet 3     No current facility-administered medications for this visit.       ROS  Review of Systems   Constitutional:  Negative for activity change, chills and fever.   HENT:  Negative for congestion and hearing loss.    Eyes:  Negative for pain and visual disturbance.   Respiratory:  Negative for cough and shortness of breath.    Cardiovascular:  Negative for chest pain and palpitations.   Gastrointestinal:  Negative for abdominal pain, constipation, diarrhea, nausea and vomiting.   Endocrine: Negative.    Genitourinary: Negative.    Musculoskeletal:  Negative for arthralgias and myalgias.   Skin: Negative.    Allergic/Immunologic: Negative.    Neurological:  Negative for dizziness, light-headedness and headaches.   Hematological: Negative.        OBJECTIVE     Physical Exam  Vitals:    05/01/23 1605   BP: 122/80   Pulse: 87    Body mass index is 33.36 kg/m².            Physical Exam  Vitals reviewed.   Constitutional:       General: He is not in acute distress.     Appearance: Normal appearance.   HENT:      Head: Normocephalic and atraumatic.      Mouth/Throat:      " Mouth: Mucous membranes are moist.      Pharynx: Oropharynx is clear.   Eyes:      Extraocular Movements: Extraocular movements intact.      Conjunctiva/sclera: Conjunctivae normal.      Pupils: Pupils are equal, round, and reactive to light.   Cardiovascular:      Rate and Rhythm: Normal rate and regular rhythm.      Pulses: Normal pulses.      Heart sounds: Normal heart sounds.   Pulmonary:      Effort: Pulmonary effort is normal.      Breath sounds: Normal breath sounds.   Abdominal:      General: There is no distension.   Musculoskeletal:         General: Normal range of motion.      Cervical back: Normal range of motion and neck supple. No rigidity or tenderness.      Right lower leg: No edema.      Left lower leg: No edema.   Lymphadenopathy:      Cervical: No cervical adenopathy.   Skin:     General: Skin is warm and dry.   Neurological:      General: No focal deficit present.      Mental Status: He is alert.         Health Maintenance         Date Due Completion Date    COVID-19 Vaccine (1) Never done ---    Pneumococcal Vaccines (Age 0-64) (1 - PCV) Never done ---    Eye Exam Never done ---    TETANUS VACCINE Never done ---    Low Dose Statin Never done ---    Hemoglobin A1c 06/01/2023 3/1/2023    Influenza Vaccine (Season Ended) 09/01/2023 ---    Diabetes Urine Screening 03/08/2024 3/8/2023    Foot Exam 03/08/2024 3/8/2023    Lipid Panel 03/15/2024 3/15/2023    Colorectal Cancer Screening 04/24/2026 4/24/2023              ASSESSMENT     47 y.o. male with     1. Primary hypertension    2. Type 2 diabetes mellitus with stage 4 chronic kidney disease, with long-term current use of insulin        PLAN:     1. Primary hypertension  - Controlled on current regimen. Continue.     2. Type 2 diabetes mellitus with stage 4 chronic kidney disease, with long-term current use of insulin  - Some improvement, but still not at goal. Increase Mounjaro to 5 mg weekly; Increase Levemir to 18 units daily; increase Humalog to  6 units with lunch and dinner along with sliding scale. Continue Jardiance.   - HEMOGLOBIN A1C; Future        RTC in 1 month.      Kristofer Menard MD  Family Medicine  [facility]  05/01/2023    This document was created using voice recognition software ([employer]). Although it may be edited, this document may contain errors related to incorrect recognition of the spoken word. Please call the physician if clarification is needed.           No follow-ups on file.

## 2023-05-01 NOTE — PATIENT INSTRUCTIONS
Continue Mounjaro 5 mg ONCE a week.   Increase Levemir to 18 units in the morning.   Increase Humalong to 6 units with lunch and dinner with the same sliding scale.     Continue your Amlodipine, Valsartan, and Labetalol. Your blood pressure is at goal, keep up the good work!

## 2023-05-06 ENCOUNTER — LAB VISIT (OUTPATIENT)
Dept: LAB | Facility: HOSPITAL | Age: 48
End: 2023-05-06
Payer: COMMERCIAL

## 2023-05-06 DIAGNOSIS — N02.B9 IGA NEPHROPATHY: ICD-10-CM

## 2023-05-06 DIAGNOSIS — N18.4 CHRONIC KIDNEY DISEASE, STAGE 4 (SEVERE): ICD-10-CM

## 2023-05-06 LAB
25(OH)D3+25(OH)D2 SERPL-MCNC: 44 NG/ML (ref 30–96)
ALBUMIN SERPL BCP-MCNC: 3.6 G/DL (ref 3.5–5.2)
ANION GAP SERPL CALC-SCNC: 15 MMOL/L (ref 8–16)
BUN SERPL-MCNC: 33 MG/DL (ref 6–20)
CALCIUM SERPL-MCNC: 10.3 MG/DL (ref 8.7–10.5)
CHLORIDE SERPL-SCNC: 102 MMOL/L (ref 95–110)
CO2 SERPL-SCNC: 23 MMOL/L (ref 23–29)
CREAT SERPL-MCNC: 2.8 MG/DL (ref 0.5–1.4)
EST. GFR  (NO RACE VARIABLE): 26.8 ML/MIN/1.73 M^2
GLUCOSE SERPL-MCNC: 146 MG/DL (ref 70–110)
PHOSPHATE SERPL-MCNC: 3.8 MG/DL (ref 2.7–4.5)
POTASSIUM SERPL-SCNC: 3.9 MMOL/L (ref 3.5–5.1)
PTH-INTACT SERPL-MCNC: 101.2 PG/ML (ref 9–77)
SODIUM SERPL-SCNC: 140 MMOL/L (ref 136–145)

## 2023-05-06 PROCEDURE — 83970 ASSAY OF PARATHORMONE: CPT | Performed by: INTERNAL MEDICINE

## 2023-05-06 PROCEDURE — 80069 RENAL FUNCTION PANEL: CPT | Performed by: INTERNAL MEDICINE

## 2023-05-06 PROCEDURE — 36415 COLL VENOUS BLD VENIPUNCTURE: CPT | Mod: PO | Performed by: INTERNAL MEDICINE

## 2023-05-06 PROCEDURE — 82306 VITAMIN D 25 HYDROXY: CPT | Performed by: INTERNAL MEDICINE

## 2023-05-10 DIAGNOSIS — E11.9 TYPE 2 DIABETES MELLITUS WITHOUT COMPLICATION, UNSPECIFIED WHETHER LONG TERM INSULIN USE: ICD-10-CM

## 2023-05-11 ENCOUNTER — OFFICE VISIT (OUTPATIENT)
Dept: NEPHROLOGY | Facility: CLINIC | Age: 48
End: 2023-05-11
Payer: COMMERCIAL

## 2023-05-11 VITALS
WEIGHT: 224.88 LBS | BODY MASS INDEX: 31.36 KG/M2 | OXYGEN SATURATION: 96 % | HEART RATE: 96 BPM | DIASTOLIC BLOOD PRESSURE: 76 MMHG | SYSTOLIC BLOOD PRESSURE: 111 MMHG

## 2023-05-11 DIAGNOSIS — N18.4 CHRONIC KIDNEY DISEASE, STAGE 4 (SEVERE): ICD-10-CM

## 2023-05-11 DIAGNOSIS — N02.B9 IGA NEPHROPATHY: ICD-10-CM

## 2023-05-11 PROCEDURE — 3066F NEPHROPATHY DOC TX: CPT | Mod: CPTII,S$GLB,, | Performed by: INTERNAL MEDICINE

## 2023-05-11 PROCEDURE — 99999 PR PBB SHADOW E&M-EST. PATIENT-LVL IV: ICD-10-PCS | Mod: PBBFAC,,, | Performed by: INTERNAL MEDICINE

## 2023-05-11 PROCEDURE — 3008F PR BODY MASS INDEX (BMI) DOCUMENTED: ICD-10-PCS | Mod: CPTII,S$GLB,, | Performed by: INTERNAL MEDICINE

## 2023-05-11 PROCEDURE — 3078F DIAST BP <80 MM HG: CPT | Mod: CPTII,S$GLB,, | Performed by: INTERNAL MEDICINE

## 2023-05-11 PROCEDURE — 3046F HEMOGLOBIN A1C LEVEL >9.0%: CPT | Mod: CPTII,S$GLB,, | Performed by: INTERNAL MEDICINE

## 2023-05-11 PROCEDURE — 1159F PR MEDICATION LIST DOCUMENTED IN MEDICAL RECORD: ICD-10-PCS | Mod: CPTII,S$GLB,, | Performed by: INTERNAL MEDICINE

## 2023-05-11 PROCEDURE — 99999 PR PBB SHADOW E&M-EST. PATIENT-LVL IV: CPT | Mod: PBBFAC,,, | Performed by: INTERNAL MEDICINE

## 2023-05-11 PROCEDURE — 1159F MED LIST DOCD IN RCRD: CPT | Mod: CPTII,S$GLB,, | Performed by: INTERNAL MEDICINE

## 2023-05-11 PROCEDURE — 3074F PR MOST RECENT SYSTOLIC BLOOD PRESSURE < 130 MM HG: ICD-10-PCS | Mod: CPTII,S$GLB,, | Performed by: INTERNAL MEDICINE

## 2023-05-11 PROCEDURE — 3074F SYST BP LT 130 MM HG: CPT | Mod: CPTII,S$GLB,, | Performed by: INTERNAL MEDICINE

## 2023-05-11 PROCEDURE — 99214 PR OFFICE/OUTPT VISIT, EST, LEVL IV, 30-39 MIN: ICD-10-PCS | Mod: S$GLB,,, | Performed by: INTERNAL MEDICINE

## 2023-05-11 PROCEDURE — 3062F POS MACROALBUMINURIA REV: CPT | Mod: CPTII,S$GLB,, | Performed by: INTERNAL MEDICINE

## 2023-05-11 PROCEDURE — 4010F PR ACE/ARB THEARPY RXD/TAKEN: ICD-10-PCS | Mod: CPTII,S$GLB,, | Performed by: INTERNAL MEDICINE

## 2023-05-11 PROCEDURE — 3078F PR MOST RECENT DIASTOLIC BLOOD PRESSURE < 80 MM HG: ICD-10-PCS | Mod: CPTII,S$GLB,, | Performed by: INTERNAL MEDICINE

## 2023-05-11 PROCEDURE — 99214 OFFICE O/P EST MOD 30 MIN: CPT | Mod: S$GLB,,, | Performed by: INTERNAL MEDICINE

## 2023-05-11 PROCEDURE — 4010F ACE/ARB THERAPY RXD/TAKEN: CPT | Mod: CPTII,S$GLB,, | Performed by: INTERNAL MEDICINE

## 2023-05-11 PROCEDURE — 3062F PR POS MACROALBUMINURIA RESULT DOCUMENTED/REVIEW: ICD-10-PCS | Mod: CPTII,S$GLB,, | Performed by: INTERNAL MEDICINE

## 2023-05-11 PROCEDURE — 3008F BODY MASS INDEX DOCD: CPT | Mod: CPTII,S$GLB,, | Performed by: INTERNAL MEDICINE

## 2023-05-11 PROCEDURE — 3046F PR MOST RECENT HEMOGLOBIN A1C LEVEL > 9.0%: ICD-10-PCS | Mod: CPTII,S$GLB,, | Performed by: INTERNAL MEDICINE

## 2023-05-11 PROCEDURE — 3066F PR DOCUMENTATION OF TREATMENT FOR NEPHROPATHY: ICD-10-PCS | Mod: CPTII,S$GLB,, | Performed by: INTERNAL MEDICINE

## 2023-05-11 NOTE — ASSESSMENT & PLAN NOTE
Cr appears to have peaked at 5.2 during his most recent admission for MARIE; significant interval improved with serum Cr down to 2.8 with a GFR of 26 consistent with CKD stage 4  No edema on examination   UPCR significantly improved at 0.36g/g; continue Valsartan 320mg  Most recent UA with trace blood and 3 RBCs   Urine microscopy with many acanthocytes  Kidney biopsy results consistent with chronic IgA nephropathy; no significant disease activity noted however pts renal function continues to deteriorate; 30-40 interstitial fibrosis present on biopsy  Hyperkalemia well controlled on lokelma; continue three times a week   Kidney US performed in clinic (9/15) with no hydronephrosis  Continue Budesonide 16mg PO daily to complete 9 months (around September 2023) followed by 2 weeks of 8mg PO daily prior to discontinuing   Valsartan 320mg PO daily   Amlodipine 5mg PO daily   Sodium bicarb 650mg PO TID and Lokelma 10mg PO TIW  Oriented on the importance of weight loss, BP control, diabetes control and smoking cessation

## 2023-05-11 NOTE — ASSESSMENT & PLAN NOTE
Kidney biopsy (9/8/22): IgA dominant diffuse global granular mesangial with segmental capillary loop staining by IF, diffuse mild increase in mesangial cellularity and 30-40% interstitial fibrosis. There is no activity in that isaiah is no endocapillary proliferation, crescents, or fibrinoid necrosis. Three worse prognostic indicators present; significant mesangial proliferation, segmental sclerosis and interstitial fibrosis.      Continue Tarpeyo   Continue working on lifestyle modifications including weight loss, BP control, tobacco cessation and diabetes control

## 2023-05-11 NOTE — PROGRESS NOTES
Nephrology Clinic Note   5/11/2023    Chief Complaint   Patient presents with    Chronic Kidney Disease      History of present illness:  Patient is a 47 y.o. male.   Presents to the clinic today for medical conditions listed below.  Problem Noted   Iga Nephropathy 10/20/2022   Htn (Hypertension) 11/14/2012   Chronic Kidney Disease, Stage 4 (Severe) 11/14/2012    45 y/o M with hx of HTN, Gout and prior history of glomerulonephritis who comes in for follow up evaluation. Pt was last evaluated by a kidney doctor 5y ago in 2017. At that time it was suspected he had stable disease, possibly IgA nephropathy. Biopsy was deferred and expectant observation was recommended. Pt now comes back to the Nephrology clinic with worsening renal dysfunction and proteinuria. Blood pressure also significantly elevated. He refers his father suffered from kidney disease and had a complication of bleeding after a biopsy and required dialysis however he cannot remember the diagnosis. He believes it was related to hypertension. Pt denies any recent nausea, vomits, diarrhea, radiocontrast administration, dehydration or NSAID use.     Interval Hx:  9/15/22: Kidney biopsy results noted and consistent with IgA nephropathy. Worsening kidney function with creatinine up to 2.9.   10/20/22: Serum creatinine appears to have peaked at 3.6 and currently down to 3.2 as pt refers he has been working on lifestyle modifications. Tooth infection/abscess resolved after extraction and pt has completed his antibiotics.   11/10/22: sCr down to 2.6, UPCR up to 4. Pt refers he just realized he had not been taking the prednisone. Tarpeyo approved on appeal.   3/23/22: Admitted earlier this month for MARIE and uncontrolled hyperglycemia. Serum creatinine peaked around 5.2 before improving to around 3.3 on his most recent labs.          Review of Systems   Constitutional:  Negative for chills, fever and weight loss.   Respiratory:  Negative for cough, shortness of  breath and wheezing.    Cardiovascular:  Negative for chest pain, claudication and leg swelling.   Gastrointestinal:  Negative for abdominal pain, diarrhea, nausea and vomiting.   Genitourinary:  Negative for dysuria, flank pain, frequency, hematuria and urgency.   Skin:  Negative for rash.   Neurological:  Negative for weakness.     History:  Past Medical History:   Diagnosis Date    Anemia 3/1/2023    Chronic kidney disease     Chronic kidney disease stage II    Colon polyps     Hyperlipidemia 12/13/2012    Hypertension       Past Surgical History:   Procedure Laterality Date    COLONOSCOPY N/A 4/24/2023    Procedure: COLONOSCOPY;  Surgeon: Mayelin Forte MD;  [facility]  Service: Endoscopy;  Laterality: N/A;  Golytely ok per nephrologist-see note 3/24- instr to portal -MS  4-18-23- preop call- voice mail on- no message left- MMG    COSMETIC SURGERY      pyloric stenosis      RENAL BIOPSY Left 9/1/2022    Procedure: BIOPSY, KIDNEY;  Surgeon: Sinan Abad MD;  Location: [facility] CATH LAB;  Service: Interventional Nephrology;  Laterality: Left;        Current Outpatient Medications:     acetaminophen (TYLENOL) 500 MG tablet, Take 1,000 mg by mouth every 4 (four) hours as needed for Pain., Disp: , Rfl:     allopurinoL (ZYLOPRIM) 100 MG tablet, Take 1 tablet (100 mg total) by mouth once daily., Disp: 30 tablet, Rfl: 3    amLODIPine (NORVASC) 10 MG tablet, Take 0.5 tablets (5 mg total) by mouth once daily., Disp: 90 tablet, Rfl: 3    blood sugar diagnostic Strp, 1 strip by Misc.(Non-Drug; Combo Route) route 2 (two) times daily. ICD 10: E11.22, Disp: 100 each, Rfl: 6    blood-glucose meter kit, Use as instructed to check blood sugar two times a day. ICD 10: E11.22, Disp: 1 each, Rfl: 0    budesonide (TARPEYO) 4 mg CpDR, Take 16 mg by mouth once daily., Disp: 120 capsule, Rfl: 3    calcitRIOL (ROCALTROL) 0.25 MCG Cap, Take 1 capsule (0.25 mcg total) by mouth once daily., Disp: 90 capsule, Rfl: 3     "calcium carbonate (TUMS ORAL), Take 1 tablet by mouth daily as needed (Heartburn)., Disp: , Rfl:     colchicine (COLCRYS) 0.6 mg tablet, 0.6 mg daily as needed (gout flare-up)., Disp: , Rfl:     empagliflozin (JARDIANCE) 10 mg tablet, Take 1 tablet (10 mg total) by mouth once daily., Disp: 90 tablet, Rfl: 1    hydrocortisone (ANUSOL-HC) 2.5 % rectal cream, Place rectally 2 (two) times daily., Disp: 28 g, Rfl: 2    insulin detemir U-100, Levemir, (LEVEMIR FLEXTOUCH U-100 INSULN) 100 unit/mL (3 mL) InPn pen, Inject 18 Units into the skin every evening. Eat a snack before bed if BG is < 100 mg/dl, Disp: 16.2 mL, Rfl: 3    insulin lispro (HUMALOG KWIKPEN INSULIN) 100 unit/mL pen, Inject 6 units into skin with lunch and dinner; 2-10 Units into the skin 3 (three) times daily as needed. Blood Glucose: 180 - 230 + 2 unit, 231- 280 + 4 units, 281 - 330 + 6 units, 331 - 380 + 8 units, > 380 + 10 units, Disp: 45 mL, Rfl: 1    labetaloL (NORMODYNE) 100 MG tablet, Take 1 tablet (100 mg total) by mouth 2 (two) times daily., Disp: 180 tablet, Rfl: 3    lancets Misc, 1 lancet by Misc.(Non-Drug; Combo Route) route 3 (three) times daily. ICD 10: E11.22, Disp: 300 each, Rfl: 3    pen needle, diabetic (BD ULTRA-FINE DAVID PEN NEEDLE) 32 gauge x 5/32" Ndle, Use to inject insulin 4 times daily, Disp: 100 each, Rfl: 11    polyethylene glycol (GLYCOLAX) 17 gram/dose powder, Take 17 g by mouth once daily., Disp: 510 g, Rfl: 11    prednisoLONE acetate (PRED FORTE) 1 % DrpS, Place 1 drop into both eyes daily as needed (Inflammation)., Disp: , Rfl:     sodium bicarbonate 650 MG tablet, Take 1 tablet (650 mg total) by mouth once daily., Disp: 90 tablet, Rfl: 3    sodium zirconium cyclosilicate (LOKELMA) 10 gram packet, Take 1 packet (10 g total) by mouth every Mon, Wed, Fri. Mix entire contents of packet(s) into drinking glass containing 3 tablespoons of water; stir well and drink immediately. Add water and repeat until no powder remains to " receive entire dose., Disp: 12 packet, Rfl: 2    tirzepatide (MOUNJARO) 5 mg/0.5 mL PnIj, Inject 5 mg into the skin every 7 days., Disp: 4 pen, Rfl: 11    valsartan (DIOVAN) 320 MG tablet, Take 1 tablet (320 mg total) by mouth once daily., Disp: 90 tablet, Rfl: 3    empagliflozin (JARDIANCE) 10 mg tablet, Take 1 tablet (10 mg total) by mouth once daily., Disp: 90 tablet, Rfl: 1    OPW TEST CLAIM - DO NOT FILL, OPW test claim. Do not fill. (Patient not taking: Reported on 5/11/2023), Disp: 1 tablet, Rfl: 0  Review of patient's allergies indicates:  No Known Allergies   Social History     Tobacco Use    Smoking status: Every Day     Packs/day: 1.00     Years: 24.00     Pack years: 24.00     Types: Cigarettes    Smokeless tobacco: Current   Substance Use Topics    Alcohol use: Yes     Comment: Occasional      Family History   Problem Relation Age of Onset    COPD Mother     Heart disease Mother     Peripheral vascular disease Mother     Diabetes Father     Kidney disease Father     Stroke Father     Hypertension Father     Heart disease Father 70        CABG x 3    Kidney failure Father     Heart disease Maternal Grandfather         Physical Exam :  Vitals:    05/11/23 1457   BP: 111/76   Pulse: 96     Physical Exam  Constitutional:       General: He is not in acute distress.     Appearance: He is not ill-appearing or toxic-appearing.   HENT:      Head: Atraumatic.      Nose: Nose normal.      Mouth/Throat:      Mouth: Mucous membranes are moist.   Cardiovascular:      Rate and Rhythm: Normal rate.   Pulmonary:      Effort: Pulmonary effort is normal.   Abdominal:      General: There is no distension.      Palpations: Abdomen is soft.      Tenderness: There is no abdominal tenderness.   Musculoskeletal:         General: No swelling. Normal range of motion.      Right lower leg: No edema.      Left lower leg: No edema.   Skin:     General: Skin is warm and dry.   Neurological:      Mental Status: He is alert and  oriented to person, place, and time.   Psychiatric:         Mood and Affect: Mood normal.         Behavior: Behavior normal.       Labs reviewed   Images Reviewed    Assessment:    1. Chronic kidney disease, stage 4 (severe)    2. IgA nephropathy        Plan:    Chronic kidney disease, stage 4 (severe)  Cr appears to have peaked at 5.2 during his most recent admission for MARIE; significant interval improved with serum Cr down to 2.8 with a GFR of 26 consistent with CKD stage 4  No edema on examination   UPCR significantly improved at 0.36g/g; continue Valsartan 320mg  Most recent UA with trace blood and 3 RBCs   Urine microscopy with many acanthocytes  Kidney biopsy results consistent with chronic IgA nephropathy; no significant disease activity noted however pts renal function continues to deteriorate; 30-40 interstitial fibrosis present on biopsy  Hyperkalemia well controlled on lokelma; continue three times a week   Kidney US performed in clinic (9/15) with no hydronephrosis  Continue Budesonide 16mg PO daily to complete 9 months (around September 2023) followed by 2 weeks of 8mg PO daily prior to discontinuing   Valsartan 320mg PO daily   Amlodipine 5mg PO daily   Sodium bicarb 650mg PO TID and Lokelma 10mg PO TIW  Oriented on the importance of weight loss, BP control, diabetes control and smoking cessation    IgA nephropathy  Kidney biopsy (9/8/22): IgA dominant diffuse global granular mesangial with segmental capillary loop staining by IF, diffuse mild increase in mesangial cellularity and 30-40% interstitial fibrosis. There is no activity in that isaiah is no endocapillary proliferation, crescents, or fibrinoid necrosis. Three worse prognostic indicators present; significant mesangial proliferation, segmental sclerosis and interstitial fibrosis.      Continue Tarpeyo   Continue working on lifestyle modifications including weight loss, BP control, tobacco cessation and diabetes control   Follow up in about 3  months (around 8/11/2023).     Orders Placed This Encounter   Procedures    Vitamin D    PTH, Intact    Protein/Creatinine Ratio, Urine    Urinalysis    Renal Function Panel     There are no discontinued medications.   Future Appointments   Date Time Provider Department Center   6/1/2023  3:30 PM CV [facility] [place]   6/5/2023  3:30 PM [facility]   7/5/2023  9:00 AM Ky Walters OD [facility] [place]

## 2023-05-15 ENCOUNTER — PATIENT MESSAGE (OUTPATIENT)
Dept: ADMINISTRATIVE | Facility: HOSPITAL | Age: 48
End: 2023-05-15
Payer: COMMERCIAL

## 2023-05-15 NOTE — PROGRESS NOTES
I have reviewed the notes, assessments, and/or procedures performed by Dr Blue and , I concur with her/his documentation of Edouard Pena.    Biopsy proven IgA Nephropathy with 30-40 % IFTA  Cr appears to have peaked at 5.2, currently down to 2.8 with a GFR of 26 consistent with CKD stage 4  UPCR improved at 0.36g/g;  On Budesonide 16 mg po daily, tolerating well so far  Continue Valsartan 320mg PO daily    Guadalupe Colón MD  Nephrology  [facility].

## 2023-05-30 ENCOUNTER — TELEPHONE (OUTPATIENT)
Dept: PHARMACY | Facility: CLINIC | Age: 48
End: 2023-05-30
Payer: COMMERCIAL

## 2023-05-30 NOTE — TELEPHONE ENCOUNTER
Incoming call from patient , patient was requesting to refill his Tarpeyo, but it appears patient has been filling with [facility] with [facility].    Patient confirms he has been filling with pharmacy, and provided phone number to patient.

## 2023-06-05 ENCOUNTER — OFFICE VISIT (OUTPATIENT)
Dept: INTERNAL MEDICINE | Facility: CLINIC | Age: 48
End: 2023-06-05
Payer: COMMERCIAL

## 2023-06-05 ENCOUNTER — LAB VISIT (OUTPATIENT)
Dept: LAB | Facility: HOSPITAL | Age: 48
End: 2023-06-05
Attending: STUDENT IN AN ORGANIZED HEALTH CARE EDUCATION/TRAINING PROGRAM
Payer: COMMERCIAL

## 2023-06-05 VITALS
SYSTOLIC BLOOD PRESSURE: 115 MMHG | TEMPERATURE: 98 F | HEART RATE: 101 BPM | BODY MASS INDEX: 30.68 KG/M2 | OXYGEN SATURATION: 95 % | WEIGHT: 219.13 LBS | HEIGHT: 71 IN | DIASTOLIC BLOOD PRESSURE: 70 MMHG

## 2023-06-05 DIAGNOSIS — E11.22 TYPE 2 DIABETES MELLITUS WITH STAGE 4 CHRONIC KIDNEY DISEASE, WITH LONG-TERM CURRENT USE OF INSULIN: Primary | ICD-10-CM

## 2023-06-05 DIAGNOSIS — N18.4 TYPE 2 DIABETES MELLITUS WITH STAGE 4 CHRONIC KIDNEY DISEASE, WITH LONG-TERM CURRENT USE OF INSULIN: Primary | ICD-10-CM

## 2023-06-05 DIAGNOSIS — Z79.4 TYPE 2 DIABETES MELLITUS WITH STAGE 4 CHRONIC KIDNEY DISEASE, WITH LONG-TERM CURRENT USE OF INSULIN: Primary | ICD-10-CM

## 2023-06-05 DIAGNOSIS — E11.22 TYPE 2 DIABETES MELLITUS WITH STAGE 4 CHRONIC KIDNEY DISEASE, WITH LONG-TERM CURRENT USE OF INSULIN: ICD-10-CM

## 2023-06-05 DIAGNOSIS — Z79.4 TYPE 2 DIABETES MELLITUS WITH STAGE 4 CHRONIC KIDNEY DISEASE, WITH LONG-TERM CURRENT USE OF INSULIN: ICD-10-CM

## 2023-06-05 DIAGNOSIS — E78.2 MIXED HYPERLIPIDEMIA: ICD-10-CM

## 2023-06-05 DIAGNOSIS — N18.4 TYPE 2 DIABETES MELLITUS WITH STAGE 4 CHRONIC KIDNEY DISEASE, WITH LONG-TERM CURRENT USE OF INSULIN: ICD-10-CM

## 2023-06-05 PROBLEM — N17.9 AKI (ACUTE KIDNEY INJURY): Status: RESOLVED | Noted: 2023-02-15 | Resolved: 2023-06-05

## 2023-06-05 PROBLEM — K62.5 RECTAL BLEED: Status: RESOLVED | Noted: 2023-03-01 | Resolved: 2023-06-05

## 2023-06-05 LAB
ESTIMATED AVG GLUCOSE: 146 MG/DL (ref 68–131)
HBA1C MFR BLD: 6.7 % (ref 4–5.6)

## 2023-06-05 PROCEDURE — 4010F ACE/ARB THERAPY RXD/TAKEN: CPT | Mod: CPTII,S$GLB,, | Performed by: STUDENT IN AN ORGANIZED HEALTH CARE EDUCATION/TRAINING PROGRAM

## 2023-06-05 PROCEDURE — 3008F BODY MASS INDEX DOCD: CPT | Mod: CPTII,S$GLB,, | Performed by: STUDENT IN AN ORGANIZED HEALTH CARE EDUCATION/TRAINING PROGRAM

## 2023-06-05 PROCEDURE — 99999 PR PBB SHADOW E&M-EST. PATIENT-LVL V: ICD-10-PCS | Mod: PBBFAC,,, | Performed by: STUDENT IN AN ORGANIZED HEALTH CARE EDUCATION/TRAINING PROGRAM

## 2023-06-05 PROCEDURE — 83036 HEMOGLOBIN GLYCOSYLATED A1C: CPT | Performed by: STUDENT IN AN ORGANIZED HEALTH CARE EDUCATION/TRAINING PROGRAM

## 2023-06-05 PROCEDURE — 3062F POS MACROALBUMINURIA REV: CPT | Mod: CPTII,S$GLB,, | Performed by: STUDENT IN AN ORGANIZED HEALTH CARE EDUCATION/TRAINING PROGRAM

## 2023-06-05 PROCEDURE — 99214 PR OFFICE/OUTPT VISIT, EST, LEVL IV, 30-39 MIN: ICD-10-PCS | Mod: S$GLB,,, | Performed by: STUDENT IN AN ORGANIZED HEALTH CARE EDUCATION/TRAINING PROGRAM

## 2023-06-05 PROCEDURE — 3066F PR DOCUMENTATION OF TREATMENT FOR NEPHROPATHY: ICD-10-PCS | Mod: CPTII,S$GLB,, | Performed by: STUDENT IN AN ORGANIZED HEALTH CARE EDUCATION/TRAINING PROGRAM

## 2023-06-05 PROCEDURE — 3008F PR BODY MASS INDEX (BMI) DOCUMENTED: ICD-10-PCS | Mod: CPTII,S$GLB,, | Performed by: STUDENT IN AN ORGANIZED HEALTH CARE EDUCATION/TRAINING PROGRAM

## 2023-06-05 PROCEDURE — 3046F PR MOST RECENT HEMOGLOBIN A1C LEVEL > 9.0%: ICD-10-PCS | Mod: CPTII,S$GLB,, | Performed by: STUDENT IN AN ORGANIZED HEALTH CARE EDUCATION/TRAINING PROGRAM

## 2023-06-05 PROCEDURE — 3078F PR MOST RECENT DIASTOLIC BLOOD PRESSURE < 80 MM HG: ICD-10-PCS | Mod: CPTII,S$GLB,, | Performed by: STUDENT IN AN ORGANIZED HEALTH CARE EDUCATION/TRAINING PROGRAM

## 2023-06-05 PROCEDURE — 3078F DIAST BP <80 MM HG: CPT | Mod: CPTII,S$GLB,, | Performed by: STUDENT IN AN ORGANIZED HEALTH CARE EDUCATION/TRAINING PROGRAM

## 2023-06-05 PROCEDURE — 3074F SYST BP LT 130 MM HG: CPT | Mod: CPTII,S$GLB,, | Performed by: STUDENT IN AN ORGANIZED HEALTH CARE EDUCATION/TRAINING PROGRAM

## 2023-06-05 PROCEDURE — 3066F NEPHROPATHY DOC TX: CPT | Mod: CPTII,S$GLB,, | Performed by: STUDENT IN AN ORGANIZED HEALTH CARE EDUCATION/TRAINING PROGRAM

## 2023-06-05 PROCEDURE — 1159F PR MEDICATION LIST DOCUMENTED IN MEDICAL RECORD: ICD-10-PCS | Mod: CPTII,S$GLB,, | Performed by: STUDENT IN AN ORGANIZED HEALTH CARE EDUCATION/TRAINING PROGRAM

## 2023-06-05 PROCEDURE — 3046F HEMOGLOBIN A1C LEVEL >9.0%: CPT | Mod: CPTII,S$GLB,, | Performed by: STUDENT IN AN ORGANIZED HEALTH CARE EDUCATION/TRAINING PROGRAM

## 2023-06-05 PROCEDURE — 4010F PR ACE/ARB THEARPY RXD/TAKEN: ICD-10-PCS | Mod: CPTII,S$GLB,, | Performed by: STUDENT IN AN ORGANIZED HEALTH CARE EDUCATION/TRAINING PROGRAM

## 2023-06-05 PROCEDURE — 1160F PR REVIEW ALL MEDS BY PRESCRIBER/CLIN PHARMACIST DOCUMENTED: ICD-10-PCS | Mod: CPTII,S$GLB,, | Performed by: STUDENT IN AN ORGANIZED HEALTH CARE EDUCATION/TRAINING PROGRAM

## 2023-06-05 PROCEDURE — 36415 COLL VENOUS BLD VENIPUNCTURE: CPT | Performed by: STUDENT IN AN ORGANIZED HEALTH CARE EDUCATION/TRAINING PROGRAM

## 2023-06-05 PROCEDURE — 99214 OFFICE O/P EST MOD 30 MIN: CPT | Mod: S$GLB,,, | Performed by: STUDENT IN AN ORGANIZED HEALTH CARE EDUCATION/TRAINING PROGRAM

## 2023-06-05 PROCEDURE — 1159F MED LIST DOCD IN RCRD: CPT | Mod: CPTII,S$GLB,, | Performed by: STUDENT IN AN ORGANIZED HEALTH CARE EDUCATION/TRAINING PROGRAM

## 2023-06-05 PROCEDURE — 3074F PR MOST RECENT SYSTOLIC BLOOD PRESSURE < 130 MM HG: ICD-10-PCS | Mod: CPTII,S$GLB,, | Performed by: STUDENT IN AN ORGANIZED HEALTH CARE EDUCATION/TRAINING PROGRAM

## 2023-06-05 PROCEDURE — 1160F RVW MEDS BY RX/DR IN RCRD: CPT | Mod: CPTII,S$GLB,, | Performed by: STUDENT IN AN ORGANIZED HEALTH CARE EDUCATION/TRAINING PROGRAM

## 2023-06-05 PROCEDURE — 99999 PR PBB SHADOW E&M-EST. PATIENT-LVL V: CPT | Mod: PBBFAC,,, | Performed by: STUDENT IN AN ORGANIZED HEALTH CARE EDUCATION/TRAINING PROGRAM

## 2023-06-05 PROCEDURE — 3062F PR POS MACROALBUMINURIA RESULT DOCUMENTED/REVIEW: ICD-10-PCS | Mod: CPTII,S$GLB,, | Performed by: STUDENT IN AN ORGANIZED HEALTH CARE EDUCATION/TRAINING PROGRAM

## 2023-06-05 RX ORDER — BLOOD-GLUCOSE SENSOR
1 EACH MISCELLANEOUS
Qty: 9 EACH | Refills: 3 | Status: SHIPPED | OUTPATIENT
Start: 2023-06-05 | End: 2023-09-15 | Stop reason: SDUPTHER

## 2023-06-05 RX ORDER — BLOOD-GLUCOSE TRANSMITTER
1 EACH MISCELLANEOUS
Qty: 1 EACH | Refills: 3 | Status: SHIPPED | OUTPATIENT
Start: 2023-06-05 | End: 2023-09-15 | Stop reason: SDUPTHER

## 2023-06-05 RX ORDER — ATORVASTATIN CALCIUM 10 MG/1
10 TABLET, FILM COATED ORAL DAILY
Qty: 90 TABLET | Refills: 3 | Status: SHIPPED | OUTPATIENT
Start: 2023-06-05 | End: 2023-08-08 | Stop reason: SDUPTHER

## 2023-06-05 NOTE — PROGRESS NOTES
SUBJECTIVE     Chief Complaint   Patient presents with    Follow-up       HPI  Edouard Pena is a 47 y.o. male with  T2DM, HTN, HLD, CKD 2, IgA nephropathy  that presents for follow-up; LOV 5/1/23.     Patient is down 20 lb since last visit on 05/01/2023!  Patient states that he is changed his diet up considerably since he and his wife  since last visit.  States that his stress has improved since he and his wife split.  Reports that he has not been checking his sugars as frequently as before, but states that they all have been controlled.  No hypoglycemic readings or symptoms.    PAST MEDICAL HISTORY:  Past Medical History:   Diagnosis Date    Anemia 3/1/2023    Chronic kidney disease     Chronic kidney disease stage II    Colon polyps     Hyperlipidemia 12/13/2012    Hypertension        PAST SURGICAL HISTORY:  Past Surgical History:   Procedure Laterality Date    COLONOSCOPY N/A 4/24/2023    Procedure: COLONOSCOPY;  Surgeon: Mayelin Forte MD;  Location: [facility] ENDO ([address] [place] [address]);  Service: Endoscopy;  Laterality: N/A;  Golytely ok per nephrologist-see note 3/24- instr to portal -MS  4-18-23- preop call- voice mail on- no message left- MMG    COSMETIC SURGERY      pyloric stenosis      RENAL BIOPSY Left 9/1/2022    Procedure: BIOPSY, KIDNEY;  Surgeon: Sinan Abad MD;  Location: [facility] CATH LAB;  Service: Interventional Nephrology;  Laterality: Left;       FAMILY HISTORY:  Family History   Problem Relation Age of Onset    COPD Mother     Heart disease Mother     Peripheral vascular disease Mother     Diabetes Father     Kidney disease Father     Stroke Father     Hypertension Father     Heart disease Father 70        CABG x 3    Kidney failure Father     Heart disease Maternal Grandfather        ALLERGIES AND MEDICATIONS: updated and reviewed.  Review of patient's allergies indicates:  No Known Allergies  Current Outpatient Medications   Medication Sig Dispense Refill    acetaminophen (TYLENOL) 500 MG  "tablet Take 1,000 mg by mouth every 4 (four) hours as needed for Pain.      allopurinoL (ZYLOPRIM) 100 MG tablet Take 1 tablet (100 mg total) by mouth once daily. 30 tablet 3    amLODIPine (NORVASC) 10 MG tablet Take 0.5 tablets (5 mg total) by mouth once daily. 90 tablet 3    blood sugar diagnostic Strp 1 strip by Misc.(Non-Drug; Combo Route) route 2 (two) times daily. ICD 10: E11.22 100 each 6    blood-glucose meter kit Use as instructed to check blood sugar two times a day. ICD 10: E11.22 1 each 0    budesonide (TARPEYO) 4 mg CpDR Take 16 mg by mouth once daily. 120 capsule 3    calcitRIOL (ROCALTROL) 0.25 MCG Cap Take 1 capsule (0.25 mcg total) by mouth once daily. 90 capsule 3    calcium carbonate (TUMS ORAL) Take 1 tablet by mouth daily as needed (Heartburn).      colchicine (COLCRYS) 0.6 mg tablet 0.6 mg daily as needed (gout flare-up).      empagliflozin (JARDIANCE) 10 mg tablet Take 1 tablet (10 mg total) by mouth once daily. 90 tablet 1    hydrocortisone (ANUSOL-HC) 2.5 % rectal cream Place rectally 2 (two) times daily. 28 g 2    insulin detemir U-100, Levemir, (LEVEMIR FLEXTOUCH U-100 INSULN) 100 unit/mL (3 mL) InPn pen Inject 18 Units into the skin every evening. Eat a snack before bed if BG is < 100 mg/dl 16.2 mL 3    insulin lispro (HUMALOG KWIKPEN INSULIN) 100 unit/mL pen Inject 6 units into skin with lunch and dinner; 2-10 Units into the skin 3 (three) times daily as needed. Blood Glucose: 180 - 230 + 2 unit, 231- 280 + 4 units, 281 - 330 + 6 units, 331 - 380 + 8 units, > 380 + 10 units 45 mL 1    labetaloL (NORMODYNE) 100 MG tablet Take 1 tablet (100 mg total) by mouth 2 (two) times daily. 180 tablet 3    lancets Misc 1 lancet by Misc.(Non-Drug; Combo Route) route 3 (three) times daily. ICD 10: E11.22 300 each 3    pen needle, diabetic (BD ULTRA-FINE DAVID PEN NEEDLE) 32 gauge x 5/32" Ndle Use to inject insulin 4 times daily 100 each 11    polyethylene glycol (GLYCOLAX) 17 gram/dose powder Take 17 g " "by mouth once daily. 510 g 11    prednisoLONE acetate (PRED FORTE) 1 % DrpS Place 1 drop into both eyes daily as needed (Inflammation).      sodium bicarbonate 650 MG tablet Take 1 tablet (650 mg total) by mouth once daily. 90 tablet 3    sodium zirconium cyclosilicate (LOKELMA) 10 gram packet Take 1 packet (10 g total) by mouth every Mon, Wed, Fri. Mix entire contents of packet(s) into drinking glass containing 3 tablespoons of water; stir well and drink immediately. Add water and repeat until no powder remains to receive entire dose. 12 packet 2    tirzepatide (MOUNJARO) 5 mg/0.5 mL PnIj Inject 5 mg into the skin every 7 days. 4 pen 11    valsartan (DIOVAN) 320 MG tablet Take 1 tablet (320 mg total) by mouth once daily. 90 tablet 3    atorvastatin (LIPITOR) 10 MG tablet Take 1 tablet (10 mg total) by mouth once daily. 90 tablet 3     No current facility-administered medications for this visit.       ROS  Review of Systems   Constitutional:  Negative for activity change, chills and fever.   HENT:  Negative for congestion and hearing loss.    Eyes:  Negative for pain and visual disturbance.   Respiratory:  Negative for cough and shortness of breath.    Cardiovascular:  Negative for chest pain and palpitations.   Gastrointestinal:  Negative for abdominal pain, constipation, diarrhea, nausea and vomiting.   Endocrine: Negative.    Genitourinary: Negative.    Musculoskeletal:  Negative for arthralgias and myalgias.   Skin: Negative.    Allergic/Immunologic: Negative.    Neurological:  Negative for dizziness, light-headedness and headaches.   Hematological: Negative.        OBJECTIVE     Physical Exam  Vitals:    06/05/23 1524   BP: 115/70   Pulse: 101   Temp: 98.2 °F (36.8 °C)    Body mass index is 30.56 kg/m².  Weight: 99.4 kg (219 lb 2.2 oz)   Height: 5' 11" (180.3 cm)     Physical Exam  Vitals reviewed.   Constitutional:       General: He is not in acute distress.     Appearance: Normal appearance.   HENT:      " Head: Normocephalic and atraumatic.      Mouth/Throat:      Mouth: Mucous membranes are moist.      Pharynx: Oropharynx is clear.   Eyes:      Extraocular Movements: Extraocular movements intact.      Conjunctiva/sclera: Conjunctivae normal.      Pupils: Pupils are equal, round, and reactive to light.   Cardiovascular:      Rate and Rhythm: Normal rate and regular rhythm.      Pulses: Normal pulses.      Heart sounds: Normal heart sounds.   Pulmonary:      Effort: Pulmonary effort is normal.      Breath sounds: Normal breath sounds.   Abdominal:      General: There is no distension.   Musculoskeletal:         General: Normal range of motion.      Cervical back: Normal range of motion and neck supple.      Right lower leg: No edema.      Left lower leg: No edema.   Skin:     General: Skin is warm and dry.   Neurological:      General: No focal deficit present.      Mental Status: He is alert.   Psychiatric:         Mood and Affect: Mood normal.         Behavior: Behavior normal.         Health Maintenance         Date Due Completion Date    COVID-19 Vaccine (1) Never done ---    Pneumococcal Vaccines (Age 0-64) (1 - PCV) Never done ---    Eye Exam Never done ---    TETANUS VACCINE Never done ---    Hemoglobin A1c 06/01/2023 3/1/2023    Influenza Vaccine (Season Ended) 09/01/2023 ---    Diabetes Urine Screening 03/08/2024 3/8/2023    Foot Exam 03/08/2024 3/8/2023    Lipid Panel 03/15/2024 3/15/2023    Low Dose Statin 06/05/2024 6/5/2023    Colorectal Cancer Screening 04/24/2026 4/24/2023              ASSESSMENT     47 y.o. male with     1. Type 2 diabetes mellitus with stage 4 chronic kidney disease, with long-term current use of insulin    2. Mixed hyperlipidemia        PLAN:     1. Type 2 diabetes mellitus with stage 4 chronic kidney disease, with long-term current use of insulin  - Sugars reported to be controlled on current regimen. As patient is on several doses of insulin throughout the day, he would benefit  from CGM. Order placed.   - HEMOGLOBIN A1C; Future    2. Mixed hyperlipidemia  - Start statin. Counseled patient on safe and effective use of new medication, including common adverse effects. Patient verbalized understanding.   - atorvastatin (LIPITOR) 10 MG tablet; Take 1 tablet (10 mg total) by mouth once daily.  Dispense: 90 tablet; Refill: 3        RTC in 3 months.      Kristofer Menard MD  Family Medicine  [facility]  06/05/2023    This document was created using voice recognition software ([employer] Fluency Direct). Although it may be edited, this document may contain errors related to incorrect recognition of the spoken word. Please call the physician if clarification is needed.           Follow up in about 3 months (around 9/5/2023) for Check up .

## 2023-06-22 DIAGNOSIS — N18.4 CHRONIC KIDNEY DISEASE, STAGE 4 (SEVERE): Primary | ICD-10-CM

## 2023-06-23 ENCOUNTER — LAB VISIT (OUTPATIENT)
Dept: LAB | Facility: HOSPITAL | Age: 48
End: 2023-06-23
Attending: INTERNAL MEDICINE
Payer: COMMERCIAL

## 2023-06-23 DIAGNOSIS — N18.4 CHRONIC KIDNEY DISEASE, STAGE 4 (SEVERE): ICD-10-CM

## 2023-06-23 LAB
ANION GAP SERPL CALC-SCNC: 14 MMOL/L (ref 8–16)
BUN SERPL-MCNC: 51 MG/DL (ref 6–20)
CALCIUM SERPL-MCNC: 10 MG/DL (ref 8.7–10.5)
CHLORIDE SERPL-SCNC: 102 MMOL/L (ref 95–110)
CO2 SERPL-SCNC: 22 MMOL/L (ref 23–29)
CREAT SERPL-MCNC: 4.3 MG/DL (ref 0.5–1.4)
EST. GFR  (NO RACE VARIABLE): 16 ML/MIN/1.73 M^2
GLUCOSE SERPL-MCNC: 177 MG/DL (ref 70–110)
POTASSIUM SERPL-SCNC: 4.6 MMOL/L (ref 3.5–5.1)
SODIUM SERPL-SCNC: 138 MMOL/L (ref 136–145)

## 2023-06-23 PROCEDURE — 80048 BASIC METABOLIC PNL TOTAL CA: CPT | Performed by: INTERNAL MEDICINE

## 2023-06-23 PROCEDURE — 36415 COLL VENOUS BLD VENIPUNCTURE: CPT | Mod: PO | Performed by: INTERNAL MEDICINE

## 2023-07-07 DIAGNOSIS — N18.4 CHRONIC KIDNEY DISEASE, STAGE 4 (SEVERE): Primary | ICD-10-CM

## 2023-07-08 ENCOUNTER — TELEPHONE (OUTPATIENT)
Dept: INTERNAL MEDICINE | Facility: CLINIC | Age: 48
End: 2023-07-08
Payer: COMMERCIAL

## 2023-07-08 ENCOUNTER — NURSE TRIAGE (OUTPATIENT)
Dept: ADMINISTRATIVE | Facility: CLINIC | Age: 48
End: 2023-07-08
Payer: COMMERCIAL

## 2023-07-08 DIAGNOSIS — E11.9 NEWLY DIAGNOSED DIABETES: Primary | ICD-10-CM

## 2023-07-08 RX ORDER — INSULIN LISPRO 100 [IU]/ML
INJECTION, SOLUTION INTRAVENOUS; SUBCUTANEOUS
Qty: 45 ML | Refills: 0 | Status: SHIPPED | OUTPATIENT
Start: 2023-07-08

## 2023-07-08 NOTE — TELEPHONE ENCOUNTER
Spoke with patient who states [facility] pharmacy does not have his prescription for Humalog on file.  He states his Humalog pen has a mechanical issue and he can not use it.  He last took a injection of insulin yesterday.  Asked patient is he could tell me his blood sugar reading he stated no.  Spoke with Chasidy at [facility] pharmacy who states they have no record of filling this patient's Humalog.  Spoke with on call provider Dr. Gilbert who states if patient cannot give his current glucose reading then he should proceed to Urgent Care or ED for evaluation and have then write prescription.  This information was given to the patient and he then stated he could check his blood sugar.   He states his reading is currently 303.  Patient denies havign symptoms.  Called Dr. Gilbert back and he stated he would send in prescription to [facility] pharmacy and patient should  ASAP.  He states if patient continues to have high blood sugar reading over 300 or if he develops symptoms he needs to go to urgent care or ED.  Patient given this information and he verbalized understanding.     Reason for Disposition   [1] Caller has URGENT medication or insulin pump question AND [2] triager unable to answer question   [1] Caller has URGENT medicine question about med that PCP or specialist prescribed AND [2] triager unable to answer question    Additional Information   Negative: Unconscious or difficult to awaken   Negative: Acting confused (e.g., disoriented, slurred speech)   Negative: Very weak (e.g., can't stand)   Negative: Sounds like a life-threatening emergency to the triager   Negative: [1] Vomiting AND [2] signs of dehydration (e.g., very dry mouth, lightheaded, dark urine)   Negative: [1] Blood glucose > 240 mg/dL (13.3 mmol/L) AND [2] rapid breathing   Negative: Blood glucose > 500 mg/dL (27.8 mmol/L)     Can't check ketones   Negative: [1] Blood glucose > 240 mg/dL (13.3 mmol/L) AND [2] urine ketones moderate-large (or more  than 1+)     Can't check ketones   Negative: [1] Blood glucose > 240 mg/dL (13.3 mmol/L) AND [2] blood ketones > 1.4 mmol/L   Negative: [1] Blood glucose > 240 mg/dL (13.3 mmol/L) AND [2] vomiting AND [3] unable to check for ketones (in blood or urine)   Negative: [1] New-onset diabetes suspected (e.g., frequent urination, weak, weight loss) AND [2] vomiting or rapid breathing   Negative: Vomiting lasts > 4 hours   Negative: Patient sounds very sick or weak to the triager   Negative: Fever > 100.4 F (38.0 C)   Negative: Blood glucose > 400 mg/dL (22.2 mmol/L)   Negative: [1] Blood glucose > 300 mg/dL (16.7 mmol/L) AND [2] two or more times in a row   Negative: Urine ketones moderate - large (or blood ketones > 1.4 mmol/L)   Negative: [1] Symptoms of high blood sugar (e.g., abnormally thirsty, frequent urination, weight loss) AND [2] not able to test blood glucose AND [3] pregnant    Protocols used: Medication Question Call-A-, Diabetes - High Blood Sugar-A-

## 2023-07-10 NOTE — TELEPHONE ENCOUNTER
Called and spoke with pt, states he now has everything he needs and doesn't need anything from Dr. Menard office.

## 2023-07-11 ENCOUNTER — TELEPHONE (OUTPATIENT)
Dept: NEPHROLOGY | Facility: CLINIC | Age: 48
End: 2023-07-11
Payer: COMMERCIAL

## 2023-08-03 ENCOUNTER — LAB VISIT (OUTPATIENT)
Dept: LAB | Facility: HOSPITAL | Age: 48
End: 2023-08-03
Attending: INTERNAL MEDICINE
Payer: COMMERCIAL

## 2023-08-03 DIAGNOSIS — N18.4 CHRONIC KIDNEY DISEASE, STAGE 4 (SEVERE): ICD-10-CM

## 2023-08-03 LAB
25(OH)D3+25(OH)D2 SERPL-MCNC: 42 NG/ML (ref 30–96)
ALBUMIN SERPL BCP-MCNC: 3.9 G/DL (ref 3.5–5.2)
ALP SERPL-CCNC: 62 U/L (ref 55–135)
ALT SERPL W/O P-5'-P-CCNC: 19 U/L (ref 10–44)
ANION GAP SERPL CALC-SCNC: 14 MMOL/L (ref 8–16)
AST SERPL-CCNC: 13 U/L (ref 10–40)
BASOPHILS # BLD AUTO: 0.05 K/UL (ref 0–0.2)
BASOPHILS NFR BLD: 0.4 % (ref 0–1.9)
BILIRUB SERPL-MCNC: 0.4 MG/DL (ref 0.1–1)
BUN SERPL-MCNC: 37 MG/DL (ref 6–20)
CALCIUM SERPL-MCNC: 10 MG/DL (ref 8.7–10.5)
CHLORIDE SERPL-SCNC: 106 MMOL/L (ref 95–110)
CO2 SERPL-SCNC: 22 MMOL/L (ref 23–29)
CREAT SERPL-MCNC: 3.2 MG/DL (ref 0.5–1.4)
DIFFERENTIAL METHOD: ABNORMAL
EOSINOPHIL # BLD AUTO: 0.1 K/UL (ref 0–0.5)
EOSINOPHIL NFR BLD: 0.7 % (ref 0–8)
ERYTHROCYTE [DISTWIDTH] IN BLOOD BY AUTOMATED COUNT: 15.5 % (ref 11.5–14.5)
EST. GFR  (NO RACE VARIABLE): 22.7 ML/MIN/1.73 M^2
GLUCOSE SERPL-MCNC: 157 MG/DL (ref 70–110)
HCT VFR BLD AUTO: 40.2 % (ref 40–54)
HGB BLD-MCNC: 13.1 G/DL (ref 14–18)
IMM GRANULOCYTES # BLD AUTO: 0.08 K/UL (ref 0–0.04)
IMM GRANULOCYTES NFR BLD AUTO: 0.7 % (ref 0–0.5)
LYMPHOCYTES # BLD AUTO: 3 K/UL (ref 1–4.8)
LYMPHOCYTES NFR BLD: 25.9 % (ref 18–48)
MCH RBC QN AUTO: 32.8 PG (ref 27–31)
MCHC RBC AUTO-ENTMCNC: 32.6 G/DL (ref 32–36)
MCV RBC AUTO: 101 FL (ref 82–98)
MONOCYTES # BLD AUTO: 0.8 K/UL (ref 0.3–1)
MONOCYTES NFR BLD: 6.9 % (ref 4–15)
NEUTROPHILS # BLD AUTO: 7.5 K/UL (ref 1.8–7.7)
NEUTROPHILS NFR BLD: 65.4 % (ref 38–73)
NRBC BLD-RTO: 0 /100 WBC
PLATELET # BLD AUTO: 423 K/UL (ref 150–450)
PMV BLD AUTO: 9.3 FL (ref 9.2–12.9)
POTASSIUM SERPL-SCNC: 4.7 MMOL/L (ref 3.5–5.1)
PROT SERPL-MCNC: 7.1 G/DL (ref 6–8.4)
PTH-INTACT SERPL-MCNC: 94.3 PG/ML (ref 9–77)
RBC # BLD AUTO: 4 M/UL (ref 4.6–6.2)
SODIUM SERPL-SCNC: 142 MMOL/L (ref 136–145)
WBC # BLD AUTO: 11.42 K/UL (ref 3.9–12.7)

## 2023-08-03 PROCEDURE — 85025 COMPLETE CBC W/AUTO DIFF WBC: CPT | Performed by: STUDENT IN AN ORGANIZED HEALTH CARE EDUCATION/TRAINING PROGRAM

## 2023-08-03 PROCEDURE — 36415 COLL VENOUS BLD VENIPUNCTURE: CPT | Mod: PO | Performed by: STUDENT IN AN ORGANIZED HEALTH CARE EDUCATION/TRAINING PROGRAM

## 2023-08-03 PROCEDURE — 80053 COMPREHEN METABOLIC PANEL: CPT | Performed by: STUDENT IN AN ORGANIZED HEALTH CARE EDUCATION/TRAINING PROGRAM

## 2023-08-03 PROCEDURE — 82306 VITAMIN D 25 HYDROXY: CPT | Performed by: STUDENT IN AN ORGANIZED HEALTH CARE EDUCATION/TRAINING PROGRAM

## 2023-08-03 PROCEDURE — 83970 ASSAY OF PARATHORMONE: CPT | Performed by: STUDENT IN AN ORGANIZED HEALTH CARE EDUCATION/TRAINING PROGRAM

## 2023-08-08 ENCOUNTER — OFFICE VISIT (OUTPATIENT)
Dept: NEPHROLOGY | Facility: CLINIC | Age: 48
End: 2023-08-08
Payer: COMMERCIAL

## 2023-08-08 VITALS
HEIGHT: 71 IN | BODY MASS INDEX: 28.77 KG/M2 | HEART RATE: 71 BPM | WEIGHT: 205.5 LBS | SYSTOLIC BLOOD PRESSURE: 112 MMHG | DIASTOLIC BLOOD PRESSURE: 66 MMHG

## 2023-08-08 DIAGNOSIS — N18.4 CHRONIC KIDNEY DISEASE, STAGE 4 (SEVERE): Primary | ICD-10-CM

## 2023-08-08 DIAGNOSIS — E78.2 MIXED HYPERLIPIDEMIA: ICD-10-CM

## 2023-08-08 DIAGNOSIS — Z79.4 TYPE 2 DIABETES MELLITUS WITH STAGE 4 CHRONIC KIDNEY DISEASE, WITH LONG-TERM CURRENT USE OF INSULIN: ICD-10-CM

## 2023-08-08 DIAGNOSIS — N18.4 TYPE 2 DIABETES MELLITUS WITH STAGE 4 CHRONIC KIDNEY DISEASE, WITH LONG-TERM CURRENT USE OF INSULIN: ICD-10-CM

## 2023-08-08 DIAGNOSIS — N02.B9 IGA NEPHROPATHY: ICD-10-CM

## 2023-08-08 DIAGNOSIS — E11.22 TYPE 2 DIABETES MELLITUS WITH STAGE 4 CHRONIC KIDNEY DISEASE, WITH LONG-TERM CURRENT USE OF INSULIN: ICD-10-CM

## 2023-08-08 PROCEDURE — 3074F SYST BP LT 130 MM HG: CPT | Mod: CPTII,S$GLB,, | Performed by: STUDENT IN AN ORGANIZED HEALTH CARE EDUCATION/TRAINING PROGRAM

## 2023-08-08 PROCEDURE — 3066F NEPHROPATHY DOC TX: CPT | Mod: CPTII,S$GLB,, | Performed by: STUDENT IN AN ORGANIZED HEALTH CARE EDUCATION/TRAINING PROGRAM

## 2023-08-08 PROCEDURE — 1159F PR MEDICATION LIST DOCUMENTED IN MEDICAL RECORD: ICD-10-PCS | Mod: CPTII,S$GLB,, | Performed by: STUDENT IN AN ORGANIZED HEALTH CARE EDUCATION/TRAINING PROGRAM

## 2023-08-08 PROCEDURE — 99999 PR PBB SHADOW E&M-EST. PATIENT-LVL IV: CPT | Mod: PBBFAC,,, | Performed by: STUDENT IN AN ORGANIZED HEALTH CARE EDUCATION/TRAINING PROGRAM

## 2023-08-08 PROCEDURE — 99999 PR PBB SHADOW E&M-EST. PATIENT-LVL IV: ICD-10-PCS | Mod: PBBFAC,,, | Performed by: STUDENT IN AN ORGANIZED HEALTH CARE EDUCATION/TRAINING PROGRAM

## 2023-08-08 PROCEDURE — 3008F BODY MASS INDEX DOCD: CPT | Mod: CPTII,S$GLB,, | Performed by: STUDENT IN AN ORGANIZED HEALTH CARE EDUCATION/TRAINING PROGRAM

## 2023-08-08 PROCEDURE — 3062F POS MACROALBUMINURIA REV: CPT | Mod: CPTII,S$GLB,, | Performed by: STUDENT IN AN ORGANIZED HEALTH CARE EDUCATION/TRAINING PROGRAM

## 2023-08-08 PROCEDURE — 3066F PR DOCUMENTATION OF TREATMENT FOR NEPHROPATHY: ICD-10-PCS | Mod: CPTII,S$GLB,, | Performed by: STUDENT IN AN ORGANIZED HEALTH CARE EDUCATION/TRAINING PROGRAM

## 2023-08-08 PROCEDURE — 1159F MED LIST DOCD IN RCRD: CPT | Mod: CPTII,S$GLB,, | Performed by: STUDENT IN AN ORGANIZED HEALTH CARE EDUCATION/TRAINING PROGRAM

## 2023-08-08 PROCEDURE — 1160F RVW MEDS BY RX/DR IN RCRD: CPT | Mod: CPTII,S$GLB,, | Performed by: STUDENT IN AN ORGANIZED HEALTH CARE EDUCATION/TRAINING PROGRAM

## 2023-08-08 PROCEDURE — 3074F PR MOST RECENT SYSTOLIC BLOOD PRESSURE < 130 MM HG: ICD-10-PCS | Mod: CPTII,S$GLB,, | Performed by: STUDENT IN AN ORGANIZED HEALTH CARE EDUCATION/TRAINING PROGRAM

## 2023-08-08 PROCEDURE — 99205 OFFICE O/P NEW HI 60 MIN: CPT | Mod: S$GLB,,, | Performed by: STUDENT IN AN ORGANIZED HEALTH CARE EDUCATION/TRAINING PROGRAM

## 2023-08-08 PROCEDURE — 4010F PR ACE/ARB THEARPY RXD/TAKEN: ICD-10-PCS | Mod: CPTII,S$GLB,, | Performed by: STUDENT IN AN ORGANIZED HEALTH CARE EDUCATION/TRAINING PROGRAM

## 2023-08-08 PROCEDURE — 4010F ACE/ARB THERAPY RXD/TAKEN: CPT | Mod: CPTII,S$GLB,, | Performed by: STUDENT IN AN ORGANIZED HEALTH CARE EDUCATION/TRAINING PROGRAM

## 2023-08-08 PROCEDURE — 3044F PR MOST RECENT HEMOGLOBIN A1C LEVEL <7.0%: ICD-10-PCS | Mod: CPTII,S$GLB,, | Performed by: STUDENT IN AN ORGANIZED HEALTH CARE EDUCATION/TRAINING PROGRAM

## 2023-08-08 PROCEDURE — 1160F PR REVIEW ALL MEDS BY PRESCRIBER/CLIN PHARMACIST DOCUMENTED: ICD-10-PCS | Mod: CPTII,S$GLB,, | Performed by: STUDENT IN AN ORGANIZED HEALTH CARE EDUCATION/TRAINING PROGRAM

## 2023-08-08 PROCEDURE — 99205 PR OFFICE/OUTPT VISIT, NEW, LEVL V, 60-74 MIN: ICD-10-PCS | Mod: S$GLB,,, | Performed by: STUDENT IN AN ORGANIZED HEALTH CARE EDUCATION/TRAINING PROGRAM

## 2023-08-08 PROCEDURE — 3078F PR MOST RECENT DIASTOLIC BLOOD PRESSURE < 80 MM HG: ICD-10-PCS | Mod: CPTII,S$GLB,, | Performed by: STUDENT IN AN ORGANIZED HEALTH CARE EDUCATION/TRAINING PROGRAM

## 2023-08-08 PROCEDURE — 3008F PR BODY MASS INDEX (BMI) DOCUMENTED: ICD-10-PCS | Mod: CPTII,S$GLB,, | Performed by: STUDENT IN AN ORGANIZED HEALTH CARE EDUCATION/TRAINING PROGRAM

## 2023-08-08 PROCEDURE — 3062F PR POS MACROALBUMINURIA RESULT DOCUMENTED/REVIEW: ICD-10-PCS | Mod: CPTII,S$GLB,, | Performed by: STUDENT IN AN ORGANIZED HEALTH CARE EDUCATION/TRAINING PROGRAM

## 2023-08-08 PROCEDURE — 3044F HG A1C LEVEL LT 7.0%: CPT | Mod: CPTII,S$GLB,, | Performed by: STUDENT IN AN ORGANIZED HEALTH CARE EDUCATION/TRAINING PROGRAM

## 2023-08-08 PROCEDURE — 3078F DIAST BP <80 MM HG: CPT | Mod: CPTII,S$GLB,, | Performed by: STUDENT IN AN ORGANIZED HEALTH CARE EDUCATION/TRAINING PROGRAM

## 2023-08-08 RX ORDER — ATORVASTATIN CALCIUM 80 MG/1
80 TABLET, FILM COATED ORAL DAILY
Qty: 90 TABLET | Refills: 3 | Status: SHIPPED | OUTPATIENT
Start: 2023-08-08 | End: 2024-08-07

## 2023-08-08 NOTE — ASSESSMENT & PLAN NOTE
Congratulated patient for getting his BP and diabetes under control.  Kidney disease is stabilizing  Continue current CKD management

## 2023-08-08 NOTE — ASSESSMENT & PLAN NOTE
Will discuss with team about biopsy and transition to MMF from budesonide if UPCR still low in 1 month  Repeat labs and UPCR in 1 month  Increase dose of empagliflozin  Increase atorvastatin

## 2023-08-08 NOTE — PROGRESS NOTES
Nephrology Clinic Note   8/8/2023    Chief Complaint   Patient presents with    Nephropathy       IgA    Chronic Kidney Disease      History of present illness:  Patient is a 48 y.o. male.   Presents to the clinic today for medical conditions listed below.  Problem Noted   Iga Nephropathy 10/20/2022   Chronic Kidney Disease, Stage 4 (Severe) 11/14/2012    47 y/o M with hx of HTN, Gout and prior history of glomerulonephritis who comes in for follow up evaluation. Pt was last evaluated by a kidney doctor 5y ago in 2017. At that time it was suspected he had stable disease, possibly IgA nephropathy. Biopsy was deferred and expectant observation was recommended. Pt now comes back to the Nephrology clinic with worsening renal dysfunction and proteinuria. Blood pressure also significantly elevated. He refers his father suffered from kidney disease and had a complication of bleeding after a biopsy and required dialysis however he cannot remember the diagnosis. He believes it was related to hypertension. Pt denies any recent nausea, vomits, diarrhea, radiocontrast administration, dehydration or NSAID use.     Interval Hx:  9/15/22: Kidney biopsy results noted and consistent with IgA nephropathy. Worsening kidney function with creatinine up to 2.9.   10/20/22: Serum creatinine appears to have peaked at 3.6 and currently down to 3.2 as pt refers he has been working on lifestyle modifications. Tooth infection/abscess resolved after extraction and pt has completed his antibiotics.   11/10/22: sCr down to 2.6, UPCR up to 4. Pt refers he just realized he had not been taking the prednisone. [place] approved on appeal.   3/23/22: Admitted earlier this month for MARIE and uncontrolled hyperglycemia. Serum creatinine peaked around 5.2 before improving to around 3.3 on his most recent labs.     -improving  -stage G4A3 likely secondary to IgA with minimal contribution from diabetes  -baseline creatinine 3.2  -baseline UPCR 2-4  -historic  complications Volume overload  -BP controlled on ACE/ARB, CCB and BB  -glucose controlled on SGLT2 inhibitor and Insulin  -UPCR controlled on ACE/ARB, SGLT2 inhibitor and budesonide  -kidney US 2022 10cm kidneys bilaterally  -current diuretic therapy: none   -current bicarbonate therapy: 650 daily   -current CKD-MBD therapy: calcitriol  -current anemia therapy: none    4g/g proteinuria 4/2022, kidney biopsy 9/2022 inadequate sample, but with IgA and no evidence of diabetes 30-40IF, started budesonide 9/2022. Discovered elevated a1c 1/2023 to 10 now back down to 6.7 and controlled.   Proteinuria has now decreased to 0.17. Discussed transitioning to MMF next month if UPCR stays low. Discussed possible kidney biopsy to reassess activity. Will discuss with team prior to making any changes.         Review of Systems   Constitutional: Negative.    HENT: Negative.     Eyes: Negative.    Respiratory: Negative.     Cardiovascular: Negative.    Gastrointestinal: Negative.    Genitourinary: Negative.    Musculoskeletal: Negative.    Skin: Negative.    Neurological: Negative.    Endo/Heme/Allergies: Negative.    Psychiatric/Behavioral:  Positive for depression. The patient is nervous/anxious.        History:  Past Medical History:   Diagnosis Date    Anemia 3/1/2023    Chronic kidney disease     Chronic kidney disease stage II    Colon polyps     Hyperlipidemia 12/13/2012    Hypertension       Past Surgical History:   Procedure Laterality Date    COLONOSCOPY N/A 4/24/2023    Procedure: COLONOSCOPY;  Surgeon: Mayelin Forte MD;  Location: [facility] ([address] [place] [address]);  Service: Endoscopy;  Laterality: N/A;  Golytely ok per nephrologist-see note 3/24- instr to portal -MS  4-18-23- preop call- voice mail on- no message left- MMG    COSMETIC SURGERY      pyloric stenosis      RENAL BIOPSY Left 9/1/2022    Procedure: BIOPSY, KIDNEY;  Surgeon: Sinan Abad MD;  Location: [facility] CATH LAB;  Service: Interventional Nephrology;  Laterality:  Left;        Current Outpatient Medications:     acetaminophen (TYLENOL) 500 MG tablet, Take 1,000 mg by mouth every 4 (four) hours as needed for Pain., Disp: , Rfl:     allopurinoL (ZYLOPRIM) 100 MG tablet, Take 1 tablet (100 mg total) by mouth once daily., Disp: 30 tablet, Rfl: 3    amLODIPine (NORVASC) 10 MG tablet, Take 0.5 tablets (5 mg total) by mouth once daily., Disp: 90 tablet, Rfl: 3    blood sugar diagnostic Strp, 1 strip by Misc.(Non-Drug; Combo Route) route 2 (two) times daily. ICD 10: E11.22, Disp: 100 each, Rfl: 6    blood-glucose meter kit, Use as instructed to check blood sugar two times a day. ICD 10: E11.22, Disp: 1 each, Rfl: 0    blood-glucose sensor (DEXCOM G6 SENSOR) Rita, 1 each by Misc.(Non-Drug; Combo Route) route every 10 days., Disp: 9 each, Rfl: 3    blood-glucose transmitter (DEXCOM G6 TRANSMITTER) Rita, 1 Device by Misc.(Non-Drug; Combo Route) route every 3 (three) months., Disp: 1 each, Rfl: 3    budesonide (TARPEYO) 4 mg CpDR, Take 16 mg by mouth once daily., Disp: 120 capsule, Rfl: 3    calcitRIOL (ROCALTROL) 0.25 MCG Cap, Take 1 capsule (0.25 mcg total) by mouth once daily., Disp: 90 capsule, Rfl: 3    calcium carbonate (TUMS ORAL), Take 1 tablet by mouth daily as needed (Heartburn)., Disp: , Rfl:     colchicine (COLCRYS) 0.6 mg tablet, 0.6 mg daily as needed (gout flare-up)., Disp: , Rfl:     hydrocortisone (ANUSOL-HC) 2.5 % rectal cream, Place rectally 2 (two) times daily., Disp: 28 g, Rfl: 2    insulin detemir U-100, Levemir, (LEVEMIR FLEXTOUCH U-100 INSULN) 100 unit/mL (3 mL) InPn pen, Inject 18 Units into the skin every evening. Eat a snack before bed if BG is < 100 mg/dl, Disp: 16.2 mL, Rfl: 3    insulin lispro (HUMALOG KWIKPEN INSULIN) 100 unit/mL pen, Inject 6 units into skin with lunch and dinner; 2-10 Units into the skin 3 (three) times daily as needed. Blood Glucose: 180 - 230 + 2 unit, 231- 280 + 4 units, 281 - 330 + 6 units, 331 - 380 + 8 units, > 380 + 10 units, Disp:  "45 mL, Rfl: 0    labetaloL (NORMODYNE) 100 MG tablet, Take 1 tablet (100 mg total) by mouth 2 (two) times daily., Disp: 180 tablet, Rfl: 3    lancets Misc, 1 lancet by Misc.(Non-Drug; Combo Route) route 3 (three) times daily. ICD 10: E11.22, Disp: 300 each, Rfl: 3    pen needle, diabetic (BD ULTRA-FINE DAVID PEN NEEDLE) 32 gauge x 5/32" Ndle, Use to inject insulin 4 times daily, Disp: 100 each, Rfl: 11    polyethylene glycol (GLYCOLAX) 17 gram/dose powder, Take 17 g by mouth once daily., Disp: 510 g, Rfl: 11    prednisoLONE acetate (PRED FORTE) 1 % DrpS, Place 1 drop into both eyes daily as needed (Inflammation)., Disp: , Rfl:     sodium bicarbonate 650 MG tablet, Take 1 tablet (650 mg total) by mouth once daily., Disp: 90 tablet, Rfl: 3    tirzepatide (MOUNJARO) 5 mg/0.5 mL PnIj, Inject 5 mg into the skin every 7 days., Disp: 4 pen, Rfl: 11    valsartan (DIOVAN) 320 MG tablet, Take 1 tablet (320 mg total) by mouth once daily., Disp: 90 tablet, Rfl: 3    atorvastatin (LIPITOR) 80 MG tablet, Take 1 tablet (80 mg total) by mouth once daily., Disp: 90 tablet, Rfl: 3    empagliflozin (JARDIANCE) 25 mg tablet, Take 1 tablet (25 mg total) by mouth once daily., Disp: 90 tablet, Rfl: 1  Review of patient's allergies indicates:  No Known Allergies   Social History     Tobacco Use    Smoking status: Every Day     Current packs/day: 1.00     Average packs/day: 1 pack/day for 24.0 years (24.0 ttl pk-yrs)     Types: Cigarettes    Smokeless tobacco: Current   Substance Use Topics    Alcohol use: Yes     Comment: Occasional      Family History   Problem Relation Age of Onset    COPD Mother     Heart disease Mother     Peripheral vascular disease Mother     Diabetes Father     Kidney disease Father     Stroke Father     Hypertension Father     Heart disease Father 70        CABG x 3    Kidney failure Father     Heart disease Maternal Grandfather         Physical Exam :  Vitals:    08/08/23 1405   BP: 112/66   Pulse: 71     Physical " Exam  Vitals and nursing note reviewed.   Constitutional:       General: He is not in acute distress.     Appearance: He is obese.   Eyes:      General: No scleral icterus.  Pulmonary:      Effort: Pulmonary effort is normal. No respiratory distress.   Skin:     Coloration: Skin is not jaundiced.   Neurological:      Mental Status: He is alert.         Last Labs reviewed   Lab Results   Component Value Date    HGB 13.1 (L) 08/03/2023     08/03/2023    K 4.7 08/03/2023    CREATININE 3.2 (H) 08/03/2023    PHOS 3.8 05/06/2023    PTH 94.3 (H) 08/03/2023    [id number] 42 08/03/2023        Assessment:    1. Chronic kidney disease, stage 4 (severe)    2. Mixed hyperlipidemia    3. Type 2 diabetes mellitus with stage 4 chronic kidney disease, with long-term current use of insulin    4. IgA nephropathy        Plan:    IgA nephropathy  Will discuss with team about biopsy and transition to MMF from budesonide if UPCR still low in 1 month  Repeat labs and UPCR in 1 month  Increase dose of empagliflozin  Increase atorvastatin     Chronic kidney disease, stage 4 (severe)  Congratulated patient for getting his BP and diabetes under control.  Kidney disease is stabilizing  Continue current CKD management    Follow up in about 4 weeks (around 9/5/2023).     I spent a total of 60 minutes on the day of the visit.   More than half spent on education and counseling. All questions answered.    No orders of the defined types were placed in this encounter.    Medications Discontinued During This Encounter   Medication Reason    empagliflozin (JARDIANCE) 10 mg tablet     atorvastatin (LIPITOR) 10 MG tablet Reorder      Future Appointments   Date Time Provider Department Center   9/8/2023  3:30 PM Kristofer Menard MD [facility] Ornelas

## 2023-08-25 ENCOUNTER — TELEPHONE (OUTPATIENT)
Dept: NEPHROLOGY | Facility: CLINIC | Age: 48
End: 2023-08-25
Payer: COMMERCIAL

## 2023-08-25 NOTE — TELEPHONE ENCOUNTER
----- Message from Minerva Zaldivar RN sent at 8/25/2023 10:30 AM CDT -----    ----- Message -----  From: Viviana Corona  Sent: 8/24/2023   9:08 AM CDT  To: [facility]ANC[facility] Staff    Mayelin morgan/ [employer] calling regarding wanting to speak to the nurse about the  the the PA approval and the PT has had 9 month of treatment but is asking if the doctor wanted the PT to continue with the medication and if so an appeal will be needed because the insurance is refusing to pay for further treatment, call back to [phone] for further details

## 2023-08-28 DIAGNOSIS — N02.B9 IGA NEPHROPATHY: Primary | ICD-10-CM

## 2023-08-28 RX ORDER — BUDESONIDE 4 MG/1
8 CAPSULE, DELAYED RELEASE ORAL DAILY
Qty: 120 CAPSULE | Refills: 0 | Status: ACTIVE | OUTPATIENT
Start: 2023-08-28 | End: 2023-08-30

## 2023-08-30 ENCOUNTER — PATIENT MESSAGE (OUTPATIENT)
Dept: NEPHROLOGY | Facility: CLINIC | Age: 48
End: 2023-08-30
Payer: COMMERCIAL

## 2023-08-30 DIAGNOSIS — N02.B9 IGA NEPHROPATHY: ICD-10-CM

## 2023-08-30 RX ORDER — BUDESONIDE 4 MG/1
8 CAPSULE, DELAYED RELEASE ORAL DAILY
Qty: 28 CAPSULE | Refills: 0 | Status: ACTIVE | OUTPATIENT
Start: 2023-08-30 | End: 2023-10-05

## 2023-09-05 ENCOUNTER — TELEPHONE (OUTPATIENT)
Dept: INTERNAL MEDICINE | Facility: CLINIC | Age: 48
End: 2023-09-05
Payer: COMMERCIAL

## 2023-09-05 NOTE — TELEPHONE ENCOUNTER
Called patient to check on his diabetic eye exam to offer the DM eyecam he agrees to schedule but is unable to get here prior to the appt on this Friday. He will look at his work schedule and call back to schedule the appt.   Marbella Ignacio RN HC

## 2023-09-15 ENCOUNTER — OFFICE VISIT (OUTPATIENT)
Dept: INTERNAL MEDICINE | Facility: CLINIC | Age: 48
End: 2023-09-15
Payer: COMMERCIAL

## 2023-09-15 ENCOUNTER — LAB VISIT (OUTPATIENT)
Dept: LAB | Facility: HOSPITAL | Age: 48
End: 2023-09-15
Attending: STUDENT IN AN ORGANIZED HEALTH CARE EDUCATION/TRAINING PROGRAM
Payer: COMMERCIAL

## 2023-09-15 VITALS
HEART RATE: 73 BPM | BODY MASS INDEX: 31.51 KG/M2 | SYSTOLIC BLOOD PRESSURE: 120 MMHG | OXYGEN SATURATION: 97 % | TEMPERATURE: 98 F | DIASTOLIC BLOOD PRESSURE: 70 MMHG | HEIGHT: 71 IN | WEIGHT: 225.06 LBS

## 2023-09-15 DIAGNOSIS — N18.4 TYPE 2 DIABETES MELLITUS WITH STAGE 4 CHRONIC KIDNEY DISEASE, WITH LONG-TERM CURRENT USE OF INSULIN: Primary | ICD-10-CM

## 2023-09-15 DIAGNOSIS — E11.22 TYPE 2 DIABETES MELLITUS WITH STAGE 4 CHRONIC KIDNEY DISEASE, WITH LONG-TERM CURRENT USE OF INSULIN: ICD-10-CM

## 2023-09-15 DIAGNOSIS — E11.22 TYPE 2 DIABETES MELLITUS WITH STAGE 4 CHRONIC KIDNEY DISEASE, WITH LONG-TERM CURRENT USE OF INSULIN: Primary | ICD-10-CM

## 2023-09-15 DIAGNOSIS — I10 PRIMARY HYPERTENSION: ICD-10-CM

## 2023-09-15 DIAGNOSIS — Z79.4 TYPE 2 DIABETES MELLITUS WITH STAGE 4 CHRONIC KIDNEY DISEASE, WITH LONG-TERM CURRENT USE OF INSULIN: Primary | ICD-10-CM

## 2023-09-15 DIAGNOSIS — N18.4 TYPE 2 DIABETES MELLITUS WITH STAGE 4 CHRONIC KIDNEY DISEASE, WITH LONG-TERM CURRENT USE OF INSULIN: ICD-10-CM

## 2023-09-15 DIAGNOSIS — K62.5 BRBPR (BRIGHT RED BLOOD PER RECTUM): ICD-10-CM

## 2023-09-15 DIAGNOSIS — Z79.4 TYPE 2 DIABETES MELLITUS WITH STAGE 4 CHRONIC KIDNEY DISEASE, WITH LONG-TERM CURRENT USE OF INSULIN: ICD-10-CM

## 2023-09-15 LAB
BASOPHILS # BLD AUTO: 0.07 K/UL (ref 0–0.2)
BASOPHILS NFR BLD: 0.5 % (ref 0–1.9)
C PEPTIDE SERPL-MCNC: 13.96 NG/ML (ref 0.78–5.19)
DIFFERENTIAL METHOD: ABNORMAL
EOSINOPHIL # BLD AUTO: 0.1 K/UL (ref 0–0.5)
EOSINOPHIL NFR BLD: 1 % (ref 0–8)
ERYTHROCYTE [DISTWIDTH] IN BLOOD BY AUTOMATED COUNT: 15.2 % (ref 11.5–14.5)
ESTIMATED AVG GLUCOSE: 128 MG/DL (ref 68–131)
HBA1C MFR BLD: 6.1 % (ref 4–5.6)
HCT VFR BLD AUTO: 35.1 % (ref 40–54)
HGB BLD-MCNC: 11.3 G/DL (ref 14–18)
IMM GRANULOCYTES # BLD AUTO: 0.08 K/UL (ref 0–0.04)
IMM GRANULOCYTES NFR BLD AUTO: 0.6 % (ref 0–0.5)
LYMPHOCYTES # BLD AUTO: 3.8 K/UL (ref 1–4.8)
LYMPHOCYTES NFR BLD: 29.7 % (ref 18–48)
MCH RBC QN AUTO: 32.7 PG (ref 27–31)
MCHC RBC AUTO-ENTMCNC: 32.2 G/DL (ref 32–36)
MCV RBC AUTO: 101 FL (ref 82–98)
MONOCYTES # BLD AUTO: 1.1 K/UL (ref 0.3–1)
MONOCYTES NFR BLD: 8.8 % (ref 4–15)
NEUTROPHILS # BLD AUTO: 7.7 K/UL (ref 1.8–7.7)
NEUTROPHILS NFR BLD: 59.4 % (ref 38–73)
NRBC BLD-RTO: 0 /100 WBC
PLATELET # BLD AUTO: 495 K/UL (ref 150–450)
PMV BLD AUTO: 9 FL (ref 9.2–12.9)
RBC # BLD AUTO: 3.46 M/UL (ref 4.6–6.2)
WBC # BLD AUTO: 12.91 K/UL (ref 3.9–12.7)

## 2023-09-15 PROCEDURE — 3062F PR POS MACROALBUMINURIA RESULT DOCUMENTED/REVIEW: ICD-10-PCS | Mod: CPTII,S$GLB,, | Performed by: STUDENT IN AN ORGANIZED HEALTH CARE EDUCATION/TRAINING PROGRAM

## 2023-09-15 PROCEDURE — 3078F PR MOST RECENT DIASTOLIC BLOOD PRESSURE < 80 MM HG: ICD-10-PCS | Mod: CPTII,S$GLB,, | Performed by: STUDENT IN AN ORGANIZED HEALTH CARE EDUCATION/TRAINING PROGRAM

## 2023-09-15 PROCEDURE — 99999 PR PBB SHADOW E&M-EST. PATIENT-LVL V: ICD-10-PCS | Mod: PBBFAC,,, | Performed by: STUDENT IN AN ORGANIZED HEALTH CARE EDUCATION/TRAINING PROGRAM

## 2023-09-15 PROCEDURE — 3074F PR MOST RECENT SYSTOLIC BLOOD PRESSURE < 130 MM HG: ICD-10-PCS | Mod: CPTII,S$GLB,, | Performed by: STUDENT IN AN ORGANIZED HEALTH CARE EDUCATION/TRAINING PROGRAM

## 2023-09-15 PROCEDURE — 3066F PR DOCUMENTATION OF TREATMENT FOR NEPHROPATHY: ICD-10-PCS | Mod: CPTII,S$GLB,, | Performed by: STUDENT IN AN ORGANIZED HEALTH CARE EDUCATION/TRAINING PROGRAM

## 2023-09-15 PROCEDURE — 83036 HEMOGLOBIN GLYCOSYLATED A1C: CPT | Performed by: STUDENT IN AN ORGANIZED HEALTH CARE EDUCATION/TRAINING PROGRAM

## 2023-09-15 PROCEDURE — 84681 ASSAY OF C-PEPTIDE: CPT | Performed by: STUDENT IN AN ORGANIZED HEALTH CARE EDUCATION/TRAINING PROGRAM

## 2023-09-15 PROCEDURE — 4010F PR ACE/ARB THEARPY RXD/TAKEN: ICD-10-PCS | Mod: CPTII,S$GLB,, | Performed by: STUDENT IN AN ORGANIZED HEALTH CARE EDUCATION/TRAINING PROGRAM

## 2023-09-15 PROCEDURE — 1160F RVW MEDS BY RX/DR IN RCRD: CPT | Mod: CPTII,S$GLB,, | Performed by: STUDENT IN AN ORGANIZED HEALTH CARE EDUCATION/TRAINING PROGRAM

## 2023-09-15 PROCEDURE — 99214 PR OFFICE/OUTPT VISIT, EST, LEVL IV, 30-39 MIN: ICD-10-PCS | Mod: S$GLB,,, | Performed by: STUDENT IN AN ORGANIZED HEALTH CARE EDUCATION/TRAINING PROGRAM

## 2023-09-15 PROCEDURE — 99999 PR PBB SHADOW E&M-EST. PATIENT-LVL V: CPT | Mod: PBBFAC,,, | Performed by: STUDENT IN AN ORGANIZED HEALTH CARE EDUCATION/TRAINING PROGRAM

## 2023-09-15 PROCEDURE — 1159F MED LIST DOCD IN RCRD: CPT | Mod: CPTII,S$GLB,, | Performed by: STUDENT IN AN ORGANIZED HEALTH CARE EDUCATION/TRAINING PROGRAM

## 2023-09-15 PROCEDURE — 3062F POS MACROALBUMINURIA REV: CPT | Mod: CPTII,S$GLB,, | Performed by: STUDENT IN AN ORGANIZED HEALTH CARE EDUCATION/TRAINING PROGRAM

## 2023-09-15 PROCEDURE — 3078F DIAST BP <80 MM HG: CPT | Mod: CPTII,S$GLB,, | Performed by: STUDENT IN AN ORGANIZED HEALTH CARE EDUCATION/TRAINING PROGRAM

## 2023-09-15 PROCEDURE — 3044F HG A1C LEVEL LT 7.0%: CPT | Mod: CPTII,S$GLB,, | Performed by: STUDENT IN AN ORGANIZED HEALTH CARE EDUCATION/TRAINING PROGRAM

## 2023-09-15 PROCEDURE — 36415 COLL VENOUS BLD VENIPUNCTURE: CPT | Performed by: STUDENT IN AN ORGANIZED HEALTH CARE EDUCATION/TRAINING PROGRAM

## 2023-09-15 PROCEDURE — 3008F PR BODY MASS INDEX (BMI) DOCUMENTED: ICD-10-PCS | Mod: CPTII,S$GLB,, | Performed by: STUDENT IN AN ORGANIZED HEALTH CARE EDUCATION/TRAINING PROGRAM

## 2023-09-15 PROCEDURE — 85025 COMPLETE CBC W/AUTO DIFF WBC: CPT | Performed by: STUDENT IN AN ORGANIZED HEALTH CARE EDUCATION/TRAINING PROGRAM

## 2023-09-15 PROCEDURE — 1159F PR MEDICATION LIST DOCUMENTED IN MEDICAL RECORD: ICD-10-PCS | Mod: CPTII,S$GLB,, | Performed by: STUDENT IN AN ORGANIZED HEALTH CARE EDUCATION/TRAINING PROGRAM

## 2023-09-15 PROCEDURE — 1160F PR REVIEW ALL MEDS BY PRESCRIBER/CLIN PHARMACIST DOCUMENTED: ICD-10-PCS | Mod: CPTII,S$GLB,, | Performed by: STUDENT IN AN ORGANIZED HEALTH CARE EDUCATION/TRAINING PROGRAM

## 2023-09-15 PROCEDURE — 3008F BODY MASS INDEX DOCD: CPT | Mod: CPTII,S$GLB,, | Performed by: STUDENT IN AN ORGANIZED HEALTH CARE EDUCATION/TRAINING PROGRAM

## 2023-09-15 PROCEDURE — 4010F ACE/ARB THERAPY RXD/TAKEN: CPT | Mod: CPTII,S$GLB,, | Performed by: STUDENT IN AN ORGANIZED HEALTH CARE EDUCATION/TRAINING PROGRAM

## 2023-09-15 PROCEDURE — 3066F NEPHROPATHY DOC TX: CPT | Mod: CPTII,S$GLB,, | Performed by: STUDENT IN AN ORGANIZED HEALTH CARE EDUCATION/TRAINING PROGRAM

## 2023-09-15 PROCEDURE — 3044F PR MOST RECENT HEMOGLOBIN A1C LEVEL <7.0%: ICD-10-PCS | Mod: CPTII,S$GLB,, | Performed by: STUDENT IN AN ORGANIZED HEALTH CARE EDUCATION/TRAINING PROGRAM

## 2023-09-15 PROCEDURE — 99214 OFFICE O/P EST MOD 30 MIN: CPT | Mod: S$GLB,,, | Performed by: STUDENT IN AN ORGANIZED HEALTH CARE EDUCATION/TRAINING PROGRAM

## 2023-09-15 PROCEDURE — 3074F SYST BP LT 130 MM HG: CPT | Mod: CPTII,S$GLB,, | Performed by: STUDENT IN AN ORGANIZED HEALTH CARE EDUCATION/TRAINING PROGRAM

## 2023-09-15 RX ORDER — BLOOD-GLUCOSE SENSOR
1 EACH MISCELLANEOUS
Qty: 9 EACH | Refills: 3 | Status: SHIPPED | OUTPATIENT
Start: 2023-09-15 | End: 2023-09-15

## 2023-09-15 RX ORDER — BLOOD-GLUCOSE TRANSMITTER
1 EACH MISCELLANEOUS
Qty: 1 EACH | Refills: 3 | Status: SHIPPED | OUTPATIENT
Start: 2023-09-15 | End: 2023-09-15

## 2023-09-15 RX ORDER — BLOOD-GLUCOSE TRANSMITTER
1 EACH MISCELLANEOUS
Qty: 1 EACH | Refills: 3 | Status: SHIPPED | OUTPATIENT
Start: 2023-09-15 | End: 2024-09-14

## 2023-09-15 RX ORDER — BLOOD-GLUCOSE SENSOR
1 EACH MISCELLANEOUS
Qty: 9 EACH | Refills: 3 | Status: SHIPPED | OUTPATIENT
Start: 2023-09-15 | End: 2024-09-14

## 2023-09-15 NOTE — PATIENT INSTRUCTIONS
Continue monitoring your blood sugar. Will try to obtain the Continuous Glucose Monitor.   Follow up with colorectal surgery. [phone].

## 2023-09-15 NOTE — PROGRESS NOTES
SUBJECTIVE     Chief Complaint   Patient presents with    Follow-up       HPI  Edouard Pena is a 48 y.o. male with  T2DM, HTN, HLD, CKD 2, IgA nephropathy    that presents for follow-up. LOV 6/5/23.     T2DM -   Meds: Levemir 16 units qhs, Humalog 6 u TID with lung & dinner w/ SSI, Mounjaro 5 mg q7days, Jardiance 25 mg qd  A1c 6.1%  MACR 359 on 3/8/23.   On a statin.   On an ARB, has CKD2  Foot exam UTD  Eye exam due  Declines PNA vaccine  Sugars are mostly <140 fasting. No symptoms of hyper/hypoglycemia.     HTN -   Currently prescribed Amlodipine 5 mg qd, Labetalol 100 mg BID, Valsartan 320 mg qd.  Patient endorses taking medication as directed.  Denies side effects or concerns while taking medication.  Patient not currently checking BP at home.  Denies headaches, vision changes, CP, palpitations, or other concerning symptoms.  Lab Results   Component Value Date    MICALBCREAT 359.6 (H) 03/08/2023     BP Readings from Last 3 Encounters:   10/05/23 111/70   09/15/23 120/70   08/08/23 112/66       Had some blood on toilet paper recently. Has had in past. Has seen CRS, colonoscopy with non-cancerous polyps. Has h/o external hemorrhoids. No pain currently.       PAST MEDICAL HISTORY:  Past Medical History:   Diagnosis Date    Anemia 3/1/2023    Chronic kidney disease     Chronic kidney disease stage II    Colon polyps     Hyperlipidemia 12/13/2012    Hypertension        PAST SURGICAL HISTORY:  Past Surgical History:   Procedure Laterality Date    COLONOSCOPY N/A 4/24/2023    Procedure: COLONOSCOPY;  Surgeon: Mayelin Forte MD;  Location: [facility] ENDO ([address] [place] [address]);  Service: Endoscopy;  Laterality: N/A;  Golytely ok per nephrologist-see note 3/24- instr to portal -MS  4-18-23- preop call- voice mail on- no message left- MMG    COSMETIC SURGERY      pyloric stenosis      RENAL BIOPSY Left 9/1/2022    Procedure: BIOPSY, KIDNEY;  Surgeon: Sinan Abad MD;  Location: [facility] CATH LAB;  Service: Interventional Nephrology;   Laterality: Left;       FAMILY HISTORY:  Family History   Problem Relation Age of Onset    COPD Mother     Heart disease Mother     Peripheral vascular disease Mother     Diabetes Father     Kidney disease Father     Stroke Father     Hypertension Father     Heart disease Father 70        CABG x 3    Kidney failure Father     Heart disease Maternal Grandfather        ALLERGIES AND MEDICATIONS: updated and reviewed.  Review of patient's allergies indicates:  No Known Allergies  Current Outpatient Medications   Medication Sig Dispense Refill    acetaminophen (TYLENOL) 500 MG tablet Take 1,000 mg by mouth every 4 (four) hours as needed for Pain.      allopurinoL (ZYLOPRIM) 100 MG tablet Take 1 tablet (100 mg total) by mouth once daily. 30 tablet 3    amLODIPine (NORVASC) 10 MG tablet Take 0.5 tablets (5 mg total) by mouth once daily. 90 tablet 3    atorvastatin (LIPITOR) 80 MG tablet Take 1 tablet (80 mg total) by mouth once daily. 90 tablet 3    blood sugar diagnostic Strp 1 strip by Misc.(Non-Drug; Combo Route) route 2 (two) times daily. ICD 10: E11.22 100 each 6    blood-glucose meter kit Use as instructed to check blood sugar two times a day. ICD 10: E11.22 1 each 0    blood-glucose sensor (DEXCOM G6 SENSOR) Rita 1 each by Misc.(Non-Drug; Combo Route) route every 10 days. 9 each 3    blood-glucose transmitter (DEXCOM G6 TRANSMITTER) Rita 1 Device by Misc.(Non-Drug; Combo Route) route every 3 (three) months. 1 each 3    calcitRIOL (ROCALTROL) 0.25 MCG Cap Take 1 capsule (0.25 mcg total) by mouth once daily. 90 capsule 3    calcium carbonate (TUMS ORAL) Take 1 tablet by mouth daily as needed (Heartburn).      colchicine (COLCRYS) 0.6 mg tablet 0.6 mg daily as needed (gout flare-up).      empagliflozin (JARDIANCE) 25 mg tablet Take 1 tablet (25 mg total) by mouth once daily. 90 tablet 1    hydrocortisone (ANUSOL-HC) 2.5 % rectal cream Place rectally 2 (two) times daily. 28 g 2    insulin detemir U-100, Levemir,  "(LEVEMIR FLEXTOUCH U-100 INSULN) 100 unit/mL (3 mL) InPn pen Inject 18 Units into the skin every evening. Eat a snack before bed if BG is < 100 mg/dl 16.2 mL 3    insulin lispro (HUMALOG KWIKPEN INSULIN) 100 unit/mL pen Inject 6 units into skin with lunch and dinner; 2-10 Units into the skin 3 (three) times daily as needed. Blood Glucose: 180 - 230 + 2 unit, 231- 280 + 4 units, 281 - 330 + 6 units, 331 - 380 + 8 units, > 380 + 10 units 45 mL 0    labetaloL (NORMODYNE) 100 MG tablet Take 1 tablet (100 mg total) by mouth 2 (two) times daily. 180 tablet 3    lancets Misc 1 lancet by Misc.(Non-Drug; Combo Route) route 3 (three) times daily. ICD 10: E11.22 300 each 3    pen needle, diabetic (BD ULTRA-FINE DAVID PEN NEEDLE) 32 gauge x 5/32" Ndle Use to inject insulin 4 times daily 100 each 11    polyethylene glycol (GLYCOLAX) 17 gram/dose powder Take 17 g by mouth once daily. 510 g 11    prednisoLONE acetate (PRED FORTE) 1 % DrpS Place 1 drop into both eyes daily as needed (Inflammation).      sodium bicarbonate 650 MG tablet Take 1 tablet (650 mg total) by mouth once daily. 90 tablet 3    tirzepatide (MOUNJARO) 5 mg/0.5 mL PnIj Inject 5 mg into the skin every 7 days. 4 pen 11    valsartan (DIOVAN) 320 MG tablet Take 1 tablet (320 mg total) by mouth once daily. 90 tablet 3    budesonide (TARPEYO) 4 mg CpDR Take 8 mg by mouth once daily. for 14 days 28 capsule 0     No current facility-administered medications for this visit.       ROS  Review of Systems   Constitutional:  Negative for activity change, chills and fever.   HENT:  Negative for congestion and hearing loss.    Eyes:  Negative for pain and visual disturbance.   Respiratory:  Negative for cough and shortness of breath.    Cardiovascular:  Negative for chest pain and palpitations.   Gastrointestinal:  Positive for anal bleeding. Negative for abdominal pain, constipation, diarrhea, nausea and vomiting.   Endocrine: Negative.    Genitourinary: Negative.  " "  Musculoskeletal:  Negative for arthralgias and myalgias.   Skin: Negative.    Allergic/Immunologic: Negative.    Neurological:  Negative for dizziness, light-headedness and headaches.   Hematological: Negative.          OBJECTIVE     Physical Exam  Vitals:    09/15/23 1600   BP: 120/70   Pulse: 73   Temp: 98.3 °F (36.8 °C)    Body mass index is 31.39 kg/m².  Weight: 102.1 kg (225 lb 1.4 oz)   Height: 5' 11" (180.3 cm)     Physical Exam  Vitals reviewed.   Constitutional:       General: He is not in acute distress.     Appearance: Normal appearance.   HENT:      Head: Normocephalic and atraumatic.      Mouth/Throat:      Mouth: Mucous membranes are moist.      Pharynx: Oropharynx is clear.   Eyes:      Extraocular Movements: Extraocular movements intact.      Conjunctiva/sclera: Conjunctivae normal.      Pupils: Pupils are equal, round, and reactive to light.   Cardiovascular:      Rate and Rhythm: Normal rate and regular rhythm.      Pulses: Normal pulses.      Heart sounds: Normal heart sounds.   Pulmonary:      Effort: Pulmonary effort is normal.      Breath sounds: Normal breath sounds.   Abdominal:      General: There is no distension.   Musculoskeletal:         General: Normal range of motion.      Cervical back: Normal range of motion and neck supple.   Skin:     General: Skin is warm and dry.   Neurological:      General: No focal deficit present.      Mental Status: He is alert.           Health Maintenance         Date Due Completion Date    COVID-19 Vaccine (1) Never done ---    Pneumococcal Vaccines (Age 0-64) (1 - PCV) Never done ---    Eye Exam Never done ---    TETANUS VACCINE Never done ---    LDCT Lung Screen Never done ---    Shingles Vaccine (1 of 2) Never done ---    Influenza Vaccine (1) Never done ---    Hemoglobin A1c 12/05/2023 6/5/2023    Diabetes Urine Screening 03/08/2024 3/8/2023    Foot Exam 03/08/2024 3/8/2023    Lipid Panel 03/15/2024 3/15/2023    Low Dose Statin 08/08/2024 8/8/2023 "    Colorectal Cancer Screening 04/24/2026 4/24/2023              ASSESSMENT     48 y.o. male with     1. Type 2 diabetes mellitus with stage 4 chronic kidney disease, with long-term current use of insulin    2. Primary hypertension    3. BRBPR (bright red blood per rectum)        PLAN:     1. Type 2 diabetes mellitus with stage 4 chronic kidney disease, with long-term current use of insulin  - Controlled on current regimen, continue.   - Would benefit from CGM, orders placed.   - blood-glucose sensor (DEXCOM G6 SENSOR) Rita; 1 each by Misc.(Non-Drug; Combo Route) route every 10 days.  Dispense: 9 each; Refill: 3  - blood-glucose transmitter (DEXCOM G6 TRANSMITTER) Rita; 1 Device by Misc.(Non-Drug; Combo Route) route every 3 (three) months.  Dispense: 1 each; Refill: 3  - HEMOGLOBIN A1C; Future  - C-Peptide; Future    2. Primary hypertension  - Controlled on current regimen. Continue.     3. BRBPR (bright red blood per rectum)  - Likely due to known hemorrhoids. Colonoscopy up to date. Continue Anusol PRN. Return precautions given.   - CBC Auto Differential; Future        RTC in 6 months       Kristofer Menard MD  Family Medicine  [facility]  09/15/2023          No follow-ups on file.

## 2023-10-03 DIAGNOSIS — N18.32 STAGE 3B CHRONIC KIDNEY DISEASE: Primary | ICD-10-CM

## 2023-10-04 ENCOUNTER — LAB VISIT (OUTPATIENT)
Dept: LAB | Facility: HOSPITAL | Age: 48
End: 2023-10-04
Payer: COMMERCIAL

## 2023-10-04 DIAGNOSIS — N18.32 STAGE 3B CHRONIC KIDNEY DISEASE: ICD-10-CM

## 2023-10-04 PROBLEM — N25.81 SECONDARY HYPERPARATHYROIDISM OF RENAL ORIGIN: Status: ACTIVE | Noted: 2023-10-04

## 2023-10-04 LAB
ALBUMIN SERPL BCP-MCNC: 3.8 G/DL (ref 3.5–5.2)
ANION GAP SERPL CALC-SCNC: 8 MMOL/L (ref 8–16)
BASOPHILS # BLD AUTO: 0.08 K/UL (ref 0–0.2)
BASOPHILS NFR BLD: 0.9 % (ref 0–1.9)
BUN SERPL-MCNC: 47 MG/DL (ref 6–20)
CALCIUM SERPL-MCNC: 9.5 MG/DL (ref 8.7–10.5)
CHLORIDE SERPL-SCNC: 112 MMOL/L (ref 95–110)
CO2 SERPL-SCNC: 18 MMOL/L (ref 23–29)
CREAT SERPL-MCNC: 3.4 MG/DL (ref 0.5–1.4)
DIFFERENTIAL METHOD: ABNORMAL
EOSINOPHIL # BLD AUTO: 0.4 K/UL (ref 0–0.5)
EOSINOPHIL NFR BLD: 4.5 % (ref 0–8)
ERYTHROCYTE [DISTWIDTH] IN BLOOD BY AUTOMATED COUNT: 13.7 % (ref 11.5–14.5)
EST. GFR  (NO RACE VARIABLE): 21.1 ML/MIN/1.73 M^2
GLUCOSE SERPL-MCNC: 172 MG/DL (ref 70–110)
HCT VFR BLD AUTO: 35.8 % (ref 40–54)
HGB BLD-MCNC: 11.6 G/DL (ref 14–18)
IMM GRANULOCYTES # BLD AUTO: 0.02 K/UL (ref 0–0.04)
IMM GRANULOCYTES NFR BLD AUTO: 0.2 % (ref 0–0.5)
LYMPHOCYTES # BLD AUTO: 4.5 K/UL (ref 1–4.8)
LYMPHOCYTES NFR BLD: 49.3 % (ref 18–48)
MCH RBC QN AUTO: 32 PG (ref 27–31)
MCHC RBC AUTO-ENTMCNC: 32.4 G/DL (ref 32–36)
MCV RBC AUTO: 99 FL (ref 82–98)
MONOCYTES # BLD AUTO: 0.8 K/UL (ref 0.3–1)
MONOCYTES NFR BLD: 8.6 % (ref 4–15)
NEUTROPHILS # BLD AUTO: 3.3 K/UL (ref 1.8–7.7)
NEUTROPHILS NFR BLD: 36.5 % (ref 38–73)
NRBC BLD-RTO: 0 /100 WBC
PHOSPHATE SERPL-MCNC: 5.5 MG/DL (ref 2.7–4.5)
PLATELET # BLD AUTO: 398 K/UL (ref 150–450)
PMV BLD AUTO: 9.1 FL (ref 9.2–12.9)
POTASSIUM SERPL-SCNC: 5.1 MMOL/L (ref 3.5–5.1)
PTH-INTACT SERPL-MCNC: 115.7 PG/ML (ref 9–77)
RBC # BLD AUTO: 3.62 M/UL (ref 4.6–6.2)
SODIUM SERPL-SCNC: 138 MMOL/L (ref 136–145)
WBC # BLD AUTO: 9.16 K/UL (ref 3.9–12.7)

## 2023-10-04 PROCEDURE — 80069 RENAL FUNCTION PANEL: CPT | Performed by: STUDENT IN AN ORGANIZED HEALTH CARE EDUCATION/TRAINING PROGRAM

## 2023-10-04 PROCEDURE — 83970 ASSAY OF PARATHORMONE: CPT | Performed by: STUDENT IN AN ORGANIZED HEALTH CARE EDUCATION/TRAINING PROGRAM

## 2023-10-04 PROCEDURE — 85025 COMPLETE CBC W/AUTO DIFF WBC: CPT | Performed by: STUDENT IN AN ORGANIZED HEALTH CARE EDUCATION/TRAINING PROGRAM

## 2023-10-04 PROCEDURE — 36415 COLL VENOUS BLD VENIPUNCTURE: CPT | Mod: PO | Performed by: STUDENT IN AN ORGANIZED HEALTH CARE EDUCATION/TRAINING PROGRAM

## 2023-10-04 NOTE — ASSESSMENT & PLAN NOTE
Off budesonide and repeat upcr still low  Recommend to check urine dipstick at home  Start sevelamer  Needs to take bicarb as he was not taking before  Referral to transplant

## 2023-10-05 ENCOUNTER — OFFICE VISIT (OUTPATIENT)
Dept: NEPHROLOGY | Facility: CLINIC | Age: 48
End: 2023-10-05
Payer: COMMERCIAL

## 2023-10-05 ENCOUNTER — LAB VISIT (OUTPATIENT)
Dept: LAB | Facility: HOSPITAL | Age: 48
End: 2023-10-05
Payer: COMMERCIAL

## 2023-10-05 VITALS
WEIGHT: 218.25 LBS | SYSTOLIC BLOOD PRESSURE: 111 MMHG | BODY MASS INDEX: 30.44 KG/M2 | DIASTOLIC BLOOD PRESSURE: 70 MMHG | HEART RATE: 74 BPM

## 2023-10-05 DIAGNOSIS — I10 PRIMARY HYPERTENSION: ICD-10-CM

## 2023-10-05 DIAGNOSIS — N02.B9 IGA NEPHROPATHY: ICD-10-CM

## 2023-10-05 DIAGNOSIS — N18.4 CHRONIC KIDNEY DISEASE, STAGE 4 (SEVERE): Primary | ICD-10-CM

## 2023-10-05 DIAGNOSIS — N18.4 ANEMIA OF CHRONIC RENAL FAILURE, STAGE 4 (SEVERE): ICD-10-CM

## 2023-10-05 DIAGNOSIS — D63.1 ANEMIA OF CHRONIC RENAL FAILURE, STAGE 4 (SEVERE): ICD-10-CM

## 2023-10-05 DIAGNOSIS — N25.81 SECONDARY HYPERPARATHYROIDISM OF RENAL ORIGIN: ICD-10-CM

## 2023-10-05 DIAGNOSIS — E66.9 OBESITY (BMI 30-39.9): ICD-10-CM

## 2023-10-05 DIAGNOSIS — E11.22 TYPE 2 DIABETES MELLITUS WITH STAGE 4 CHRONIC KIDNEY DISEASE, WITH LONG-TERM CURRENT USE OF INSULIN: ICD-10-CM

## 2023-10-05 DIAGNOSIS — Z72.0 TOBACCO USE: ICD-10-CM

## 2023-10-05 DIAGNOSIS — N18.4 TYPE 2 DIABETES MELLITUS WITH STAGE 4 CHRONIC KIDNEY DISEASE, WITH LONG-TERM CURRENT USE OF INSULIN: ICD-10-CM

## 2023-10-05 DIAGNOSIS — Z79.4 TYPE 2 DIABETES MELLITUS WITH STAGE 4 CHRONIC KIDNEY DISEASE, WITH LONG-TERM CURRENT USE OF INSULIN: ICD-10-CM

## 2023-10-05 LAB
FERRITIN SERPL-MCNC: 34 NG/ML (ref 20–300)
IRON SERPL-MCNC: 133 UG/DL (ref 45–160)
SATURATED IRON: 37 % (ref 20–50)
TOTAL IRON BINDING CAPACITY: 364 UG/DL (ref 250–450)
TRANSFERRIN SERPL-MCNC: 246 MG/DL (ref 200–375)

## 2023-10-05 PROCEDURE — 3044F HG A1C LEVEL LT 7.0%: CPT | Mod: CPTII,S$GLB,, | Performed by: STUDENT IN AN ORGANIZED HEALTH CARE EDUCATION/TRAINING PROGRAM

## 2023-10-05 PROCEDURE — 3044F PR MOST RECENT HEMOGLOBIN A1C LEVEL <7.0%: ICD-10-PCS | Mod: CPTII,S$GLB,, | Performed by: STUDENT IN AN ORGANIZED HEALTH CARE EDUCATION/TRAINING PROGRAM

## 2023-10-05 PROCEDURE — 3074F PR MOST RECENT SYSTOLIC BLOOD PRESSURE < 130 MM HG: ICD-10-PCS | Mod: CPTII,S$GLB,, | Performed by: STUDENT IN AN ORGANIZED HEALTH CARE EDUCATION/TRAINING PROGRAM

## 2023-10-05 PROCEDURE — 99215 PR OFFICE/OUTPT VISIT, EST, LEVL V, 40-54 MIN: ICD-10-PCS | Mod: S$GLB,,, | Performed by: STUDENT IN AN ORGANIZED HEALTH CARE EDUCATION/TRAINING PROGRAM

## 2023-10-05 PROCEDURE — 3074F SYST BP LT 130 MM HG: CPT | Mod: CPTII,S$GLB,, | Performed by: STUDENT IN AN ORGANIZED HEALTH CARE EDUCATION/TRAINING PROGRAM

## 2023-10-05 PROCEDURE — 1160F RVW MEDS BY RX/DR IN RCRD: CPT | Mod: CPTII,S$GLB,, | Performed by: STUDENT IN AN ORGANIZED HEALTH CARE EDUCATION/TRAINING PROGRAM

## 2023-10-05 PROCEDURE — 99215 OFFICE O/P EST HI 40 MIN: CPT | Mod: S$GLB,,, | Performed by: STUDENT IN AN ORGANIZED HEALTH CARE EDUCATION/TRAINING PROGRAM

## 2023-10-05 PROCEDURE — 3062F POS MACROALBUMINURIA REV: CPT | Mod: CPTII,S$GLB,, | Performed by: STUDENT IN AN ORGANIZED HEALTH CARE EDUCATION/TRAINING PROGRAM

## 2023-10-05 PROCEDURE — 4010F ACE/ARB THERAPY RXD/TAKEN: CPT | Mod: CPTII,S$GLB,, | Performed by: STUDENT IN AN ORGANIZED HEALTH CARE EDUCATION/TRAINING PROGRAM

## 2023-10-05 PROCEDURE — 1159F PR MEDICATION LIST DOCUMENTED IN MEDICAL RECORD: ICD-10-PCS | Mod: CPTII,S$GLB,, | Performed by: STUDENT IN AN ORGANIZED HEALTH CARE EDUCATION/TRAINING PROGRAM

## 2023-10-05 PROCEDURE — 84466 ASSAY OF TRANSFERRIN: CPT | Performed by: STUDENT IN AN ORGANIZED HEALTH CARE EDUCATION/TRAINING PROGRAM

## 2023-10-05 PROCEDURE — 99999 PR PBB SHADOW E&M-EST. PATIENT-LVL V: ICD-10-PCS | Mod: PBBFAC,,, | Performed by: STUDENT IN AN ORGANIZED HEALTH CARE EDUCATION/TRAINING PROGRAM

## 2023-10-05 PROCEDURE — 3066F NEPHROPATHY DOC TX: CPT | Mod: CPTII,S$GLB,, | Performed by: STUDENT IN AN ORGANIZED HEALTH CARE EDUCATION/TRAINING PROGRAM

## 2023-10-05 PROCEDURE — 3078F PR MOST RECENT DIASTOLIC BLOOD PRESSURE < 80 MM HG: ICD-10-PCS | Mod: CPTII,S$GLB,, | Performed by: STUDENT IN AN ORGANIZED HEALTH CARE EDUCATION/TRAINING PROGRAM

## 2023-10-05 PROCEDURE — 3008F PR BODY MASS INDEX (BMI) DOCUMENTED: ICD-10-PCS | Mod: CPTII,S$GLB,, | Performed by: STUDENT IN AN ORGANIZED HEALTH CARE EDUCATION/TRAINING PROGRAM

## 2023-10-05 PROCEDURE — 1159F MED LIST DOCD IN RCRD: CPT | Mod: CPTII,S$GLB,, | Performed by: STUDENT IN AN ORGANIZED HEALTH CARE EDUCATION/TRAINING PROGRAM

## 2023-10-05 PROCEDURE — 3062F PR POS MACROALBUMINURIA RESULT DOCUMENTED/REVIEW: ICD-10-PCS | Mod: CPTII,S$GLB,, | Performed by: STUDENT IN AN ORGANIZED HEALTH CARE EDUCATION/TRAINING PROGRAM

## 2023-10-05 PROCEDURE — 82728 ASSAY OF FERRITIN: CPT | Performed by: STUDENT IN AN ORGANIZED HEALTH CARE EDUCATION/TRAINING PROGRAM

## 2023-10-05 PROCEDURE — 3078F DIAST BP <80 MM HG: CPT | Mod: CPTII,S$GLB,, | Performed by: STUDENT IN AN ORGANIZED HEALTH CARE EDUCATION/TRAINING PROGRAM

## 2023-10-05 PROCEDURE — 99999 PR PBB SHADOW E&M-EST. PATIENT-LVL V: CPT | Mod: PBBFAC,,, | Performed by: STUDENT IN AN ORGANIZED HEALTH CARE EDUCATION/TRAINING PROGRAM

## 2023-10-05 PROCEDURE — 83540 ASSAY OF IRON: CPT | Performed by: STUDENT IN AN ORGANIZED HEALTH CARE EDUCATION/TRAINING PROGRAM

## 2023-10-05 PROCEDURE — 36415 COLL VENOUS BLD VENIPUNCTURE: CPT | Performed by: STUDENT IN AN ORGANIZED HEALTH CARE EDUCATION/TRAINING PROGRAM

## 2023-10-05 PROCEDURE — 3066F PR DOCUMENTATION OF TREATMENT FOR NEPHROPATHY: ICD-10-PCS | Mod: CPTII,S$GLB,, | Performed by: STUDENT IN AN ORGANIZED HEALTH CARE EDUCATION/TRAINING PROGRAM

## 2023-10-05 PROCEDURE — 3008F BODY MASS INDEX DOCD: CPT | Mod: CPTII,S$GLB,, | Performed by: STUDENT IN AN ORGANIZED HEALTH CARE EDUCATION/TRAINING PROGRAM

## 2023-10-05 PROCEDURE — 1160F PR REVIEW ALL MEDS BY PRESCRIBER/CLIN PHARMACIST DOCUMENTED: ICD-10-PCS | Mod: CPTII,S$GLB,, | Performed by: STUDENT IN AN ORGANIZED HEALTH CARE EDUCATION/TRAINING PROGRAM

## 2023-10-05 PROCEDURE — 4010F PR ACE/ARB THEARPY RXD/TAKEN: ICD-10-PCS | Mod: CPTII,S$GLB,, | Performed by: STUDENT IN AN ORGANIZED HEALTH CARE EDUCATION/TRAINING PROGRAM

## 2023-10-05 RX ORDER — SEVELAMER HYDROCHLORIDE 800 MG/1
800 TABLET, FILM COATED ORAL
Qty: 270 TABLET | Refills: 3 | Status: SHIPPED | OUTPATIENT
Start: 2023-10-05 | End: 2024-10-04

## 2023-10-05 NOTE — PROGRESS NOTES
Nephrology Clinic Note   10/5/2023    Chief Complaint   Patient presents with    Chronic Kidney Disease      History of present illness:  Patient is a 48 y.o. male.   Presents to the clinic today for medical conditions listed below.  Problem Noted   Secondary Hyperparathyroidism of Renal Origin 10/4/2023   Obesity (Bmi 30-39.9) 3/1/2023   Iga Nephropathy 10/20/2022    In remission after treatment with budesonide     Type 2 Diabetes Mellitus With Stage 4 Chronic Kidney Disease, With Long-Term Current Use of Insulin 10/3/2019   Htn (Hypertension) 11/14/2012    On valsartan, labetalol, amlodipine     Chronic Kidney Disease, Stage 4 (Severe) 11/14/2012    47 y/o M with hx of HTN, Gout and prior history of glomerulonephritis who comes in for follow up evaluation. Pt was last evaluated by a kidney doctor 5y ago in 2017. At that time it was suspected he had stable disease, possibly IgA nephropathy. Biopsy was deferred and expectant observation was recommended. Pt now comes back to the Nephrology clinic with worsening renal dysfunction and proteinuria. Blood pressure also significantly elevated. He refers his father suffered from kidney disease and had a complication of bleeding after a biopsy and required dialysis however he cannot remember the diagnosis. He believes it was related to hypertension. Pt denies any recent nausea, vomits, diarrhea, radiocontrast administration, dehydration or NSAID use.     Interval Hx:  9/15/22: Kidney biopsy results noted and consistent with IgA nephropathy. Worsening kidney function with creatinine up to 2.9.   10/20/22: Serum creatinine appears to have peaked at 3.6 and currently down to 3.2 as pt refers he has been working on lifestyle modifications. Tooth infection/abscess resolved after extraction and pt has completed his antibiotics.   11/10/22: sCr down to 2.6, UPCR up to 4. Pt refers he just realized he had not been taking the prednisone. [place] approved on appeal.   3/23/22:  Admitted earlier this month for MARIE and uncontrolled hyperglycemia. Serum creatinine peaked around 5.2 before improving to around 3.3 on his most recent labs.     -improving  -stage G4A3 likely secondary to IgA with minimal contribution from diabetes  -baseline creatinine 3.2  -baseline UPCR 2-4  -historic complications Volume overload  -BP controlled on ACE/ARB, CCB and BB  -glucose controlled on SGLT2 inhibitor and Insulin  -UPCR controlled on ACE/ARB, SGLT2 inhibitor and budesonide  -kidney US 2022 10cm kidneys bilaterally  -current diuretic therapy: none   -current bicarbonate therapy: 650 daily   -current CKD-MBD therapy: calcitriol, start sevelamer  -current anemia therapy: none    4g/g proteinuria 4/2022, kidney biopsy 9/2022 inadequate sample, but with IgA and no evidence of diabetes 30-40IF, started budesonide 9/2022. Discovered elevated a1c 1/2023 to 10 now back down to 6.7 and controlled.   Proteinuria has now decreased to 0.17. Discussed transitioning to MMF next month if UPCR stays low. Discussed possible kidney biopsy to reassess activity. Will discuss with team prior to making any changes.       Tobacco Use 11/14/2012     Review of Systems   Respiratory:  Negative for shortness of breath.    Cardiovascular:  Negative for leg swelling.   Genitourinary:  Negative for dysuria.       History:  Past Medical History:   Diagnosis Date    Anemia 3/1/2023    Chronic kidney disease     Chronic kidney disease stage II    Colon polyps     Hyperlipidemia 12/13/2012    Hypertension       Past Surgical History:   Procedure Laterality Date    COLONOSCOPY N/A 4/24/2023    Procedure: COLONOSCOPY;  Surgeon: Mayelin Forte MD;  Location: [address]);  Service: Endoscopy;  Laterality: N/A;  Golytely ok per nephrologist-see note 3/24- instr to portal -MS  4-18-23- preop call- voice mail on- no message left- MMG    COSMETIC SURGERY      pyloric stenosis      RENAL BIOPSY Left 9/1/2022    Procedure: BIOPSY,  KIDNEY;  Surgeon: Sinan Abad MD;  Location: [facility] CATH LAB;  Service: Interventional Nephrology;  Laterality: Left;        Current Outpatient Medications:     acetaminophen (TYLENOL) 500 MG tablet, Take 1,000 mg by mouth every 4 (four) hours as needed for Pain., Disp: , Rfl:     amLODIPine (NORVASC) 10 MG tablet, Take 0.5 tablets (5 mg total) by mouth once daily., Disp: 90 tablet, Rfl: 3    atorvastatin (LIPITOR) 80 MG tablet, Take 1 tablet (80 mg total) by mouth once daily., Disp: 90 tablet, Rfl: 3    blood sugar diagnostic Strp, 1 strip by Misc.(Non-Drug; Combo Route) route 2 (two) times daily. ICD 10: E11.22, Disp: 100 each, Rfl: 6    blood-glucose meter kit, Use as instructed to check blood sugar two times a day. ICD 10: E11.22, Disp: 1 each, Rfl: 0    blood-glucose sensor (DEXCOM G6 SENSOR) Rita, 1 each by Misc.(Non-Drug; Combo Route) route every 10 days., Disp: 9 each, Rfl: 3    blood-glucose transmitter (DEXCOM G6 TRANSMITTER) Rita, 1 Device by Misc.(Non-Drug; Combo Route) route every 3 (three) months., Disp: 1 each, Rfl: 3    calcitRIOL (ROCALTROL) 0.25 MCG Cap, Take 1 capsule (0.25 mcg total) by mouth once daily., Disp: 90 capsule, Rfl: 3    empagliflozin (JARDIANCE) 25 mg tablet, Take 1 tablet (25 mg total) by mouth once daily., Disp: 90 tablet, Rfl: 1    insulin detemir U-100, Levemir, (LEVEMIR FLEXTOUCH U-100 INSULN) 100 unit/mL (3 mL) InPn pen, Inject 18 Units into the skin every evening. Eat a snack before bed if BG is < 100 mg/dl (Patient taking differently: Inject 16 Units into the skin every evening. Eat a snack before bed if BG is < 100 mg/dl), Disp: 16.2 mL, Rfl: 3    insulin lispro (HUMALOG KWIKPEN INSULIN) 100 unit/mL pen, Inject 6 units into skin with lunch and dinner; 2-10 Units into the skin 3 (three) times daily as needed. Blood Glucose: 180 - 230 + 2 unit, 231- 280 + 4 units, 281 - 330 + 6 units, 331 - 380 + 8 units, > 380 + 10 units, Disp: 45 mL, Rfl: 0    labetaloL (NORMODYNE) 100 MG  "tablet, Take 1 tablet (100 mg total) by mouth 2 (two) times daily., Disp: 180 tablet, Rfl: 3    lancets Misc, 1 lancet by Misc.(Non-Drug; Combo Route) route 3 (three) times daily. ICD 10: E11.22, Disp: 300 each, Rfl: 3    pen needle, diabetic (BD ULTRA-FINE DAVID PEN NEEDLE) 32 gauge x 5/32" Ndle, Use to inject insulin 4 times daily, Disp: 100 each, Rfl: 11    valsartan (DIOVAN) 320 MG tablet, Take 1 tablet (320 mg total) by mouth once daily., Disp: 90 tablet, Rfl: 3    allopurinoL (ZYLOPRIM) 100 MG tablet, Take 1 tablet (100 mg total) by mouth once daily. (Patient not taking: Reported on 10/5/2023), Disp: 30 tablet, Rfl: 3    calcium carbonate (TUMS ORAL), Take 1 tablet by mouth daily as needed (Heartburn)., Disp: , Rfl:     colchicine (COLCRYS) 0.6 mg tablet, 0.6 mg daily as needed (gout flare-up)., Disp: , Rfl:     hydrocortisone (ANUSOL-HC) 2.5 % rectal cream, Place rectally 2 (two) times daily. (Patient not taking: Reported on 10/5/2023), Disp: 28 g, Rfl: 2    polyethylene glycol (GLYCOLAX) 17 gram/dose powder, Take 17 g by mouth once daily. (Patient not taking: Reported on 10/5/2023), Disp: 510 g, Rfl: 11    prednisoLONE acetate (PRED FORTE) 1 % DrpS, Place 1 drop into both eyes daily as needed (Inflammation)., Disp: , Rfl:     sevelamer HCL (RENAGEL) 800 MG Tab, Take 1 tablet (800 mg total) by mouth 3 (three) times daily with meals., Disp: 270 tablet, Rfl: 3    sodium bicarbonate 650 MG tablet, Take 1 tablet (650 mg total) by mouth once daily. (Patient not taking: Reported on 10/5/2023), Disp: 90 tablet, Rfl: 3    tirzepatide (MOUNJARO) 5 mg/0.5 mL PnIj, Inject 5 mg into the skin every 7 days. (Patient not taking: Reported on 10/5/2023), Disp: 4 pen, Rfl: 11  Review of patient's allergies indicates:  No Known Allergies   Social History     Tobacco Use    Smoking status: Every Day     Current packs/day: 1.00     Average packs/day: 1 pack/day for 24.0 years (24.0 ttl pk-yrs)     Types: Cigarettes    Smokeless " tobacco: Current   Substance Use Topics    Alcohol use: Yes     Comment: Occasional      Family History   Problem Relation Age of Onset    COPD Mother     Heart disease Mother     Peripheral vascular disease Mother     Diabetes Father     Kidney disease Father     Stroke Father     Hypertension Father     Heart disease Father 70        CABG x 3    Kidney failure Father     Heart disease Maternal Grandfather         Physical Exam :  Vitals:    10/05/23 1403   BP: 111/70   Pulse: 74     Physical Exam  Vitals and nursing note reviewed.   Constitutional:       General: He is not in acute distress.     Appearance: He is obese.   Eyes:      General: No scleral icterus.  Cardiovascular:      Rate and Rhythm: Normal rate.   Pulmonary:      Effort: Pulmonary effort is normal. No respiratory distress.   Musculoskeletal:      Right lower leg: No edema.      Left lower leg: No edema.   Skin:     Coloration: Skin is not jaundiced.   Neurological:      Mental Status: He is alert.         The following labs reviewed   Lab Results   Component Value Date    HGB 11.6 (L) 10/04/2023     10/04/2023    K 5.1 10/04/2023    CREATININE 3.4 (H) 10/04/2023    PHOS 5.5 (H) 10/04/2023    .7 (H) 10/04/2023    [id number] 42 08/03/2023    IRON 133 10/05/2023        Assessment:    1. Chronic kidney disease, stage 4 (severe)    2. IgA nephropathy    3. Primary hypertension    4. Type 2 diabetes mellitus with stage 4 chronic kidney disease, with long-term current use of insulin    5. Secondary hyperparathyroidism of renal origin    6. Anemia of chronic renal failure, stage 4 (severe)    7. Obesity (BMI 30-39.9)    8. Tobacco use        Plan:    Chronic kidney disease, stage 4 (severe)  Off budesonide and repeat upcr still low  Recommend to check urine dipstick at home  Start sevelamer  Needs to take bicarb as he was not taking before  Referral to transplant    Secondary hyperparathyroidism of renal origin  Start sevelamer    IgA  nephropathy  In remission   Monitor protein with dipsticks at home    Type 2 diabetes mellitus with stage 4 chronic kidney disease, with long-term current use of insulin  He will get tirzepatide refilled    Obesity (BMI 30-39.9)  Counseled on importance of weight loss    Tobacco use  Quit smoking    HTN (hypertension)  Controlled  Cont current meds    Follow up in about 3 months (around 1/5/2024).     I spent a total of 52 minutes on the day of the visit.      Orders Placed This Encounter   Procedures    Iron and TIBC    Ferritin    Ambulatory referral/consult to Transplant, Kidney     Medications Discontinued During This Encounter   Medication Reason    budesonide (TARPEYO) 4 mg CpDR Patient no longer taking      Future Appointments   Date Time Provider Department Center   12/15/2023  3:30 PM Kristofer Menard MD [facility]       Par[facility] Ornelas

## 2023-10-09 ENCOUNTER — TELEPHONE (OUTPATIENT)
Dept: TRANSPLANT | Facility: CLINIC | Age: 48
End: 2023-10-09
Payer: COMMERCIAL

## 2023-10-16 ENCOUNTER — TELEPHONE (OUTPATIENT)
Dept: TRANSPLANT | Facility: CLINIC | Age: 48
End: 2023-10-16

## 2023-11-29 ENCOUNTER — TELEPHONE (OUTPATIENT)
Dept: TRANSPLANT | Facility: CLINIC | Age: 48
End: 2023-11-29
Payer: COMMERCIAL

## 2023-11-30 ENCOUNTER — TELEPHONE (OUTPATIENT)
Dept: TRANSPLANT | Facility: CLINIC | Age: 48
End: 2023-11-30
Payer: COMMERCIAL

## 2023-12-04 ENCOUNTER — TELEPHONE (OUTPATIENT)
Dept: TRANSPLANT | Facility: CLINIC | Age: 48
End: 2023-12-04
Payer: COMMERCIAL

## 2023-12-04 DIAGNOSIS — Z91.89 CARDIOVASCULAR EVENT RISK: ICD-10-CM

## 2023-12-04 DIAGNOSIS — Z76.82 ORGAN TRANSPLANT CANDIDATE: Primary | ICD-10-CM

## 2023-12-08 ENCOUNTER — EPISODE CHANGES (OUTPATIENT)
Dept: TRANSPLANT | Facility: CLINIC | Age: 48
End: 2023-12-08

## 2023-12-14 ENCOUNTER — TELEPHONE (OUTPATIENT)
Dept: TRANSPLANT | Facility: CLINIC | Age: 48
End: 2023-12-14
Payer: COMMERCIAL

## 2023-12-15 ENCOUNTER — OFFICE VISIT (OUTPATIENT)
Dept: INTERNAL MEDICINE | Facility: CLINIC | Age: 48
End: 2023-12-15
Payer: COMMERCIAL

## 2023-12-15 VITALS
OXYGEN SATURATION: 99 % | WEIGHT: 231.69 LBS | HEART RATE: 80 BPM | SYSTOLIC BLOOD PRESSURE: 106 MMHG | DIASTOLIC BLOOD PRESSURE: 74 MMHG | BODY MASS INDEX: 32.32 KG/M2

## 2023-12-15 DIAGNOSIS — N02.B9 IGA NEPHROPATHY: ICD-10-CM

## 2023-12-15 DIAGNOSIS — N18.4 TYPE 2 DIABETES MELLITUS WITH STAGE 4 CHRONIC KIDNEY DISEASE, WITH LONG-TERM CURRENT USE OF INSULIN: Primary | ICD-10-CM

## 2023-12-15 DIAGNOSIS — N18.4 CHRONIC KIDNEY DISEASE, STAGE 4 (SEVERE): ICD-10-CM

## 2023-12-15 DIAGNOSIS — Z79.4 TYPE 2 DIABETES MELLITUS WITH STAGE 4 CHRONIC KIDNEY DISEASE, WITH LONG-TERM CURRENT USE OF INSULIN: Primary | ICD-10-CM

## 2023-12-15 DIAGNOSIS — Z12.83 SCREENING FOR SKIN CANCER: ICD-10-CM

## 2023-12-15 DIAGNOSIS — E11.22 TYPE 2 DIABETES MELLITUS WITH STAGE 4 CHRONIC KIDNEY DISEASE, WITH LONG-TERM CURRENT USE OF INSULIN: Primary | ICD-10-CM

## 2023-12-15 PROCEDURE — 1159F PR MEDICATION LIST DOCUMENTED IN MEDICAL RECORD: ICD-10-PCS | Mod: CPTII,S$GLB,, | Performed by: STUDENT IN AN ORGANIZED HEALTH CARE EDUCATION/TRAINING PROGRAM

## 2023-12-15 PROCEDURE — 99214 OFFICE O/P EST MOD 30 MIN: CPT | Mod: S$GLB,,, | Performed by: STUDENT IN AN ORGANIZED HEALTH CARE EDUCATION/TRAINING PROGRAM

## 2023-12-15 PROCEDURE — 3044F HG A1C LEVEL LT 7.0%: CPT | Mod: CPTII,S$GLB,, | Performed by: STUDENT IN AN ORGANIZED HEALTH CARE EDUCATION/TRAINING PROGRAM

## 2023-12-15 PROCEDURE — 4010F PR ACE/ARB THEARPY RXD/TAKEN: ICD-10-PCS | Mod: CPTII,S$GLB,, | Performed by: STUDENT IN AN ORGANIZED HEALTH CARE EDUCATION/TRAINING PROGRAM

## 2023-12-15 PROCEDURE — 3066F NEPHROPATHY DOC TX: CPT | Mod: CPTII,S$GLB,, | Performed by: STUDENT IN AN ORGANIZED HEALTH CARE EDUCATION/TRAINING PROGRAM

## 2023-12-15 PROCEDURE — 99999 PR PBB SHADOW E&M-EST. PATIENT-LVL V: ICD-10-PCS | Mod: PBBFAC,,, | Performed by: STUDENT IN AN ORGANIZED HEALTH CARE EDUCATION/TRAINING PROGRAM

## 2023-12-15 PROCEDURE — 3066F PR DOCUMENTATION OF TREATMENT FOR NEPHROPATHY: ICD-10-PCS | Mod: CPTII,S$GLB,, | Performed by: STUDENT IN AN ORGANIZED HEALTH CARE EDUCATION/TRAINING PROGRAM

## 2023-12-15 PROCEDURE — 4010F ACE/ARB THERAPY RXD/TAKEN: CPT | Mod: CPTII,S$GLB,, | Performed by: STUDENT IN AN ORGANIZED HEALTH CARE EDUCATION/TRAINING PROGRAM

## 2023-12-15 PROCEDURE — 3062F PR POS MACROALBUMINURIA RESULT DOCUMENTED/REVIEW: ICD-10-PCS | Mod: CPTII,S$GLB,, | Performed by: STUDENT IN AN ORGANIZED HEALTH CARE EDUCATION/TRAINING PROGRAM

## 2023-12-15 PROCEDURE — 3074F SYST BP LT 130 MM HG: CPT | Mod: CPTII,S$GLB,, | Performed by: STUDENT IN AN ORGANIZED HEALTH CARE EDUCATION/TRAINING PROGRAM

## 2023-12-15 PROCEDURE — 3062F POS MACROALBUMINURIA REV: CPT | Mod: CPTII,S$GLB,, | Performed by: STUDENT IN AN ORGANIZED HEALTH CARE EDUCATION/TRAINING PROGRAM

## 2023-12-15 PROCEDURE — 99999 PR PBB SHADOW E&M-EST. PATIENT-LVL V: CPT | Mod: PBBFAC,,, | Performed by: STUDENT IN AN ORGANIZED HEALTH CARE EDUCATION/TRAINING PROGRAM

## 2023-12-15 PROCEDURE — 3008F PR BODY MASS INDEX (BMI) DOCUMENTED: ICD-10-PCS | Mod: CPTII,S$GLB,, | Performed by: STUDENT IN AN ORGANIZED HEALTH CARE EDUCATION/TRAINING PROGRAM

## 2023-12-15 PROCEDURE — 3074F PR MOST RECENT SYSTOLIC BLOOD PRESSURE < 130 MM HG: ICD-10-PCS | Mod: CPTII,S$GLB,, | Performed by: STUDENT IN AN ORGANIZED HEALTH CARE EDUCATION/TRAINING PROGRAM

## 2023-12-15 PROCEDURE — 3078F DIAST BP <80 MM HG: CPT | Mod: CPTII,S$GLB,, | Performed by: STUDENT IN AN ORGANIZED HEALTH CARE EDUCATION/TRAINING PROGRAM

## 2023-12-15 PROCEDURE — 3008F BODY MASS INDEX DOCD: CPT | Mod: CPTII,S$GLB,, | Performed by: STUDENT IN AN ORGANIZED HEALTH CARE EDUCATION/TRAINING PROGRAM

## 2023-12-15 PROCEDURE — 3078F PR MOST RECENT DIASTOLIC BLOOD PRESSURE < 80 MM HG: ICD-10-PCS | Mod: CPTII,S$GLB,, | Performed by: STUDENT IN AN ORGANIZED HEALTH CARE EDUCATION/TRAINING PROGRAM

## 2023-12-15 PROCEDURE — 3044F PR MOST RECENT HEMOGLOBIN A1C LEVEL <7.0%: ICD-10-PCS | Mod: CPTII,S$GLB,, | Performed by: STUDENT IN AN ORGANIZED HEALTH CARE EDUCATION/TRAINING PROGRAM

## 2023-12-15 PROCEDURE — 99214 PR OFFICE/OUTPT VISIT, EST, LEVL IV, 30-39 MIN: ICD-10-PCS | Mod: S$GLB,,, | Performed by: STUDENT IN AN ORGANIZED HEALTH CARE EDUCATION/TRAINING PROGRAM

## 2023-12-15 PROCEDURE — 1159F MED LIST DOCD IN RCRD: CPT | Mod: CPTII,S$GLB,, | Performed by: STUDENT IN AN ORGANIZED HEALTH CARE EDUCATION/TRAINING PROGRAM

## 2023-12-15 RX ORDER — INSULIN GLARGINE 100 [IU]/ML
14 INJECTION, SOLUTION SUBCUTANEOUS NIGHTLY
Qty: 6 ML | Refills: 7 | Status: SHIPPED | OUTPATIENT
Start: 2024-01-01 | End: 2024-02-08 | Stop reason: ALTCHOICE

## 2023-12-15 NOTE — PROGRESS NOTES
SUBJECTIVE     No chief complaint on file.      HPI  Edouard Pena is a 48 y.o. male with  T2DM, HTN, HLD, CKD 4, IgA nephropathy  that presents for follow-up. LOV 9/15/23.     Meds: Levemir 16 units qhs, Humalog 6 u TID with lung & dinner w/ SSI, Mounjaro 5 mg q7days, Jardiance 25 mg qd  A1c 6.1%  MACR 359 on 3/8/23.   On a statin.   On an ARB, has CKD2  Foot exam UTD  Eye exam due  Declines PNA vaccine  Sugars are mostly <140 fasting. Has had some low blood sugar readings down in the 40s during the day and while asleep.     Patient with CKD4 with IgA nephropathy, established with transplant medicine. States that he is being evaluated for kidney transplant in 2/2024. Baseline Cr around 3.2. Last GFR was in 8/2023 and was 22.7.    PAST MEDICAL HISTORY:  Past Medical History:   Diagnosis Date    Anemia 3/1/2023    Chronic kidney disease     Chronic kidney disease stage II    Colon polyps     Hyperlipidemia 12/13/2012    Hypertension        PAST SURGICAL HISTORY:  Past Surgical History:   Procedure Laterality Date    COLONOSCOPY N/A 4/24/2023    Procedure: COLONOSCOPY;  Surgeon: Mayelin Forte MD;  Location: [facility] ENDO ([address] [place] [address]);  Service: Endoscopy;  Laterality: N/A;  Golytely ok per nephrologist-see note 3/24- instr to portal -MS  4-18-23- preop call- voice mail on- no message left- MMG    COSMETIC SURGERY      pyloric stenosis      RENAL BIOPSY Left 9/1/2022    Procedure: BIOPSY, KIDNEY;  Surgeon: Sinan Abad MD;  Location: [facility] CATH LAB;  Service: Interventional Nephrology;  Laterality: Left;       FAMILY HISTORY:  Family History   Problem Relation Age of Onset    COPD Mother     Heart disease Mother     Peripheral vascular disease Mother     Diabetes Father     Kidney disease Father     Stroke Father     Hypertension Father     Heart disease Father 70        CABG x 3    Kidney failure Father     Heart disease Maternal Grandfather        ALLERGIES AND MEDICATIONS: updated and reviewed.  Review of  patient's allergies indicates:  No Known Allergies  Current Outpatient Medications   Medication Sig Dispense Refill    acetaminophen (TYLENOL) 500 MG tablet Take 1,000 mg by mouth every 4 (four) hours as needed for Pain.      allopurinoL (ZYLOPRIM) 100 MG tablet Take 1 tablet (100 mg total) by mouth once daily. 30 tablet 3    amLODIPine (NORVASC) 10 MG tablet Take 0.5 tablets (5 mg total) by mouth once daily. 90 tablet 3    atorvastatin (LIPITOR) 80 MG tablet Take 1 tablet (80 mg total) by mouth once daily. 90 tablet 3    blood sugar diagnostic Strp 1 strip by Misc.(Non-Drug; Combo Route) route 2 (two) times daily. ICD 10: E11.22 100 each 6    blood-glucose meter kit Use as instructed to check blood sugar two times a day. ICD 10: E11.22 1 each 0    blood-glucose sensor (DEXCOM G6 SENSOR) Rita 1 each by Misc.(Non-Drug; Combo Route) route every 10 days. 9 each 3    blood-glucose transmitter (DEXCOM G6 TRANSMITTER) Rita 1 Device by Misc.(Non-Drug; Combo Route) route every 3 (three) months. 1 each 3    calcitRIOL (ROCALTROL) 0.25 MCG Cap Take 1 capsule (0.25 mcg total) by mouth once daily. 90 capsule 3    calcium carbonate (TUMS ORAL) Take 1 tablet by mouth daily as needed (Heartburn).      colchicine (COLCRYS) 0.6 mg tablet 0.6 mg daily as needed (gout flare-up).      empagliflozin (JARDIANCE) 25 mg tablet Take 1 tablet (25 mg total) by mouth once daily. 90 tablet 1    hydrocortisone (ANUSOL-HC) 2.5 % rectal cream Place rectally 2 (two) times daily. 28 g 2    insulin detemir U-100, Levemir, (LEVEMIR FLEXTOUCH U-100 INSULN) 100 unit/mL (3 mL) InPn pen Inject 18 Units into the skin every evening. Eat a snack before bed if BG is < 100 mg/dl (Patient taking differently: Inject 16 Units into the skin every evening. Eat a snack before bed if BG is < 100 mg/dl) 16.2 mL 3    insulin lispro (HUMALOG KWIKPEN INSULIN) 100 unit/mL pen Inject 6 units into skin with lunch and dinner; 2-10 Units into the skin 3 (three) times daily as  "needed. Blood Glucose: 180 - 230 + 2 unit, 231- 280 + 4 units, 281 - 330 + 6 units, 331 - 380 + 8 units, > 380 + 10 units 45 mL 0    labetaloL (NORMODYNE) 100 MG tablet Take 1 tablet (100 mg total) by mouth 2 (two) times daily. 180 tablet 3    lancets Misc 1 lancet by Misc.(Non-Drug; Combo Route) route 3 (three) times daily. ICD 10: E11.22 300 each 3    pen needle, diabetic (BD ULTRA-FINE DAVID PEN NEEDLE) 32 gauge x 5/32" Ndle Use to inject insulin 4 times daily 100 each 11    polyethylene glycol (GLYCOLAX) 17 gram/dose powder Take 17 g by mouth once daily. 510 g 11    prednisoLONE acetate (PRED FORTE) 1 % DrpS Place 1 drop into both eyes daily as needed (Inflammation).      sevelamer HCL (RENAGEL) 800 MG Tab Take 1 tablet (800 mg total) by mouth 3 (three) times daily with meals. 270 tablet 3    sodium bicarbonate 650 MG tablet Take 1 tablet (650 mg total) by mouth once daily. 90 tablet 3    tirzepatide (MOUNJARO) 5 mg/0.5 mL PnIj Inject 5 mg into the skin every 7 days. 4 pen 11    valsartan (DIOVAN) 320 MG tablet Take 1 tablet (320 mg total) by mouth once daily. 90 tablet 3     No current facility-administered medications for this visit.       ROS  Review of Systems   Constitutional:  Negative for activity change, chills and fever.   HENT:  Negative for congestion and hearing loss.    Eyes:  Negative for pain and visual disturbance.   Respiratory:  Negative for cough and shortness of breath.    Cardiovascular:  Negative for chest pain and palpitations.   Gastrointestinal:  Negative for abdominal pain, constipation, diarrhea, nausea and vomiting.   Endocrine: Negative.    Genitourinary: Negative.    Musculoskeletal:  Negative for arthralgias and myalgias.   Skin: Negative.    Allergic/Immunologic: Negative.    Neurological:  Negative for dizziness, light-headedness and headaches.   Hematological: Negative.          OBJECTIVE     Physical Exam  Vitals:    12/15/23 1528   BP: 106/74   Pulse: 80    Body mass index is " 32.32 kg/m².  Weight: 105.1 kg (231 lb 11.3 oz)         Physical Exam  Vitals reviewed.   Constitutional:       General: He is not in acute distress.     Appearance: Normal appearance.   HENT:      Head: Normocephalic and atraumatic.      Mouth/Throat:      Mouth: Mucous membranes are moist.      Pharynx: Oropharynx is clear.   Eyes:      Extraocular Movements: Extraocular movements intact.      Conjunctiva/sclera: Conjunctivae normal.      Pupils: Pupils are equal, round, and reactive to light.   Cardiovascular:      Rate and Rhythm: Normal rate and regular rhythm.      Pulses: Normal pulses.      Heart sounds: Normal heart sounds.   Pulmonary:      Effort: Pulmonary effort is normal.      Breath sounds: Normal breath sounds.   Abdominal:      General: There is no distension.   Musculoskeletal:         General: Normal range of motion.      Cervical back: Normal range of motion and neck supple.   Skin:     General: Skin is warm and dry.   Neurological:      General: No focal deficit present.      Mental Status: He is alert.           Health Maintenance         Date Due Completion Date    COVID-19 Vaccine (1) Never done ---    Pneumococcal Vaccines (Age 0-64) (1 - PCV) Never done ---    Eye Exam Never done ---    TETANUS VACCINE Never done ---    LDCT Lung Screen Never done ---    Shingles Vaccine (1 of 2) Never done ---    Influenza Vaccine (1) Never done ---    Diabetes Urine Screening 03/08/2024 3/8/2023    Foot Exam 03/08/2024 3/8/2023    Lipid Panel 03/15/2024 3/15/2023    Hemoglobin A1c 03/15/2024 9/15/2023    Low Dose Statin 11/30/2024 11/30/2023    Colorectal Cancer Screening 04/24/2026 4/24/2023              ASSESSMENT     48 y.o. male with     1. Type 2 diabetes mellitus with stage 4 chronic kidney disease, with long-term current use of insulin    2. Screening for skin cancer    3. Chronic kidney disease, stage 4 (severe)    4. IgA nephropathy        PLAN:     1. Type 2 diabetes mellitus with stage 4  chronic kidney disease, with long-term current use of insulin  - Patient with some hypoglycemic episodes. A1c down to 6.1%, can start to lower insulin. Lower basal insulin to 14 units nightly. Will switch levemir to semglee in 1/2024 due to insurance coverage changes. Lower Humalog to 4 units qAC. No need for sliding scale. Counseled at length about managing hypoglycemia. If continues to have hypoglycemic episodes, can consider discontinuing prandial insulin altogether.   - Continue Jardiance, Mounjaro.   - insulin (SEMGLEE PEN U-100 INSULIN) glargine 100 units/mL SubQ pen; Inject 14 Units into the skin every evening.  Dispense: 6 mL; Refill: 7    2. Screening for skin cancer  - Ambulatory referral/consult to Dermatology; Future    3. Chronic kidney disease, stage 4 (severe)  - Continue follow up with nephrology, transplant medicine.     4. IgA nephropathy  - Continue follow up with nephrology, transplant medicine.         RTC in 4 months       Kristofer Menard MD  Family Medicine  [facility]  12/15/2023    This document was created using voice recognition software (M*Modal Fluency Direct). Although it may be edited, this document may contain errors related to incorrect recognition of the spoken word. Please call the physician if clarification is needed.       No follow-ups on file.

## 2023-12-15 NOTE — PATIENT INSTRUCTIONS
Lower your Levemir to 14 units at bedtime.   Lower your Humalog to 4 units with meals.   Continue the Mounjaro and Jardiance.   Continue to watch your sugars. Let me know if you are still getting low readings.   Try to send me screenshots from your Dexcom once you get back into the pushpa.   See me in 4 months.

## 2023-12-28 RX ORDER — TIRZEPATIDE 5 MG/.5ML
5 INJECTION, SOLUTION SUBCUTANEOUS
Qty: 4 PEN | Refills: 11 | Status: SHIPPED | OUTPATIENT
Start: 2023-12-28 | End: 2024-12-22

## 2023-12-28 NOTE — TELEPHONE ENCOUNTER
Care Due:                  Date            Visit Type   Department     Provider  --------------------------------------------------------------------------------                                EP -                              PRIMARY      [facility] INTERNAL  Last Visit: 12-      CARE ([facility])   MEDICINE       Kristofer KAYE Sailaja                              EP -                              PRIMARY      [facility] INTERNAL  Next Visit: 04-      CARE ([facility])   MEDICINE       Kristofer A Sailaja                                                            Last  Test          Frequency    Reason                     Performed    Due Date  --------------------------------------------------------------------------------    HBA1C.......  6 months...  insulin, tirzepatide.....  09- 03-    [facility] Embedded Care Due Messages. Reference number: [id number].   12/28/2023 1:18:04 PM CST

## 2024-01-23 ENCOUNTER — TELEPHONE (OUTPATIENT)
Dept: TRANSPLANT | Facility: CLINIC | Age: 49
End: 2024-01-23
Payer: COMMERCIAL

## 2024-01-30 DIAGNOSIS — I10 HYPERTENSION, UNSPECIFIED TYPE: ICD-10-CM

## 2024-01-30 RX ORDER — AMLODIPINE BESYLATE 10 MG/1
5 TABLET ORAL DAILY
Qty: 90 TABLET | Refills: 2 | Status: SHIPPED | OUTPATIENT
Start: 2024-01-30 | End: 2024-03-04

## 2024-02-01 ENCOUNTER — TELEPHONE (OUTPATIENT)
Dept: TRANSPLANT | Facility: CLINIC | Age: 49
End: 2024-02-01
Payer: COMMERCIAL

## 2024-02-01 NOTE — TELEPHONE ENCOUNTER
----- Message from Mary Rodriguez MA sent at 1/30/2024  6:18 PM CST -----  Regarding: FW: Appointments    ----- Message -----  From: Radha Marino  Sent: 1/29/2024   9:48 AM CST  To: [facility] Pre-Kidney Transplant Non-Clinical  Subject: Appointments                                         Name Of Caller:    Edouard      Contact Preference:   [phone]       Nature of call:   Pt returning call to Madelyn. He would like to schedule his upcoming appts.

## 2024-02-06 NOTE — PROGRESS NOTES
Verified demographics, appt times, lab work including HIV and Hepatitis which is required for transplantation. Informed patient that a caregiver is required and to bring a light snack. Assessed for  needs. Noted patient  does not require . Updated in Epic:  health/surgical history, allergies, and family history. Patient states understanding , given opportunity to ask questions and all questions answered. Reminded patient to refer to appointment slips and education information sent previously.

## 2024-02-08 ENCOUNTER — HOSPITAL ENCOUNTER (OUTPATIENT)
Dept: RADIOLOGY | Facility: HOSPITAL | Age: 49
Discharge: HOME | End: 2024-02-08
Attending: NURSE PRACTITIONER
Payer: COMMERCIAL

## 2024-02-08 ENCOUNTER — OFFICE VISIT (OUTPATIENT)
Dept: TRANSPLANT | Facility: CLINIC | Age: 49
End: 2024-02-08
Payer: COMMERCIAL

## 2024-02-08 VITALS
OXYGEN SATURATION: 96 % | DIASTOLIC BLOOD PRESSURE: 71 MMHG | TEMPERATURE: 97 F | RESPIRATION RATE: 18 BRPM | HEART RATE: 79 BPM | SYSTOLIC BLOOD PRESSURE: 127 MMHG | BODY MASS INDEX: 32.53 KG/M2 | HEIGHT: 71 IN | WEIGHT: 232.38 LBS

## 2024-02-08 DIAGNOSIS — I10 PRIMARY HYPERTENSION: ICD-10-CM

## 2024-02-08 DIAGNOSIS — Z76.82 ORGAN TRANSPLANT CANDIDATE: ICD-10-CM

## 2024-02-08 DIAGNOSIS — E11.22 TYPE 2 DIABETES MELLITUS WITH STAGE 4 CHRONIC KIDNEY DISEASE, WITH LONG-TERM CURRENT USE OF INSULIN: ICD-10-CM

## 2024-02-08 DIAGNOSIS — N18.4 TYPE 2 DIABETES MELLITUS WITH STAGE 4 CHRONIC KIDNEY DISEASE, WITH LONG-TERM CURRENT USE OF INSULIN: ICD-10-CM

## 2024-02-08 DIAGNOSIS — Z01.818 PRE-TRANSPLANT EVALUATION FOR KIDNEY AND PANCREAS TRANSPLANT: Primary | ICD-10-CM

## 2024-02-08 DIAGNOSIS — N18.4 CHRONIC KIDNEY DISEASE, STAGE 4 (SEVERE): ICD-10-CM

## 2024-02-08 DIAGNOSIS — N02.B9 IGA NEPHROPATHY: ICD-10-CM

## 2024-02-08 DIAGNOSIS — Z79.4 TYPE 2 DIABETES MELLITUS WITH STAGE 4 CHRONIC KIDNEY DISEASE, WITH LONG-TERM CURRENT USE OF INSULIN: ICD-10-CM

## 2024-02-08 PROCEDURE — 76700 US EXAM ABDOM COMPLETE: CPT | Mod: 26,TXP,, | Performed by: RADIOLOGY

## 2024-02-08 PROCEDURE — 99215 OFFICE O/P EST HI 40 MIN: CPT | Mod: S$GLB,TXP,, | Performed by: NURSE PRACTITIONER

## 2024-02-08 PROCEDURE — 99999 PR PBB SHADOW E&M-EST. PATIENT-LVL V: CPT | Mod: PBBFAC,TXP,, | Performed by: NURSE PRACTITIONER

## 2024-02-08 PROCEDURE — 1159F MED LIST DOCD IN RCRD: CPT | Mod: CPTII,S$GLB,TXP, | Performed by: NURSE PRACTITIONER

## 2024-02-08 PROCEDURE — 3074F SYST BP LT 130 MM HG: CPT | Mod: CPTII,S$GLB,TXP, | Performed by: NURSE PRACTITIONER

## 2024-02-08 PROCEDURE — 72170 X-RAY EXAM OF PELVIS: CPT | Mod: TC,TXP

## 2024-02-08 PROCEDURE — 93978 VASCULAR STUDY: CPT | Mod: 26,TXP,, | Performed by: RADIOLOGY

## 2024-02-08 PROCEDURE — 3078F DIAST BP <80 MM HG: CPT | Mod: CPTII,S$GLB,TXP, | Performed by: NURSE PRACTITIONER

## 2024-02-08 PROCEDURE — 3044F HG A1C LEVEL LT 7.0%: CPT | Mod: CPTII,S$GLB,TXP, | Performed by: NURSE PRACTITIONER

## 2024-02-08 PROCEDURE — 93978 VASCULAR STUDY: CPT | Mod: TC,TXP

## 2024-02-08 PROCEDURE — 71046 X-RAY EXAM CHEST 2 VIEWS: CPT | Mod: 26,TXP,, | Performed by: RADIOLOGY

## 2024-02-08 PROCEDURE — 72170 X-RAY EXAM OF PELVIS: CPT | Mod: 26,TXP,, | Performed by: RADIOLOGY

## 2024-02-08 PROCEDURE — 71046 X-RAY EXAM CHEST 2 VIEWS: CPT | Mod: TC,TXP

## 2024-02-08 PROCEDURE — 97802 MEDICAL NUTRITION INDIV IN: CPT | Mod: S$GLB,TXP,,

## 2024-02-08 PROCEDURE — 4010F ACE/ARB THERAPY RXD/TAKEN: CPT | Mod: CPTII,S$GLB,TXP, | Performed by: NURSE PRACTITIONER

## 2024-02-08 PROCEDURE — 76700 US EXAM ABDOM COMPLETE: CPT | Mod: TC,TXP

## 2024-02-08 PROCEDURE — 1160F RVW MEDS BY RX/DR IN RCRD: CPT | Mod: CPTII,S$GLB,TXP, | Performed by: NURSE PRACTITIONER

## 2024-02-08 PROCEDURE — 3008F BODY MASS INDEX DOCD: CPT | Mod: CPTII,S$GLB,TXP, | Performed by: NURSE PRACTITIONER

## 2024-02-08 PROCEDURE — 99204 OFFICE O/P NEW MOD 45 MIN: CPT | Mod: S$GLB,TXP,, | Performed by: PHYSICIAN ASSISTANT

## 2024-02-08 PROCEDURE — 99214 OFFICE O/P EST MOD 30 MIN: CPT | Mod: S$GLB,TXP,, | Performed by: SURGERY

## 2024-02-08 NOTE — PROGRESS NOTES
INITIAL PATIENT EDUCATION NOTE     Mr. Edouard Pena was seen in pre-kidney transplant clinic for evaluation for kidney, kidney/pancreas or pancreas only transplant.  The patient attended an individual video education session that discussed/reviewed the following aspects of transplantation: evaluation including diagnostic and laboratory testing,(Chemistries, Hematology, Serologies including HIV and Hepatitis and HLA) required for transplantation and selection committee process, UNOS waitlist management/multiple listings, types of organs offered (KDPI < 85%, KDPI > 85%, PHS risk, DCD, HCV+, HIV+ for HIV+ recipients and enbloc/dual), financial aspects, surgical procedures, dietary instruction pre- and post-transplant, health maintenance pre- and post-transplant, post-transplant hospitalization and outpatient follow-up, potential to participate in a research protocol, and medication management and side effects.  A question and answer session was provided after the presentation.    The patient was seen by all members of the multi-disciplinary team to include: Nephrologist/RICA, Surgeon, , Transplant Coordinator, , Pharmacist and Dietician (if applicable).    The patient reviewed and signed all consents for evaluation which were witnessed and sent to scanning into the [facility] chart.    The patient was given an education book and plan for further evaluation based on his individual assessment.      Reviewed program requirement for complete COVID vaccination with documentation prior to listing.  COVID education information reviewed with patient. Patient encouraged to be up to date on all vaccinations.     The patient was informed that the transplant team would manage immediate post op pain. If the patient requires long term pain management, they will need to have that pain management addressed by their PCP or previous provider who wrote for long term pain medicines.    The patient was  encouraged to call with any questions or concerns.

## 2024-02-08 NOTE — PROGRESS NOTES
PRE-TRANSPLANT INFECTIOUS DISEASE CONSULT    Reason for Visit:  Pre-transplant evaluation  Referring Provider: Dr. Parvez Ornelas     History of Present Illness:    48 y.o. male with a history of ESRD 2/2 IgA nephropathy presents for pre-kidney transplant evaluation. Pt is Pre dialysis     Infectious History:  Recent hospital admissions: none   Recent infections: No  Recent or current antibiotic use: Yes, dental abscess   History of recurrent infections *(sinus / pneumonia / UTI / SBP)*: No  History of diabetic foot wound or bone/joint infection: No  Recent dental infections, issues or procedures: Yes had dental abscess/infection. Had dental evaluation   History of chicken pox: No  History of shingles: No  History of STI: No  History of COVID infection: No    History of Immunosuppression:  Prior chemotherapy / immunosuppression: No  Prior transplant: No  History of splenectomy: No    Tuberculosis:  Prior screening for latent TB: No  Prior diagnosis of latent TB: No  Risk factors for TB *known exposure, incarceration, homelessness*: No    Geographical exposures:  Currently lives in [place] with spouse   Lived in the following states: none  Lived or travelled to the [facility] [place]: yes in the 1990s, [place], [place], [place] [facility]   International travel: Yes [place]   Travel-associated illness: No    Social/Environmental:  Occupation:   for band, security    Pets: Yes , two indoor dogs, vaccinated   Livestock: No  Fishing / hunting: Yes  Hobbies: fishing   Water: [place]  Consumption of raw/undercooked meat or seafood?  No  Tobacco: Yes  Alcohol: No rarely   Recreational drug use:  No  Sexual partners: spouse       Past Histories:   Past Medical History:   Diagnosis Date    Anemia 03/01/2023    Chronic kidney disease     Chronic kidney disease stage II    Colon polyps     Hyperlipidemia 12/13/2012    Hypertension      Past Surgical History:   Procedure Laterality Date     COLONOSCOPY N/A 04/24/2023    Procedure: COLONOSCOPY;  Surgeon: Mayelin Forte MD;  [facility]  Service: Endoscopy;  Laterality: N/A;  Golytely ok per nephrologist-see note 3/24- instr to portal -MS  4-18-23- preop call- voice mail on- no message left- MMG    COSMETIC SURGERY      pyloric stenosis      RENAL BIOPSY Left 09/01/2022    Procedure: BIOPSY, KIDNEY;  Surgeon: Sinan Abad MD;  Location: [facility] CATH LAB;  Service: Interventional Nephrology;  Laterality: Left;     Family History   Problem Relation Age of Onset    COPD Mother     Heart disease Mother     Peripheral vascular disease Mother     Diabetes Father     Kidney disease Father     Stroke Father     Hypertension Father     Heart disease Father 70        CABG x 3    Kidney failure Father     Heart disease Maternal Grandfather      Social History     Tobacco Use    Smoking status: Every Day     Current packs/day: 1.00     Average packs/day: 1 pack/day for 24.0 years (24.0 ttl pk-yrs)     Types: Cigarettes    Smokeless tobacco: Current   Substance Use Topics    Alcohol use: Yes     Comment: Occasional    Drug use: No     Review of patient's allergies indicates:  No Known Allergies      Immunization History:  Received all childhood vaccines: Yes  All household members receive annual flu vaccine: No  All household members are up to date on COVID vaccine: No      Current antibiotics:  Antibiotics (From admission, onward)      None              Review of Systems  Review of Systems   Constitutional: Negative for chills, decreased appetite, fever, malaise/fatigue, night sweats, weight gain and weight loss.   HENT:  Negative for congestion, ear pain, hearing loss, hoarse voice, sore throat and tinnitus.    Eyes:  Negative for blurred vision, pain, vision loss in left eye, vision loss in right eye and visual disturbance.   Cardiovascular:  Negative for chest pain, dyspnea on exertion, leg swelling and palpitations.   Respiratory:  Negative  for cough, shortness of breath, sputum production and wheezing.    Skin:  Negative for dry skin, itching, rash and suspicious lesions.   Musculoskeletal:  Negative for back pain, joint pain, myalgias and neck pain.   Gastrointestinal:  Negative for abdominal pain, constipation, diarrhea, heartburn, nausea and vomiting.   Genitourinary:  Negative for dysuria, flank pain, frequency, hematuria, hesitancy and urgency.   Neurological:  Negative for dizziness, headaches, numbness, paresthesias and weakness.   Psychiatric/Behavioral:  Negative for depression and memory loss. The patient does not have insomnia and is not nervous/anxious.           Objective  Physical Exam  Vitals and nursing note reviewed.   Constitutional:       General: He is not in acute distress.     Appearance: He is well-developed. He is not diaphoretic.   HENT:      Head: Normocephalic and atraumatic.   Eyes:      Pupils: Pupils are equal, round, and reactive to light.   Cardiovascular:      Rate and Rhythm: Normal rate and regular rhythm.      Heart sounds: Normal heart sounds. No murmur heard.     No friction rub. No gallop.   Pulmonary:      Effort: Pulmonary effort is normal. No respiratory distress.      Breath sounds: Normal breath sounds. No wheezing or rales.   Chest:      Chest wall: No tenderness.   Abdominal:      General: Bowel sounds are normal. There is no distension.      Palpations: There is no mass.      Tenderness: There is no abdominal tenderness. There is no guarding.   Musculoskeletal:         General: No deformity. Normal range of motion.      Cervical back: Normal range of motion and neck supple.   Skin:     General: Skin is warm and dry.      Findings: No erythema or rash.   Neurological:      Mental Status: He is alert and oriented to person, place, and time.   Psychiatric:         Behavior: Behavior normal.         Thought Content: Thought content normal.         Judgment: Judgment normal.           Labs:    CBC:   Lab  "Results   Component Value Date    WBC 9.26 02/08/2024    HGB 12.5 (L) 02/08/2024    HCT 37.5 (L) 02/08/2024    MCV 92 02/08/2024     02/08/2024    GRAN 3.5 02/08/2024    GRAN 37.6 (L) 02/08/2024    LYMPH 4.5 02/08/2024    LYMPH 48.5 (H) 02/08/2024    MONO 0.8 02/08/2024    MONO 8.4 02/08/2024    EOSINOPHIL 4.5 02/08/2024       Syphilis screening:   Lab Results   Component Value Date    RPR Non-reactive 07/24/2017        TB screening: No results found for: "TBGOLDPLUS", "TSPOTSCREN"    HIV screening:   Lab Results   Component Value Date    EFJ34TPTA Non-reactive 02/08/2024       Strongyloides IgG: No results found for: "STRONGANTIGG"    Hepatitis Serologies:   Lab Results   Component Value Date    HEPAIGG Non-reactive 02/08/2024    HEPBCAB Non-reactive 02/08/2024    HEPBSAB <3.00 02/08/2024    HEPBSAB Non-reactive 02/08/2024    HEPCAB Non-reactive 02/08/2024        Varicella IgG: No results found for: "VARICELLAINT"        There is no immunization history on file for this patient.       Assessment and Plan    1. Risks of Infection: Available serologies were reviewed. No unusual risks of infection or significant barriers to transplantation were identified from the infectious disease standpoint given the information available at this time.    - Acute infectious issues: None   - Pending serologies: HIV, Quantiferon gold / T-spot, RPR, Strongyloides IgG, and VZV IgG will check coccidiodes    - Please call if any pending serologic testing is positive.    2. Immunizations:  Based on the patient's immunization history and serologies, the following immunizations are recommended:  - Hepatitis A    Patient does not have immunity to hepatitis A    Vaccination ordered today: Yes   - Hepatitis B    Patient does not have immunity to hepatitis B    Vaccination ordered today: Yes   - COVID    Current CDC vaccination recommendations were discussed with the patient   - Annual high dose influenza     Vaccination ordered today: " Yes   - Prevnar 20    Vaccination ordered today: Yes   - Tdap    Vaccination ordered today: Yes   - Shingrix    Vaccination ordered today: Yes    Recommended Pre-Transplant Immunization Schedule   Vaccine  0m 1m 2m 6m   Pneumococcal conjugate vaccine (Prevnar 20) X      Tetanus-diphtheria-pertussis (Tdap)* X      Hepatitis A Vaccine (Havrix)** X   X   Hepatitis B Vaccine (Heplisav)** X X     Influenza (annual) X      Zoster Recombinant Vaccine (Shingrix) X  X           *Administer booster every 10 years.       **Administer if no immunity demonstrated on serologies               Patient will receive vaccines at local pharmacy. A written prescription was provided for all vaccine doses.     3. Counseling:   I discussed with the patient the risk for increased susceptibility to infections following transplantation including increased risk for infection right after transplant and if rejection should occur.  The patient has been counseled on the importance of vaccinations to decrease risk of infection and severe illness. Specific guidance has been provided to the patient regarding the patient's occupation, hobbies and activities to avoid future infectious complications.     4. Transplant Candidacy: Based on available information, there are no identified significant barriers to transplantation from an infectious disease standpoint.  Final determination of transplant candidacy will be made once evaluation is complete and reviewed by the Selection Committee.      Follow up with infectious disease as needed.       The total time for evaluation and management services performed on 02/08/2024 was greater than 35 minutes.

## 2024-02-08 NOTE — PROGRESS NOTES
Transplant Surgery  Kidney Transplant Recipient Evaluation    Referring Physician:   Current Nephrologist:     Subjective:     Reason for Visit: evaluate transplant candidacy    History of Present Illness: Edouard Pena is a 48 y.o. year old male undergoing transplant evaluation.    Dialysis History: Edouard is pre-dialysis.      Transplant History: N/A    Etiology of Renal Disease: Diabetes Mellitus - Type II (based on medical records from referral).    External provider notes reviewed: No    Review of Systems   Constitutional:  Positive for fatigue.   HENT:  Negative for drooling, postnasal drip and sore throat.    Eyes:  Negative for discharge and itching.   Respiratory:  Negative for choking and stridor.    Gastrointestinal:  Negative for rectal pain.   Endocrine: Negative for polydipsia.   Genitourinary:  Negative for enuresis and genital sores.   Musculoskeletal:  Negative for back pain, neck pain and neck stiffness.   Allergic/Immunologic: Negative for immunocompromised state.   Neurological:  Negative for facial asymmetry and numbness.   Hematological:  Negative for adenopathy.   Psychiatric/Behavioral:  Negative for behavioral problems, self-injury and suicidal ideas.    Objective:     Physical Exam:  Constitutional:   Vitals reviewed: yes   Well-nourished and well-groomed: yes  Eyes:   Sclerae icteric: no   Extraocular movements intact: yes  GI:    Bowel sounds normal: yes   Tenderness: no    If yes, quadrant/location: not applicable   Palpable masses: no    If yes, quadrant/location: not applicable   Hepatosplenomegaly: no   Ascites: no   Hernia: no    If yes, type/location: not applicable   Surgical scars: yes    If yes, type/location: ruq scar for pyloromyotomy as infant  Resp:   Effort normal: yes   Breath sounds normal: yes    CV:   Regular rate and rhythm: yes   Heart sounds normal: yes   Femoral pulses normal: yes   Extremities edematous: no  Skin:   Rashes or lesions present: no    If yes, describe:not  applicable   Jaundice:: no    Musculoskeletal:   Gait normal: yes   Strength normal: yes  Psych:   Oriented to person, place, and time: yes   Affect and mood normal: yes    Additional comments: not applicable    Diagnostics:  The following labs have been reviewed: CBC  CMP    Counseling: We provided Edouard Pena with a group education session today.  We discussed kidney transplantation at length with him, including risks, potential complications, and alternatives in the management of his renal failure.  The discussion included complications related to anesthesia, bleeding, infection, primary nonfunction, and ATN.  I discussed the typical postoperative course, length of hospitalization, the need for long-term immunosuppression, and the need for long-term routine follow-up.  I discussed living-donor and -donor transplantation and the relative advantages and disadvantages of each.  I also discussed average waiting times for both living donation and  donation.  I discussed national and center-specific survival rates.  I also mentioned the potential benefit of multicenter listing to candidates listed with centers within more than one organ procurement organization.  All questions were answered.    Patient advised that it is recommended that all transplant candidates, and their close contacts and household members receive Covid vaccination.    Final determination of transplant candidacy will be made once evaluation is complete and reviewed by the Kidney & Kidney/Pancreas Selection Committee.    Coronavirus disease (COVID-19) caused by severe acute respiratory virus coronavirus 2 (SARS-C0V 2) is associated with increased mortality in solid organ transplant recipients (SOT) compared to non-transplant patients. Vaccine responses to vaccination are depressed against SARS-CoV2 compared to normal individuals but improve with third vaccination doses. Vaccination prior to SOT provides both the best  opportunity for transplant candidates to develop protective immunity and to reduce the risk of serious COVID19 infections post transplantation. Organ transplant candidates at [facility] will be required to receive SARS-CoV-2 vaccination prior to being listed with a an active status, whenever possible. Exceptions will be made for disability related reasons or for sincerely held Taoism beliefs.          Transplant Surgery - Candidacy   Assessment/Plan:   Edouard Pena is pre-dialysis with CKD stage 4 (GFR 15-29 mL/min). I see no surgical contraindication to placing a kidney transplant. Based on available information, Edouard Pena is a suitable kidney transplant candidate.     Additional testing to be completed according to the Written Order Guidelines for Adult Pre-kidney and Pancreas Transplant Evaluation (KI-02).  Interpretation of tests and discussion of patient management involves all members of the multidisciplinary transplant team.    Ishmael Velazquez MD

## 2024-02-08 NOTE — PROGRESS NOTES
TRANSPLANT NUTRITIONAL ASSESSMENT    Referring Provider: Parvez Ornelas MD    Reason for Visit: Pre-kidney transplant work-up (pre-dialysis)    Age: 48 y.o.  Sex: male    Patient Active Problem List   Diagnosis    HTN (hypertension)    Chronic kidney disease, stage 4 (severe)    Tobacco use    FH: heart disease    FH: diabetes mellitus    Hyperlipidemia    Hyperuricemia    Type 2 diabetes mellitus with stage 4 chronic kidney disease, with long-term current use of insulin    Newly diagnosed diabetes    IgA nephropathy    Leucocytosis    Hypercalcemia    Anemia    Obesity (BMI 30-39.9)    Blurred vision, bilateral    Screening for malignant neoplasm of colon    History of colon polyps    Secondary hyperparathyroidism of renal origin    Pre-transplant evaluation for kidney and pancreas transplant     Past Medical History:   Diagnosis Date    Anemia 03/01/2023    Chronic kidney disease     Chronic kidney disease stage II    Colon polyps     Hyperlipidemia 12/13/2012    Hypertension      Lab Results   Component Value Date     (H) 02/08/2024    K 5.2 (H) 02/08/2024    PHOS 4.0 02/08/2024    MG 2.4 03/03/2023    CHOL 130 02/08/2024    HDL 33 (L) 02/08/2024    TRIG 133 02/08/2024    ALBUMIN 4.0 02/08/2024    HGBA1C 6.5 (H) 02/08/2024    CALCIUM 9.7 02/08/2024     Other Pertinent Labs: N/A    Current Outpatient Medications   Medication Sig    acetaminophen (TYLENOL) 500 MG tablet Take 1,000 mg by mouth every 4 (four) hours as needed for Pain.    allopurinoL (ZYLOPRIM) 100 MG tablet Take 1 tablet (100 mg total) by mouth once daily.    amLODIPine (NORVASC) 10 MG tablet Take 0.5 tablets (5 mg total) by mouth once daily.    atorvastatin (LIPITOR) 80 MG tablet Take 1 tablet (80 mg total) by mouth once daily.    blood sugar diagnostic Strp 1 strip by Misc.(Non-Drug; Combo Route) route 2 (two) times daily. ICD 10: E11.22    blood-glucose meter kit Use as instructed to check blood sugar two times a day. ICD 10: E11.22     "blood-glucose sensor (DEXCOM G6 SENSOR) Rita 1 each by Misc.(Non-Drug; Combo Route) route every 10 days.    blood-glucose transmitter (DEXCOM G6 TRANSMITTER) Rita 1 Device by Misc.(Non-Drug; Combo Route) route every 3 (three) months.    calcitRIOL (ROCALTROL) 0.25 MCG Cap Take 1 capsule (0.25 mcg total) by mouth once daily.    calcium carbonate (TUMS ORAL) Take 1 tablet by mouth daily as needed (Heartburn).    colchicine (COLCRYS) 0.6 mg tablet 0.6 mg daily as needed (gout flare-up).    hydrocortisone (ANUSOL-HC) 2.5 % rectal cream Place rectally 2 (two) times daily.    insulin detemir U-100, Levemir, (LEVEMIR FLEXTOUCH U-100 INSULN) 100 unit/mL (3 mL) InPn pen Inject 18 Units into the skin every evening. Eat a snack before bed if BG is < 100 mg/dl (Patient taking differently: Inject 12 Units into the skin every evening. Eat a snack before bed if BG is < 100 mg/dl)    insulin lispro (HUMALOG KWIKPEN INSULIN) 100 unit/mL pen Inject 6 units into skin with lunch and dinner; 2-10 Units into the skin 3 (three) times daily as needed. Blood Glucose: 180 - 230 + 2 unit, 231- 280 + 4 units, 281 - 330 + 6 units, 331 - 380 + 8 units, > 380 + 10 units (Patient taking differently: Inject 4 Units into the skin 3 (three) times daily. Inject 6 units into skin with lunch and dinner; 2-10 Units into the skin 3 (three) times daily as needed. Blood Glucose: 180 - 230 + 2 unit, 231- 280 + 4 units, 281 - 330 + 6 units, 331 - 380 + 8 units, > 380 + 10 units)    labetaloL (NORMODYNE) 100 MG tablet Take 1 tablet (100 mg total) by mouth 2 (two) times daily.    lancets Misc 1 lancet by Misc.(Non-Drug; Combo Route) route 3 (three) times daily. ICD 10: E11.22    pen needle, diabetic (BD ULTRA-FINE DAVID PEN NEEDLE) 32 gauge x 5/32" Ndle Use to inject insulin 4 times daily    polyethylene glycol (GLYCOLAX) 17 gram/dose powder Take 17 g by mouth once daily.    prednisoLONE acetate (PRED FORTE) 1 % DrpS Place 1 drop into both eyes daily as needed " "(Inflammation).    sevelamer HCL (RENAGEL) 800 MG Tab Take 1 tablet (800 mg total) by mouth 3 (three) times daily with meals.    sodium bicarbonate 650 MG tablet Take 1 tablet (650 mg total) by mouth once daily.    tirzepatide (MOUNJARO) 5 mg/0.5 mL PnIj Inject 5 mg into the skin every 7 days.    valsartan (DIOVAN) 320 MG tablet Take 1 tablet (320 mg total) by mouth once daily.     No current facility-administered medications for this visit.     Allergies: Patient has no known allergies.    Ht Readings from Last 1 Encounters:   02/08/24 5' 10.71" (1.796 m)     Wt Readings from Last 1 Encounters:   02/08/24 105.4 kg (232 lb 5.8 oz)      BMI: Body mass index is 32.68 kg/m².    Usual Weight: approx. 200 lb  Weight Change/Time: Stable.  Current Diet: Regular diet, tries to watch CHO (DM)     Appetite/Current Intake: good   Exercise/Physical Activity: No problem completing ADLs. No current exercise.   Nutritional/Herbal Supplements: None.  Chewing/Swallowing Problems: None reported.   Symptoms: diarrhea about 2x/month   Estimated Kcal Need: 2108 - 2635 kcal/day (20-25 kcal/kg CBW)   Estimated Protein Need: 84-95 g/day (0.8-0.9g/kg/day)     Nutritional History: Pt dx w/ DM. See RD regularly for DM nutrition education. Works full time, physically active job. Eats vegetables 3x/week (green beans, broccoli, potatoes), does not enjoy fruit. Pt wife cooks meals regularly. Eats out for lunch every day, eats dinner out 1x/week.     24 H Recall   Breakfast: if pt eats breakfast - quinn, eggs, grits, pancakes  Lunch: eats out during work, often fast food ([employer])  Dinner: red beans or pork chops with vegetables  Snacks: cashews, peanuts, smoothies, chips  Beverages: water, 7 up, and iced tea     Nutritional Diagnoses  Problem: food- and nutrition-related knowledge deficit  Etiology: Pt needs more information on a renal diet for pre-kidney transplant   Symptoms: Pt reports no prior education on renal diet, 24 h recall reflects diet " predicted high in Na, BMI >30.0    Educational Need? yes  Barriers: none identified  Discussed with: patient and spouse  Interventions: Patient taught nutrition information regarding Pre-kidney transplant work-up (pre-dialysis). Renal Nutrition Therapy packet reviewed (high/low food sources of K, Phos and protein, low sodium and fluid intake, emphasis on moderate protein intake). Encouraged physical activity daily, regular exercise as tolerated, stay mobile. Discussed adequate intake of fiber and fluids to support GI health.   Goals/Recommendations: diet adherence, increase intake of high fiber foods and fluids  Actions Taken: instruct/provide written information  Patient and/or family comprehend instructions: yes  Outcome: Verbalizes understanding  Monitoring: Contact information provided, will f/u in clinic and communicate with the care team as needed.     Counseling Time: 20 minutes     Diamond Jimenez Dietetic Intern     I certify that I directed the dietetic intern in service delivery and guided them using my skilled judgment. As the cosigning dietitian, I have reviewed the dietetic interns documentation and am responsible for the treatment, assessment, and plan.

## 2024-02-08 NOTE — PROGRESS NOTES
Transplant Nephrology  Kidney Transplant Recipient Evaluation    Referring Physician:   Current Nephrologist:     Subjective:   CC:  Initial evaluation of kidney transplant candidacy.    HPI:  Mr. Pena is a 48 y.o. year old White male who has presented to be evaluated as a potential kidney transplant recipient.  He has ESRD secondary to IgA nephropathy.  Patient is currently pre-dialysis. He has a  none  for dialysis access.     Body mass index is 32.68 kg/m².    Patient report at age 10yo with hematuria. 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 At some point was placed on steroids and but did not take for some time. At that time UPCR up to 4. He was placed on budesonide 11/2022. Had kidney biopsy 9/2022 consistent with IgA. He has been HTN and DM since his 30s. More recently is having better control of his HTN and DM than in recent years. Has had recurrent rectal bleeding last colonoscopy 4/2023.    Denies heart disease, pulmonary disease, liver disease, stroke, DVt/PE, chronic wounds/infections/amputations.    Functional Status:  Every active. 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 Not frail.     Previous Transplant: no  Previous Blood Transfusion: none  Previous neurogenic bladder/ urine incontinence: urinating well  Anticoagulation/ antiplatelet therapy and reason: none  Potential Donor: none  High KDPI candidate: no due to weight  Meets center eligibility for accepting HCV+ donor offer: yes        Current Outpatient Medications:     acetaminophen (TYLENOL) 500 MG tablet, Take 1,000 mg by mouth every 4 (four) hours as needed for Pain., Disp: , Rfl:     allopurinoL (ZYLOPRIM) 100 MG tablet, Take 1 tablet (100 mg total) by mouth once daily., Disp: 30 tablet, Rfl: 3    amLODIPine (NORVASC) 10 MG tablet, Take 0.5 tablets (5 mg total) by mouth once daily., Disp: 90 tablet, Rfl: 2    atorvastatin (LIPITOR) 80 MG tablet, Take 1 tablet (80 mg total) by mouth once daily., Disp: 90 tablet, Rfl: 3    blood sugar  diagnostic Strp, 1 strip by Misc.(Non-Drug; Combo Route) route 2 (two) times daily. ICD 10: E11.22, Disp: 100 each, Rfl: 6    blood-glucose meter kit, Use as instructed to check blood sugar two times a day. ICD 10: E11.22, Disp: 1 each, Rfl: 0    blood-glucose sensor (DEXCOM G6 SENSOR) Rita, 1 each by Misc.(Non-Drug; Combo Route) route every 10 days., Disp: 9 each, Rfl: 3    blood-glucose transmitter (DEXCOM G6 TRANSMITTER) Rita, 1 Device by Misc.(Non-Drug; Combo Route) route every 3 (three) months., Disp: 1 each, Rfl: 3    calcitRIOL (ROCALTROL) 0.25 MCG Cap, Take 1 capsule (0.25 mcg total) by mouth once daily., Disp: 90 capsule, Rfl: 3    calcium carbonate (TUMS ORAL), Take 1 tablet by mouth daily as needed (Heartburn)., Disp: , Rfl:     colchicine (COLCRYS) 0.6 mg tablet, 0.6 mg daily as needed (gout flare-up)., Disp: , Rfl:     hydrocortisone (ANUSOL-HC) 2.5 % rectal cream, Place rectally 2 (two) times daily., Disp: 28 g, Rfl: 2    insulin detemir U-100, Levemir, (LEVEMIR FLEXTOUCH U-100 INSULN) 100 unit/mL (3 mL) InPn pen, Inject 18 Units into the skin every evening. Eat a snack before bed if BG is < 100 mg/dl (Patient taking differently: Inject 12 Units into the skin every evening. Eat a snack before bed if BG is < 100 mg/dl), Disp: 16.2 mL, Rfl: 3    insulin lispro (HUMALOG KWIKPEN INSULIN) 100 unit/mL pen, Inject 6 units into skin with lunch and dinner; 2-10 Units into the skin 3 (three) times daily as needed. Blood Glucose: 180 - 230 + 2 unit, 231- 280 + 4 units, 281 - 330 + 6 units, 331 - 380 + 8 units, > 380 + 10 units (Patient taking differently: Inject 4 Units into the skin 3 (three) times daily. Inject 6 units into skin with lunch and dinner; 2-10 Units into the skin 3 (three) times daily as needed. Blood Glucose: 180 - 230 + 2 unit, 231- 280 + 4 units, 281 - 330 + 6 units, 331 - 380 + 8 units, > 380 + 10 units), Disp: 45 mL, Rfl: 0    labetaloL (NORMODYNE) 100 MG tablet, Take 1 tablet (100 mg total)  "by mouth 2 (two) times daily., Disp: 180 tablet, Rfl: 3    lancets Misc, 1 lancet by Misc.(Non-Drug; Combo Route) route 3 (three) times daily. ICD 10: E11.22, Disp: 300 each, Rfl: 3    pen needle, diabetic (BD ULTRA-FINE DAVID PEN NEEDLE) 32 gauge x 5/32" Ndle, Use to inject insulin 4 times daily, Disp: 100 each, Rfl: 11    polyethylene glycol (GLYCOLAX) 17 gram/dose powder, Take 17 g by mouth once daily., Disp: 510 g, Rfl: 11    prednisoLONE acetate (PRED FORTE) 1 % DrpS, Place 1 drop into both eyes daily as needed (Inflammation)., Disp: , Rfl:     sevelamer HCL (RENAGEL) 800 MG Tab, Take 1 tablet (800 mg total) by mouth 3 (three) times daily with meals., Disp: 270 tablet, Rfl: 3    sodium bicarbonate 650 MG tablet, Take 1 tablet (650 mg total) by mouth once daily., Disp: 90 tablet, Rfl: 3    tirzepatide (MOUNJARO) 5 mg/0.5 mL PnIj, Inject 5 mg into the skin every 7 days., Disp: 4 pen , Rfl: 11    valsartan (DIOVAN) 320 MG tablet, Take 1 tablet (320 mg total) by mouth once daily., Disp: 90 tablet, Rfl: 3      Past Medical and Surgical History: Mr. Pena  has a past medical history of Anemia, Chronic kidney disease, Colon polyps, Hyperlipidemia, and Hypertension.  He has a past surgical history that includes Cosmetic surgery; pyloric stenosis; Renal biopsy (Left, 09/01/2022); and Colonoscopy (N/A, 04/24/2023).    Past Social and Family History: Mr. Pena reports that he has been smoking cigarettes. He has a 24.0 pack-year smoking history. He uses smokeless tobacco. He reports current alcohol use. He reports that he does not use drugs. His family history includes COPD in his mother; Diabetes in his father; Heart disease in his maternal grandfather and mother; Heart disease (age of onset: 70) in his father; Hypertension in his father; Kidney disease in his father; Kidney failure in his father; Peripheral vascular disease in his mother; Stroke in his father.    Review of Systems   Constitutional:  Negative for appetite " "change, chills, fatigue and fever.   HENT:  Negative for trouble swallowing.    Respiratory:  Negative for cough, chest tightness, shortness of breath and wheezing.    Cardiovascular:  Negative for chest pain, palpitations and leg swelling.   Gastrointestinal:  Negative for abdominal pain, constipation, diarrhea and nausea.   Genitourinary:  Negative for difficulty urinating, frequency and urgency.   Musculoskeletal:  Negative for arthralgias and myalgias.   Skin:  Negative for rash.   Neurological:  Negative for dizziness, weakness, light-headedness and headaches.   Psychiatric/Behavioral:  Negative for sleep disturbance.        Objective:   Blood pressure 127/71, pulse 79, temperature 97.2 °F (36.2 °C), temperature source Tympanic, resp. rate 18, height 5' 10.71" (1.796 m), weight 105.4 kg (232 lb 5.8 oz), SpO2 96 %.body mass index is 32.68 kg/m².    Physical Exam  Constitutional:       General: He is not in acute distress.     Appearance: He is well-developed. He is not diaphoretic.   Cardiovascular:      Rate and Rhythm: Normal rate and regular rhythm.      Heart sounds: Normal heart sounds.   Pulmonary:      Effort: Pulmonary effort is normal.      Breath sounds: Normal breath sounds.   Abdominal:      General: Bowel sounds are normal.      Palpations: Abdomen is soft.   Musculoskeletal:         General: No tenderness. Normal range of motion.   Skin:     General: Skin is warm and dry.      Findings: No rash.      Nails: There is no clubbing.   Neurological:      Mental Status: He is alert and oriented to person, place, and time.   Psychiatric:         Behavior: Behavior normal.         Labs:  Lab Results   Component Value Date    WBC 9.26 02/08/2024    HGB 12.5 (L) 02/08/2024    HCT 37.5 (L) 02/08/2024     02/08/2024    K 5.2 (H) 02/08/2024     (H) 02/08/2024    CO2 20 (L) 02/08/2024    BUN 38 (H) 02/08/2024    CREATININE 2.8 (H) 02/08/2024    EGFRNORACEVR 26.7 (A) 02/08/2024    CALCIUM 9.7 " "02/08/2024    PHOS 4.0 02/08/2024    MG 2.4 03/03/2023    ALBUMIN 4.0 02/08/2024    AST 17 02/08/2024    ALT 20 02/08/2024    UTPCR 0.10 10/04/2023    .6 (H) 02/08/2024       Lab Results   Component Value Date    BILIRUBINUA Negative 10/04/2023    LIPASE 44 03/01/2023    PROTEINUA Negative 10/04/2023    NITRITE Negative 10/04/2023    RBCUA 1 08/03/2023    WBCUA 9 (H) 08/03/2023       No results found for: "HLAABCTYPE"    Labs were reviewed with the patient.    Assessment:     1. Pre-transplant evaluation for kidney and pancreas transplant    2. Chronic kidney disease, stage 4 (severe)    3. IgA nephropathy    4. Type 2 diabetes mellitus with stage 4 chronic kidney disease, with long-term current use of insulin    5. Primary hypertension        Plan:   Mr. Pena is a 48 y.o. year old White male who has presented to be evaluated as a potential kidney transplant recipient.  He has ESRD secondary to IgA nephropathy.  Patient is currently pre-dialysis.     Prior to Listing, will need the following items to be completed:  1. Standard serologies, cardiac and imaging studies   2. Intermittent rectal bleeding, needs needs GI clearance    Transplant Candidacy:   Based on available information, Mr. Pena is a suitable kidney transplant candidate. Not candidate for pancreas and not interested in panc.   Meets center eligibility for accepting HCV+ donor offer - Yes.  Patient educated on HCV+ donors. Edouard is willing to accept HCV+ donor offer - Yes   Patient is a candidate for KDPI > 85 kidney donor offer - No. Due to weight  Final determination of transplant candidacy will be made once workup is complete and reviewed by the selection committee.    Patient advised that it is recommended that all transplant candidates, and their close contacts and household members receive Covid vaccination.    UNOS Patient Status  Functional Status: 100% - Normal, no complaints, no evidence of disease    Counseling:  Exercise: reminded " patient of the importance of regular exercise for weight management, blood sugar and blood pressure management.  I also explained exercise has been shown to improve cardiovascular health, energy level, and sleep hygiene.  Lastly, I advised her that cardiovascular complications are leading cause of death for renal transplant recipients, and regular exercise can help lower this risk.    We discussed various aspects of kidney transplantation including transplant surgery, immunosuppressive medications and the need for close follow up. We discussed the pregnancy (child bearing age female) following transplant, adverse effects of MMF/MYF on a fetus, and the need to plan pregnancy with TXP post receiving a kidney transplant. We also discussed side effects of immunosuppression including weight gain, hypertension, hyperlipidemia, new-onset diabetes after transplantation, infections and malignancies, especially skin cancers and lymphomas. I also reviewed the risk of acute rejection, vascular thrombosis, recurrent disease and potential transmission of infections such as hepatitis and HIV. I informed the patient that the average time on the wait list in the [facility] is between 5 to 7 years.     Radha Quinones NP

## 2024-02-08 NOTE — LETTER
February 8, 2024                     [facility] 1st Fl  [address]  [facility] [address]  Phone: [phone]   Patient: Edouard Pena   MR Number: [id number]   YOB: 1975   Date of Visit: 2/8/2024       Dear      Thank you for referring Edouard Pena to me for evaluation. Attached you will find relevant portions of my assessment and plan of care.    If you have questions, please do not hesitate to call me. I look forward to following Edouard Pena along with you.    Sincerely,    Radha Quinones, NP    Enclosure    If you would like to receive this communication electronically, please contact [email or web address] or [phone] to request [employer] access.    [facility] is a tool which provides read-only access to select patient information with whom you have a relationship. Its easy to use and provides real time access to review your patients record including encounter summaries, notes, results, and demographic information.    If you feel you have received this communication in error or would no longer like to receive these types of communications, please e-mail [email or web address]

## 2024-02-09 NOTE — PROGRESS NOTES
Transplant Recipient Adult Psychosocial Assessment    SW completed assessment update with patient and pt's wife Patricia Daily in clinic.    Edouard Pena  [address] [facility] [address]  Telephone Information:   Mobile [phone]   Home  [phone] (home)  Work  There is no work phone number on file.  E-mail  [email or web address]    Sex: male  YOB: 1975  Age: 48 y.o.    Encounter Date: 2024  [place] Citizen: yes  Primary Language: English   Needed: no    Emergency Contact:  Name: Patricia Barahona  Relationship: wife  Address: same as patient  Phone Numbers: [phone] (mobile)    Family/Social Support:   Number of dependents/: Patient denies any dependents.  Marital history: Patient is  to wife Patricia since .  Other family dynamics: Patient's parents are .  Patient reports his father was a kidney transplant patient and served as a transplant caregiver to his father.  Patient has no siblings and no children.  Patient reports social support is limited to only his wife as all of his relatives are all .  Patient states his father-in-law would also be a source of support; however, father-in-law is now dealing with his own medical issues which limits his ability to provide care to others.  Patient denies maintaining close friendships with others who could also be involved as a caregiver.  Patient and wife report ability to afford cab/ride sharing services for post-transplant follow-up needs since patient's wife / caregiver does not drive.    Household Composition:  Name: Edouard Pena  Age: 48  Relationship: patient  Does person drive? yes    Name: Patricia Barahona  Age: 52  Relationship: wife  Does person drive? NO    Do you and your caregivers have access to reliable transportation? yes - Patient and wife report ability to afford cab/ride sharing services for post-transplant follow-up needs since patient's wife / caregiver does not drive.    PRIMARY  CAREGIVER: Patricia Barahona, pt's wife, will be primary caregiver, phone number [phone].      provided in-depth information to patient and caregiver regarding pre- and post-transplant caregiver role.   strongly encourages patient and caregiver to have concrete plan regarding post-transplant care giving, including back-up caregiver(s) to ensure care giving needs are met as needed.    Patient and Caregiver states understanding all aspects of caregiver role/commitment and is able/willing/committed to being caregiver to the fullest extent necessary.    Patient and Caregiver verbalizes understanding of the education provided today and caregiver responsibilities.         remains available. Patient and Caregiver agree to contact  in a timely manner if concerns arise.      Able to take time off work without financial concerns:  N/A - Patricia is unemployed .     Additional Significant Others who will Assist with Transplant:  Patient and wife deny having additional transplant caregivers identified at this time.  Both expressed concern regarding their lack of social support and ability to identify additional caregivers.  SW strongly encouraged both to discuss caregiver plans with anyone whom pt/caregiver believes would be a reliable source of support.  SW also strongly encouraged both to consider starting fundraising efforts to ensure pt can afford transportation costs (due to wife / caregiver not driving) as well as hired caregiver costs if necessary.    Living Will: no  Healthcare Power of : no  Advance Directives on file: <<no information> per medical record.  Verbally reviewed LW/HCPA information.   provided patient with copy of LW/HCPA documents and provided education on completion of forms.    Living Donors: No. Education and resource information given to patient.    Highest Education Level: Attended College/Technical School  Reading Ability:  college  Reports difficulty with: N/A - wears reading glasses  Learns Best By:  hands-on learning     Status: no  VA Benefits: no     Working for Income: yes  If yes, working activity level: Working Full Time  Patient is employed as full-time  for [employer].  Patient reports having PTO, STD/LTD, & FMLA benefits available to take leave from work for transplant.    Spouse/Significant Other Employment: Patient's wife Patricia Barahona is unemployed.    Disabled: no    Monthly Income:  Salary/Wages: $3,400  Able to afford all costs now and if transplanted, including medications: yes  Patient and Caregiver verbalizes understanding of personal responsibilities related to transplant costs and the importance of having a financial plan to ensure that patients transplant costs are fully covered.      provided fundraising information/education.  Patient and Caregiver verbalizes understanding.   remains available.    Insurance:   Payer/Plan Subscr  Sex Relation Sub. Ins. ID Effective Group Num   1. BLUE CROSS BL* SENTALI ROB 1975 Male Self [id number]                                   [address] [address]     Primary Insurance (for UNOS reporting): Private Insurance - BCBS (via pt's employment benefit)  Secondary Insurance (for UNOS reporting): None  Patient and Caregiver verbalizes clear understanding that patient may experience difficulty obtaining and/or be denied insurance coverage post-surgery. This includes and is not limited to disability insurance, life insurance, health insurance, burial insurance, long term care insurance, and other insurances.    Patient and Caregiver also reports understanding that future health concerns related to or unrelated to transplantation may not be covered by patient's insurance.  Resources and information provided and reviewed.      Patient and Caregiver provides verbal permission to release any necessary information  to outside resources for patient care and discharge planning.  Resources and information provided are reviewed.      Dialysis Adherence:  Patient reports being pre-dialysis, nephrologist is Dr. Parvez Ornelas, and maintaining full adherence to nephrology plan of care.  Per [facility]'s review of nephrology visit notes in chart, no concerns regarding pt's treatment compliance noted over the past 3 months.    Infusion Service: patient utilizing? no  Home Health: patient utilizing? no  DME: no  Pulmonary/Cardiac Rehab: no   ADLS:  Pt reports being independent with all ADLs and mobility and driving himself.    Adherence:   Pt/caregiver reports pt has exhibited good adherence to medication regimen, appointment schedule, and medical recommendations in the past.  Adherence education and counseling provided.     Per History Section:  Past Medical History:   Diagnosis Date    Anemia 03/01/2023    Chronic kidney disease     Chronic kidney disease stage II    Colon polyps     Hyperlipidemia 12/13/2012    Hypertension      Social History     Tobacco Use    Smoking status: Every Day     Current packs/day: 1.00     Average packs/day: 1 pack/day for 24.0 years (24.0 ttl pk-yrs)     Types: Cigarettes    Smokeless tobacco: Current   Substance Use Topics    Alcohol use: Yes     Comment: Occasional     Social History     Substance and Sexual Activity   Drug Use No     Social History     Substance and Sexual Activity   Sexual Activity Yes    Partners: Female       Per Today's Psychosocial:  Tobacco:  Patient reports currently smoking cigarettes (~ 1 ppd) and reports smoking since age 12 .  Patient denies interest in quitting.  SW encouraged pt to speak with healthcare provider about options for smoking cessation aid and made pt aware of smoking cessation program potentially being available.  Alcohol:  Pt reports social ETOH use limited to usually 1 daiquiri on occasion .  Illicit Drugs/Non-prescribed Medications: none, patient denies any  use.    Patient and Caregiver states clear understanding of the potential impact of substance use as it relates to transplant candidacy and is aware of possible random substance screening.  Substance abstinence/cessation counseling, education and resources provided and reviewed.     Arrests/DWI/Treatment/Rehab: patient denies    Psychiatric History:    Mental Health: Pt denies any past and/or present mental health symptoms and/or diagnoses.  Psychiatrist/Counselor: Pt denies ever receiving care from psychiatrist/counselor.  Medications:  Pt denies ever being prescribed medications for mental health care.  Suicide/Homicide Issues: Pt denies any past and/or present SI/HI.   Safety at home: Pt denies having any past or present concerns regarding safety at home including but not limited to physical/emotional/sexual abuse, neglect, or exploitation.    Knowledge: Patient and Caregiver states having clear understanding and realistic expectations regarding the potential risks and potential benefits of organ transplantation and organ donation, agrees to discuss with health care team members and support system members, and to utilize available resources and express questions and/or concerns in order to further facilitate the pt informed decision-making.  Resources and information provided and reviewed.     Patient and Caregiver is aware of [facility]'s affiliation and/or partnership with agencies in home health care, LTAC, SNF, [facility] [place] [facility], and other hospitals and clinics.    Understanding: Patient and Caregiver reports having a clear understanding of the many lifetime commitments involved with being a transplant recipient, including costs, compliance, medications, lab work, procedures, appointments, concrete and financial planning, preparedness, timely and appropriate communication of concerns, abstinence (ETOH, tobacco, illicit non-prescribed drugs), adherence to all health care team recommendations, support system and caregiver  involvement, appropriate and timely resource utilization and follow-through, mental health counseling as needed/recommended, and patient and caregiver responsibilities.  Social Service Handbook, resources and detailed educational information provided and reviewed.  Educational information provided.    Patient and Caregiver also reports current and expected compliance with health care regime and states having a clear understanding of the importance of compliance.      Patient and Caregiver reports a clear understanding that risks and benefits may be involved with organ transplantation and with organ donation.      Patient and Caregiver also reports clear understanding that psychosocial risk factors may affect patient, and include but are not limited to feelings of depression, generalized anxiety, anxiety regarding dependence on others, post traumatic stress disorder, feelings of guilt and other emotional and/or mental concerns, and/or exacerbation of existing mental health concerns.  Detailed resources provided and discussed.     Patient and Caregiver agrees to access appropriate resources in a timely manner as needed and/or as recommended, and to communicate concerns appropriately.  Patient and Caregiver also reports a clear understanding of treatment options available.      reviewed education, provided additional information, and answered questions.    Feelings or Concerns: Pt expresses motivation to continue pursuing transplant and denies any transplant related concerns at this time.    Coping: Identify Patient & Caregiver Strategies to [place]:   1. In the past - hobbies; going out to eat; support from wife   2. Currently & Pre-transplant - same as above   3. At the time of surgery - support from wife   4. During post-Transplant & Recovery Period - all of the above    Goals: Patient reports transplant goals include avoiding dialysis and sustaining longer life.  Patient referred to Vocational  Rehabilitation.    Interview Behavior: Patient and Caregiver presents as alert and oriented x 4, pleasant, good eye contact, well groomed, recall good, concentration/judgement good, average intelligence, calm, communicative, cooperative, and asking and answering questions appropriately.  Patient accompanied to clinic by wife Patricia who presents as highly engaged and supportive of pt's pursuit of transplant.       Transplant Social Work - Candidacy  Assessment/Plan:     Psychosocial Suitability: Based on psychosocial risk factors, patient presents as  moderate risk  candidate for kidney transplant at this time due to social support limited to just patient's wife who does not drive.  Patient and wife report ability to afford cab / ride sharing services for all transportation needs related to transplant.  Patient also reports significant history of cigarette smoking with no desire to quit.  Protective factors include adequate insurance coverage and personal finances to cover transplant cost and realistic beliefs and expectations regarding risks and benefits of transplant.    Final determination of transplant candidacy to be made by Transplant Selection Committee.    Recommendations/Additional Comments: SW recommends patient identify additional transplant caregiver support (preferably caregivers who can provide transportation for patient).  SW also recommends pt initiate fundraising efforts to maximize pt's ability to afford transplant-related costs including transportation and hired caregiver support if needed.  SW recommends that pt remain aware of potential mental health concerns and contact the team if any concerns arise.  SW recommends that pt remain abstinent from tobacco, ETOH, and drug use.  SW supports pt's continued adherence. SW remains available to answer any questions or concerns that arise as the pt moves through the transplant process.     Jose Maria Day

## 2024-02-15 ENCOUNTER — TELEPHONE (OUTPATIENT)
Dept: TRANSPLANT | Facility: CLINIC | Age: 49
End: 2024-02-15
Payer: COMMERCIAL

## 2024-02-15 DIAGNOSIS — Z76.82 ORGAN TRANSPLANT CANDIDATE: Primary | ICD-10-CM

## 2024-02-22 ENCOUNTER — OFFICE VISIT (OUTPATIENT)
Dept: NEPHROLOGY | Facility: CLINIC | Age: 49
End: 2024-02-22
Payer: COMMERCIAL

## 2024-02-22 ENCOUNTER — TELEPHONE (OUTPATIENT)
Dept: INTERNAL MEDICINE | Facility: CLINIC | Age: 49
End: 2024-02-22
Payer: COMMERCIAL

## 2024-02-22 VITALS
BODY MASS INDEX: 32.86 KG/M2 | SYSTOLIC BLOOD PRESSURE: 112 MMHG | DIASTOLIC BLOOD PRESSURE: 69 MMHG | HEART RATE: 83 BPM | OXYGEN SATURATION: 95 % | WEIGHT: 233.69 LBS

## 2024-02-22 DIAGNOSIS — Z79.4 TYPE 2 DIABETES MELLITUS WITH STAGE 4 CHRONIC KIDNEY DISEASE, WITH LONG-TERM CURRENT USE OF INSULIN: ICD-10-CM

## 2024-02-22 DIAGNOSIS — Z72.0 TOBACCO USE: ICD-10-CM

## 2024-02-22 DIAGNOSIS — N18.4 TYPE 2 DIABETES MELLITUS WITH STAGE 4 CHRONIC KIDNEY DISEASE, WITH LONG-TERM CURRENT USE OF INSULIN: ICD-10-CM

## 2024-02-22 DIAGNOSIS — I10 PRIMARY HYPERTENSION: ICD-10-CM

## 2024-02-22 DIAGNOSIS — D63.1 ANEMIA DUE TO STAGE 4 CHRONIC KIDNEY DISEASE: ICD-10-CM

## 2024-02-22 DIAGNOSIS — N25.81 SECONDARY HYPERPARATHYROIDISM OF RENAL ORIGIN: ICD-10-CM

## 2024-02-22 DIAGNOSIS — E11.22 TYPE 2 DIABETES MELLITUS WITH STAGE 4 CHRONIC KIDNEY DISEASE, WITH LONG-TERM CURRENT USE OF INSULIN: ICD-10-CM

## 2024-02-22 DIAGNOSIS — N02.B9 IGA NEPHROPATHY: Primary | ICD-10-CM

## 2024-02-22 DIAGNOSIS — N18.4 ANEMIA DUE TO STAGE 4 CHRONIC KIDNEY DISEASE: ICD-10-CM

## 2024-02-22 DIAGNOSIS — N18.4 CHRONIC KIDNEY DISEASE, STAGE 4 (SEVERE): ICD-10-CM

## 2024-02-22 PROCEDURE — 1159F MED LIST DOCD IN RCRD: CPT | Mod: CPTII,S$GLB,, | Performed by: STUDENT IN AN ORGANIZED HEALTH CARE EDUCATION/TRAINING PROGRAM

## 2024-02-22 PROCEDURE — 1160F RVW MEDS BY RX/DR IN RCRD: CPT | Mod: CPTII,S$GLB,, | Performed by: STUDENT IN AN ORGANIZED HEALTH CARE EDUCATION/TRAINING PROGRAM

## 2024-02-22 PROCEDURE — 3074F SYST BP LT 130 MM HG: CPT | Mod: CPTII,S$GLB,, | Performed by: STUDENT IN AN ORGANIZED HEALTH CARE EDUCATION/TRAINING PROGRAM

## 2024-02-22 PROCEDURE — 3078F DIAST BP <80 MM HG: CPT | Mod: CPTII,S$GLB,, | Performed by: STUDENT IN AN ORGANIZED HEALTH CARE EDUCATION/TRAINING PROGRAM

## 2024-02-22 PROCEDURE — 3008F BODY MASS INDEX DOCD: CPT | Mod: CPTII,S$GLB,, | Performed by: STUDENT IN AN ORGANIZED HEALTH CARE EDUCATION/TRAINING PROGRAM

## 2024-02-22 PROCEDURE — 4010F ACE/ARB THERAPY RXD/TAKEN: CPT | Mod: CPTII,S$GLB,, | Performed by: STUDENT IN AN ORGANIZED HEALTH CARE EDUCATION/TRAINING PROGRAM

## 2024-02-22 PROCEDURE — 99999 PR PBB SHADOW E&M-EST. PATIENT-LVL IV: CPT | Mod: PBBFAC,,, | Performed by: STUDENT IN AN ORGANIZED HEALTH CARE EDUCATION/TRAINING PROGRAM

## 2024-02-22 PROCEDURE — 99417 PROLNG OP E/M EACH 15 MIN: CPT | Mod: S$GLB,,, | Performed by: STUDENT IN AN ORGANIZED HEALTH CARE EDUCATION/TRAINING PROGRAM

## 2024-02-22 PROCEDURE — G2211 COMPLEX E/M VISIT ADD ON: HCPCS | Mod: S$GLB,,, | Performed by: STUDENT IN AN ORGANIZED HEALTH CARE EDUCATION/TRAINING PROGRAM

## 2024-02-22 PROCEDURE — 3066F NEPHROPATHY DOC TX: CPT | Mod: CPTII,S$GLB,, | Performed by: STUDENT IN AN ORGANIZED HEALTH CARE EDUCATION/TRAINING PROGRAM

## 2024-02-22 PROCEDURE — 3044F HG A1C LEVEL LT 7.0%: CPT | Mod: CPTII,S$GLB,, | Performed by: STUDENT IN AN ORGANIZED HEALTH CARE EDUCATION/TRAINING PROGRAM

## 2024-02-22 PROCEDURE — 99215 OFFICE O/P EST HI 40 MIN: CPT | Mod: S$GLB,,, | Performed by: STUDENT IN AN ORGANIZED HEALTH CARE EDUCATION/TRAINING PROGRAM

## 2024-02-22 NOTE — TELEPHONE ENCOUNTER
Called patient to check to see if had their DM eye exam this year, states has not.   Offered the DM eyecam while here to see PCP at visit and he agrees.   Appt set. Marbella Ignacio RN HC

## 2024-02-23 NOTE — ASSESSMENT & PLAN NOTE
Increase bicarb to 1300 bid  Renal function and UPCR next week  Low k diet  Cont sglt2 and arb  Cont calcitriol  Spent long time discussing treanpslant  Encouraged to restart his glp1

## 2024-02-23 NOTE — PROGRESS NOTES
Nephrology Clinic Note   2/22/2024    Chief Complaint   Patient presents with    Chronic Kidney Disease    Glomerulonephritis      History of present illness:  Patient is a 48 y.o. male.   Presents to the clinic today for medical conditions listed below.  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     Type 2 Diabetes Mellitus With Stage 4 Chronic Kidney Disease, With Long-Term Current Use of Insulin 10/3/2019   Htn (Hypertension) 11/14/2012    On valsartan, labetalol, amlodipine     Chronic Kidney Disease, Stage 4 (Severe) 11/14/2012    -stable  -stage G4A3 likely secondary to IgA with minimal contribution from diabetes  -baseline creatinine 3.8-3  -baseline UPCR 0.3  -historic complications Volume overload  -BP controlled on ACE/ARB, CCB and BB  -glucose controlled on SGLT2 inhibitor and Insulin  -UPCR controlled on ACE/ARB, SGLT2 inhibitor  -kidney US 2022 10cm kidneys bilaterally  -current diuretic therapy: none   -current bicarbonate therapy: 1300 BID   -current CKD-MBD therapy: calcitriol, cont sevelamer  -current anemia therapy: none    4g/g proteinuria 4/2022, kidney biopsy 9/2022 inadequate sample, but with IgA and no evidence of diabetes 30-40IF, started budesonide end of 2022. Discovered elevated a1c 1/2023 to 10 now back down to 6's and controlled.   Proteinuria has now decreased to 0.1  Off tarpeyo for months     Tobacco Use 11/14/2012     Review of Systems   Cardiovascular:  Negative for orthopnea and leg swelling.       History:  Past Medical History:   Diagnosis Date    Anemia 03/01/2023    Chronic kidney disease     Chronic kidney disease stage II    Colon polyps     Hyperlipidemia 12/13/2012    Hypertension       Past Surgical History:   Procedure Laterality Date    COLONOSCOPY N/A 04/24/2023    Procedure:  COLONOSCOPY;  Surgeon: Mayelin Forte MD;  Location: [facility] ENDO (4TH FLR);  Service: Endoscopy;  Laterality: N/A;  Golytely ok per nephrologist-see note 3/24- instr to portal -MS  4-18-23- preop call- voice mail on- no message left- MMG    COSMETIC SURGERY      pyloric stenosis      RENAL BIOPSY Left 09/01/2022    Procedure: BIOPSY, KIDNEY;  Surgeon: Sinan Abad MD;  Location: [facility] CATH LAB;  Service: Interventional Nephrology;  Laterality: Left;        Current Outpatient Medications:     acetaminophen (TYLENOL) 500 MG tablet, Take 1,000 mg by mouth every 4 (four) hours as needed for Pain., Disp: , Rfl:     allopurinoL (ZYLOPRIM) 100 MG tablet, Take 1 tablet (100 mg total) by mouth once daily., Disp: 30 tablet, Rfl: 3    amLODIPine (NORVASC) 10 MG tablet, Take 0.5 tablets (5 mg total) by mouth once daily., Disp: 90 tablet, Rfl: 2    atorvastatin (LIPITOR) 80 MG tablet, Take 1 tablet (80 mg total) by mouth once daily., Disp: 90 tablet, Rfl: 3    blood sugar diagnostic Strp, 1 strip by Misc.(Non-Drug; Combo Route) route 2 (two) times daily. ICD 10: E11.22, Disp: 100 each, Rfl: 6    blood-glucose meter kit, Use as instructed to check blood sugar two times a day. ICD 10: E11.22, Disp: 1 each, Rfl: 0    calcitRIOL (ROCALTROL) 0.25 MCG Cap, Take 1 capsule (0.25 mcg total) by mouth once daily., Disp: 90 capsule, Rfl: 3    calcium carbonate (TUMS ORAL), Take 1 tablet by mouth daily as needed (Heartburn)., Disp: , Rfl:     insulin detemir U-100, Levemir, (LEVEMIR FLEXTOUCH U-100 INSULN) 100 unit/mL (3 mL) InPn pen, Inject 18 Units into the skin every evening. Eat a snack before bed if BG is < 100 mg/dl (Patient taking differently: Inject 12 Units into the skin every evening. Eat a snack before bed if BG is < 100 mg/dl), Disp: 16.2 mL, Rfl: 3    insulin lispro (HUMALOG KWIKPEN INSULIN) 100 unit/mL pen, Inject 6 units into skin with lunch and dinner; 2-10 Units into the skin 3 (three) times daily as needed. Blood  "Glucose: 180 - 230 + 2 unit, 231- 280 + 4 units, 281 - 330 + 6 units, 331 - 380 + 8 units, > 380 + 10 units (Patient taking differently: Inject 4 Units into the skin 3 (three) times daily. Inject 6 units into skin with lunch and dinner; 2-10 Units into the skin 3 (three) times daily as needed. Blood Glucose: 180 - 230 + 2 unit, 231- 280 + 4 units, 281 - 330 + 6 units, 331 - 380 + 8 units, > 380 + 10 units), Disp: 45 mL, Rfl: 0    labetaloL (NORMODYNE) 100 MG tablet, Take 1 tablet (100 mg total) by mouth 2 (two) times daily., Disp: 180 tablet, Rfl: 3    lancets Misc, 1 lancet by Misc.(Non-Drug; Combo Route) route 3 (three) times daily. ICD 10: E11.22, Disp: 300 each, Rfl: 3    pen needle, diabetic (BD ULTRA-FINE DAVID PEN NEEDLE) 32 gauge x 5/32" Ndle, Use to inject insulin 4 times daily, Disp: 100 each, Rfl: 11    sevelamer HCL (RENAGEL) 800 MG Tab, Take 1 tablet (800 mg total) by mouth 3 (three) times daily with meals. (Patient taking differently: Take 800 mg by mouth daily with dinner or evening meal.), Disp: 270 tablet, Rfl: 3    sodium bicarbonate 650 MG tablet, Take 1 tablet (650 mg total) by mouth once daily., Disp: 90 tablet, Rfl: 3    valsartan (DIOVAN) 320 MG tablet, Take 1 tablet (320 mg total) by mouth once daily., Disp: 90 tablet, Rfl: 3    blood-glucose sensor (DEXCOM G6 SENSOR) Rita, 1 each by Misc.(Non-Drug; Combo Route) route every 10 days. (Patient not taking: Reported on 2/22/2024), Disp: 9 each, Rfl: 3    blood-glucose transmitter (DEXCOM G6 TRANSMITTER) Rita, 1 Device by Misc.(Non-Drug; Combo Route) route every 3 (three) months. (Patient not taking: Reported on 2/22/2024), Disp: 1 each, Rfl: 3    colchicine (COLCRYS) 0.6 mg tablet, 0.6 mg daily as needed (gout flare-up)., Disp: , Rfl:     hydrocortisone (ANUSOL-HC) 2.5 % rectal cream, Place rectally 2 (two) times daily. (Patient not taking: Reported on 2/22/2024), Disp: 28 g, Rfl: 2    polyethylene glycol (GLYCOLAX) 17 gram/dose powder, Take 17 g " by mouth once daily. (Patient not taking: Reported on 2/22/2024), Disp: 510 g, Rfl: 11    prednisoLONE acetate (PRED FORTE) 1 % DrpS, Place 1 drop into both eyes daily as needed (Inflammation)., Disp: , Rfl:     tirzepatide (MOUNJARO) 5 mg/0.5 mL PnIj, Inject 5 mg into the skin every 7 days. (Patient not taking: Reported on 2/22/2024), Disp: 4 pen , Rfl: 11  Review of patient's allergies indicates:  No Known Allergies   Social History     Tobacco Use    Smoking status: Every Day     Current packs/day: 1.00     Average packs/day: 1 pack/day for 24.0 years (24.0 ttl pk-yrs)     Types: Cigarettes    Smokeless tobacco: Current   Substance Use Topics    Alcohol use: Yes     Comment: Occasional      Family History   Problem Relation Age of Onset    COPD Mother     Heart disease Mother     Peripheral vascular disease Mother     Diabetes Father     Kidney disease Father     Stroke Father     Hypertension Father     Heart disease Father 70        CABG x 3    Kidney failure Father     Heart disease Maternal Grandfather         Physical Exam :  Vitals:    02/22/24 1552   BP: 112/69   Pulse: 83     Physical Exam  Vitals and nursing note reviewed.   Constitutional:       General: He is not in acute distress.     Appearance: He is obese.   Eyes:      General: No scleral icterus.  Cardiovascular:      Rate and Rhythm: Normal rate.   Pulmonary:      Effort: Pulmonary effort is normal. No respiratory distress.   Musculoskeletal:      Right lower leg: No edema.      Left lower leg: No edema.   Skin:     Coloration: Skin is not jaundiced.   Neurological:      Mental Status: He is alert.         The following labs reviewed   Lab Results   Component Value Date    HGB 12.5 (L) 02/08/2024     02/08/2024    K 5.2 (H) 02/08/2024    CREATININE 2.8 (H) 02/08/2024    PHOS 4.0 02/08/2024    .6 (H) 02/08/2024    [id number] 42 08/03/2023    IRON 133 10/05/2023        Assessment:    1. IgA nephropathy    2. Chronic kidney disease,  stage 4 (severe)    3. Type 2 diabetes mellitus with stage 4 chronic kidney disease, with long-term current use of insulin    4. Secondary hyperparathyroidism of renal origin    5. Primary hypertension    6. Anemia due to stage 4 chronic kidney disease    7. Tobacco use        Plan:    Chronic kidney disease, stage 4 (severe)  Increase bicarb to 1300 bid  Renal function and UPCR next week  Low k diet  Cont sglt2 and arb  Cont calcitriol  Spent long time discussing treanpslant  Encouraged to restart his glp1    Tobacco use  Discussed at length strategies to quit    Type 2 diabetes mellitus with stage 4 chronic kidney disease, with long-term current use of insulin  Encouraged him to restart his glp1    Follow up in about 2 months (around 4/22/2024) for Virtual Visit.     Visit today included increased complexity associated with the care of the episodic problem CKD addressed and managing the longitudinal care of the patient due to the serious and/or complex managed problem(s) CKD, HTN, secondary hyperparathyroidism, and anemia of chronic disease.  I spent a total of 87 minutes on the day of the visit.    Orders Placed This Encounter   Procedures    Urinalysis    RENAL FUNCTION PANEL     There are no discontinued medications.   Future Appointments   Date Time Provider Department Center   2/27/2024  7:15 AM SPECIMEN, [facility] [place]   2/27/2024  7:45 AM LAB, [facility] [place]   3/25/2024 11:40 AM LAB, [facility] LABTX [facility]   3/25/2024 12:30 PM CARDIAC, PET IMAGING [facility] CARDPET [facility]   3/25/2024  2:30 PM ECHO, [facility] ECHOSTR [facility]   4/1/2024  1:00 PM Froylan MD [facility] CARDIO [facility]   4/3/2024  2:15 PM Andrews Broderick MD [facility] DERM [facility]   4/9/2024  3:30 PM Parvez Ornelas Jr., MD [facility] NEPHRO [facility]   4/15/2024  1:00 PM DIABETES EYECAM, [facility] INTERNAL MEDICINE [facility] Dave [facility]ode[facility]   4/15/2024  1:30 PM Kristofer Menard MD [facility] Dave emiliana JAKI [place]  [place]

## 2024-02-26 DIAGNOSIS — E87.20 METABOLIC ACIDOSIS: ICD-10-CM

## 2024-02-26 DIAGNOSIS — Z94.0 STATUS POST DECEASED-DONOR KIDNEY TRANSPLANTATION: Primary | ICD-10-CM

## 2024-02-27 ENCOUNTER — TELEPHONE (OUTPATIENT)
Dept: TRANSPLANT | Facility: CLINIC | Age: 49
End: 2024-02-27
Payer: COMMERCIAL

## 2024-02-27 ENCOUNTER — LAB VISIT (OUTPATIENT)
Dept: LAB | Facility: HOSPITAL | Age: 49
End: 2024-02-27
Attending: STUDENT IN AN ORGANIZED HEALTH CARE EDUCATION/TRAINING PROGRAM
Payer: COMMERCIAL

## 2024-02-27 DIAGNOSIS — N17.9 AKI (ACUTE KIDNEY INJURY): Primary | ICD-10-CM

## 2024-02-27 DIAGNOSIS — Z76.82 AWAITING ORGAN TRANSPLANT STATUS: Primary | ICD-10-CM

## 2024-02-27 DIAGNOSIS — N18.4 CHRONIC KIDNEY DISEASE, STAGE 4 (SEVERE): ICD-10-CM

## 2024-02-27 LAB
ALBUMIN SERPL BCP-MCNC: 3.9 G/DL (ref 3.5–5.2)
ANION GAP SERPL CALC-SCNC: 14 MMOL/L (ref 8–16)
BUN SERPL-MCNC: 54 MG/DL (ref 6–20)
CALCIUM SERPL-MCNC: 9.6 MG/DL (ref 8.7–10.5)
CHLORIDE SERPL-SCNC: 103 MMOL/L (ref 95–110)
CO2 SERPL-SCNC: 22 MMOL/L (ref 23–29)
CREAT SERPL-MCNC: 5.7 MG/DL (ref 0.5–1.4)
EST. GFR  (NO RACE VARIABLE): 11.4 ML/MIN/1.73 M^2
GLUCOSE SERPL-MCNC: 192 MG/DL (ref 70–110)
PHOSPHATE SERPL-MCNC: 4.2 MG/DL (ref 2.7–4.5)
POTASSIUM SERPL-SCNC: 4.9 MMOL/L (ref 3.5–5.1)
SODIUM SERPL-SCNC: 139 MMOL/L (ref 136–145)

## 2024-02-27 PROCEDURE — 80069 RENAL FUNCTION PANEL: CPT | Mod: NTX | Performed by: STUDENT IN AN ORGANIZED HEALTH CARE EDUCATION/TRAINING PROGRAM

## 2024-02-27 PROCEDURE — 36415 COLL VENOUS BLD VENIPUNCTURE: CPT | Mod: PO,TXP | Performed by: STUDENT IN AN ORGANIZED HEALTH CARE EDUCATION/TRAINING PROGRAM

## 2024-02-27 RX ORDER — SODIUM BICARBONATE 650 MG/1
650 TABLET ORAL DAILY
Qty: 90 TABLET | Refills: 3 | Status: SHIPPED | OUTPATIENT
Start: 2024-02-27 | End: 2024-03-04

## 2024-02-27 NOTE — TELEPHONE ENCOUNTER
Spoke w/pt regarding appts that needed to be scheduled. Confirmed date, time and location of upcoming appt. Reminder mailed.

## 2024-02-29 ENCOUNTER — TELEPHONE (OUTPATIENT)
Dept: NEPHROLOGY | Facility: CLINIC | Age: 49
End: 2024-02-29
Payer: COMMERCIAL

## 2024-02-29 NOTE — TELEPHONE ENCOUNTER
I attempted to call patient 3 times 2/27/24 with no answer. Left a message and sent an epic message.  Attempted to call patient again today at his number listed in epic. A different person answered and said that this is not Edouard and that the number is not associated with a Edouard. Attempted to call the alternate contact, Patricia, and no answer. Attempted to call Julian, relative, alternate contact. No answer.  Trying to contact patient in regard to his worsening kidney function. Will send a letter requesting he contact us.

## 2024-03-01 ENCOUNTER — TELEPHONE (OUTPATIENT)
Dept: TRANSPLANT | Facility: CLINIC | Age: 49
End: 2024-03-01
Payer: COMMERCIAL

## 2024-03-01 ENCOUNTER — TELEPHONE (OUTPATIENT)
Dept: NEPHROLOGY | Facility: CLINIC | Age: 49
End: 2024-03-01
Payer: COMMERCIAL

## 2024-03-01 DIAGNOSIS — N25.81 SECONDARY HYPERPARATHYROIDISM OF RENAL ORIGIN: ICD-10-CM

## 2024-03-01 DIAGNOSIS — D63.1 ANEMIA DUE TO STAGE 4 CHRONIC KIDNEY DISEASE: ICD-10-CM

## 2024-03-01 DIAGNOSIS — N18.4 CHRONIC KIDNEY DISEASE, STAGE 4 (SEVERE): Primary | ICD-10-CM

## 2024-03-01 DIAGNOSIS — N18.4 ANEMIA DUE TO STAGE 4 CHRONIC KIDNEY DISEASE: ICD-10-CM

## 2024-03-01 RX ORDER — CALCITRIOL 0.25 UG/1
0.25 CAPSULE ORAL DAILY
Qty: 90 CAPSULE | Refills: 3 | Status: SHIPPED | OUTPATIENT
Start: 2024-03-01

## 2024-03-01 NOTE — TELEPHONE ENCOUNTER
Called and spoke with Mr. Pena  Had a head and chest cold, cough, no fever last week at time of renal panel. He also took double amlodipine on accident and he started tirzapetide. Fortunately has has no uremic symptoms, decreased urine output, edema. His potassium was acceptable. He says he has been sleeping more because of his job.   We discussed his current decline in kidney function and have he will get labs done on Monday. He was instructed to not take amlodipine if his SBP <110

## 2024-03-01 NOTE — TELEPHONE ENCOUNTER
Pt already spoke to Dr. Ornelas on today   ----- Message from Parvez Ornelas Jr., MD sent at 2/27/2024  5:03 PM CST -----  Attempted to call you 3 times. Your kidney function is significantly worse than 2 weeks ago. Not sure what's going on. If you feel ill, you need to go to the ED. Honestly, I would probably go to the ED to get checked out and make sure everything is fine. If everything is ok at an ED, I ordered repeat labs for next week.

## 2024-03-04 ENCOUNTER — TELEPHONE (OUTPATIENT)
Dept: RHEUMATOLOGY | Facility: CLINIC | Age: 49
End: 2024-03-04
Payer: COMMERCIAL

## 2024-03-04 ENCOUNTER — LAB VISIT (OUTPATIENT)
Dept: LAB | Facility: HOSPITAL | Age: 49
End: 2024-03-04
Attending: STUDENT IN AN ORGANIZED HEALTH CARE EDUCATION/TRAINING PROGRAM
Payer: COMMERCIAL

## 2024-03-04 DIAGNOSIS — I10 PRIMARY HYPERTENSION: ICD-10-CM

## 2024-03-04 DIAGNOSIS — Z94.0 STATUS POST DECEASED-DONOR KIDNEY TRANSPLANTATION: ICD-10-CM

## 2024-03-04 DIAGNOSIS — E87.20 METABOLIC ACIDOSIS: ICD-10-CM

## 2024-03-04 DIAGNOSIS — N18.4 CHRONIC KIDNEY DISEASE, STAGE 4 (SEVERE): Primary | ICD-10-CM

## 2024-03-04 DIAGNOSIS — N18.4 CHRONIC KIDNEY DISEASE, STAGE 4 (SEVERE): ICD-10-CM

## 2024-03-04 LAB
ALBUMIN/CREAT UR: 7.5 UG/MG (ref 0–30)
BILIRUB UR QL STRIP: NEGATIVE
CLARITY UR REFRACT.AUTO: CLEAR
COLOR UR AUTO: YELLOW
CREAT UR-MCNC: 146 MG/DL (ref 23–375)
GLUCOSE UR QL STRIP: NEGATIVE
HGB UR QL STRIP: NEGATIVE
KETONES UR QL STRIP: NEGATIVE
LEUKOCYTE ESTERASE UR QL STRIP: NEGATIVE
MICROALBUMIN UR DL<=1MG/L-MCNC: 11 UG/ML
NITRITE UR QL STRIP: NEGATIVE
PH UR STRIP: 5 [PH] (ref 5–8)
PROT UR QL STRIP: NEGATIVE
SODIUM UR-SCNC: 63 MMOL/L (ref 20–250)
SP GR UR STRIP: 1.01 (ref 1–1.03)
URN SPEC COLLECT METH UR: NORMAL

## 2024-03-04 PROCEDURE — 82043 UR ALBUMIN QUANTITATIVE: CPT | Mod: TXP | Performed by: STUDENT IN AN ORGANIZED HEALTH CARE EDUCATION/TRAINING PROGRAM

## 2024-03-04 PROCEDURE — 84300 ASSAY OF URINE SODIUM: CPT | Mod: TXP | Performed by: STUDENT IN AN ORGANIZED HEALTH CARE EDUCATION/TRAINING PROGRAM

## 2024-03-04 PROCEDURE — 81003 URINALYSIS AUTO W/O SCOPE: CPT | Mod: NTX | Performed by: STUDENT IN AN ORGANIZED HEALTH CARE EDUCATION/TRAINING PROGRAM

## 2024-03-04 RX ORDER — SODIUM BICARBONATE 650 MG/1
1300 TABLET ORAL 2 TIMES DAILY
Qty: 360 TABLET | Refills: 3 | Status: SHIPPED | OUTPATIENT
Start: 2024-03-04 | End: 2025-03-04

## 2024-03-04 NOTE — TELEPHONE ENCOUNTER
---- Message -----   From: Parvez Ornelas Jr., MD   Sent: 3/4/2024   2:49 PM CST   To: Marbella Finch RN     Called and spoke with patient. Ordered retro ultrasounds, doppler renal artery and repeat labs in 2 weeks. Stopped amlodipine. Report blood pressures next week and if all still low plan to reduce labetalol.

## 2024-03-06 ENCOUNTER — PATIENT MESSAGE (OUTPATIENT)
Dept: NEPHROLOGY | Facility: CLINIC | Age: 49
End: 2024-03-06
Payer: COMMERCIAL

## 2024-03-08 RX ORDER — ALLOPURINOL 100 MG/1
100 TABLET ORAL DAILY
Qty: 90 TABLET | Refills: 3 | Status: SHIPPED | OUTPATIENT
Start: 2024-03-08 | End: 2024-04-09 | Stop reason: SDUPTHER

## 2024-03-08 NOTE — TELEPHONE ENCOUNTER
No care due was identified.  [facility] Embedded Care Due Messages. Reference number: [id number].   3/08/2024 1:47:04 PM CST

## 2024-03-08 NOTE — TELEPHONE ENCOUNTER
Refill Routing Note   Medication(s) are not appropriate for processing by [facility] for the following reason(s):        Required labs abnormal: not previously signed by PCP    [facility] action(s):  Defer      Medication Therapy Plan:         Appointments  past 12m or future 3m with PCP    Date Provider   Last Visit   12/15/2023 Kristofer Menard MD   Next Visit   4/15/2024 Kristofer Menard MD   ED visits in past 90 days: 0        Note composed:2:15 PM 03/08/2024

## 2024-03-08 NOTE — TELEPHONE ENCOUNTER
----- Message from Nesha Bedolla sent at 3/8/2024  1:36 PM CST -----  Contact: [phone]  Requesting an RX refill or new RX.  Is this a refill or new RX: Refill 1  RX name and strength (copy/paste from chart):  allopurinoL (ZYLOPRIM) 100 MG tablet  Is this a 30 day or 90 day RX:   Pharmacy name and phone # (copy/paste from chart):  [facility] - [address]   Phone: [phone]  Fax: [phone]        The doctors have asked that we provide their patients with the following 2 reminders -- prescription refills can take up to 72 hours, and a friendly reminder that in the future you can use your [facility] account to request refills:       Patient would like a call back from nurse on advise on rx . Colchicine. If is better than the allopurinol. Thank you.

## 2024-03-11 RX ORDER — COLCHICINE 0.6 MG/1
0.6 TABLET ORAL DAILY PRN
Qty: 90 TABLET | Refills: 0 | Status: SHIPPED | OUTPATIENT
Start: 2024-03-11

## 2024-03-11 NOTE — TELEPHONE ENCOUNTER
Care Due:                  Date            Visit Type   Department     Provider  --------------------------------------------------------------------------------                                EP -                              PRIMARY      [facility] INTERNAL  Last Visit: 12-      CARE ([facility])   MEDICINE       Kristofer KAYE Sailaja                              EP -                              PRIMARY      [facility] INTERNAL  Next Visit: 04-      CARE ([facility])   MEDICINE       Kristofer A Sailaja                                                            Last  Test          Frequency    Reason                     Performed    Due Date  --------------------------------------------------------------------------------    Uric Acid...  12 months..  allopurinoL..............  03-   03-    [facility] Embedded Care Due Messages. Reference number: [id number].   3/11/2024 9:50:49 AM CDT

## 2024-03-11 NOTE — TELEPHONE ENCOUNTER
----- Message from Patricia Lim sent at 3/11/2024  9:37 AM CDT -----  Contact: edouard  [phone]  Pt needs a refill on  Colchicine called into     [facility] - [place] - [address]  [address]  [place] [address]  Phone: [phone] Fax: [phone]         Pt mom/dad/guardian can be reached at [phone]        Edouard states it does not have to be the Colchicine but something like it that works fast. Pt states he did not go to work today. Edouard states time is a matter and needs to have a medication asap. Please call Edouard back for advice.

## 2024-03-18 ENCOUNTER — LAB VISIT (OUTPATIENT)
Dept: LAB | Facility: HOSPITAL | Age: 49
End: 2024-03-18
Attending: STUDENT IN AN ORGANIZED HEALTH CARE EDUCATION/TRAINING PROGRAM
Payer: COMMERCIAL

## 2024-03-18 DIAGNOSIS — N18.4 CHRONIC KIDNEY DISEASE, STAGE 4 (SEVERE): ICD-10-CM

## 2024-03-18 DIAGNOSIS — I10 PRIMARY HYPERTENSION: ICD-10-CM

## 2024-03-18 LAB
ALBUMIN SERPL BCP-MCNC: 3.8 G/DL (ref 3.5–5.2)
ANION GAP SERPL CALC-SCNC: 11 MMOL/L (ref 8–16)
BUN SERPL-MCNC: 41 MG/DL (ref 6–20)
CALCIUM SERPL-MCNC: 9.9 MG/DL (ref 8.7–10.5)
CHLORIDE SERPL-SCNC: 105 MMOL/L (ref 95–110)
CO2 SERPL-SCNC: 22 MMOL/L (ref 23–29)
CREAT SERPL-MCNC: 3.1 MG/DL (ref 0.5–1.4)
EST. GFR  (NO RACE VARIABLE): 23.6 ML/MIN/1.73 M^2
GLUCOSE SERPL-MCNC: 149 MG/DL (ref 70–110)
PHOSPHATE SERPL-MCNC: 4.6 MG/DL (ref 2.7–4.5)
POTASSIUM SERPL-SCNC: 4.6 MMOL/L (ref 3.5–5.1)
SODIUM SERPL-SCNC: 138 MMOL/L (ref 136–145)

## 2024-03-18 PROCEDURE — 36415 COLL VENOUS BLD VENIPUNCTURE: CPT | Mod: PO,NTX | Performed by: STUDENT IN AN ORGANIZED HEALTH CARE EDUCATION/TRAINING PROGRAM

## 2024-03-18 PROCEDURE — 80069 RENAL FUNCTION PANEL: CPT | Mod: TXP | Performed by: STUDENT IN AN ORGANIZED HEALTH CARE EDUCATION/TRAINING PROGRAM

## 2024-03-19 DIAGNOSIS — M10.9 GOUT, UNSPECIFIED CAUSE, UNSPECIFIED CHRONICITY, UNSPECIFIED SITE: Primary | ICD-10-CM

## 2024-03-19 RX ORDER — PREDNISONE 20 MG/1
20 TABLET ORAL DAILY
Qty: 15 TABLET | Refills: 3 | Status: SHIPPED | OUTPATIENT
Start: 2024-03-19 | End: 2025-03-19

## 2024-03-21 ENCOUNTER — TELEPHONE (OUTPATIENT)
Dept: CARDIOLOGY | Facility: HOSPITAL | Age: 49
End: 2024-03-21
Payer: COMMERCIAL

## 2024-03-25 ENCOUNTER — HOSPITAL ENCOUNTER (OUTPATIENT)
Dept: CARDIOLOGY | Facility: HOSPITAL | Age: 49
Discharge: HOME | End: 2024-03-25
Attending: NURSE PRACTITIONER
Payer: COMMERCIAL

## 2024-03-25 ENCOUNTER — HOSPITAL ENCOUNTER (OUTPATIENT)
Dept: RADIOLOGY | Facility: HOSPITAL | Age: 49
Discharge: HOME | End: 2024-03-25
Attending: STUDENT IN AN ORGANIZED HEALTH CARE EDUCATION/TRAINING PROGRAM
Payer: COMMERCIAL

## 2024-03-25 ENCOUNTER — PATIENT MESSAGE (OUTPATIENT)
Dept: NEPHROLOGY | Facility: CLINIC | Age: 49
End: 2024-03-25
Payer: COMMERCIAL

## 2024-03-25 VITALS
BODY MASS INDEX: 33.36 KG/M2 | HEART RATE: 63 BPM | SYSTOLIC BLOOD PRESSURE: 112 MMHG | WEIGHT: 233 LBS | HEIGHT: 70 IN | DIASTOLIC BLOOD PRESSURE: 55 MMHG

## 2024-03-25 VITALS
SYSTOLIC BLOOD PRESSURE: 112 MMHG | WEIGHT: 233 LBS | DIASTOLIC BLOOD PRESSURE: 55 MMHG | RESPIRATION RATE: 16 BRPM | BODY MASS INDEX: 33.36 KG/M2 | HEIGHT: 70 IN | HEART RATE: 75 BPM

## 2024-03-25 DIAGNOSIS — Z91.89 CARDIOVASCULAR EVENT RISK: ICD-10-CM

## 2024-03-25 DIAGNOSIS — N18.4 CHRONIC KIDNEY DISEASE, STAGE 4 (SEVERE): ICD-10-CM

## 2024-03-25 DIAGNOSIS — I10 PRIMARY HYPERTENSION: ICD-10-CM

## 2024-03-25 DIAGNOSIS — Z76.82 ORGAN TRANSPLANT CANDIDATE: ICD-10-CM

## 2024-03-25 LAB
ASCENDING AORTA: 3.06 CM
AV INDEX (PROSTH): 0.98
AV MEAN GRADIENT: 4 MMHG
AV PEAK GRADIENT: 7 MMHG
AV VALVE AREA BY VELOCITY RATIO: 4.94 CM²
AV VALVE AREA: 4.48 CM²
AV VELOCITY RATIO: 1.08
BSA FOR ECHO PROCEDURE: 2.28 M2
CFR FLOW - ANTERIOR: 1.81
CFR FLOW - INFERIOR: 1.94
CFR FLOW - LATERAL: 1.96
CFR FLOW - MAX: 2.6
CFR FLOW - MIN: 1.4
CFR FLOW - SEPTAL: 1.86
CFR FLOW - WHOLE HEART: 1.89
CFR FLOW- DEFECT 1: 1.62
CV ECHO LV RWT: 0.42 CM
CV STRESS BASE HR: 69 BPM
DIASTOLIC BLOOD PRESSURE: 80 MMHG
DOP CALC AO PEAK VEL: 1.32 M/S
DOP CALC AO VTI: 29.06 CM
DOP CALC LVOT AREA: 4.6 CM2
DOP CALC LVOT DIAMETER: 2.41 CM
DOP CALC LVOT PEAK VEL: 1.43 M/S
DOP CALC LVOT STROKE VOLUME: 130.12 CM3
DOP CALCLVOT PEAK VEL VTI: 28.54 CM
E WAVE DECELERATION TIME: 254.96 MSEC
E/A RATIO: 1.38
E/E' RATIO: 6.15 M/S
ECHO LV POSTERIOR WALL: 1.01 CM (ref 0.6–1.1)
EJECTION FRACTION- HIGH: 59 %
END DIASTOLIC INDEX-HIGH: 155 ML/M2
END DIASTOLIC INDEX-LOW: 91 ML/M2
END SYSTOLIC INDEX-HIGH: 78 ML/M2
END SYSTOLIC INDEX-LOW: 40 ML/M2
FRACTIONAL SHORTENING: 33 % (ref 28–44)
INTERVENTRICULAR SEPTUM: 0.96 CM (ref 0.6–1.1)
IVRT: 79.92 MSEC
LA MAJOR: 5.29 CM
LA MINOR: 5.3 CM
LA WIDTH: 3.61 CM
LEFT ATRIUM SIZE: 3.68 CM
LEFT ATRIUM VOLUME INDEX MOD: 19.9 ML/M2
LEFT ATRIUM VOLUME INDEX: 26.8 ML/M2
LEFT ATRIUM VOLUME MOD: 44.36 CM3
LEFT ATRIUM VOLUME: 59.79 CM3
LEFT INTERNAL DIMENSION IN SYSTOLE: 3.24 CM (ref 2.1–4)
LEFT VENTRICLE DIASTOLIC VOLUME INDEX: 48.3 ML/M2
LEFT VENTRICLE DIASTOLIC VOLUME: 107.7 ML
LEFT VENTRICLE MASS INDEX: 75 G/M2
LEFT VENTRICLE SYSTOLIC VOLUME INDEX: 18.9 ML/M2
LEFT VENTRICLE SYSTOLIC VOLUME: 42.2 ML
LEFT VENTRICULAR INTERNAL DIMENSION IN DIASTOLE: 4.8 CM (ref 3.5–6)
LEFT VENTRICULAR MASS: 166.75 G
LV LATERAL E/E' RATIO: 5.53 M/S
LV SEPTAL E/E' RATIO: 6.92 M/S
MV A" WAVE DURATION": 10.56 MSEC
MV PEAK A VEL: 0.6 M/S
MV PEAK E VEL: 0.83 M/S
MV STENOSIS PRESSURE HALF TIME: 73.94 MS
MV VALVE AREA P 1/2 METHOD: 2.98 CM2
NUC REST DIASTOLIC VOLUME INDEX: 112
NUC REST EJECTION FRACTION: 55
NUC REST SYSTOLIC VOLUME INDEX: 51
NUC STRESS DIASTOLIC VOLUME INDEX: 115
NUC STRESS EJECTION FRACTION: 57 %
NUC STRESS SYSTOLIC VOLUME INDEX: 50
OHS CV CPX 1 MINUTE RECOVERY HEART RATE: 101 BPM
OHS CV CPX 85 PERCENT MAX PREDICTED HEART RATE MALE: 145
OHS CV CPX MAX PREDICTED HEART RATE: 170
OHS CV CPX PATIENT IS FEMALE: 0
OHS CV CPX PATIENT IS MALE: 1
OHS CV CPX PEAK DIASTOLIC BLOOD PRESSURE: 55 MMHG
OHS CV CPX PEAK HEAR RATE: 65 BPM
OHS CV CPX PEAK RATE PRESSURE PRODUCT: 7540
OHS CV CPX PEAK SYSTOLIC BLOOD PRESSURE: 116 MMHG
OHS CV CPX PERCENT MAX PREDICTED HEART RATE ACHIEVED: 38
OHS CV CPX RATE PRESSURE PRODUCT PRESENTING: NORMAL
PERFUSION DEFECT 1 SIZE IN %: 9 %
PISA TR MAX VEL: 2.34 M/S
PULM VEIN S/D RATIO: 1
PV PEAK D VEL: 0.54 M/S
PV PEAK S VEL: 0.54 M/S
RA MAJOR: 4.71 CM
RA PRESSURE ESTIMATED: 3 MMHG
RA WIDTH: 4.39 CM
REST FLOW - ANTERIOR: 0.61 CC/MIN/G
REST FLOW - INFERIOR: 0.53 CC/MIN/G
REST FLOW - LATERAL: 0.72 CC/MIN/G
REST FLOW - MAX: 0.89 CC/MIN/G
REST FLOW - MIN: 0.39 CC/MIN/G
REST FLOW - SEPTAL: 0.52 CC/MIN/G
REST FLOW - WHOLE HEART: 0.6 CC/MIN/G
REST FLOW- DEFECT 1: 0.45 CC/MIN/G
RETIRED EF AND QEF - SEE NOTES: 47 %
RIGHT VENTRICULAR END-DIASTOLIC DIMENSION: 3.82 CM
RV TB RVSP: 5 MMHG
SINUS: 2.86 CM
STJ: 2.61 CM
STRESS FLOW - ANTERIOR: 1.1 CC/MIN/G
STRESS FLOW - INFERIOR: 1.02 CC/MIN/G
STRESS FLOW - LATERAL: 1.42 CC/MIN/G
STRESS FLOW - MAX: 1.83 CC/MIN/G
STRESS FLOW - MIN: 1.96 CC/MIN/G
STRESS FLOW - SEPTAL: 0.97 CC/MIN/G
STRESS FLOW - WHOLE HEART: 1.13 CC/MIN/G
STRESS FLOW- DEFECT 1: 0.72 CC/MIN/G
SYSTOLIC BLOOD PRESSURE: 153 MMHG
TDI LATERAL: 0.15 M/S
TDI SEPTAL: 0.12 M/S
TDI: 0.14 M/S
TR MAX PG: 22 MMHG
TRICUSPID ANNULAR PLANE SYSTOLIC EXCURSION: 2.46 CM
TV REST PULMONARY ARTERY PRESSURE: 25 MMHG
Z-SCORE OF LEFT VENTRICULAR DIMENSION IN END DIASTOLE: -4.96
Z-SCORE OF LEFT VENTRICULAR DIMENSION IN END SYSTOLE: -3.07

## 2024-03-25 PROCEDURE — 93018 CV STRESS TEST I&R ONLY: CPT | Mod: TXP,,, | Performed by: INTERNAL MEDICINE

## 2024-03-25 PROCEDURE — 78431 MYOCRD IMG PET RST&STRS CT: CPT | Mod: 26,TXP,, | Performed by: INTERNAL MEDICINE

## 2024-03-25 PROCEDURE — 93016 CV STRESS TEST SUPVJ ONLY: CPT | Mod: TXP,,, | Performed by: INTERNAL MEDICINE

## 2024-03-25 PROCEDURE — 93306 TTE W/DOPPLER COMPLETE: CPT | Mod: 26,TXP,, | Performed by: INTERNAL MEDICINE

## 2024-03-25 PROCEDURE — 76770 US EXAM ABDO BACK WALL COMP: CPT | Mod: 26,59,TXP, | Performed by: RADIOLOGY

## 2024-03-25 PROCEDURE — A9555 RB82 RUBIDIUM: HCPCS | Mod: TXP | Performed by: NURSE PRACTITIONER

## 2024-03-25 PROCEDURE — 78434 AQMBF PET REST & RX STRESS: CPT | Mod: TXP

## 2024-03-25 PROCEDURE — 93975 VASCULAR STUDY: CPT | Mod: 26,TXP,, | Performed by: RADIOLOGY

## 2024-03-25 PROCEDURE — 63600175 PHARM REV CODE 636 W HCPCS: Mod: TXP | Performed by: NURSE PRACTITIONER

## 2024-03-25 PROCEDURE — 93306 TTE W/DOPPLER COMPLETE: CPT | Mod: TXP

## 2024-03-25 PROCEDURE — 78434 AQMBF PET REST & RX STRESS: CPT | Mod: 26,TXP,, | Performed by: INTERNAL MEDICINE

## 2024-03-25 PROCEDURE — 76770 US EXAM ABDO BACK WALL COMP: CPT | Mod: TC,TXP

## 2024-03-25 RX ORDER — AMINOPHYLLINE 25 MG/ML
75 INJECTION, SOLUTION INTRAVENOUS
Status: COMPLETED | OUTPATIENT
Start: 2024-03-25 | End: 2024-03-25

## 2024-03-25 RX ORDER — REGADENOSON 0.08 MG/ML
0.4 INJECTION, SOLUTION INTRAVENOUS
Status: COMPLETED | OUTPATIENT
Start: 2024-03-25 | End: 2024-03-25

## 2024-03-25 RX ADMIN — REGADENOSON 0.4 MG: 0.08 INJECTION, SOLUTION INTRAVENOUS at 12:03

## 2024-03-25 RX ADMIN — AMINOPHYLLINE 75 MG: 25 INJECTION, SOLUTION INTRAVENOUS at 12:03

## 2024-03-25 RX ADMIN — RUBIDIUM CHLORIDE RB-82 31.9 MILLICURIE: 150 INJECTION, SOLUTION INTRAVENOUS at 12:03

## 2024-04-03 ENCOUNTER — LAB VISIT (OUTPATIENT)
Dept: LAB | Facility: HOSPITAL | Age: 49
End: 2024-04-03
Payer: COMMERCIAL

## 2024-04-03 DIAGNOSIS — Z76.82 AWAITING ORGAN TRANSPLANT STATUS: ICD-10-CM

## 2024-04-03 LAB — ABO + RH BLD: NORMAL

## 2024-04-03 PROCEDURE — 36415 COLL VENOUS BLD VENIPUNCTURE: CPT | Mod: PO,TXP | Performed by: NURSE PRACTITIONER

## 2024-04-03 PROCEDURE — 86901 BLOOD TYPING SEROLOGIC RH(D): CPT | Mod: TXP | Performed by: NURSE PRACTITIONER

## 2024-04-09 ENCOUNTER — OFFICE VISIT (OUTPATIENT)
Dept: NEPHROLOGY | Facility: CLINIC | Age: 49
End: 2024-04-09
Payer: COMMERCIAL

## 2024-04-09 DIAGNOSIS — N25.81 SECONDARY HYPERPARATHYROIDISM OF RENAL ORIGIN: ICD-10-CM

## 2024-04-09 DIAGNOSIS — Z79.4 TYPE 2 DIABETES MELLITUS WITH STAGE 4 CHRONIC KIDNEY DISEASE, WITH LONG-TERM CURRENT USE OF INSULIN: ICD-10-CM

## 2024-04-09 DIAGNOSIS — I10 PRIMARY HYPERTENSION: ICD-10-CM

## 2024-04-09 DIAGNOSIS — N18.4 ANEMIA DUE TO STAGE 4 CHRONIC KIDNEY DISEASE: ICD-10-CM

## 2024-04-09 DIAGNOSIS — N18.4 TYPE 2 DIABETES MELLITUS WITH STAGE 4 CHRONIC KIDNEY DISEASE, WITH LONG-TERM CURRENT USE OF INSULIN: ICD-10-CM

## 2024-04-09 DIAGNOSIS — N02.B9 IGA NEPHROPATHY: ICD-10-CM

## 2024-04-09 DIAGNOSIS — N18.4 CHRONIC KIDNEY DISEASE, STAGE 4 (SEVERE): Primary | ICD-10-CM

## 2024-04-09 DIAGNOSIS — D63.1 ANEMIA DUE TO STAGE 4 CHRONIC KIDNEY DISEASE: ICD-10-CM

## 2024-04-09 DIAGNOSIS — E11.22 TYPE 2 DIABETES MELLITUS WITH STAGE 4 CHRONIC KIDNEY DISEASE, WITH LONG-TERM CURRENT USE OF INSULIN: ICD-10-CM

## 2024-04-09 PROCEDURE — G2211 COMPLEX E/M VISIT ADD ON: HCPCS | Mod: 95,,, | Performed by: STUDENT IN AN ORGANIZED HEALTH CARE EDUCATION/TRAINING PROGRAM

## 2024-04-09 PROCEDURE — 99215 OFFICE O/P EST HI 40 MIN: CPT | Mod: 95,,, | Performed by: STUDENT IN AN ORGANIZED HEALTH CARE EDUCATION/TRAINING PROGRAM

## 2024-04-09 PROCEDURE — 3044F HG A1C LEVEL LT 7.0%: CPT | Mod: CPTII,95,, | Performed by: STUDENT IN AN ORGANIZED HEALTH CARE EDUCATION/TRAINING PROGRAM

## 2024-04-09 PROCEDURE — 1159F MED LIST DOCD IN RCRD: CPT | Mod: CPTII,95,, | Performed by: STUDENT IN AN ORGANIZED HEALTH CARE EDUCATION/TRAINING PROGRAM

## 2024-04-09 PROCEDURE — 3061F NEG MICROALBUMINURIA REV: CPT | Mod: CPTII,95,, | Performed by: STUDENT IN AN ORGANIZED HEALTH CARE EDUCATION/TRAINING PROGRAM

## 2024-04-09 PROCEDURE — 4010F ACE/ARB THERAPY RXD/TAKEN: CPT | Mod: CPTII,95,, | Performed by: STUDENT IN AN ORGANIZED HEALTH CARE EDUCATION/TRAINING PROGRAM

## 2024-04-09 PROCEDURE — 3066F NEPHROPATHY DOC TX: CPT | Mod: CPTII,95,, | Performed by: STUDENT IN AN ORGANIZED HEALTH CARE EDUCATION/TRAINING PROGRAM

## 2024-04-09 PROCEDURE — 1160F RVW MEDS BY RX/DR IN RCRD: CPT | Mod: CPTII,95,, | Performed by: STUDENT IN AN ORGANIZED HEALTH CARE EDUCATION/TRAINING PROGRAM

## 2024-04-09 RX ORDER — LABETALOL 100 MG/1
100 TABLET, FILM COATED ORAL 2 TIMES DAILY
Qty: 180 TABLET | Refills: 3 | Status: SHIPPED | OUTPATIENT
Start: 2024-04-09 | End: 2025-04-09

## 2024-04-09 RX ORDER — VALSARTAN 320 MG/1
320 TABLET ORAL DAILY
Qty: 90 TABLET | Refills: 3 | Status: SHIPPED | OUTPATIENT
Start: 2024-04-09 | End: 2025-04-09

## 2024-04-09 RX ORDER — ALLOPURINOL 100 MG/1
100 TABLET ORAL DAILY
Qty: 90 TABLET | Refills: 3 | Status: SHIPPED | OUTPATIENT
Start: 2024-04-09

## 2024-04-09 NOTE — PROGRESS NOTES
The patient location is: [place]  The chief complaint leading to consultation is: CKD    Visit type: audio only, visit delayed due to clinic schedule and could not get video to work so had to use audio    Face to Face time with patient: all audio, 26 min  45 minutes of total time spent on the encounter, which includes face to face time and non-face to face time preparing to see the patient (eg, review of tests), Obtaining and/or reviewing separately obtained history, Documenting clinical information in the electronic or other health record, Independently interpreting results (not separately reported) and communicating results to the patient/family/caregiver, or Care coordination (not separately reported).         Each patient to whom he or she provides medical services by telemedicine is:  (1) informed of the relationship between the physician and patient and the respective role of any other health care provider with respect to management of the patient; and (2) notified that he or she may decline to receive medical services by telemedicine and may withdraw from such care at any time.    Notes:       Nephrology Clinic Note   4/9/2024    Chief Complaint   Patient presents with    Chronic Kidney Disease      History of Present Illness    Patient presents today for follow-up.    The patient experienced a recent gout flare-up, which initially subsided with Prednisone but tried to recur during the treatment. He used Prednisone for 1 week and understands future flare-ups should ideally be treated with this medication for around four days due to potential impacts on blood sugars and BUN levels. There was a misunderstanding with daily use of Allopurinol, which he used only during gout flare-ups. However, it was clarified that Allopurinol should be taken daily to prevent uric acid production and future gout attacks. He confirmed needing a refill on his Allopurinol prescription.    He has been off his Valsartan medication for  blood pressure management since last Friday and understands the need to get a refill. He also takes Atorvastatin for blood pressure and cholesterol management, and needs to refill his  Labetalol prescription.    He admits to inconsistent blood sugar checks lately due to a busy schedule and acknowledges the need for regular monitoring.    He experiences sporadic sweating on his right eyebrow over the past several months, producing a small amount of normal sweat without any other negative or abnormal experiences.    Previous measures have shown a significant decrease in kidney function. Recent ultrasound revealed new kidney stones, although he reports no symptoms related to these stones. He was advised to follow the Borghi diet and increase his water intake to possibly prevent further stone formation. A 24-hour stone profile will further explore the approach towards the kidney stones.    Recent stress test results revealed a reversible perfusion abnormality, suggesting a potential cardiovascular disease when his heart is under stress and cannot get the necessary blood supply volume. No further symptoms or adverse events related to this cardiovascular condition were reported during this consultation.  ROS:  Cardiovascular: -palpitations  Musculoskeletal: +joint pain  Endocrine: +excessive sweating       Presents to the clinic today for medical conditions listed below.  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     Type 2 Diabetes Mellitus With Stage 4 Chronic Kidney Disease, With Long-Term Current Use of Insulin 10/3/2019   Htn (Hypertension) 2012    On valsartan, labetalol, amlodipine     Chronic Kidney Disease, Stage 4 (Severe) 2012    -stable  -stage G4A3 likely secondary to IgA with minimal contribution  from diabetes  -baseline creatinine 3.8-3  -baseline UPCR 0.3  -historic complications Volume overload  -BP controlled on ACE/ARB, CCB and BB  -glucose controlled on SGLT2 inhibitor and Insulin  -UPCR controlled on ACE/ARB, SGLT2 inhibitor  -kidney US 2022 10cm kidneys bilaterally  -KUS 2024 kidney stones  -current diuretic therapy: none   -current bicarbonate therapy: 1300 BID   -current CKD-MBD therapy: calcitriol, cont sevelamer  -current anemia therapy: none    4g/g proteinuria 4/2022, kidney biopsy 9/2022 inadequate sample, but with IgA and no evidence of diabetes 30-40IF, started budesonide end of 2022. Discovered elevated a1c 1/2023 to 10 now back down to 6's and controlled.   Proteinuria has now decreased to 0.1  Off tarpeyo for months  4/2023: had severe kidney injury about a month ago without good explanation; doppler of kidney showed new stones, but none obstructing or large. Ordered US     Tobacco Use 11/14/2012       Physical Exam    General: Well-developed. Well-appearing.  Cardiovascular: Regular rate. Regular rhythm. Good heart function.  Lungs: Normal respiratory effort. No respiratory distress.  Extremities: No edema lower extremities.  Psychiatric: Alert.  Skin: Sweating on right eyebrow.         Assessment:    1. Chronic kidney disease, stage 4 (severe)    2. Anemia due to stage 4 chronic kidney disease    3. Type 2 diabetes mellitus with stage 4 chronic kidney disease, with long-term current use of insulin    4. Secondary hyperparathyroidism of renal origin    5. Primary hypertension    6. IgA nephropathy        Plan:    Assessment & Plan    M10.9 Gout, unspecified  I25.10 Atherosclerotic heart disease of native coronary artery without angina pectoris  E11.9 Type 2 diabetes mellitus without complications  GOUT:  - Discussed the patient's recent gout flare-up, emphasizing the importance of limiting prednisone use to approximately four days during future flare-ups due to its potential impact on  blood sugars and BUN levels.  - Prescribed allopurinol, valsartan, and labetalol, stressing the necessity of daily allopurinol intake to inhibit uric acid production and reduce uric acid levels.  POTENTIAL CARDIOVASCULAR DISEASE:  - Reviewed the patient's recent stress test results, indicating potential cardiovascular disease.  - Suggested the possible need for a cardiologist for further evaluation.  - Advised the patient to manage modifiable risk factors for heart disease, including controlling diabetes and cessation of smoking.  KIDNEY FUNCTION AND STONES:  - Addressed the patient's kidney function and the presence of new kidney stones.  - Recommend increased water intake and adherence to the borgie diet for stone management.  - Ordered a kidney ultrasound and kidney labs to be conducted in 2 weeks, with a follow-up visit to review the results.  - Mentioned the potential need for a 24-hour urine stone profile in the future for a more comprehensive understanding of the patient's kidney stones, and the possibility of a urology consultation if the stones persist.         Edouard was seen today for chronic kidney disease.    Diagnoses and all orders for this visit:    Chronic kidney disease, stage 4 (severe)  -     allopurinoL (ZYLOPRIM) 100 MG tablet; Take 1 tablet (100 mg total) by mouth once daily.  -     valsartan (DIOVAN) 320 MG tablet; Take 1 tablet (320 mg total) by mouth once daily.  -     labetaloL (NORMODYNE) 100 MG tablet; Take 1 tablet (100 mg total) by mouth 2 (two) times daily.  -     US Retroperitoneal Complete; Future  -     Sodium, Random Urine; Future  -     Microalbumin/Creatinine Ratio, Urine; Future  -     Ferritin; Future  -     Iron and TIBC; Future  -     PTH, Intact; Future  -     Vitamin D; Future  -     Phosphorus; Future  -     Magnesium; Future  -     Comprehensive Metabolic Panel; Future  -     CBC Auto Differential; Future  -     Urinalysis; Future    Anemia due to stage 4 chronic kidney  disease    Type 2 diabetes mellitus with stage 4 chronic kidney disease, with long-term current use of insulin    Secondary hyperparathyroidism of renal origin    Primary hypertension  -     labetaloL (NORMODYNE) 100 MG tablet; Take 1 tablet (100 mg total) by mouth 2 (two) times daily.    IgA nephropathy        Follow up in about 3 months (around 7/9/2024).     I spent a total of 45 minutes on the day of the visit.    Visit today included increased complexity associated with the care of the episodic problem CKD addressed and managing the longitudinal care of the patient due to the serious and/or complex managed problem(s) CKD, HTN, secondary hyperparathyroidism, and anemia of chronic disease.  Orders Placed This Encounter   Procedures    US Retroperitoneal Complete    Sodium, Random Urine    Microalbumin/Creatinine Ratio, Urine    Ferritin    Iron and TIBC    PTH, Intact    Vitamin D    Phosphorus    Magnesium    Comprehensive Metabolic Panel    CBC Auto Differential    Urinalysis     Medications Discontinued During This Encounter   Medication Reason    valsartan (DIOVAN) 320 MG tablet Reorder    labetaloL (NORMODYNE) 100 MG tablet     allopurinoL (ZYLOPRIM) 100 MG tablet Reorder      Future Appointments   Date Time Provider Department Center   4/15/2024  1:00 PM [facility], [facility] INTERNAL MEDICINE [facility] W   5/22/2024  3:30 PM Kristofer Menard MD [facility]       This note was generated with the assistance of ambient listening technology. Verbal consent was obtained by the patient and accompanying visitor(s) for the recording of patient appointment to facilitate this note. I attest to having reviewed and edited the generated note for accuracy, though some syntax or spelling errors may persist. Please contact the author of this note for any clarification.      Parvez Ornelas

## 2024-04-11 ENCOUNTER — TELEPHONE (OUTPATIENT)
Dept: INTERNAL MEDICINE | Facility: CLINIC | Age: 49
End: 2024-04-11
Payer: COMMERCIAL

## 2024-04-11 DIAGNOSIS — N18.4 TYPE 2 DIABETES MELLITUS WITH STAGE 4 CHRONIC KIDNEY DISEASE, WITH LONG-TERM CURRENT USE OF INSULIN: Primary | ICD-10-CM

## 2024-04-11 DIAGNOSIS — Z79.4 TYPE 2 DIABETES MELLITUS WITH STAGE 4 CHRONIC KIDNEY DISEASE, WITH LONG-TERM CURRENT USE OF INSULIN: Primary | ICD-10-CM

## 2024-04-11 DIAGNOSIS — E11.22 TYPE 2 DIABETES MELLITUS WITH STAGE 4 CHRONIC KIDNEY DISEASE, WITH LONG-TERM CURRENT USE OF INSULIN: Primary | ICD-10-CM

## 2024-04-11 NOTE — TELEPHONE ENCOUNTER
Called patient about moving his eye camera appt to same day as Dr. Menard.   Appt changed per patent request.   Marbella Ignacio RN HC

## 2024-05-28 NOTE — PROGRESS NOTES
PHARM.D. PRE-TRANSPLANT NOTE:    This patient's medication therapy was evaluated as part of his pre-transplant evaluation.      The following general pharmacologic concerns were noted: none    The following concerns for post-operative pain management were noted: none    The following pharmacologic concerns related to HCV therapy were noted: none       This patient's medication profile was reviewed for considerations for DAA Hepatitis C therapy:    [X]  No current inducers of CYP 3A4 or PGP  [X]  No amiodarone on this patient's EMR profile in the last 24 months  [X]  No past or current atrial fibrillation on this patient's EMR profile       Current Outpatient Medications   Medication Sig Dispense Refill    acetaminophen (TYLENOL) 500 MG tablet Take 1,000 mg by mouth every 4 (four) hours as needed for Pain.      atorvastatin (LIPITOR) 80 MG tablet Take 1 tablet (80 mg total) by mouth once daily. 90 tablet 3    blood sugar diagnostic Strp 1 strip by Misc.(Non-Drug; Combo Route) route 2 (two) times daily. ICD 10: E11.22 100 each 6    blood-glucose meter kit Use as instructed to check blood sugar two times a day. ICD 10: E11.22 1 each 0    blood-glucose sensor (DEXCOM G6 SENSOR) Rita 1 each by Misc.(Non-Drug; Combo Route) route every 10 days. (Patient not taking: Reported on 2/22/2024) 9 each 3    blood-glucose transmitter (DEXCOM G6 TRANSMITTER) Rita 1 Device by Misc.(Non-Drug; Combo Route) route every 3 (three) months. (Patient not taking: Reported on 2/22/2024) 1 each 3    calcium carbonate (TUMS ORAL) Take 1 tablet by mouth daily as needed (Heartburn).      hydrocortisone (ANUSOL-HC) 2.5 % rectal cream Place rectally 2 (two) times daily. (Patient not taking: Reported on 2/22/2024) 28 g 2    insulin detemir U-100, Levemir, (LEVEMIR FLEXTOUCH U-100 INSULN) 100 unit/mL (3 mL) InPn pen Inject 18 Units into the skin every evening. Eat a snack before bed if BG is < 100 mg/dl (Patient taking differently: Inject 12 Units  "into the skin every evening. Eat a snack before bed if BG is < 100 mg/dl) 16.2 mL 3    insulin lispro (HUMALOG KWIKPEN INSULIN) 100 unit/mL pen Inject 6 units into skin with lunch and dinner; 2-10 Units into the skin 3 (three) times daily as needed. Blood Glucose: 180 - 230 + 2 unit, 231- 280 + 4 units, 281 - 330 + 6 units, 331 - 380 + 8 units, > 380 + 10 units (Patient taking differently: Inject 4 Units into the skin 3 (three) times daily. Inject 6 units into skin with lunch and dinner; 2-10 Units into the skin 3 (three) times daily as needed. Blood Glucose: 180 - 230 + 2 unit, 231- 280 + 4 units, 281 - 330 + 6 units, 331 - 380 + 8 units, > 380 + 10 units) 45 mL 0    lancets Misc 1 lancet by Misc.(Non-Drug; Combo Route) route 3 (three) times daily. ICD 10: E11.22 300 each 3    pen needle, diabetic (BD ULTRA-FINE DAVID PEN NEEDLE) 32 gauge x 5/32" Ndle Use to inject insulin 4 times daily 100 each 11    prednisoLONE acetate (PRED FORTE) 1 % DrpS Place 1 drop into both eyes daily as needed (Inflammation).      sevelamer HCL (RENAGEL) 800 MG Tab Take 1 tablet (800 mg total) by mouth 3 (three) times daily with meals. (Patient taking differently: Take 800 mg by mouth daily with dinner or evening meal.) 270 tablet 3    tirzepatide (MOUNJARO) 5 mg/0.5 mL PnIj Inject 5 mg into the skin every 7 days. (Patient not taking: Reported on 2/22/2024) 4 pen 11    allopurinoL (ZYLOPRIM) 100 MG tablet Take 1 tablet (100 mg total) by mouth once daily. 90 tablet 3    calcitRIOL (ROCALTROL) 0.25 MCG Cap Take 1 capsule (0.25 mcg total) by mouth once daily. 90 capsule 3    colchicine (COLCRYS) 0.6 mg tablet Take 1 tablet (0.6 mg total) by mouth daily as needed (gout flare-up). 90 tablet 0    labetaloL (NORMODYNE) 100 MG tablet Take 1 tablet (100 mg total) by mouth 2 (two) times daily. 180 tablet 3    predniSONE (DELTASONE) 20 MG tablet Take 1 tablet (20 mg total) by mouth once daily. 15 tablet 3    sodium bicarbonate 650 MG tablet Take 2 " tablets (1,300 mg total) by mouth 2 (two) times daily. 360 tablet 3    valsartan (DIOVAN) 320 MG tablet Take 1 tablet (320 mg total) by mouth once daily. 90 tablet 3    valsartan (DIOVAN) 320 MG tablet Take 1 tablet (320 mg total) by mouth once daily. 90 tablet 3     No current facility-administered medications for this visit.           I am available for consultation and can be contacted, as needed by the other members of the Transplant team.

## 2024-06-10 ENCOUNTER — PATIENT MESSAGE (OUTPATIENT)
Dept: INTERNAL MEDICINE | Facility: CLINIC | Age: 49
End: 2024-06-10
Payer: COMMERCIAL

## 2024-06-19 ENCOUNTER — TELEPHONE (OUTPATIENT)
Dept: INTERNAL MEDICINE | Facility: CLINIC | Age: 49
End: 2024-06-19
Payer: COMMERCIAL

## 2024-06-19 NOTE — TELEPHONE ENCOUNTER
----- Message from Sandy Lopez sent at 6/19/2024  1:45 PM CDT -----  Type:  Patient Requesting Referral - Blood work    Who Called: Pt's wife  Does the patient already have the specialty appointment scheduled?:  If yes, what is the date of that appointment?:  Referral to What Specialty: Lab  Reason for Referral: blood work prior to appt  Does the patient want the referral with a specific physician?:  [facility]  Patient Requesting a Response?: Yes  Would the patient rather a call back or a response via [facility]? Call  Best Call Back Number: [phone]  Additional Information: Pt would like orders to be put in prior to appt.

## 2024-07-23 ENCOUNTER — OFFICE VISIT (OUTPATIENT)
Dept: INTERNAL MEDICINE | Facility: CLINIC | Age: 49
End: 2024-07-23
Payer: COMMERCIAL

## 2024-07-23 VITALS
DIASTOLIC BLOOD PRESSURE: 78 MMHG | HEART RATE: 75 BPM | BODY MASS INDEX: 35.43 KG/M2 | SYSTOLIC BLOOD PRESSURE: 140 MMHG | WEIGHT: 246.94 LBS | OXYGEN SATURATION: 97 %

## 2024-07-23 DIAGNOSIS — E11.22 TYPE 2 DIABETES MELLITUS WITH STAGE 4 CHRONIC KIDNEY DISEASE, WITH LONG-TERM CURRENT USE OF INSULIN: Primary | ICD-10-CM

## 2024-07-23 DIAGNOSIS — Z79.4 TYPE 2 DIABETES MELLITUS WITH STAGE 4 CHRONIC KIDNEY DISEASE, WITH LONG-TERM CURRENT USE OF INSULIN: Primary | ICD-10-CM

## 2024-07-23 DIAGNOSIS — Z87.891 PERSONAL HISTORY OF TOBACCO USE: ICD-10-CM

## 2024-07-23 DIAGNOSIS — N18.4 TYPE 2 DIABETES MELLITUS WITH STAGE 4 CHRONIC KIDNEY DISEASE, WITH LONG-TERM CURRENT USE OF INSULIN: Primary | ICD-10-CM

## 2024-07-23 PROCEDURE — 3078F DIAST BP <80 MM HG: CPT | Mod: CPTII,S$GLB,, | Performed by: STUDENT IN AN ORGANIZED HEALTH CARE EDUCATION/TRAINING PROGRAM

## 2024-07-23 PROCEDURE — 3077F SYST BP >= 140 MM HG: CPT | Mod: CPTII,S$GLB,, | Performed by: STUDENT IN AN ORGANIZED HEALTH CARE EDUCATION/TRAINING PROGRAM

## 2024-07-23 PROCEDURE — 3061F NEG MICROALBUMINURIA REV: CPT | Mod: CPTII,S$GLB,, | Performed by: STUDENT IN AN ORGANIZED HEALTH CARE EDUCATION/TRAINING PROGRAM

## 2024-07-23 PROCEDURE — 99999 PR PBB SHADOW E&M-EST. PATIENT-LVL V: CPT | Mod: PBBFAC,,, | Performed by: STUDENT IN AN ORGANIZED HEALTH CARE EDUCATION/TRAINING PROGRAM

## 2024-07-23 PROCEDURE — 1160F RVW MEDS BY RX/DR IN RCRD: CPT | Mod: CPTII,S$GLB,, | Performed by: STUDENT IN AN ORGANIZED HEALTH CARE EDUCATION/TRAINING PROGRAM

## 2024-07-23 PROCEDURE — 4010F ACE/ARB THERAPY RXD/TAKEN: CPT | Mod: CPTII,S$GLB,, | Performed by: STUDENT IN AN ORGANIZED HEALTH CARE EDUCATION/TRAINING PROGRAM

## 2024-07-23 PROCEDURE — 99214 OFFICE O/P EST MOD 30 MIN: CPT | Mod: 25,S$GLB,, | Performed by: STUDENT IN AN ORGANIZED HEALTH CARE EDUCATION/TRAINING PROGRAM

## 2024-07-23 PROCEDURE — 3066F NEPHROPATHY DOC TX: CPT | Mod: CPTII,S$GLB,, | Performed by: STUDENT IN AN ORGANIZED HEALTH CARE EDUCATION/TRAINING PROGRAM

## 2024-07-23 PROCEDURE — 3008F BODY MASS INDEX DOCD: CPT | Mod: CPTII,S$GLB,, | Performed by: STUDENT IN AN ORGANIZED HEALTH CARE EDUCATION/TRAINING PROGRAM

## 2024-07-23 PROCEDURE — 1159F MED LIST DOCD IN RCRD: CPT | Mod: CPTII,S$GLB,, | Performed by: STUDENT IN AN ORGANIZED HEALTH CARE EDUCATION/TRAINING PROGRAM

## 2024-07-23 PROCEDURE — 99406 BEHAV CHNG SMOKING 3-10 MIN: CPT | Mod: S$GLB,,, | Performed by: STUDENT IN AN ORGANIZED HEALTH CARE EDUCATION/TRAINING PROGRAM

## 2024-07-23 PROCEDURE — 3044F HG A1C LEVEL LT 7.0%: CPT | Mod: CPTII,S$GLB,, | Performed by: STUDENT IN AN ORGANIZED HEALTH CARE EDUCATION/TRAINING PROGRAM

## 2024-07-23 NOTE — PROGRESS NOTES
SUBJECTIVE     Chief Complaint   Patient presents with    Follow-up       HPI  Edouard Pena is a 49 y.o. male with  HTN, HLD, IgA nephropathy, CKD4 with anemia, T2DM, secondary hyperparathyroidism of renal origin, tobacco use  that presents for follow-up.     T2DM   - Currently taking: Levemir 12 units qhs, humalog 4 units qAC + SSI, Mounjaro 5 mg q7d  - Compliant with and tolerating meds well.  - Home readings: Review of BG logs shows range of  over past 30 days with AVG .   - Denies signs/symptoms of hyper/hypoglycemia.  - Most recent A1c: 6.5%  - Most recent Microalbumin: wnl in 3/2024.   - Takes a statin:   - Eye exam: UTD  - Foot exam: Due  - PNA vaccine: Declines  Lab Results   Component Value Date    HGBA1C 6.5 (H) 02/08/2024    HGBA1C 6.1 (H) 09/15/2023    HGBA1C 6.7 (H) 06/05/2023     Lab Results   Component Value Date    LDLCALC 70.4 02/08/2024    CREATININE 3.1 (H) 03/18/2024   .     Patient is current smoker. Pre-contemplative. Due for LDCT scan. Patient hesitant to do secondary to cost currently.   PAST MEDICAL HISTORY:  Past Medical History:   Diagnosis Date    Anemia 03/01/2023    Chronic kidney disease     Chronic kidney disease stage II    Colon polyps     Hyperlipidemia 12/13/2012    Hypertension        PAST SURGICAL HISTORY:  Past Surgical History:   Procedure Laterality Date    COLONOSCOPY N/A 04/24/2023    Procedure: COLONOSCOPY;  Surgeon: Mayelin Forte MD;  Location: [facility] ENDO ([address] [place] [address]);  Service: Endoscopy;  Laterality: N/A;  Golytely ok per nephrologist-see note 3/24- instr to portal -MS  4-18-23- preop call- voice mail on- no message left- MMG    COSMETIC SURGERY      pyloric stenosis      RENAL BIOPSY Left 09/01/2022    Procedure: BIOPSY, KIDNEY;  Surgeon: Sinan Abad MD;  Location: [facility] CATH LAB;  Service: Interventional Nephrology;  Laterality: Left;       FAMILY HISTORY:  Family History   Problem Relation Name Age of Onset    COPD Mother      Heart disease Mother       Peripheral vascular disease Mother      Diabetes Father      Kidney disease Father      Stroke Father      Hypertension Father      Heart disease Father  70        CABG x 3    Kidney failure Father      Heart disease Maternal Grandfather         ALLERGIES AND MEDICATIONS: updated and reviewed.  Review of patient's allergies indicates:  No Known Allergies  Current Outpatient Medications   Medication Sig Dispense Refill    acetaminophen (TYLENOL) 500 MG tablet Take 1,000 mg by mouth every 4 (four) hours as needed for Pain.      allopurinoL (ZYLOPRIM) 100 MG tablet Take 1 tablet (100 mg total) by mouth once daily. 90 tablet 3    atorvastatin (LIPITOR) 80 MG tablet Take 1 tablet (80 mg total) by mouth once daily. 90 tablet 3    blood sugar diagnostic Strp 1 strip by Misc.(Non-Drug; Combo Route) route 2 (two) times daily. ICD 10: E11.22 100 each 6    blood-glucose meter kit Use as instructed to check blood sugar two times a day. ICD 10: E11.22 1 each 0    blood-glucose sensor (DEXCOM G6 SENSOR) Rita 1 each by Misc.(Non-Drug; Combo Route) route every 10 days. (Patient not taking: Reported on 2/22/2024) 9 each 3    blood-glucose transmitter (DEXCOM G6 TRANSMITTER) Rita 1 Device by Misc.(Non-Drug; Combo Route) route every 3 (three) months. (Patient not taking: Reported on 2/22/2024) 1 each 3    calcitRIOL (ROCALTROL) 0.25 MCG Cap Take 1 capsule (0.25 mcg total) by mouth once daily. 90 capsule 3    calcium carbonate (TUMS ORAL) Take 1 tablet by mouth daily as needed (Heartburn).      colchicine (COLCRYS) 0.6 mg tablet Take 1 tablet (0.6 mg total) by mouth daily as needed (gout flare-up). 90 tablet 0    hydrocortisone (ANUSOL-HC) 2.5 % rectal cream Place rectally 2 (two) times daily. (Patient not taking: Reported on 2/22/2024) 28 g 2    insulin detemir U-100, Levemir, (LEVEMIR FLEXTOUCH U-100 INSULN) 100 unit/mL (3 mL) InPn pen Inject 18 Units into the skin every evening. Eat a snack before bed if BG is < 100 mg/dl  "(Patient taking differently: Inject 12 Units into the skin every evening. Eat a snack before bed if BG is < 100 mg/dl) 16.2 mL 3    insulin lispro (HUMALOG KWIKPEN INSULIN) 100 unit/mL pen Inject 6 units into skin with lunch and dinner; 2-10 Units into the skin 3 (three) times daily as needed. Blood Glucose: 180 - 230 + 2 unit, 231- 280 + 4 units, 281 - 330 + 6 units, 331 - 380 + 8 units, > 380 + 10 units (Patient taking differently: Inject 4 Units into the skin 3 (three) times daily. Inject 6 units into skin with lunch and dinner; 2-10 Units into the skin 3 (three) times daily as needed. Blood Glucose: 180 - 230 + 2 unit, 231- 280 + 4 units, 281 - 330 + 6 units, 331 - 380 + 8 units, > 380 + 10 units) 45 mL 0    labetaloL (NORMODYNE) 100 MG tablet Take 1 tablet (100 mg total) by mouth 2 (two) times daily. 180 tablet 3    lancets Misc 1 lancet by Misc.(Non-Drug; Combo Route) route 3 (three) times daily. ICD 10: E11.22 300 each 3    pen needle, diabetic (BD ULTRA-FINE DAVID PEN NEEDLE) 32 gauge x 5/32" Ndle Use to inject insulin 4 times daily 100 each 11    prednisoLONE acetate (PRED FORTE) 1 % DrpS Place 1 drop into both eyes daily as needed (Inflammation).      predniSONE (DELTASONE) 20 MG tablet Take 1 tablet (20 mg total) by mouth once daily. 15 tablet 3    sevelamer HCL (RENAGEL) 800 MG Tab Take 1 tablet (800 mg total) by mouth 3 (three) times daily with meals. (Patient taking differently: Take 800 mg by mouth daily with dinner or evening meal.) 270 tablet 3    sodium bicarbonate 650 MG tablet Take 2 tablets (1,300 mg total) by mouth 2 (two) times daily. 360 tablet 3    tirzepatide (MOUNJARO) 5 mg/0.5 mL PnIj Inject 5 mg into the skin every 7 days. (Patient not taking: Reported on 2/22/2024) 4 pen 11    valsartan (DIOVAN) 320 MG tablet Take 1 tablet (320 mg total) by mouth once daily. 90 tablet 3    valsartan (DIOVAN) 320 MG tablet Take 1 tablet (320 mg total) by mouth once daily. 90 tablet 3     No current " facility-administered medications for this visit.       ROS  Review of Systems   Constitutional:  Negative for activity change, chills and fever.   HENT:  Negative for congestion and hearing loss.    Eyes:  Negative for pain and visual disturbance.   Respiratory:  Negative for cough and shortness of breath.    Cardiovascular:  Negative for chest pain and palpitations.   Gastrointestinal:  Negative for abdominal pain, constipation, diarrhea, nausea and vomiting.   Endocrine: Negative.    Genitourinary: Negative.    Musculoskeletal:  Negative for arthralgias and myalgias.   Skin: Negative.    Allergic/Immunologic: Negative.    Neurological:  Negative for dizziness, light-headedness and headaches.   Hematological: Negative.          OBJECTIVE     Physical Exam  Vitals:    07/23/24 1552   BP: (!) 140/78   Pulse: 75    Body mass index is 35.43 kg/m².            Physical Exam  Vitals reviewed.   Constitutional:       General: He is not in acute distress.     Appearance: Normal appearance. He is obese.   HENT:      Head: Normocephalic and atraumatic.      Mouth/Throat:      Mouth: Mucous membranes are moist.      Pharynx: Oropharynx is clear.   Eyes:      Extraocular Movements: Extraocular movements intact.      Conjunctiva/sclera: Conjunctivae normal.      Pupils: Pupils are equal, round, and reactive to light.   Cardiovascular:      Rate and Rhythm: Normal rate and regular rhythm.      Pulses: Normal pulses.      Heart sounds: Normal heart sounds.   Pulmonary:      Effort: Pulmonary effort is normal.      Breath sounds: Normal breath sounds.   Abdominal:      General: There is no distension.   Musculoskeletal:         General: Normal range of motion.      Cervical back: Normal range of motion and neck supple.   Skin:     General: Skin is warm and dry.   Neurological:      General: No focal deficit present.      Mental Status: He is alert.           Health Maintenance         Date Due Completion Date    Pneumococcal  Vaccines (Age 0-64) (1 of 2 - PCV) Never done ---    TETANUS VACCINE 06/23/2016 6/23/2006    LDCT Lung Screen Never done ---    Shingles Vaccine (1 of 2) Never done ---    COVID-19 Vaccine (1 - 2023-24 season) Never done ---    Foot Exam 03/08/2024 3/8/2023    Hemoglobin A1c 08/08/2024 2/8/2024    Influenza Vaccine (1) 09/01/2024 ---    Lipid Panel 02/08/2025 2/8/2024    Low Dose Statin 02/22/2025 2/22/2024    Diabetes Urine Screening 03/04/2025 3/4/2024    Eye Exam 04/23/2025 4/23/2024    Colorectal Cancer Screening 04/24/2026 4/24/2023              ASSESSMENT     49 y.o. male with     1. Type 2 diabetes mellitus with stage 4 chronic kidney disease, with long-term current use of insulin    2. Personal history of tobacco use        PLAN:     1. Type 2 diabetes mellitus with stage 4 chronic kidney disease, with long-term current use of insulin  - Stable on current regimen. Continue.   - Recheck A1c, if not at [facility], consider increasing Mounjaro to 7.5 mg q7d.   - HEMOGLOBIN A1C; Future    2. Personal history of tobacco use  - Patient counseled on the need to stop smoking for 3 minutes during appointment. Not interested currently. Reviewed smoking cessation program with patient, instructed to call when interested.   - Declines LDCT screening for lung cancer at the moment. Given information about test. Will revisit at next appointment.         RTC in 4 months, earlier PRN       Kristofer Menard MD  Family Medicine  [facility]  07/23/2024    This document was created using voice recognition software (M*Modal Fluency Direct). Although it may be edited, this document may contain errors related to incorrect recognition of the spoken word. Please call the physician if clarification is needed.       No follow-ups on file.

## 2024-07-23 NOTE — PATIENT INSTRUCTIONS
When you feel constipated, I recommend using Miralax as needed.   Make an appointment for follow up with Dr. Ornelas

## 2024-08-02 ENCOUNTER — LAB VISIT (OUTPATIENT)
Dept: LAB | Facility: HOSPITAL | Age: 49
End: 2024-08-02
Attending: STUDENT IN AN ORGANIZED HEALTH CARE EDUCATION/TRAINING PROGRAM
Payer: COMMERCIAL

## 2024-08-02 DIAGNOSIS — E11.22 TYPE 2 DIABETES MELLITUS WITH STAGE 4 CHRONIC KIDNEY DISEASE, WITH LONG-TERM CURRENT USE OF INSULIN: ICD-10-CM

## 2024-08-02 DIAGNOSIS — N18.4 CHRONIC KIDNEY DISEASE, STAGE 4 (SEVERE): ICD-10-CM

## 2024-08-02 DIAGNOSIS — N18.4 TYPE 2 DIABETES MELLITUS WITH STAGE 4 CHRONIC KIDNEY DISEASE, WITH LONG-TERM CURRENT USE OF INSULIN: ICD-10-CM

## 2024-08-02 DIAGNOSIS — Z79.4 TYPE 2 DIABETES MELLITUS WITH STAGE 4 CHRONIC KIDNEY DISEASE, WITH LONG-TERM CURRENT USE OF INSULIN: ICD-10-CM

## 2024-08-02 LAB
25(OH)D3+25(OH)D2 SERPL-MCNC: 36 NG/ML (ref 30–96)
ALBUMIN SERPL BCP-MCNC: 3.7 G/DL (ref 3.5–5.2)
ALP SERPL-CCNC: 123 U/L (ref 55–135)
ALT SERPL W/O P-5'-P-CCNC: 28 U/L (ref 10–44)
ANION GAP SERPL CALC-SCNC: 12 MMOL/L (ref 8–16)
AST SERPL-CCNC: 19 U/L (ref 10–40)
BASOPHILS # BLD AUTO: 0.07 K/UL (ref 0–0.2)
BASOPHILS NFR BLD: 0.8 % (ref 0–1.9)
BILIRUB SERPL-MCNC: 0.5 MG/DL (ref 0.1–1)
BUN SERPL-MCNC: 32 MG/DL (ref 6–20)
CALCIUM SERPL-MCNC: 9.7 MG/DL (ref 8.7–10.5)
CHLORIDE SERPL-SCNC: 110 MMOL/L (ref 95–110)
CO2 SERPL-SCNC: 17 MMOL/L (ref 23–29)
CREAT SERPL-MCNC: 2.8 MG/DL (ref 0.5–1.4)
DIFFERENTIAL METHOD BLD: ABNORMAL
EOSINOPHIL # BLD AUTO: 0.4 K/UL (ref 0–0.5)
EOSINOPHIL NFR BLD: 4.4 % (ref 0–8)
ERYTHROCYTE [DISTWIDTH] IN BLOOD BY AUTOMATED COUNT: 14.3 % (ref 11.5–14.5)
EST. GFR  (NO RACE VARIABLE): 26.5 ML/MIN/1.73 M^2
ESTIMATED AVG GLUCOSE: 160 MG/DL (ref 68–131)
FERRITIN SERPL-MCNC: 42 NG/ML (ref 20–300)
GLUCOSE SERPL-MCNC: 167 MG/DL (ref 70–110)
HBA1C MFR BLD: 7.2 % (ref 4–5.6)
HCT VFR BLD AUTO: 42.8 % (ref 40–54)
HGB BLD-MCNC: 14.3 G/DL (ref 14–18)
IMM GRANULOCYTES # BLD AUTO: 0.03 K/UL (ref 0–0.04)
IMM GRANULOCYTES NFR BLD AUTO: 0.3 % (ref 0–0.5)
IRON SERPL-MCNC: 88 UG/DL (ref 45–160)
LYMPHOCYTES # BLD AUTO: 3.8 K/UL (ref 1–4.8)
LYMPHOCYTES NFR BLD: 42.6 % (ref 18–48)
MAGNESIUM SERPL-MCNC: 2.1 MG/DL (ref 1.6–2.6)
MCH RBC QN AUTO: 31.6 PG (ref 27–31)
MCHC RBC AUTO-ENTMCNC: 33.4 G/DL (ref 32–36)
MCV RBC AUTO: 95 FL (ref 82–98)
MONOCYTES # BLD AUTO: 0.8 K/UL (ref 0.3–1)
MONOCYTES NFR BLD: 9 % (ref 4–15)
NEUTROPHILS # BLD AUTO: 3.8 K/UL (ref 1.8–7.7)
NEUTROPHILS NFR BLD: 42.9 % (ref 38–73)
NRBC BLD-RTO: 0 /100 WBC
PHOSPHATE SERPL-MCNC: 3.7 MG/DL (ref 2.7–4.5)
PLATELET # BLD AUTO: 380 K/UL (ref 150–450)
PMV BLD AUTO: 9.7 FL (ref 9.2–12.9)
POTASSIUM SERPL-SCNC: 4.8 MMOL/L (ref 3.5–5.1)
PROT SERPL-MCNC: 7.1 G/DL (ref 6–8.4)
PTH-INTACT SERPL-MCNC: 147.9 PG/ML (ref 9–77)
RBC # BLD AUTO: 4.52 M/UL (ref 4.6–6.2)
SATURATED IRON: 26 % (ref 20–50)
SODIUM SERPL-SCNC: 139 MMOL/L (ref 136–145)
TOTAL IRON BINDING CAPACITY: 337 UG/DL (ref 250–450)
TRANSFERRIN SERPL-MCNC: 228 MG/DL (ref 200–375)
WBC # BLD AUTO: 8.89 K/UL (ref 3.9–12.7)

## 2024-08-02 PROCEDURE — 82306 VITAMIN D 25 HYDROXY: CPT | Mod: TXP | Performed by: STUDENT IN AN ORGANIZED HEALTH CARE EDUCATION/TRAINING PROGRAM

## 2024-08-02 PROCEDURE — 85025 COMPLETE CBC W/AUTO DIFF WBC: CPT | Mod: TXP | Performed by: STUDENT IN AN ORGANIZED HEALTH CARE EDUCATION/TRAINING PROGRAM

## 2024-08-02 PROCEDURE — 83970 ASSAY OF PARATHORMONE: CPT | Mod: TXP | Performed by: STUDENT IN AN ORGANIZED HEALTH CARE EDUCATION/TRAINING PROGRAM

## 2024-08-02 PROCEDURE — 80053 COMPREHEN METABOLIC PANEL: CPT | Mod: TXP | Performed by: STUDENT IN AN ORGANIZED HEALTH CARE EDUCATION/TRAINING PROGRAM

## 2024-08-02 PROCEDURE — 83735 ASSAY OF MAGNESIUM: CPT | Mod: NTX | Performed by: STUDENT IN AN ORGANIZED HEALTH CARE EDUCATION/TRAINING PROGRAM

## 2024-08-02 PROCEDURE — 36415 COLL VENOUS BLD VENIPUNCTURE: CPT | Mod: PO,TXP | Performed by: STUDENT IN AN ORGANIZED HEALTH CARE EDUCATION/TRAINING PROGRAM

## 2024-08-02 PROCEDURE — 82728 ASSAY OF FERRITIN: CPT | Mod: NTX | Performed by: STUDENT IN AN ORGANIZED HEALTH CARE EDUCATION/TRAINING PROGRAM

## 2024-08-02 PROCEDURE — 84100 ASSAY OF PHOSPHORUS: CPT | Mod: TXP | Performed by: STUDENT IN AN ORGANIZED HEALTH CARE EDUCATION/TRAINING PROGRAM

## 2024-08-02 PROCEDURE — 83036 HEMOGLOBIN GLYCOSYLATED A1C: CPT | Mod: NTX | Performed by: STUDENT IN AN ORGANIZED HEALTH CARE EDUCATION/TRAINING PROGRAM

## 2024-08-02 PROCEDURE — 83540 ASSAY OF IRON: CPT | Mod: TXP | Performed by: STUDENT IN AN ORGANIZED HEALTH CARE EDUCATION/TRAINING PROGRAM

## 2024-08-08 ENCOUNTER — PATIENT MESSAGE (OUTPATIENT)
Dept: INTERNAL MEDICINE | Facility: CLINIC | Age: 49
End: 2024-08-08
Payer: COMMERCIAL

## 2024-08-08 DIAGNOSIS — Z79.4 TYPE 2 DIABETES MELLITUS WITH STAGE 4 CHRONIC KIDNEY DISEASE, WITH LONG-TERM CURRENT USE OF INSULIN: Primary | ICD-10-CM

## 2024-08-08 DIAGNOSIS — E11.22 TYPE 2 DIABETES MELLITUS WITH STAGE 4 CHRONIC KIDNEY DISEASE, WITH LONG-TERM CURRENT USE OF INSULIN: Primary | ICD-10-CM

## 2024-08-08 DIAGNOSIS — N18.4 TYPE 2 DIABETES MELLITUS WITH STAGE 4 CHRONIC KIDNEY DISEASE, WITH LONG-TERM CURRENT USE OF INSULIN: Primary | ICD-10-CM

## 2024-10-02 ENCOUNTER — TELEPHONE (OUTPATIENT)
Dept: TRANSPLANT | Facility: CLINIC | Age: 49
End: 2024-10-02
Payer: COMMERCIAL

## 2024-10-02 DIAGNOSIS — Z76.82 AWAITING ORGAN TRANSPLANT STATUS: Primary | ICD-10-CM

## 2024-10-02 DIAGNOSIS — K62.5 RECTAL BLEEDING: ICD-10-CM

## 2024-10-29 ENCOUNTER — TELEPHONE (OUTPATIENT)
Dept: INTERNAL MEDICINE | Facility: CLINIC | Age: 49
End: 2024-10-29
Payer: COMMERCIAL

## 2024-10-29 DIAGNOSIS — Z79.4 TYPE 2 DIABETES MELLITUS WITH STAGE 4 CHRONIC KIDNEY DISEASE, WITH LONG-TERM CURRENT USE OF INSULIN: ICD-10-CM

## 2024-10-29 DIAGNOSIS — N18.4 TYPE 2 DIABETES MELLITUS WITH STAGE 4 CHRONIC KIDNEY DISEASE, WITH LONG-TERM CURRENT USE OF INSULIN: ICD-10-CM

## 2024-10-29 DIAGNOSIS — E11.22 TYPE 2 DIABETES MELLITUS WITH STAGE 4 CHRONIC KIDNEY DISEASE, WITH LONG-TERM CURRENT USE OF INSULIN: ICD-10-CM

## 2024-10-29 RX ORDER — PEN NEEDLE, DIABETIC 30 GX3/16"
NEEDLE, DISPOSABLE MISCELLANEOUS
Qty: 100 EACH | Refills: 11 | Status: SHIPPED | OUTPATIENT
Start: 2024-10-29

## 2024-11-11 ENCOUNTER — TELEPHONE (OUTPATIENT)
Dept: SURGERY | Facility: CLINIC | Age: 49
End: 2024-11-11
Payer: COMMERCIAL

## 2024-11-18 DIAGNOSIS — Z79.4 TYPE 2 DIABETES MELLITUS WITH STAGE 4 CHRONIC KIDNEY DISEASE, WITH LONG-TERM CURRENT USE OF INSULIN: ICD-10-CM

## 2024-11-18 DIAGNOSIS — N18.4 TYPE 2 DIABETES MELLITUS WITH STAGE 4 CHRONIC KIDNEY DISEASE, WITH LONG-TERM CURRENT USE OF INSULIN: ICD-10-CM

## 2024-11-18 DIAGNOSIS — E11.22 TYPE 2 DIABETES MELLITUS WITH STAGE 4 CHRONIC KIDNEY DISEASE, WITH LONG-TERM CURRENT USE OF INSULIN: ICD-10-CM

## 2024-11-18 NOTE — TELEPHONE ENCOUNTER
----- Message from Day sent at 11/18/2024  2:05 PM CST -----  Contact: [employer]ts [employer]  [phone] Pts wife Mrs. Pena  Requesting an RX refill or new RX.    Is this a refill or new RX: New     RX name and strength Mounjaro    Is this a 30 day or 90 day RX: N/A    Pharmacy name and phone # [facility]: Address, [address] [place] [address]. Phone# [phone]        The doctors have asked that we provide their patients with the following 2 reminders -- prescription refills can take up to 72 hours, and a friendly reminder that in the future you can use your [facility] account to request refills:

## 2024-11-18 NOTE — TELEPHONE ENCOUNTER
----- Message from Day sent at 11/18/2024  2:05 PM CST -----  Contact: [employer]  [phone] Pts wife Mrs. Pena  Requesting an RX refill or new RX.    Is this a refill or new RX: New     RX name and strength Mounjaro    Is this a 30 day or 90 day RX: N/A    Pharmacy name and phone # [facility]: Address, [address] [place] [address]. Phone# [phone]        The doctors have asked that we provide their patients with the following 2 reminders -- prescription refills can take up to 72 hours, and a friendly reminder that in the future you can use your [facility] account to request refills:

## 2024-11-18 NOTE — TELEPHONE ENCOUNTER
Care Due:                  Date            Visit Type   Department     Provider  --------------------------------------------------------------------------------                                EP -                              PRIMARY      [facility] INTERNAL  Last Visit: 07-      CARE ([facility])   MEDICINE       Kristofer Menard  Next Visit: None Scheduled  None         None Found                                                            Last  Test          Frequency    Reason                     Performed    Due Date  --------------------------------------------------------------------------------    HBA1C.......  6 months...  insulin, tirzepatide.....  08- 01-    [facility] Embedded Care Due Messages. Reference number: [id number].   11/18/2024 2:18:09 PM CST

## 2025-01-14 ENCOUNTER — TELEPHONE (OUTPATIENT)
Dept: TRANSPLANT | Facility: CLINIC | Age: 50
End: 2025-01-14
Payer: COMMERCIAL

## 2025-01-14 DIAGNOSIS — Z76.82 ORGAN TRANSPLANT CANDIDATE: ICD-10-CM

## 2025-01-14 DIAGNOSIS — Z76.82 AWAITING ORGAN TRANSPLANT STATUS: Primary | ICD-10-CM

## 2025-01-14 DIAGNOSIS — N18.6 END STAGE RENAL DISEASE: ICD-10-CM

## 2025-01-14 NOTE — TELEPHONE ENCOUNTER
Spoke to pt in regards to GI Clearance. Pt stated he wants to get a second opinion. Stated he mailed off request for medical records, he is planning on seeing a doctor at [facility]. Pt informed I will be in contact with him tomorrow to discuss his coordinator's decision. PT CANCELLED X2.

## 2025-01-21 ENCOUNTER — TELEPHONE (OUTPATIENT)
Dept: INTERNAL MEDICINE | Facility: CLINIC | Age: 50
End: 2025-01-21

## 2025-02-24 ENCOUNTER — OFFICE VISIT (OUTPATIENT)
Dept: INTERNAL MEDICINE | Facility: CLINIC | Age: 50
End: 2025-02-24
Payer: COMMERCIAL

## 2025-02-24 VITALS
OXYGEN SATURATION: 98 % | SYSTOLIC BLOOD PRESSURE: 124 MMHG | HEART RATE: 94 BPM | HEIGHT: 71 IN | DIASTOLIC BLOOD PRESSURE: 72 MMHG | WEIGHT: 252.44 LBS | BODY MASS INDEX: 35.34 KG/M2

## 2025-02-24 DIAGNOSIS — N02.B9 IGA NEPHROPATHY: ICD-10-CM

## 2025-02-24 DIAGNOSIS — E78.2 MIXED HYPERLIPIDEMIA: ICD-10-CM

## 2025-02-24 DIAGNOSIS — I10 PRIMARY HYPERTENSION: ICD-10-CM

## 2025-02-24 DIAGNOSIS — N18.4 TYPE 2 DIABETES MELLITUS WITH STAGE 4 CHRONIC KIDNEY DISEASE, WITH LONG-TERM CURRENT USE OF INSULIN: Primary | ICD-10-CM

## 2025-02-24 DIAGNOSIS — N18.4 CHRONIC KIDNEY DISEASE, STAGE 4 (SEVERE): ICD-10-CM

## 2025-02-24 DIAGNOSIS — E11.22 TYPE 2 DIABETES MELLITUS WITH STAGE 4 CHRONIC KIDNEY DISEASE, WITH LONG-TERM CURRENT USE OF INSULIN: Primary | ICD-10-CM

## 2025-02-24 DIAGNOSIS — Z79.4 TYPE 2 DIABETES MELLITUS WITH STAGE 4 CHRONIC KIDNEY DISEASE, WITH LONG-TERM CURRENT USE OF INSULIN: Primary | ICD-10-CM

## 2025-02-24 PROCEDURE — 99999 PR PBB SHADOW E&M-EST. PATIENT-LVL V: CPT | Mod: PBBFAC,,, | Performed by: STUDENT IN AN ORGANIZED HEALTH CARE EDUCATION/TRAINING PROGRAM

## 2025-02-24 PROCEDURE — 3008F BODY MASS INDEX DOCD: CPT | Mod: CPTII,S$GLB,, | Performed by: STUDENT IN AN ORGANIZED HEALTH CARE EDUCATION/TRAINING PROGRAM

## 2025-02-24 PROCEDURE — 99214 OFFICE O/P EST MOD 30 MIN: CPT | Mod: S$GLB,,, | Performed by: STUDENT IN AN ORGANIZED HEALTH CARE EDUCATION/TRAINING PROGRAM

## 2025-02-24 PROCEDURE — 4010F ACE/ARB THERAPY RXD/TAKEN: CPT | Mod: CPTII,S$GLB,, | Performed by: STUDENT IN AN ORGANIZED HEALTH CARE EDUCATION/TRAINING PROGRAM

## 2025-02-24 PROCEDURE — 3074F SYST BP LT 130 MM HG: CPT | Mod: CPTII,S$GLB,, | Performed by: STUDENT IN AN ORGANIZED HEALTH CARE EDUCATION/TRAINING PROGRAM

## 2025-02-24 PROCEDURE — 1160F RVW MEDS BY RX/DR IN RCRD: CPT | Mod: CPTII,S$GLB,, | Performed by: STUDENT IN AN ORGANIZED HEALTH CARE EDUCATION/TRAINING PROGRAM

## 2025-02-24 PROCEDURE — 3044F HG A1C LEVEL LT 7.0%: CPT | Mod: CPTII,S$GLB,, | Performed by: STUDENT IN AN ORGANIZED HEALTH CARE EDUCATION/TRAINING PROGRAM

## 2025-02-24 PROCEDURE — 1159F MED LIST DOCD IN RCRD: CPT | Mod: CPTII,S$GLB,, | Performed by: STUDENT IN AN ORGANIZED HEALTH CARE EDUCATION/TRAINING PROGRAM

## 2025-02-24 PROCEDURE — 3078F DIAST BP <80 MM HG: CPT | Mod: CPTII,S$GLB,, | Performed by: STUDENT IN AN ORGANIZED HEALTH CARE EDUCATION/TRAINING PROGRAM

## 2025-02-24 RX ORDER — LABETALOL 100 MG/1
100 TABLET, FILM COATED ORAL 2 TIMES DAILY
Qty: 180 TABLET | Refills: 3 | Status: SHIPPED | OUTPATIENT
Start: 2025-02-24 | End: 2026-02-24

## 2025-02-24 RX ORDER — CALCITRIOL 0.25 UG/1
0.25 CAPSULE ORAL DAILY
Qty: 90 CAPSULE | Refills: 3 | Status: SHIPPED | OUTPATIENT
Start: 2025-02-24

## 2025-02-24 RX ORDER — VALSARTAN 320 MG/1
320 TABLET ORAL DAILY
Qty: 90 TABLET | Refills: 3 | Status: SHIPPED | OUTPATIENT
Start: 2025-02-24 | End: 2026-02-24

## 2025-02-24 RX ORDER — INSULIN LISPRO 100 [IU]/ML
INJECTION, SOLUTION INTRAVENOUS; SUBCUTANEOUS
Qty: 45 ML | Refills: 0 | Status: SHIPPED | OUTPATIENT
Start: 2025-02-24

## 2025-02-24 RX ORDER — INSULIN GLARGINE-YFGN 100 [IU]/ML
10 INJECTION, SOLUTION SUBCUTANEOUS NIGHTLY
Qty: 10 ML | Refills: 5 | Status: SHIPPED | OUTPATIENT
Start: 2025-02-24 | End: 2025-06-09 | Stop reason: SDUPTHER

## 2025-02-24 NOTE — PATIENT INSTRUCTIONS
Restart night time insulin.   You will be taking Insulin Glargine (Semglee) 10 units at bedtime.   Continue taking the Insulin Lispro (Humalog) 4 units with dinner.

## 2025-02-24 NOTE — PROGRESS NOTES
SUBJECTIVE     Chief Complaint   Patient presents with    check up       HPI  Edouard Pena is a 49 y.o. male with HTN, HLD, T2DM, IgA nephropathy, CKD4, anemia due to CKD, secondary hyperparathyroidism of renal origin, tobacco use that presents for follow-up.     DIABETES:  He checks blood sugar daily and takes 4 units of insulin before dinner consistently. He has discontinued Lantus insulin and Mounjaro (Tirzepatide), reporting the latter caused discomfort and constipation. He has also stopped using the Dexcom continuous glucose monitor, preferring his current method of glucose monitoring. He denies any low blood sugar readings. Blood sugars routinely in the low 200s.     RENAL DISEASE:  He reports Dr. Ornelas indicated his kidney function has stabilized and improved. He was previously placed on the transplant list but is uncertain of his current status after being contacted by the coordinator regarding an unscheduled appointment. He is seeking a second opinion at [facility], pending medical records transfer and referral.    CURRENT MEDICATIONS:  He continues calcitriol, sodium bicarbonate, valsartan, and labetalol.         PAST MEDICAL HISTORY:  Past Medical History:   Diagnosis Date    Anemia 03/01/2023    Chronic kidney disease     Chronic kidney disease stage II    Colon polyps     Hyperlipidemia 12/13/2012    Hypertension        PAST SURGICAL HISTORY:  Past Surgical History:   Procedure Laterality Date    COLONOSCOPY N/A 04/24/2023    Procedure: COLONOSCOPY;  Surgeon: Mayelin Forte MD;  Location: [facility] ENDO ([address] [place] [address]);  Service: Endoscopy;  Laterality: N/A;  Golytely ok per nephrologist-see note 3/24- instr to portal -MS  4-18-23- preop call- voice mail on- no message left- MMG    COSMETIC SURGERY      pyloric stenosis      RENAL BIOPSY Left 09/01/2022    Procedure: BIOPSY, KIDNEY;  Surgeon: Sinan Abad MD;  Location: [facility] CATH LAB;  Service: Interventional Nephrology;  Laterality: Left;  "      FAMILY HISTORY:  Family History   Problem Relation Name Age of Onset    COPD Mother      Heart disease Mother      Peripheral vascular disease Mother      Diabetes Father      Kidney disease Father      Stroke Father      Hypertension Father      Heart disease Father  70        CABG x 3    Kidney failure Father      Heart disease Maternal Grandfather         ALLERGIES AND MEDICATIONS: updated and reviewed.  Review of patient's allergies indicates:  No Known Allergies  Current Medications[1]    ROS  Review of Systems   Constitutional: Negative.  Negative for chills and fever.   HENT: Negative.  Negative for congestion and sore throat.    Respiratory:  Negative for chest tightness and shortness of breath.    Cardiovascular:  Negative for chest pain and palpitations.   Gastrointestinal:  Negative for abdominal pain, constipation and diarrhea.   Genitourinary:  Negative for dysuria and frequency.   Musculoskeletal:  Negative for back pain, joint swelling and neck pain.   Skin: Negative.    Neurological:  Negative for dizziness, syncope and headaches.   Psychiatric/Behavioral: Negative.           OBJECTIVE     Physical Exam  Vitals:    02/24/25 1520   BP: 124/72   Pulse: 94    Body mass index is 35.21 kg/m².  Weight: 114.5 kg (252 lb 6.8 oz)   Height: 5' 11" (180.3 cm)     Physical Exam  Vitals reviewed.   Constitutional:       General: He is not in acute distress.     Appearance: Normal appearance.   HENT:      Head: Normocephalic and atraumatic.      Mouth/Throat:      Mouth: Mucous membranes are moist.      Pharynx: Oropharynx is clear.   Eyes:      Extraocular Movements: Extraocular movements intact.      Conjunctiva/sclera: Conjunctivae normal.      Pupils: Pupils are equal, round, and reactive to light.   Cardiovascular:      Rate and Rhythm: Normal rate and regular rhythm.      Pulses: Normal pulses.      Heart sounds: Normal heart sounds.   Pulmonary:      Effort: Pulmonary effort is normal.      Breath " sounds: Normal breath sounds.   Abdominal:      General: There is no distension.   Musculoskeletal:         General: Normal range of motion.      Cervical back: Normal range of motion and neck supple.      Right lower leg: No edema.      Left lower leg: No edema.   Skin:     General: Skin is warm and dry.   Neurological:      General: No focal deficit present.      Mental Status: He is alert.           ASSESSMENT     49 y.o. male with     1. Type 2 diabetes mellitus with stage 4 chronic kidney disease, with long-term current use of insulin    2. Primary hypertension    3. Mixed hyperlipidemia    4. Chronic kidney disease, stage 4 (severe)    5. IgA nephropathy        PLAN:     1. Type 2 diabetes mellitus with stage 4 chronic kidney disease, with long-term current use of insulin  - Not controlled. Restart basal insulin. Continue prandial insulin.   - HEMOGLOBIN A1C; Future  - LIPID PANEL; Future  - Comprehensive Metabolic Panel; Future  - insulin glargine-yfgn (SEMGLEE,INSULIN GLARGINE-YFGN,) 100 unit/mL Soln; Inject 10 Units into the skin every evening.  Dispense: 10 mL; Refill: 5  - insulin lispro (HUMALOG KWIKPEN INSULIN) 100 unit/mL pen; Inject 4 units daily into skin with dinner.  Dispense: 45 mL; Refill: 0    2. Primary hypertension  - Controlled on current regimen. Continue.   - Comprehensive Metabolic Panel; Future  - valsartan (DIOVAN) 320 MG tablet; Take 1 tablet (320 mg total) by mouth once daily. For blood pressure.  Dispense: 90 tablet; Refill: 3  - labetaloL (NORMODYNE) 100 MG tablet; Take 1 tablet (100 mg total) by mouth 2 (two) times daily. For blood pressure.  Dispense: 180 tablet; Refill: 3    3. Mixed hyperlipidemia  - Stable on statin. Continue.   - LIPID PANEL; Future    4. Chronic kidney disease, stage 4 (severe)  - Needs to re-establish with nephrology.   - Comprehensive Metabolic Panel; Future  - Ambulatory referral/consult to Nephrology; Future    5. IgA nephropathy  - Needs to re-establish  "with nephrology.   - Comprehensive Metabolic Panel; Future  - Ambulatory referral/consult to Nephrology; Future      RTC in 3 months       Kristofer Menard MD  Family Medicine  [facility]  06/15/2025    This note was generated with the assistance of ambient listening technology. Verbal consent was obtained by the patient and accompanying visitor(s) for the recording of patient appointment to facilitate this note. I attest to having reviewed and edited the generated note for accuracy, though some syntax or spelling errors may persist. Please contact the author of this note for any clarification.      Follow up in about 3 months (around 5/24/2025).                       [1]   Current Outpatient Medications   Medication Sig Dispense Refill    acetaminophen (TYLENOL) 500 MG tablet Take 1,000 mg by mouth every 4 (four) hours as needed for Pain.      allopurinoL (ZYLOPRIM) 100 MG tablet Take 1 tablet (100 mg total) by mouth once daily. 90 tablet 3    blood sugar diagnostic Strp 1 strip by Misc.(Non-Drug; Combo Route) route 2 (two) times daily. ICD 10: E11.22 100 each 6    blood-glucose meter kit Use as instructed to check blood sugar two times a day. ICD 10: E11.22 1 each 0    calcium carbonate (TUMS ORAL) Take 1 tablet by mouth daily as needed (Heartburn).      colchicine (COLCRYS) 0.6 mg tablet Take 1 tablet (0.6 mg total) by mouth daily as needed (gout flare-up). 90 tablet 0    hydrocortisone (ANUSOL-HC) 2.5 % rectal cream Place rectally 2 (two) times daily. 28 g 2    lancets Misc 1 lancet by Misc.(Non-Drug; Combo Route) route 3 (three) times daily. ICD 10: E11.22 300 each 3    pen needle, diabetic (BD ULTRA-FINE DAVID PEN NEEDLE) 32 gauge x 5/32" Ndle Use to inject insulin 4 times daily 100 each 11    prednisoLONE acetate (PRED FORTE) 1 % DrpS Place 1 drop into both eyes daily as needed (Inflammation).      sodium bicarbonate 650 MG tablet Take 2 tablets (1,300 mg total) by mouth 2 " (two) times daily. 360 tablet 3    atorvastatin (LIPITOR) 80 MG tablet Take 1 tablet (80 mg total) by mouth once daily. 90 tablet 3    calcitRIOL (ROCALTROL) 0.25 MCG Cap Take 1 capsule (0.25 mcg total) by mouth once daily. 90 capsule 3    insulin glargine-yfgn (SEMGLEE,INSULIN GLARGINE-YFGN,) 100 unit/mL Soln Inject 10 Units into the skin every evening. 10 mL 5    insulin lispro (HUMALOG KWIKPEN INSULIN) 100 unit/mL pen Inject 4 units daily into skin with dinner. 45 mL 0    labetaloL (NORMODYNE) 100 MG tablet Take 1 tablet (100 mg total) by mouth 2 (two) times daily. For blood pressure. 180 tablet 3    sevelamer HCL (RENAGEL) 800 MG Tab Take 1 tablet (800 mg total) by mouth 3 (three) times daily with meals. (Patient taking differently: Take 800 mg by mouth daily with dinner or evening meal.) 270 tablet 3    valsartan (DIOVAN) 320 MG tablet Take 1 tablet (320 mg total) by mouth once daily. For blood pressure. 90 tablet 3     No current facility-administered medications for this visit.

## 2025-03-03 ENCOUNTER — LAB VISIT (OUTPATIENT)
Dept: LAB | Facility: HOSPITAL | Age: 50
End: 2025-03-03
Attending: STUDENT IN AN ORGANIZED HEALTH CARE EDUCATION/TRAINING PROGRAM
Payer: COMMERCIAL

## 2025-03-03 DIAGNOSIS — N18.4 CHRONIC KIDNEY DISEASE, STAGE 4 (SEVERE): ICD-10-CM

## 2025-03-03 DIAGNOSIS — E78.2 MIXED HYPERLIPIDEMIA: ICD-10-CM

## 2025-03-03 DIAGNOSIS — I10 PRIMARY HYPERTENSION: ICD-10-CM

## 2025-03-03 DIAGNOSIS — Z79.4 TYPE 2 DIABETES MELLITUS WITH STAGE 4 CHRONIC KIDNEY DISEASE, WITH LONG-TERM CURRENT USE OF INSULIN: ICD-10-CM

## 2025-03-03 DIAGNOSIS — N18.4 TYPE 2 DIABETES MELLITUS WITH STAGE 4 CHRONIC KIDNEY DISEASE, WITH LONG-TERM CURRENT USE OF INSULIN: ICD-10-CM

## 2025-03-03 DIAGNOSIS — E11.22 TYPE 2 DIABETES MELLITUS WITH STAGE 4 CHRONIC KIDNEY DISEASE, WITH LONG-TERM CURRENT USE OF INSULIN: ICD-10-CM

## 2025-03-03 DIAGNOSIS — N02.B9 IGA NEPHROPATHY: ICD-10-CM

## 2025-03-03 LAB
ALBUMIN SERPL BCP-MCNC: 3.5 G/DL (ref 3.5–5.2)
ALP SERPL-CCNC: 93 U/L (ref 40–150)
ALT SERPL W/O P-5'-P-CCNC: 15 U/L (ref 10–44)
ANION GAP SERPL CALC-SCNC: 10 MMOL/L (ref 8–16)
AST SERPL-CCNC: 18 U/L (ref 10–40)
BILIRUB SERPL-MCNC: 0.2 MG/DL (ref 0.1–1)
BUN SERPL-MCNC: 34 MG/DL (ref 6–20)
CALCIUM SERPL-MCNC: 9.3 MG/DL (ref 8.7–10.5)
CHLORIDE SERPL-SCNC: 110 MMOL/L (ref 95–110)
CHOLEST SERPL-MCNC: 223 MG/DL (ref 120–199)
CHOLEST/HDLC SERPL: 6.6 {RATIO} (ref 2–5)
CO2 SERPL-SCNC: 19 MMOL/L (ref 23–29)
CREAT SERPL-MCNC: 2.7 MG/DL (ref 0.5–1.4)
EST. GFR  (NO RACE VARIABLE): 27.7 ML/MIN/1.73 M^2
ESTIMATED AVG GLUCOSE: 151 MG/DL (ref 68–131)
GLUCOSE SERPL-MCNC: 153 MG/DL (ref 70–110)
HBA1C MFR BLD: 6.9 % (ref 4–5.6)
HDLC SERPL-MCNC: 34 MG/DL (ref 40–75)
HDLC SERPL: 15.2 % (ref 20–50)
LDLC SERPL CALC-MCNC: 131 MG/DL (ref 63–159)
NONHDLC SERPL-MCNC: 189 MG/DL
POTASSIUM SERPL-SCNC: 4.6 MMOL/L (ref 3.5–5.1)
PROT SERPL-MCNC: 7.2 G/DL (ref 6–8.4)
SODIUM SERPL-SCNC: 139 MMOL/L (ref 136–145)
TRIGL SERPL-MCNC: 290 MG/DL (ref 30–150)

## 2025-03-03 PROCEDURE — 80061 LIPID PANEL: CPT | Mod: TXP | Performed by: STUDENT IN AN ORGANIZED HEALTH CARE EDUCATION/TRAINING PROGRAM

## 2025-03-03 PROCEDURE — 80053 COMPREHEN METABOLIC PANEL: CPT | Mod: TXP | Performed by: STUDENT IN AN ORGANIZED HEALTH CARE EDUCATION/TRAINING PROGRAM

## 2025-03-03 PROCEDURE — 83036 HEMOGLOBIN GLYCOSYLATED A1C: CPT | Mod: TXP | Performed by: STUDENT IN AN ORGANIZED HEALTH CARE EDUCATION/TRAINING PROGRAM

## 2025-03-14 ENCOUNTER — COMMITTEE REVIEW (OUTPATIENT)
Dept: TRANSPLANT | Facility: CLINIC | Age: 50
End: 2025-03-14
Payer: COMMERCIAL

## 2025-03-14 NOTE — LETTER
March 14, 2025    Edouard Pena  [address]  [place] [address]    Dear Edouard Pena:  MRN: [id number]    It is the duty of the [facility] to determine which patients are candidates for a transplant. For this reason, our committee has the difficult task of evaluating patients to determine which ones have the greatest chance of having a successful transplant. We are aware of the magnitude of this responsibility, and we approach it with reverence and humility.    It is with regret I inform you that you are not approved as a transplant candidate due to  incomplete work-up.  Patient is outstanding for GI clearance for rectal bleeding and hepatology clearance as well for solid echogenic lesion. Patient can be re referred to us by his nephrologist once he completes the requested clearances. .   Patient was also advised to continue following-up with his nephrologist on a regular basis.  Based on this review, we have determined that at this time, you are not a candidate for a transplant at [facility].      The selection committee carefully considers each patient's transplant candidacy and determines whether it is safe to proceed with transplantation on a case-by-case basis using established selection criteria.  At present, the risk of proceeding with an elective transplant surgery has become too high.                                                                               Although the selection committee believes you are not a suitable transplant candidate, you have the option to be evaluated at other transplant centers who may have different selection criteria.  You may request your [facility] records be sent to any center of your choice by contacting our Medical Records Department at [phone].                                                                               Attached is a letter from the [employer] for Organ Sharing (UNOS).  It describes the services and  information offered to patients by UNOS and the Organ Procurement and Transplant Network.    The [facility] sincerely wishes you the best and remains available to answer any questions.  Please do not hesitate to contact our pre-transplant office if we can assist you in any other way.                                                                               Sincerely,      Rosemary Dave MD  Medical Director, Kidney & Kidney/Pancreas Transplantation    CC:  Kristofer Menard MD    Encl: UNOS Letter               The Organ Procurement and Transplantation Network   Toll-free patient services line: [phone]  Your resource for organ transplant information      Staffed 8:30 am - 5:00 pm ET Monday - Friday   Leave a message 24/7 to receive a call back    The Organ Procurement and Transplantation Network (OPTN) is the national transplant system. It makes the policies that decide how donated organs are matched to patients waiting for a transplant. The OPTN:    Makes sure donated organs get matched to people on the transplant waiting list  Tells people about the donation and transplant processes  Makes sure that the public knows about the need for more organ and tissue donations    The OPTN has a free patient services line that you can call to:  Get more information about:   o Organ donation and organ transplants   o Donation and transplant policies  Get an information kit with:   o A list of transplant hospitals   o Waiting list information  Talk about any questions you may have about your transplant hospital or organ procurement organization. The staff will do their best to help you or point you to others who may help.  Find out how you can volunteer with the OPTN and help shape transplant policy    The patient services line number is: 0-163-615-3992    Patient services line staff CANNOT answer questions about your own medical care, including:  Waiting list status  Test  results  Medical records  You will need to call your transplant hospital for this information.    The following websites have more information about transplantation and donation:  OPTN: https://optn.transplant.hrsa.gov/  For potential living donors and transplant recipients:   o Living with transplant: https://www.transplantliving.org/   o Living donation process: https://optn.transplant.hrsa.gov/living-donation/     o Financial assistance: https://www.livingdonorassistance.org/  Transplantation data: https://www.srtr.org/  Organ donation: https://www.organdonor.gov/    Volunteer with the OPTN: https://optn.transplant.hrsa.gov/get-involved

## 2025-03-14 NOTE — COMMITTEE REVIEW
Native Organ Dx: Diabetes Mellitus - Type II      Not approved for LRD/CAD transplant due to incomplete work-up.  Patient is outstanding for GI clearance for rectal bleeding and hepatology clearance as well for solid echogenic lesion. Patient can be re referred to us by his nephrologist once he completes the requested clearances.  Patient was also advised to continue following-up with his nephrologist on a regular basis. Patient verbalized understanding and agreed to the plan.    Patient was informed of the decision after the committee meeting by the phone.  Note written by   Steffanie Marquez RN  ===============================================    I was present at the meeting and attest to the general consensus of the committee.   Parvez Ornelas Jr.

## 2025-05-26 ENCOUNTER — RESULTS FOLLOW-UP (OUTPATIENT)
Dept: INTERNAL MEDICINE | Facility: CLINIC | Age: 50
End: 2025-05-26

## 2025-05-27 ENCOUNTER — LAB VISIT (OUTPATIENT)
Dept: LAB | Facility: HOSPITAL | Age: 50
End: 2025-05-27
Attending: STUDENT IN AN ORGANIZED HEALTH CARE EDUCATION/TRAINING PROGRAM
Payer: COMMERCIAL

## 2025-05-27 ENCOUNTER — OFFICE VISIT (OUTPATIENT)
Dept: INTERNAL MEDICINE | Facility: CLINIC | Age: 50
End: 2025-05-27
Payer: COMMERCIAL

## 2025-05-27 VITALS
OXYGEN SATURATION: 98 % | BODY MASS INDEX: 34.84 KG/M2 | HEART RATE: 86 BPM | WEIGHT: 248.88 LBS | DIASTOLIC BLOOD PRESSURE: 86 MMHG | HEIGHT: 71 IN | SYSTOLIC BLOOD PRESSURE: 132 MMHG

## 2025-05-27 DIAGNOSIS — I10 PRIMARY HYPERTENSION: ICD-10-CM

## 2025-05-27 DIAGNOSIS — N18.4 CHRONIC KIDNEY DISEASE, STAGE 4 (SEVERE): ICD-10-CM

## 2025-05-27 DIAGNOSIS — E11.22 TYPE 2 DIABETES MELLITUS WITH STAGE 4 CHRONIC KIDNEY DISEASE, WITH LONG-TERM CURRENT USE OF INSULIN: Primary | ICD-10-CM

## 2025-05-27 DIAGNOSIS — N18.4 TYPE 2 DIABETES MELLITUS WITH STAGE 4 CHRONIC KIDNEY DISEASE, WITH LONG-TERM CURRENT USE OF INSULIN: Primary | ICD-10-CM

## 2025-05-27 DIAGNOSIS — N18.4 TYPE 2 DIABETES MELLITUS WITH STAGE 4 CHRONIC KIDNEY DISEASE, WITH LONG-TERM CURRENT USE OF INSULIN: ICD-10-CM

## 2025-05-27 DIAGNOSIS — E11.22 TYPE 2 DIABETES MELLITUS WITH STAGE 4 CHRONIC KIDNEY DISEASE, WITH LONG-TERM CURRENT USE OF INSULIN: ICD-10-CM

## 2025-05-27 DIAGNOSIS — Z79.4 TYPE 2 DIABETES MELLITUS WITH STAGE 4 CHRONIC KIDNEY DISEASE, WITH LONG-TERM CURRENT USE OF INSULIN: Primary | ICD-10-CM

## 2025-05-27 DIAGNOSIS — N02.B9 IGA NEPHROPATHY: ICD-10-CM

## 2025-05-27 DIAGNOSIS — Z79.4 TYPE 2 DIABETES MELLITUS WITH STAGE 4 CHRONIC KIDNEY DISEASE, WITH LONG-TERM CURRENT USE OF INSULIN: ICD-10-CM

## 2025-05-27 LAB
ALBUMIN SERPL BCP-MCNC: 3.8 G/DL (ref 3.5–5.2)
ANION GAP (OHS): 14 MMOL/L (ref 8–16)
BUN SERPL-MCNC: 40 MG/DL (ref 6–20)
CALCIUM SERPL-MCNC: 9.1 MG/DL (ref 8.7–10.5)
CHLORIDE SERPL-SCNC: 104 MMOL/L (ref 95–110)
CO2 SERPL-SCNC: 21 MMOL/L (ref 23–29)
CREAT SERPL-MCNC: 4.4 MG/DL (ref 0.5–1.4)
EAG (OHS): 146 MG/DL (ref 68–131)
GFR SERPLBLD CREATININE-BSD FMLA CKD-EPI: 15 ML/MIN/1.73/M2
GLUCOSE SERPL-MCNC: 142 MG/DL (ref 70–110)
HBA1C MFR BLD: 6.7 % (ref 4–5.6)
PHOSPHATE SERPL-MCNC: 3.5 MG/DL (ref 2.7–4.5)
POTASSIUM SERPL-SCNC: 4.7 MMOL/L (ref 3.5–5.1)
SODIUM SERPL-SCNC: 139 MMOL/L (ref 136–145)

## 2025-05-27 PROCEDURE — 36415 COLL VENOUS BLD VENIPUNCTURE: CPT

## 2025-05-27 PROCEDURE — 3008F BODY MASS INDEX DOCD: CPT | Mod: CPTII,S$GLB,, | Performed by: STUDENT IN AN ORGANIZED HEALTH CARE EDUCATION/TRAINING PROGRAM

## 2025-05-27 PROCEDURE — 99214 OFFICE O/P EST MOD 30 MIN: CPT | Mod: S$GLB,,, | Performed by: STUDENT IN AN ORGANIZED HEALTH CARE EDUCATION/TRAINING PROGRAM

## 2025-05-27 PROCEDURE — 4010F ACE/ARB THERAPY RXD/TAKEN: CPT | Mod: CPTII,S$GLB,, | Performed by: STUDENT IN AN ORGANIZED HEALTH CARE EDUCATION/TRAINING PROGRAM

## 2025-05-27 PROCEDURE — 3079F DIAST BP 80-89 MM HG: CPT | Mod: CPTII,S$GLB,, | Performed by: STUDENT IN AN ORGANIZED HEALTH CARE EDUCATION/TRAINING PROGRAM

## 2025-05-27 PROCEDURE — 3075F SYST BP GE 130 - 139MM HG: CPT | Mod: CPTII,S$GLB,, | Performed by: STUDENT IN AN ORGANIZED HEALTH CARE EDUCATION/TRAINING PROGRAM

## 2025-05-27 PROCEDURE — 83036 HEMOGLOBIN GLYCOSYLATED A1C: CPT

## 2025-05-27 PROCEDURE — 99999 PR PBB SHADOW E&M-EST. PATIENT-LVL V: CPT | Mod: PBBFAC,,, | Performed by: STUDENT IN AN ORGANIZED HEALTH CARE EDUCATION/TRAINING PROGRAM

## 2025-05-27 PROCEDURE — 3044F HG A1C LEVEL LT 7.0%: CPT | Mod: CPTII,S$GLB,, | Performed by: STUDENT IN AN ORGANIZED HEALTH CARE EDUCATION/TRAINING PROGRAM

## 2025-05-27 PROCEDURE — 1160F RVW MEDS BY RX/DR IN RCRD: CPT | Mod: CPTII,S$GLB,, | Performed by: STUDENT IN AN ORGANIZED HEALTH CARE EDUCATION/TRAINING PROGRAM

## 2025-05-27 PROCEDURE — 1159F MED LIST DOCD IN RCRD: CPT | Mod: CPTII,S$GLB,, | Performed by: STUDENT IN AN ORGANIZED HEALTH CARE EDUCATION/TRAINING PROGRAM

## 2025-05-27 PROCEDURE — 80069 RENAL FUNCTION PANEL: CPT

## 2025-05-27 RX ORDER — ATORVASTATIN CALCIUM 80 MG/1
80 TABLET, FILM COATED ORAL DAILY
Qty: 90 TABLET | Refills: 3 | Status: SHIPPED | OUTPATIENT
Start: 2025-05-27

## 2025-05-27 NOTE — PROGRESS NOTES
SUBJECTIVE     Chief Complaint   Patient presents with    Follow-up     HPI  Edouard Pena is a 49 y.o. male with T2DM, HTN, HLD, CKD4, IgA Nephropathy, anemia of CKD4, secondary hyperparathyroidism of renal origin, hyperuricemia, tobacco use that presents for follow-up.     T2DM   - Currently taking: Only taking Humalog 4 units with dinner. Not taking long acting Semglee.   - Compliant with and tolerating meds well.  - Home readings: Past 7 day   Ranges from past 14 [id number]  Ranges from 7 days 108-161  - Denies signs/symptoms of hyper/hypoglycemia.  - Most recent A1c: 6.9%  - Most recent Microalbumin: Elevated at 362.4 ug/mg in 8/2024  - Takes a statin: Not taking his Atorvastatin 80 mg daily  - Patient does have CKD4 and is on valsartan. He has IgA nephropathy, established with nephrology.   - Eye exam: Due  - Foot exam: Due  - PNA vaccine: Declines  - Complications: CKD4  Lab Results   Component Value Date    HGBA1C 6.7 (H) 05/27/2025    HGBA1C 6.9 (H) 03/03/2025    HGBA1C 7.2 (H) 08/02/2024     Lab Results   Component Value Date    LDLCALC 131.0 03/03/2025    CREATININE 4.4 (H) 05/27/2025   .     HTN -   Currently prescribed valsartan 320 mg daily.  Patient endorses taking medication as directed.  Denies side effects or concerns while taking medication.  Patient not currently checking BP at home.  Denies headaches, vision changes, CP, palpitations, or other concerning symptoms.  Lab Results   Component Value Date    MICALBCREAT 362.4 (H) 08/02/2024     BP Readings from Last 3 Encounters:   05/27/25 132/86   02/24/25 124/72   07/23/24 (!) 140/78       PAST MEDICAL HISTORY:  Past Medical History:   Diagnosis Date    Anemia 03/01/2023    Chronic kidney disease     Chronic kidney disease stage II    Colon polyps     Hyperlipidemia 12/13/2012    Hypertension        PAST SURGICAL HISTORY:  Past Surgical History:   Procedure Laterality Date    COLONOSCOPY N/A 04/24/2023    Procedure: COLONOSCOPY;   "Surgeon: Mayelin Forte MD;  Location: [facility] ENDO ([address] [place] [address]);  Service: Endoscopy;  Laterality: N/A;  Golytely ok per nephrologist-see note 3/24- instr to portal -MS  4-18-23- preop call- voice mail on- no message left- MMG    COSMETIC SURGERY      pyloric stenosis      RENAL BIOPSY Left 09/01/2022    Procedure: BIOPSY, KIDNEY;  Surgeon: Sinan Abad MD;  Location: [facility] CATH LAB;  Service: Interventional Nephrology;  Laterality: Left;       FAMILY HISTORY:  Family History   Problem Relation Name Age of Onset    COPD Mother      Heart disease Mother      Peripheral vascular disease Mother      Diabetes Father      Kidney disease Father      Stroke Father      Hypertension Father      Heart disease Father  70        CABG x 3    Kidney failure Father      Heart disease Maternal Grandfather         ALLERGIES AND MEDICATIONS: updated and reviewed.  Review of patient's allergies indicates:  No Known Allergies  Current Medications[1]    ROS  Review of Systems   Constitutional:  Negative for activity change, chills and fever.   HENT:  Negative for congestion and hearing loss.    Eyes:  Negative for pain and visual disturbance.   Respiratory:  Negative for cough and shortness of breath.    Cardiovascular:  Negative for chest pain and palpitations.   Gastrointestinal:  Negative for abdominal pain, constipation, diarrhea, nausea and vomiting.   Endocrine: Negative.    Genitourinary: Negative.    Musculoskeletal:  Negative for arthralgias and myalgias.   Skin: Negative.    Allergic/Immunologic: Negative.    Neurological:  Negative for dizziness, light-headedness and headaches.   Hematological: Negative.          OBJECTIVE     Physical Exam  Vitals:    05/27/25 1534   BP: 132/86   Pulse: 86    Body mass index is 34.71 kg/m².  Weight: 112.9 kg (248 lb 14.4 oz)   Height: 5' 11" (180.3 cm)     Physical Exam  Vitals reviewed.   Constitutional:       General: He is not in acute distress.     Appearance: Normal appearance.   HENT: "      Head: Normocephalic and atraumatic.      Mouth/Throat:      Mouth: Mucous membranes are moist.      Pharynx: Oropharynx is clear.   Eyes:      Extraocular Movements: Extraocular movements intact.      Conjunctiva/sclera: Conjunctivae normal.      Pupils: Pupils are equal, round, and reactive to light.   Cardiovascular:      Rate and Rhythm: Normal rate and regular rhythm.      Pulses: Normal pulses.      Heart sounds: Normal heart sounds.   Pulmonary:      Effort: Pulmonary effort is normal.      Breath sounds: Normal breath sounds.   Abdominal:      General: Bowel sounds are normal. There is no distension.      Palpations: Abdomen is soft. There is no mass.      Tenderness: There is no abdominal tenderness. There is no guarding.   Musculoskeletal:         General: Normal range of motion.      Cervical back: Normal range of motion and neck supple. No rigidity or tenderness.      Right lower leg: No edema.      Left lower leg: No edema.   Lymphadenopathy:      Cervical: No cervical adenopathy.   Skin:     General: Skin is warm and dry.   Neurological:      General: No focal deficit present.      Mental Status: He is alert.   Psychiatric:         Mood and Affect: Mood normal.         Behavior: Behavior normal.           ASSESSMENT     49 y.o. male with     1. Type 2 diabetes mellitus with stage 4 chronic kidney disease, with long-term current use of insulin    2. Primary hypertension    3. IgA nephropathy    4. Chronic kidney disease, stage 4 (severe)        PLAN:     1. Type 2 diabetes mellitus with stage 4 chronic kidney disease, with long-term current use of insulin  - Sugars stable. Can continue Humalog 4 units with dinner for now. Will need to seen more recent A1c before adjusting regimen. If controlled, can consider to switching to just basal insulin.   - Restart statin. Stressed importance for prevention of cardiovascular disease. Patient verbalized understanding.   - HEMOGLOBIN A1C; Future  - atorvastatin  (LIPITOR) 80 MG tablet; Take 1 tablet (80 mg total) by mouth once daily.  Dispense: 90 tablet; Refill: 3    2. Primary hypertension  - Controlled on valsartan. Continue.     3. IgA nephropathy  - Due for follow up with nephrology. Encouraged patient to make appointment.   - RENAL FUNCTION PANEL; Future    4. Chronic kidney disease, stage 4 (severe)  - Stable. Follow up with nephrology.   - RENAL FUNCTION PANEL; Future      RTC in 6 months       Kristofer Menard MD  Family Medicine  [facility]  05/29/2025    This document was created using voice recognition software ([employer] Fluency Direct). Although it may be edited, this document may contain errors related to incorrect recognition of the spoken word. Please call the physician if clarification is needed.       Follow up in about 6 months (around 11/27/2025) for Check up .                     [1]   Current Outpatient Medications   Medication Sig Dispense Refill    acetaminophen (TYLENOL) 500 MG tablet Take 1,000 mg by mouth every 4 (four) hours as needed for Pain.      allopurinoL (ZYLOPRIM) 100 MG tablet Take 1 tablet (100 mg total) by mouth once daily. 90 tablet 3    blood sugar diagnostic Strp 1 strip by Misc.(Non-Drug; Combo Route) route 2 (two) times daily. ICD 10: E11.22 100 each 6    blood-glucose meter kit Use as instructed to check blood sugar two times a day. ICD 10: E11.22 1 each 0    calcitRIOL (ROCALTROL) 0.25 MCG Cap Take 1 capsule (0.25 mcg total) by mouth once daily. 90 capsule 3    calcium carbonate (TUMS ORAL) Take 1 tablet by mouth daily as needed (Heartburn).      colchicine (COLCRYS) 0.6 mg tablet Take 1 tablet (0.6 mg total) by mouth daily as needed (gout flare-up). 90 tablet 0    hydrocortisone (ANUSOL-HC) 2.5 % rectal cream Place rectally 2 (two) times daily. 28 g 2    insulin glargine-yfgn (SEMGLEE,INSULIN GLARGINE-YFGN,) 100 unit/mL Soln Inject 10 Units into the skin every evening. 10 mL 5    insulin lispro  "(HUMALOG KWIKPEN INSULIN) 100 unit/mL pen Inject 4 units daily into skin with dinner. 45 mL 0    labetaloL (NORMODYNE) 100 MG tablet Take 1 tablet (100 mg total) by mouth 2 (two) times daily. For blood pressure. 180 tablet 3    lancets Misc 1 lancet by Misc.(Non-Drug; Combo Route) route 3 (three) times daily. ICD 10: E11.22 300 each 3    pen needle, diabetic (BD ULTRA-FINE DAVID PEN NEEDLE) 32 gauge x 5/32" Ndle Use to inject insulin 4 times daily 100 each 11    prednisoLONE acetate (PRED FORTE) 1 % DrpS Place 1 drop into both eyes daily as needed (Inflammation).      valsartan (DIOVAN) 320 MG tablet Take 1 tablet (320 mg total) by mouth once daily. For blood pressure. 90 tablet 3    atorvastatin (LIPITOR) 80 MG tablet Take 1 tablet (80 mg total) by mouth once daily. 90 tablet 3    sevelamer HCL (RENAGEL) 800 MG Tab Take 1 tablet (800 mg total) by mouth 3 (three) times daily with meals. (Patient taking differently: Take 800 mg by mouth daily with dinner or evening meal.) 270 tablet 3    sodium bicarbonate 650 MG tablet Take 2 tablets (1,300 mg total) by mouth 2 (two) times daily. 360 tablet 3     No current facility-administered medications for this visit.     "

## 2025-05-27 NOTE — PATIENT INSTRUCTIONS
Make an appointment with your eye doctor, you are due for an eye exam.     Follow up with your kidney doctors.

## 2025-05-29 PROBLEM — E11.9 NEWLY DIAGNOSED DIABETES: Status: RESOLVED | Noted: 2019-10-03 | Resolved: 2025-05-29

## 2025-05-30 ENCOUNTER — TELEPHONE (OUTPATIENT)
Dept: INTERNAL MEDICINE | Facility: CLINIC | Age: 50
End: 2025-05-30
Payer: COMMERCIAL

## 2025-05-30 NOTE — TELEPHONE ENCOUNTER
----- Message from Martha sent at 5/30/2025 11:43 AM CDT -----  Regarding: Pt's Wife Adalgisa called to request an Rx Refill for the pt's Diabetic Medication to be sent the the pharmacy  Type:  RX Refill RequestWho Called:  Wife Harlanefill or New Rx: Refill RX Name and Strength: Levemir 100 units How is the patient currently taking it? (ex. 1XDay): 1XDayIs this a 30 day or 90 day RX: 30 Day Supply Preferred Pharmacy with phone number:[facility] - [facility] [address] phone: [phone] or Mail Order: LocalOrdering Provider:Kristofer Menard MDWould the patient rather a call back or a response via [facility]?  [facility] Back Number: [phone]

## 2025-06-09 ENCOUNTER — TELEPHONE (OUTPATIENT)
Dept: INTERNAL MEDICINE | Facility: CLINIC | Age: 50
End: 2025-06-09
Payer: COMMERCIAL

## 2025-06-09 DIAGNOSIS — N18.4 TYPE 2 DIABETES MELLITUS WITH STAGE 4 CHRONIC KIDNEY DISEASE, WITH LONG-TERM CURRENT USE OF INSULIN: ICD-10-CM

## 2025-06-09 DIAGNOSIS — Z79.4 TYPE 2 DIABETES MELLITUS WITH STAGE 4 CHRONIC KIDNEY DISEASE, WITH LONG-TERM CURRENT USE OF INSULIN: ICD-10-CM

## 2025-06-09 DIAGNOSIS — E11.22 TYPE 2 DIABETES MELLITUS WITH STAGE 4 CHRONIC KIDNEY DISEASE, WITH LONG-TERM CURRENT USE OF INSULIN: ICD-10-CM

## 2025-06-09 NOTE — TELEPHONE ENCOUNTER
Copied from CRM #[id number]. Topic: Medications - Medication Refill  >> Jun 9, 2025  2:41 PM Jenna wrote:  Requesting an RX refill or new RX.    Is this a refill or new RX: Refill    RX name and strength (copy/paste from chart):  Levemir     Is this a 30 day or 90 day RX:     Pharmacy name and phone # (copy/paste from chart):  [facility] - [place] - [address]  [address] [place] [address]  Phone: [phone] Fax: [phone]        The doctors have asked that we provide their patients with the following 2 reminders -- prescription refills can take up to 72 hours, and a friendly reminder that in the future you can use your [facility] account to request refills: yes

## 2025-06-09 NOTE — TELEPHONE ENCOUNTER
Care Due:                  Date            Visit Type   Department     Provider  --------------------------------------------------------------------------------                                EP -                              PRIMARY      [facility] INTERNAL  Last Visit: 05-      CARE ([facility])   MEDICINE       Kristofer KAYE Sailaja                              EP -                              PRIMARY      [facility] INTERNAL  Next Visit: 12-      CARE ([facility])   MEDICINE       Kristofer A Sailaja                                                            Last  Test          Frequency    Reason                     Performed    Due Date  --------------------------------------------------------------------------------    Uric Acid...  12 months..  colchicine...............  Not Found    Overdue    [employer] Embedded Care Due Messages. Reference number: [id number].   6/09/2025 3:15:33 PM CDT

## 2025-06-16 RX ORDER — INSULIN GLARGINE-YFGN 100 [IU]/ML
10 INJECTION, SOLUTION SUBCUTANEOUS NIGHTLY
Qty: 10 ML | Refills: 5 | Status: SHIPPED | OUTPATIENT
Start: 2025-06-16

## 2025-07-02 ENCOUNTER — TELEPHONE (OUTPATIENT)
Dept: INTERNAL MEDICINE | Facility: CLINIC | Age: 50
End: 2025-07-02
Payer: COMMERCIAL

## 2025-07-02 DIAGNOSIS — Z94.0 STATUS POST DECEASED-DONOR KIDNEY TRANSPLANTATION: ICD-10-CM

## 2025-07-02 DIAGNOSIS — E11.22 TYPE 2 DIABETES MELLITUS WITH STAGE 4 CHRONIC KIDNEY DISEASE, WITH LONG-TERM CURRENT USE OF INSULIN: Primary | ICD-10-CM

## 2025-07-02 DIAGNOSIS — Z79.4 TYPE 2 DIABETES MELLITUS WITH STAGE 4 CHRONIC KIDNEY DISEASE, WITH LONG-TERM CURRENT USE OF INSULIN: Primary | ICD-10-CM

## 2025-07-02 DIAGNOSIS — E87.20 METABOLIC ACIDOSIS: ICD-10-CM

## 2025-07-02 DIAGNOSIS — N18.4 TYPE 2 DIABETES MELLITUS WITH STAGE 4 CHRONIC KIDNEY DISEASE, WITH LONG-TERM CURRENT USE OF INSULIN: Primary | ICD-10-CM

## 2025-07-02 NOTE — TELEPHONE ENCOUNTER
Copied from CRM #[id number]. Topic: Medications - Pharmacy  >> Jul 2, 2025  9:10 AM Elsy wrote:  Pharmacy is calling to clarify an RX.    RX name:  levemir flextouch     What do they need to clarify:  pt would prefer to have this over the vials.     Pharmacy Contact Name and phone number:     [facility] - [place] - [address]  [address]  [place] [address]  Phone: [phone] Fax: [phone]        Comments:

## 2025-07-02 NOTE — TELEPHONE ENCOUNTER
Copied from CRM #[id number]. Topic: Medications - Medication Refill  >> Jul 2, 2025  1:39 PM Diana wrote:  Requesting an RX refill or new RX.    Is this a refill or new RX:  Refill     RX name and strength   1.  Insulin glargine-yfgn (SEMGLEE,INSULIN GLARGINE-YFGN,) 100 unit/mL injection  ( Patient would like to get the pen) 5 Refill     2.sodium bicarbonate 650 MG tablet.    Is this a 30 day or 90 day   Pharmacy name and phone #     [facility] - [place], [place] - [address]  [address] [address]  Phone: [phone] Fax: [phone]     Who called and call back number: Patient Phone [phone]    The doctors have asked that we provide their patients with the following 2 reminders -- prescription refills can take up to 72 hours, and a friendly reminder that in the future you can use your [facility] account to request refills:

## 2025-07-02 NOTE — TELEPHONE ENCOUNTER
Pharmacy is calling to clarify an RX.            RX name:  levemir flextouch            What do they need to clarify:  pt would prefer to have this over the vials.            Pharmacy Contact Name and phone number:            [facility] - [place] - [address]      [address]      [place] [address]      Phone: [phone] Fax: [phone]

## 2025-07-07 RX ORDER — SODIUM BICARBONATE 650 MG/1
1300 TABLET ORAL 2 TIMES DAILY
Qty: 360 TABLET | Refills: 3 | Status: SHIPPED | OUTPATIENT
Start: 2025-07-07 | End: 2026-07-07

## 2025-07-09 ENCOUNTER — TELEPHONE (OUTPATIENT)
Dept: INTERNAL MEDICINE | Facility: CLINIC | Age: 50
End: 2025-07-09
Payer: COMMERCIAL

## 2025-07-09 DIAGNOSIS — E11.9 TYPE 2 DIABETES MELLITUS WITHOUT COMPLICATION, UNSPECIFIED WHETHER LONG TERM INSULIN USE: ICD-10-CM

## 2025-07-09 NOTE — TELEPHONE ENCOUNTER
Copied from CRM #[id number]. Topic: General Inquiry - Patient Advice  >> Jul 9, 2025  1:48 PM Aashish wrote:  .1MEDICALADVICE     Patient is calling for Medical Advice regarding:insulin detemir U-100, Levemir, 100 unit/mL (3 mL) SubQ InPn pen    How long has patient had these symptoms:    Pharmacy name and phone#:[facility] - [place], [place] - [address]  [address] [place] [address]  Phone: [phone] Fax: [phone]        Patient wants a call back or thru [facility], provide patient's call back phone number:Call back    Comments: Pt wife  would like a call back to discuss changing her  med due to it being discontinued and needs something to sub for it. Please call to advise [phone]     Please advise patient replies from provider may take up to 48 hours.

## 2025-07-11 DIAGNOSIS — N18.4 TYPE 2 DIABETES MELLITUS WITH STAGE 4 CHRONIC KIDNEY DISEASE, WITH LONG-TERM CURRENT USE OF INSULIN: ICD-10-CM

## 2025-07-11 DIAGNOSIS — E11.22 TYPE 2 DIABETES MELLITUS WITH STAGE 4 CHRONIC KIDNEY DISEASE, WITH LONG-TERM CURRENT USE OF INSULIN: ICD-10-CM

## 2025-07-11 DIAGNOSIS — Z79.4 TYPE 2 DIABETES MELLITUS WITH STAGE 4 CHRONIC KIDNEY DISEASE, WITH LONG-TERM CURRENT USE OF INSULIN: ICD-10-CM

## 2025-07-11 NOTE — TELEPHONE ENCOUNTER
No care due was identified.  [facility] Embedded Care Due Messages. Reference number: [id number].   7/11/2025 4:49:18 PM CDT

## 2025-07-11 NOTE — TELEPHONE ENCOUNTER
Copied from CRM #[id number]. Topic: Medications - Pharmacy  >> Jul 11, 2025  9:34 AM Miranda wrote:  Pharmacy is calling to clarify an RX.    RX name:  insulin detemir U-100, Levemir, 100 unit/mL (3 mL) SubQ InPn pen    What do they need to clarify:  discontinued. Pharmacy needs another script for a different medication. Thank you    Pharmacy Contact Name and phone number:   [facility] - [place] - [address]  [address]  [place] [address]  Phone: [phone] Fax: [phone]        Comments:

## 2025-07-15 ENCOUNTER — TELEPHONE (OUTPATIENT)
Dept: INTERNAL MEDICINE | Facility: CLINIC | Age: 50
End: 2025-07-15
Payer: COMMERCIAL

## 2025-07-15 NOTE — TELEPHONE ENCOUNTER
Copied from CRM #[id number]. Topic: Medications - Pharmacy  >> Jul 15, 2025 12:51 PM Nesha wrote:  Pharmacy called, requested a call back in regards message left on 07/11/25, about change of medication. Levemir. Please call at [phone]. Thank you.

## 2025-07-18 ENCOUNTER — TELEPHONE (OUTPATIENT)
Dept: INTERNAL MEDICINE | Facility: CLINIC | Age: 50
End: 2025-07-18
Payer: COMMERCIAL

## 2025-07-18 NOTE — TELEPHONE ENCOUNTER
Copied from CRM #[id number]. Topic: Medications - New Medication Request  >> Jul 18, 2025 10:00 AM Day wrote:  Ms. Loraine dyson Pharmacy Tech at [facility] - [place], [place] - [address]  [facility] [address]. Said that they no longer carry the Levemir, and she would like for you to send a new script for the patient.

## 2025-08-13 DIAGNOSIS — E11.9 TYPE 2 DIABETES MELLITUS WITHOUT COMPLICATION: ICD-10-CM

## (undated) DEVICE — BOWL STERILE LARGE 32OZ

## (undated) DEVICE — PACK ULTRASOUND BIOPSY CUSTOM

## (undated) DEVICE — NDL BIOPSY CHIBA 22G X 15CM

## (undated) DEVICE — NDL MONOPTY BIOPSY 18GX16CM